# Patient Record
Sex: FEMALE | Race: WHITE | NOT HISPANIC OR LATINO | Employment: OTHER | URBAN - METROPOLITAN AREA
[De-identification: names, ages, dates, MRNs, and addresses within clinical notes are randomized per-mention and may not be internally consistent; named-entity substitution may affect disease eponyms.]

---

## 2017-01-16 DIAGNOSIS — I82.409 ACUTE EMBOLISM AND THROMBOSIS OF DEEP VEIN OF LOWER EXTREMITY (HCC): ICD-10-CM

## 2017-02-08 ENCOUNTER — GENERIC CONVERSION - ENCOUNTER (OUTPATIENT)
Dept: OTHER | Facility: OTHER | Age: 62
End: 2017-02-08

## 2017-02-27 DIAGNOSIS — I82.409 ACUTE EMBOLISM AND THROMBOSIS OF DEEP VEIN OF LOWER EXTREMITY (HCC): ICD-10-CM

## 2017-04-10 DIAGNOSIS — I82.409 ACUTE EMBOLISM AND THROMBOSIS OF DEEP VEIN OF LOWER EXTREMITY (HCC): ICD-10-CM

## 2017-06-09 ENCOUNTER — GENERIC CONVERSION - ENCOUNTER (OUTPATIENT)
Dept: OTHER | Facility: OTHER | Age: 62
End: 2017-06-09

## 2017-06-15 ENCOUNTER — ALLSCRIPTS OFFICE VISIT (OUTPATIENT)
Dept: OTHER | Facility: OTHER | Age: 62
End: 2017-06-15

## 2017-06-22 DIAGNOSIS — D68.59 OTHER PRIMARY THROMBOPHILIA (HCC): ICD-10-CM

## 2017-06-22 DIAGNOSIS — E78.5 HYPERLIPIDEMIA: ICD-10-CM

## 2017-06-22 DIAGNOSIS — E11.9 TYPE 2 DIABETES MELLITUS WITHOUT COMPLICATIONS (HCC): ICD-10-CM

## 2017-06-27 ENCOUNTER — ALLSCRIPTS OFFICE VISIT (OUTPATIENT)
Dept: OTHER | Facility: OTHER | Age: 62
End: 2017-06-27

## 2017-06-29 ENCOUNTER — APPOINTMENT (OUTPATIENT)
Dept: WOUND CARE | Facility: HOSPITAL | Age: 62
End: 2017-06-29
Payer: COMMERCIAL

## 2017-06-29 PROCEDURE — 99213 OFFICE O/P EST LOW 20 MIN: CPT

## 2017-07-07 ENCOUNTER — TRANSCRIBE ORDERS (OUTPATIENT)
Dept: WOUND CARE | Facility: HOSPITAL | Age: 62
End: 2017-07-07

## 2017-07-07 DIAGNOSIS — R60.0 LOCALIZED EDEMA: ICD-10-CM

## 2017-07-07 DIAGNOSIS — I87.2 UNSPECIFIED VENOUS (PERIPHERAL) INSUFFICIENCY: Primary | ICD-10-CM

## 2017-07-10 ENCOUNTER — HOSPITAL ENCOUNTER (OUTPATIENT)
Dept: ULTRASOUND IMAGING | Facility: HOSPITAL | Age: 62
Discharge: HOME | End: 2017-07-10
Attending: PODIATRIST
Payer: COMMERCIAL

## 2017-07-10 DIAGNOSIS — R60.0 LOCALIZED EDEMA: ICD-10-CM

## 2017-07-10 DIAGNOSIS — I87.2 UNSPECIFIED VENOUS (PERIPHERAL) INSUFFICIENCY: ICD-10-CM

## 2017-07-10 PROCEDURE — 93971 EXTREMITY STUDY: CPT

## 2017-07-13 ENCOUNTER — APPOINTMENT (OUTPATIENT)
Dept: WOUND CARE | Facility: HOSPITAL | Age: 62
End: 2017-07-13
Payer: COMMERCIAL

## 2017-07-13 PROCEDURE — 29580 STRAPPING UNNA BOOT: CPT

## 2017-07-20 ENCOUNTER — APPOINTMENT (OUTPATIENT)
Dept: WOUND CARE | Facility: HOSPITAL | Age: 62
End: 2017-07-20

## 2017-07-20 PROCEDURE — 99213 OFFICE O/P EST LOW 20 MIN: CPT

## 2017-07-20 PROCEDURE — 29580 STRAPPING UNNA BOOT: CPT

## 2017-07-25 ENCOUNTER — ALLSCRIPTS OFFICE VISIT (OUTPATIENT)
Dept: OTHER | Facility: OTHER | Age: 62
End: 2017-07-25

## 2017-07-25 ENCOUNTER — GENERIC CONVERSION - ENCOUNTER (OUTPATIENT)
Dept: OTHER | Facility: OTHER | Age: 62
End: 2017-07-25

## 2017-07-27 DIAGNOSIS — D68.59 OTHER PRIMARY THROMBOPHILIA (HCC): ICD-10-CM

## 2017-08-31 DIAGNOSIS — D68.59 OTHER PRIMARY THROMBOPHILIA (HCC): ICD-10-CM

## 2017-10-05 DIAGNOSIS — D68.59 OTHER PRIMARY THROMBOPHILIA (HCC): ICD-10-CM

## 2017-10-05 DIAGNOSIS — M79.604 PAIN OF RIGHT LEG: ICD-10-CM

## 2017-10-09 ENCOUNTER — GENERIC CONVERSION - ENCOUNTER (OUTPATIENT)
Dept: OTHER | Facility: OTHER | Age: 62
End: 2017-10-09

## 2017-10-27 ENCOUNTER — ALLSCRIPTS OFFICE VISIT (OUTPATIENT)
Dept: OTHER | Facility: OTHER | Age: 62
End: 2017-10-27

## 2017-10-28 NOTE — PROGRESS NOTES
Assessment  1  Hypercoagulable state (289 81) (D68 59)   2  Hyperlipidemia (272 4) (E78 5)   3  Hypertension (401 9) (I10)   4  Type 2 diabetes mellitus (250 00) (E11 9)   5  Leg pain, right (729 5) (M79 604)    Plan  Leg pain, right    · * XR TIBIA FIBULA 2 VIEW RIGHT; Status:Active; Requested DDS:27BTJ6067;   Need for influenza vaccination    · Fluzone Quadrivalent Intramuscular Suspension  Type 2 diabetes mellitus    · Begin a limited exercise program ; Status:Complete;   Done: 77FSB9607    Discussion/Summary  Possible side effects of new medications were reviewed with the patient/guardian today  The treatment plan was reviewed with the patient/guardian  The patient/guardian understands and agrees with the treatment plan      Chief Complaint  Patient is here today for follow up of chronic conditions described in HPI  History of Present Illness  She feel on her knees in September  Ever since, she has pain on the medical lower leg  It hurts to walk  The pain is getting worse  DM is well controlled on her current regimen  She has no hypoglycemia or side effects from her medications  ha a hypercoagulable state and is on lifelong anticoagulation  is using CPAP nightly  BP is well controlled on her current regimen  She has no HA or vision changes  She has no CP or SOB  lipids are at goal on her current regimen  She has no myalgia or muscle weakness  Review of Systems    Constitutional: No fever, no chills, feels well, no tiredness, no recent weight gain or weight loss  Eyes: No complaints of eye pain, no red eyes, no eyesight problems, no discharge, no dry eyes, no itching of eyes  ENT: no complaints of earache, no loss of hearing, no nose bleeds, no nasal discharge, no sore throat, no hoarseness  Cardiovascular: No complaints of slow heart rate, no fast heart rate, no chest pain, no palpitations, no leg claudication, no lower extremity edema     Respiratory: No complaints of shortness of breath, no wheezing, no cough, no SOB on exertion, no orthopnea, no PND  Gastrointestinal: No complaints of abdominal pain, no constipation, no nausea or vomiting, no diarrhea, no bloody stools  Genitourinary: No complaints of dysuria, no incontinence, no pelvic pain, no dysmenorrhea, no vaginal discharge or bleeding  Musculoskeletal: No complaints of arthralgias, no myalgias, no joint swelling or stiffness, no limb pain or swelling  Integumentary: No complaints of skin rash or lesions, no itching, no skin wounds, no breast pain or lump  Neurological: No complaints of headache, no confusion, no convulsions, no numbness, no dizziness or fainting, no tingling, no limb weakness, no difficulty walking  Psychiatric: Not suicidal, no sleep disturbance, no anxiety or depression, no change in personality, no emotional problems  Endocrine: No complaints of proptosis, no hot flashes, no muscle weakness, no deepening of the voice, no feelings of weakness  Hematologic/Lymphatic: No complaints of swollen glands, no swollen glands in the neck, does not bleed easily, does not bruise easily  Active Problems  1  Acute embolism and thrombosis of unspecified deep veins of unspecified lower   extremity (453 40) (I82 409)   2  Chronic venous hypertension w ulceration (459 31) (I87 319)   3  DVT (deep venous thrombosis) (453 40) (I82 409)   4  Edema (782 3) (R60 9)   5  Encounter for screening mammogram for breast cancer (V76 12) (Z12 31)   6  Esophageal reflux (530 81) (K21 9)   7  Denied: History of substance abuse   8  Homocystinemia (270 4) (E72 11)   9  Hypercoagulable state (289 81) (D68 59)   10  Hyperlipidemia (272 4) (E78 5)   11  Hypertension (401 9) (I10)   12  Denied: History of Mental health disorder   13  Morbid or severe obesity due to excess calories (278 01) (E66 01)   14  Muscle cramp, nocturnal (729 82) (R25 2)   15  Osteoporosis (733 00) (M81 0)   16   Polycystic ovarian syndrome (256 4) (E28 2) 17  Pulmonary hypertension, secondary (416 8)   18  Screen for colon cancer (V76 51) (Z12 11)   19  Skin rash (782 1) (R21)   20  Type 2 diabetes mellitus (250 00) (E11 9)    Past Medical History  1  Denied: History of substance abuse   2  Denied: History of Mental health disorder   3  History of Venous Thrombosis Of The Deep Vessels Of The Proximal Lower Extremity   (453 41)    Surgical History  1  History of Appendectomy   2  History of Biopsy Breast Open   3  History of Closed Treatment Of Fracture Of Metatarsal Bone(S) (Each)   4  History of Foot Surgery Right   5  History of Hemorrhoidectomy   6  History of Hysterectomy   7  History of Interruption Inferior Vena Cava Easton Filter Placement   8  History of Knee Replacement   9  History of Knee Replacement   10  History of Renal Lithotripsy    Family History  Mother    1  Family history of Primary Hypercoagulable State   2  Family history of Pulmonary Embolism  Maternal Grandmother    3  Family history of Primary Hypercoagulable State  Family History    4  Denied: Family history of mental disorder   5  Denied: Family history of substance abuse    Social History   · Denied: History of Alcohol   · Never A Smoker   · Unemployed (V62 0)    Current Meds   1  Alendronate Sodium 70 MG Oral Tablet; TAKE 1 TABLET ONCE WEEKLY Recorded   2  Amlodipine Besy-Benazepril HCl - 5-20 MG Oral Capsule; take 1 capsule daily; Therapy: 08USF5640 to (Evaluate:59Jty3034)  Requested for: 78Cvf8992; Last   Rx:71Rnc8484 Ordered   3  Breo Ellipta 100-25 MCG/INH Inhalation Aerosol Powder Breath Activated; USE 1   INHALATION ONCE DAILY; Therapy: 05Dmp7833 to (Evaluate:67Rvy0376); Last Rx:75Cqy9214 Ordered   4  Enoxaparin Sodium 40 MG/0 4ML Subcutaneous Solution; daily sq as directed; Therapy: 79Osl9814 to (Last Rx:10Bax9993) Ordered   5  Folbic 2 5-25-2 MG Oral Tablet; TAKE 1 TABLET Other Monday, wednesday and Friday;    Therapy: 35CAS7653 to (Chetna Catalan)  Requested for: 48ZBO9520; Last   Rx:09Mar2015 Ordered   6  Fosamax Plus D  MG-UNIT Oral Tablet; Therapy: (Recorded:33Lbp5014) to Recorded   7  Furosemide 20 MG Oral Tablet; Take 1 tablet daily; Therapy: 56AEP4721 to (Evaluate:89Yqv6805)  Requested for: 72Poo7579; Last   Rx:79Knb7293 Ordered   8  Lansoprazole 30 MG TBDP; Therapy: 39ROG3583 to (Last Rx:85Vsl8113)  Requested for: 92OST4130 Ordered   9  Loratadine 10 MG Oral Tablet; Therapy: (Recorded:15Eow3493) to Recorded   10  MetFORMIN HCl  MG Oral Tablet Extended Release 24 Hour; TAKE 4 TABLET    Bedtime; Therapy: 97RQA8866 to (Evaluate:74Cgh0120)  Requested for: 96Aqq8054; Last    Rx:60Noy6864 Ordered   11  Montelukast Sodium 10 MG Oral Tablet; Take 1 tablet daily  Requested for: 96Btt7092;    Last Rx:15Byj3257 Ordered   12  Multi-Day Vitamins TABS; Therapy: (Recorded:19Dlj0476) to Recorded   13  Nasonex 50 MCG/ACT Nasal Suspension; Therapy: (Recorded:20Nov2013) to Recorded   14  Nystatin 362047 UNIT/GM External Powder; APPLY 2-3 TIMES DAILY TO AFFECTED    AREA(S)  Requested for: 71UGP0199; Last MW:11QAD2563 Ordered   15  Pravastatin Sodium 40 MG Oral Tablet; Therapy: 22Quu7916 to (Last Rx:90Dqu7818)  Requested for: 38Gpc4929 Ordered   16  Vitamin D (Ergocalciferol) 84345 UNIT Oral Capsule; Therapy: 82EDT6054 to (Last Rx:30Mar2011)  Requested for: 30Mar2011 Ordered   17  Warfarin Sodium 5 MG Oral Tablet; TAKE 1 5 tabs daily or  as directed; Therapy: 41XOE3387 to (Evaluate:19Nov2017)  Requested for: 37JAY8630; Last    Rx:99Bod4260 Ordered   18  Zyrtec 10 MG TABS; take 1 tablet daily as needed Recorded    Allergies  1  Ciprofloxacin HCl TABS    Physical Exam    Constitutional   General appearance: No acute distress, well appearing and well nourished  Pulmonary   Respiratory effort: No increased work of breathing or signs of respiratory distress  Auscultation of lungs: Clear to auscultation      Cardiovascular   Auscultation of heart: Normal rate and rhythm, normal S1 and S2, without murmurs  Examination of extremities for edema and/or varicosities: Normal     Abdomen   Abdomen: Non-tender, no masses  Liver and spleen: No hepatomegaly or splenomegaly  Health Management  Encounter for screening mammogram for breast cancer   * MAMMO SCREENING BILATERAL W CAD; every 1 year; Last 57RFR8288; Next Due: 39WKG9024; Overdue  Screen for colon cancer   COLONOSCOPY; every 10 years; Last 10QMP0617; Next Due: 44IKC6416;  Overdue    Future Appointments    Date/Time Provider Specialty Site   12/14/2017 01:00 PM Sole Saldana MD [facility] [address] [place] [address]     Signatures   Electronically signed by : Yolanda Peña MD; Oct 27 2017  1:36PM EST                       (Author)

## 2017-11-09 DIAGNOSIS — D68.59 OTHER PRIMARY THROMBOPHILIA (HCC): ICD-10-CM

## 2017-12-11 ENCOUNTER — GENERIC CONVERSION - ENCOUNTER (OUTPATIENT)
Dept: OTHER | Facility: OTHER | Age: 62
End: 2017-12-11

## 2017-12-14 DIAGNOSIS — D68.59 OTHER PRIMARY THROMBOPHILIA (HCC): ICD-10-CM

## 2017-12-14 DIAGNOSIS — E72.11 HOMOCYSTINURIA (HCC): ICD-10-CM

## 2017-12-14 DIAGNOSIS — I82.409 ACUTE EMBOLISM AND THROMBOSIS OF DEEP VEIN OF LOWER EXTREMITY (HCC): ICD-10-CM

## 2017-12-26 ENCOUNTER — GENERIC CONVERSION - ENCOUNTER (OUTPATIENT)
Dept: OTHER | Facility: OTHER | Age: 62
End: 2017-12-26

## 2017-12-27 ENCOUNTER — GENERIC CONVERSION - ENCOUNTER (OUTPATIENT)
Dept: OTHER | Facility: OTHER | Age: 62
End: 2017-12-27

## 2017-12-28 ENCOUNTER — ALLSCRIPTS OFFICE VISIT (OUTPATIENT)
Dept: OTHER | Facility: OTHER | Age: 62
End: 2017-12-28

## 2018-01-02 LAB
LEFT EYE DIABETIC RETINOPATHY: NORMAL
RIGHT EYE DIABETIC RETINOPATHY: NORMAL

## 2018-01-09 NOTE — MISCELLANEOUS
Message  previously had left a message on [employer]'s machine for her to change her dosage than she called and stated she was taking Keflex so [employer] told her to remain on the same dosage she was previously taking and retest next week  Patient understood the instructions  Active Problems    1  Acute embolism and thrombosis of unspecified deep veins of unspecified lower   extremity (453 40) (I82 409)   2  Chronic venous hypertension w ulceration (459 31) (I87 319)   3  DVT (deep venous thrombosis) (453 40) (I82 409)   4  Edema (782 3) (R60 9)   5  Esophageal reflux (530 81) (K21 9)   6  Homocystinemia (270 4) (E72 11)   7  Hypercoagulable state (289 81) (D68 59)   8  Hyperlipidemia (272 4) (E78 5)   9  Hypertension (401 9) (I10)   10  Morbid or severe obesity due to excess calories (278 01) (E66 01)   11  Muscle cramp, nocturnal (729 82) (R25 2)   12  Osteoporosis (733 00) (M81 0)   13  Polycystic ovarian syndrome (256 4) (E28 2)   14  Pulmonary hypertension, secondary (416 8) (I27 2)   15  Skin rash (782 1) (R21)   16  Type 2 diabetes mellitus (250 00) (E11 9)    Current Meds   1  Alendronate Sodium 70 MG Oral Tablet; TAKE 1 TABLET ONCE WEEKLY Recorded   2  Amlodipine Besy-Benazepril HCl - 5-20 MG Oral Capsule; Therapy: 92EBP4198 to (Last Rx:36Gwm3258)  Requested for: 64MSC2525 Ordered   3  Breo Ellipta 100-25 MCG/INH Inhalation Aerosol Powder Breath Activated; USE 1   INHALATION ONCE DAILY; Therapy: 85Sgw0624 to (Evaluate:75Nzs4817); Last Rx:63Aqp7441 Ordered   4  Breo Ellipta 100-25 MCG/INH Inhalation Aerosol Powder Breath Activated; Therapy: (Recorded:05Oct2016) to Recorded   5  Enoxaparin Sodium 40 MG/0 4ML Subcutaneous Solution; daily sq as directed; Therapy: 31Rwn4724 to (Last Rx:15Nsj9833) Ordered   6  Enoxaparin Sodium 40 MG/0 4ML Subcutaneous Solution; daily sq as directed; Therapy: 56Xfo1279 to (Last Rx:36Fem1454) Ordered   7   Folbic 2 5-25-2 MG Oral Tablet; TAKE 1 TABLET Other Monday, wednesday and Friday; Therapy: 26GCE4264 to (Leanna Ferris)  Requested for: 34VYS6122; Last   Rx:09Mar2015 Ordered   8  Fosamax Plus D  MG-UNIT Oral Tablet; Therapy: (Recorded:79Zfd6202) to Recorded   9  Lansoprazole 30 MG TBDP; Therapy: 92UGB9750 to (Last Rx:72Wha9344)  Requested for: 86KPK8122 Ordered   10  Loratadine 10 MG Oral Tablet; Therapy: (Recorded:16Gtp0015) to Recorded   11  MetFORMIN HCl  MG Oral Tablet Extended Release 24 Hour; Therapy: 74JOJ9622 to (Last Rx:30Mar2011)  Requested for: 85YQX3100 Ordered   12  Multi-Day Vitamins TABS; Therapy: (Recorded:93Axp3095) to Recorded   13  Nasonex 50 MCG/ACT Nasal Suspension (Mometasone Furoate); Therapy: (Recorded:20Nov2013) to Recorded   14  Nystatin 952712 UNIT/GM External Powder; APPLY 2-3 TIMES DAILY TO AFFECTED    AREA(S)  Requested for: 61YQE0550; Last RN:95RLS6503 Ordered   15  Pravastatin Sodium 40 MG Oral Tablet; Therapy: 21Vdd4543 to (Last Rx:16Yjk7649)  Requested for: 10Thb2806 Ordered   16  Singulair 10 MG Oral Tablet (Montelukast Sodium); Take 1 tablet daily Recorded   17  Vitamin D (Ergocalciferol) 75311 UNIT Oral Capsule; Therapy: 03ZTL7802 to (Last Rx:30Mar2011)  Requested for: 41NXC7982 Ordered   18  Warfarin Sodium 5 MG Oral Tablet; TAKE 1 5 tabs daily or  as directed; Therapy: 70CRX5576 to (Evaluate:19Nov2017)  Requested for: 84WEF8651; Last    Rx:71Toc4063 Ordered   19  Zyrtec 10 MG TABS; take 1 tablet daily as needed Recorded    Allergies    1   Ciprofloxacin HCl TABS    Signatures   Electronically signed by : Margery Shone, MA; Jun 9 2017  2:04PM EST                       (Author)

## 2018-01-10 NOTE — MISCELLANEOUS
Message   Recorded as Task   Date: 06/08/2017 03:59 PM, Created By: Fercho Downing   Task Name: Call Back   Assigned To: Krissy Yarbrough   Regarding Patient: José Miguel De Souza, Status: In Progress   Natty Yun - 08 Jun 2017 3:59 PM     TASK CREATED  Caller: Self; Results Inquiry; [phone] (Home)  req that we call her back with the results of her coumadin that was taken yesterday 6 7 17 at [facility]  Call back # [phone]   [facility] - 08 Jun 2017 4:30 PM     TASK IN PROGRESS   [facility] - 09 Jun 2017 10:56 AM     TASK REPLIED TO: Previously Assigned To [facility]  called patient and left voicemail on new instructions  call noted      Active Problems    1  Acute embolism and thrombosis of unspecified deep veins of unspecified lower   extremity (453 40) (I82 409)   2  Chronic venous hypertension w ulceration (459 31) (I87 319)   3  DVT (deep venous thrombosis) (453 40) (I82 409)   4  Edema (782 3) (R60 9)   5  Esophageal reflux (530 81) (K21 9)   6  Homocystinemia (270 4) (E72 11)   7  Hypercoagulable state (289 81) (D68 59)   8  Hyperlipidemia (272 4) (E78 5)   9  Hypertension (401 9) (I10)   10  Morbid or severe obesity due to excess calories (278 01) (E66 01)   11  Muscle cramp, nocturnal (729 82) (R25 2)   12  Osteoporosis (733 00) (M81 0)   13  Polycystic ovarian syndrome (256 4) (E28 2)   14  Pulmonary hypertension, secondary (416 8) (I27 2)   15  Skin rash (782 1) (R21)   16  Type 2 diabetes mellitus (250 00) (E11 9)    Current Meds   1  Alendronate Sodium 70 MG Oral Tablet; TAKE 1 TABLET ONCE WEEKLY Recorded   2  Amlodipine Besy-Benazepril HCl - 5-20 MG Oral Capsule; Therapy: 11ELZ6013 to (Last Rx:57Cxl3781)  Requested for: 95QUQ9592 Ordered   3  Breo Ellipta 100-25 MCG/INH Inhalation Aerosol Powder Breath Activated; USE 1   INHALATION ONCE DAILY; Therapy: 87Nfl2705 to (Evaluate:59Bwm8087); Last Rx:69Aba5683 Ordered   4   Breo Ellipta 100-25 MCG/INH Inhalation Aerosol Powder Breath Activated; Therapy: (Recorded:71Amp7476) to Recorded   5  Enoxaparin Sodium 40 MG/0 4ML Subcutaneous Solution; daily sq as directed; Therapy: 82Cbf4572 to (Last Rx:25Apr2016) Ordered   6  Enoxaparin Sodium 40 MG/0 4ML Subcutaneous Solution; daily sq as directed; Therapy: 97Xhn8328 to (Last Rx:08Pte6055) Ordered   7  Folbic 2 5-25-2 MG Oral Tablet; TAKE 1 TABLET Other Monday, wednesday and Friday; Therapy: 50ORH2559 to (Andrés [place])  Requested for: 69HGC5561; Last   Rx:09Mar2015 Ordered   8  Fosamax Plus D  MG-UNIT Oral Tablet; Therapy: (Recorded:00Zxq8829) to Recorded   9  Lansoprazole 30 MG TBDP; Therapy: 05XFF1392 to (Last Rx:36Air1185)  Requested for: 65XLA3505 Ordered   10  Loratadine 10 MG Oral Tablet; Therapy: (Recorded:13Uko9389) to Recorded   11  MetFORMIN HCl  MG Oral Tablet Extended Release 24 Hour; Therapy: 67JCU0318 to (Last Rx:30Mar2011)  Requested for: 55EKX5453 Ordered   12  Multi-Day Vitamins TABS; Therapy: (Recorded:93Fhr0268) to Recorded   13  Nasonex 50 MCG/ACT Nasal Suspension (Mometasone Furoate); Therapy: (Recorded:20Nov2013) to Recorded   14  Nystatin 668003 UNIT/GM External Powder; APPLY 2-3 TIMES DAILY TO AFFECTED    AREA(S)  Requested for: 32UNH6871; Last OV:67THS5920 Ordered   15  Pravastatin Sodium 40 MG Oral Tablet; Therapy: 79Mxi0793 to (Last Rx:13Apr2011)  Requested for: 62Emt2133 Ordered   16  Singulair 10 MG Oral Tablet (Montelukast Sodium); Take 1 tablet daily Recorded   17  Vitamin D (Ergocalciferol) 53613 UNIT Oral Capsule; Therapy: 51VPJ6369 to (Last Rx:30Mar2011)  Requested for: 48XEH8669 Ordered   18  Warfarin Sodium 5 MG Oral Tablet; TAKE 1 5 tabs daily or  as directed; Therapy: 86GCE1676 to (Evaluate:19Nov2017)  Requested for: 28NLM1314; Last    Rx:13Ehk1164 Ordered   19  Zyrtec 10 MG TABS; take 1 tablet daily as needed Recorded    Allergies    1   Ciprofloxacin HCl TABS    Signatures   Electronically signed by : Jacinto Delacruz MA; Jun 9 2017 10:56AM EST                       (Author)

## 2018-01-11 ENCOUNTER — GENERIC CONVERSION - ENCOUNTER (OUTPATIENT)
Dept: OTHER | Facility: OTHER | Age: 63
End: 2018-01-11

## 2018-01-11 NOTE — MISCELLANEOUS
Message   Recorded as Task   Date: 06/28/2016 03:30 PM, Created By: Osvaldo Stein   Task Name: Call Back   Assigned To: Cherl Libman   Regarding Patient: Heidi Ramirez, Status: In Progress   Comment:    Jessica Parekh - 28 Jun 2016 3:30 PM     TASK CREATED  Caller: Self; Other; [phone] (Home)  RE HER DENTIST APPT FOR TEETH EXTRACTION; AFTER SHE STOPS HER CUMIDIN, DOES SHE HAVE TO GO FOR BLOODWORK BEFORE HER DENTIST APPT? ALSO, WHEN WILL SHE START HER LOVONOX SHOTS? PLEASE CALL WITH CLARIFICATION, TXClarissa Ramirez - 29 Jun 2016 9:42 AM     TASK IN PROGRESS   Spoke with dentist Dr Thien Greene yesterday afternoon about Coumadin and Lovenox bridge  Dental appt changed to 7/6/16  Spoke with pt today and instructed last dose of Coumadin Thurs 6/30/16  Start Lovenox Fri 7/1/16 through Mon 7/4/16  Nothing Tues 7/5/16  Have dentist instruct on restarting after procedure based on status post procedure  Called Dr Thien Greene at [phone] to provide update but no answer or VM  Active Problems    1  Chronic venous hypertension w ulceration (459 31) (I87 319)   2  DVT, lower extremity (453 40) (I82 409)   3  Edema (782 3) (R60 9)   4  Esophageal reflux (530 81) (K21 9)   5  Homocystinemia (270 4) (E72 11)   6  Hypercoagulable state (289 81) (D68 59)   7  Hyperlipidemia (272 4) (E78 5)   8  Hypertension (401 9) (I10)   9  Morbid or severe obesity due to excess calories (278 01) (E66 01)   10  Muscle cramp, nocturnal (729 82) (R25 2)   11  Osteoporosis (733 00) (M81 0)   12  Polycystic ovarian syndrome (256 4) (E28 2)   13  Skin rash (782 1) (R21)   14  Type 2 diabetes mellitus (250 00) (E11 9)    Current Meds   1  Alendronate Sodium 70 MG Oral Tablet; TAKE 1 TABLET ONCE WEEKLY Recorded   2  Amlodipine Besy-Benazepril HCl - 5-20 MG Oral Capsule; Therapy: 83XWS8712 to (Last Rx:19Nfi1061)  Requested for: 53BCC1299 Ordered   3  Enoxaparin Sodium 40 MG/0 4ML Subcutaneous Solution; daily sq as directed;    Therapy: 23Nzl5635 to (Last Rx:25Apr2016) Ordered   4  Folbic 2 5-25-2 MG Oral Tablet; TAKE 1 TABLET Other Monday, wednesday and Friday; Therapy: 50PLL3068 to (Judie Eisenmenger)  Requested for: 57JTS4336; Last   Rx:09Mar2015 Ordered   5  Fosamax Plus D  MG-UNIT Oral Tablet; Therapy: (Recorded:57Oyq4487) to Recorded   6  Lansoprazole 30 MG TBDP; Therapy: 86PFB6629 to (Last Rx:05Feb2011)  Requested for: 73WFV8585 Ordered   7  Loratadine 10 MG Oral Tablet; Therapy: (Recorded:11Jaf2322) to Recorded   8  MetFORMIN HCl  MG Oral Tablet Extended Release 24 Hour; Therapy: 04DCP9664 to (Last Rx:30Mar2011)  Requested for: 54PPS1713 Ordered   9  Multi-Day Vitamins Oral Tablet; Therapy: (Recorded:51Rer4935) to Recorded   10  Nasonex 50 MCG/ACT Nasal Suspension (Mometasone Furoate); Therapy: (Recorded:20Nov2013) to Recorded   11  Nystatin 753000 UNIT/GM External Powder; APPLY 2-3 TIMES DAILY TO AFFECTED    AREA(S)  Requested for: 62QMW5441; Last Rx:23Jun2015 Ordered   12  Pravastatin Sodium 40 MG Oral Tablet; Therapy: 13Apr2011 to (Last Rx:13Apr2011)  Requested for: 04Ppz5835 Ordered   13  Singulair 10 MG Oral Tablet (Montelukast Sodium); Take 1 tablet daily Recorded   14  Vitamin D (Ergocalciferol) 25714 UNIT Oral Capsule; Therapy: 17BYY9288 to (Last Rx:30Mar2011)  Requested for: 79LXK4695 Ordered   15  Warfarin Sodium 5 MG Oral Tablet; 1 5 tabs daily or as directed; Therapy: 01YLA0271 to (Last Rx:15Jhc0777)  Requested for: 56GIQ0632 Ordered   16  Zyrtec 10 MG TABS; take 1 tablet daily as needed Recorded    Allergies    1   Ciprofloxacin HCl TABS    Signatures   Electronically signed by : Nerissa Andino RN; Jun 29 2016  1:42PM EST                       (Author)

## 2018-01-12 VITALS
WEIGHT: 293 LBS | HEIGHT: 63 IN | DIASTOLIC BLOOD PRESSURE: 74 MMHG | SYSTOLIC BLOOD PRESSURE: 130 MMHG | HEART RATE: 75 BPM | BODY MASS INDEX: 51.91 KG/M2 | RESPIRATION RATE: 20 BRPM | OXYGEN SATURATION: 96 %

## 2018-01-13 VITALS
OXYGEN SATURATION: 98 % | HEART RATE: 81 BPM | HEIGHT: 63 IN | WEIGHT: 293 LBS | RESPIRATION RATE: 20 BRPM | SYSTOLIC BLOOD PRESSURE: 138 MMHG | BODY MASS INDEX: 51.91 KG/M2 | DIASTOLIC BLOOD PRESSURE: 82 MMHG

## 2018-01-13 NOTE — MISCELLANEOUS
Message  Called PT 3 times and left VM regarding her INR  I advised her to call the office at her [facility] with any questions or concerns  Active Problems    1  Chronic venous hypertension w ulceration (459 31) (I87 319)   2  DVT, lower extremity (453 40) (I82 409)   3  Edema (782 3) (R60 9)   4  Esophageal reflux (530 81) (K21 9)   5  Homocystinemia (270 4) (E72 11)   6  Hypercoagulable state (289 81) (D68 59)   7  Hyperlipidemia (272 4) (E78 5)   8  Hypertension (401 9) (I10)   9  Morbid or severe obesity due to excess calories (278 01) (E66 01)   10  Muscle cramp, nocturnal (729 82) (R25 2)   11  Osteoporosis (733 00) (M81 0)   12  Polycystic ovarian syndrome (256 4) (E28 2)   13  Skin rash (782 1) (R21)   14  Type 2 diabetes mellitus (250 00) (E11 9)    Current Meds   1  Alendronate Sodium 70 MG Oral Tablet; TAKE 1 TABLET ONCE WEEKLY Recorded   2  Amlodipine Besy-Benazepril HCl - 5-20 MG Oral Capsule; Therapy: 38DMV7345 to (Last Rx:26Pea0302)  Requested for: 26BKW4017 Ordered   3  Enoxaparin Sodium 40 MG/0 4ML Subcutaneous Solution; daily sq as directed; Therapy: 90Mnw0336 to (Last Rx:25Apr2016) Ordered   4  Folbic 2 5-25-2 MG Oral Tablet; TAKE 1 TABLET Other Monday, wednesday and Friday; Therapy: 21OQS6818 to (Anila Ceja)  Requested for: 03DTM5135; Last   Rx:09Mar2015 Ordered   5  Fosamax Plus D  MG-UNIT Oral Tablet; Therapy: (Recorded:23Lev2089) to Recorded   6  Lansoprazole 30 MG TBDP; Therapy: 29VRR9005 to (Last Rx:84Hzy4003)  Requested for: 33KNE9400 Ordered   7  Loratadine 10 MG Oral Tablet; Therapy: (Recorded:82Ney9881) to Recorded   8  MetFORMIN HCl  MG Oral Tablet Extended Release 24 Hour; Therapy: 82QTF4667 to (Last Rx:30Mar2011)  Requested for: 11MQY8409 Ordered   9  Multi-Day Vitamins Oral Tablet; Therapy: (Recorded:70Pai9973) to Recorded   10  Nasonex 50 MCG/ACT Nasal Suspension (Mometasone Furoate);     Therapy: (Recorded:20Nov2013) to Recorded 11  Nystatin 259027 UNIT/GM External Powder; APPLY 2-3 TIMES DAILY TO AFFECTED    AREA(S)  Requested for: 21WWK6533; Last Rx:23Jun2015 Ordered   12  Pravastatin Sodium 40 MG Oral Tablet; Therapy: 13Apr2011 to (Last Rx:13Apr2011)  Requested for: 13Apr2011 Ordered   13  Singulair 10 MG Oral Tablet (Montelukast Sodium); Take 1 tablet daily Recorded   14  Vitamin D (Ergocalciferol) 62336 UNIT Oral Capsule; Therapy: 85PUZ6486 to (Last Rx:30Mar2011)  Requested for: 25SWO8525 Ordered   15  Warfarin Sodium 5 MG Oral Tablet; 1 5 tabs daily or as directed; Therapy: 53VKX3593 to (Last Rx:99Wfh6118)  Requested for: 94LNO6575 Ordered   16  Zyrtec 10 MG TABS; take 1 tablet daily as needed Recorded    Allergies    1   Ciprofloxacin HCl TABS    Signatures   Electronically signed by : Shanita Huggins, ; May 12 2016  2:08PM EST                       (Author)

## 2018-01-14 VITALS
BODY MASS INDEX: 51.91 KG/M2 | RESPIRATION RATE: 20 BRPM | DIASTOLIC BLOOD PRESSURE: 76 MMHG | HEIGHT: 63 IN | TEMPERATURE: 97.6 F | WEIGHT: 293 LBS | HEART RATE: 86 BPM | OXYGEN SATURATION: 97 % | SYSTOLIC BLOOD PRESSURE: 130 MMHG

## 2018-01-14 NOTE — MISCELLANEOUS
Message   Recorded as Task   Date: 12/05/2016 12:08 PM, Created By: Edwin Pierre   Task Name: Call Back   Assigned To: Clarissa Parr   Regarding Patient: Mónica Duggan, Status: Active   Comment:    Jessica Parekh - 05 Dec 2016 12:08 PM     TASK CREATED  Caller: Self; Other; [phone] (Home)  FORGOT TO ASK DR RUSH:  PT WANTS TO KNOW IF SHE WAS EVER TESTED FOR HEP C? IF NOT, SHOULD SHE BE?  SHE HAS SEEN ALOT OF ADS ON TV ABOUT "BABY [employer]OMERS" BEING TESTED AND HER SISTER HAD PASSED WITH ONE OF HER COMPLICATIONS BEING HEP C  PLEASE CALL TO ADVISE  TXS   D/W Dr Chad Nelson called pt  Reached VM  Left message that Dr Chad Nelson has never ordered Hep C and does not know if another MD ordered  Can give you a lab slip but not sure if insurance will as can only provide dx that you are seen for here  Another option is to check with PCP  Active Problems    1  Acute embolism and thrombosis of unspecified deep veins of unspecified lower   extremity (453 40) (I82 409)   2  Chronic venous hypertension w ulceration (459 31) (I87 319)   3  DVT (deep venous thrombosis) (453 40) (I82 409)   4  Edema (782 3) (R60 9)   5  Esophageal reflux (530 81) (K21 9)   6  Homocystinemia (270 4) (E72 11)   7  Hypercoagulable state (289 81) (D68 59)   8  Hyperlipidemia (272 4) (E78 5)   9  Hypertension (401 9) (I10)   10  Morbid or severe obesity due to excess calories (278 01) (E66 01)   11  Muscle cramp, nocturnal (729 82) (R25 2)   12  Osteoporosis (733 00) (M81 0)   13  Polycystic ovarian syndrome (256 4) (E28 2)   14  Pulmonary hypertension, secondary (416 8) (I27 2)   15  Skin rash (782 1) (R21)   16  Type 2 diabetes mellitus (250 00) (E11 9)    Current Meds   1  Alendronate Sodium 70 MG Oral Tablet; TAKE 1 TABLET ONCE WEEKLY Recorded   2  Amlodipine Besy-Benazepril HCl - 5-20 MG Oral Capsule; Therapy: 21XAS4336 to (Last Rx:54Rov5584)  Requested for: 40YIU7989 Ordered   3   Breo Ellipta 100-25 MCG/INH Inhalation Aerosol Powder Breath Activated; USE 1   INHALATION ONCE DAILY; Therapy: 86Doy1014 to (Evaluate:44Off7002); Last Rx:56Imf0850 Ordered   4  Breo Ellipta 100-25 MCG/INH Inhalation Aerosol Powder Breath Activated; Therapy: (Recorded:05Oct2016) to Recorded   5  Enoxaparin Sodium 40 MG/0 4ML Subcutaneous Solution; daily sq as directed; Therapy: 93Qka5124 to (Last Rx:25Apr2016) Ordered   6  Enoxaparin Sodium 40 MG/0 4ML Subcutaneous Solution; daily sq as directed; Therapy: 96Sqa3645 to (Last Rx:51Wtv9765) Ordered   7  Folbic 2 5-25-2 MG Oral Tablet; TAKE 1 TABLET Other Monday, wednesday and Friday; Therapy: 96UGS3653 to (Fahad [place])  Requested for: 84XRZ3952; Last   Rx:09Mar2015 Ordered   8  Fosamax Plus D  MG-UNIT Oral Tablet; Therapy: (Recorded:73Man0502) to Recorded   9  Lansoprazole 30 MG TBDP; Therapy: 03PDR2060 to (Last Rx:17Mew8175)  Requested for: 66GQB2236 Ordered   10  Loratadine 10 MG Oral Tablet; Therapy: (Recorded:08Ehq7576) to Recorded   11  MetFORMIN HCl  MG Oral Tablet Extended Release 24 Hour; Therapy: 32BXY9980 to (Last Rx:30Mar2011)  Requested for: 51IDH4630 Ordered   12  Multi-Day Vitamins TABS; Therapy: (Recorded:59Opz0009) to Recorded   13  Nasonex 50 MCG/ACT Nasal Suspension (Mometasone Furoate); Therapy: (Recorded:20Nov2013) to Recorded   14  Nystatin 755358 UNIT/GM External Powder; APPLY 2-3 TIMES DAILY TO AFFECTED    AREA(S)  Requested for: 84VXQ1575; Last TR:12GXE9761 Ordered   15  Pravastatin Sodium 40 MG Oral Tablet; Therapy: 13Apr2011 to (Last Rx:13Apr2011)  Requested for: 13Apr2011 Ordered   16  Singulair 10 MG Oral Tablet (Montelukast Sodium); Take 1 tablet daily Recorded   17  Vitamin D (Ergocalciferol) 79455 UNIT Oral Capsule; Therapy: 31XTJ9940 to (Last Rx:30Mar2011)  Requested for: 34AHN4006 Ordered   18  Warfarin Sodium 5 MG Oral Tablet; TAKE 1 5 tabs daily or  as directed;     Therapy: 32QEG6396 to (Evaluate:22Ayz0917)  Requested for: 59Qyn5735; Last    Rx:38Cio1692 Ordered   19  Zyrtec 10 MG TABS; take 1 tablet daily as needed Recorded    Allergies    1   Ciprofloxacin HCl TABS    Signatures   Electronically signed by : Nerissa Andino RN; Dec  5 2016  1:04PM EST                       (Author)

## 2018-01-16 NOTE — MISCELLANEOUS
Message   Recorded as Task   Date: 01/11/2016 11:23 AM, Created By: Joanie Hamilton   Task Name: Call Back   Assigned To: Kaci Olivia   Regarding Patient: Whitney Mcconnell, Status: Active   CommentGillermina Pert - 11 Jan 2016 11:23 AM     TASK CREATED  please contact Dr Isabella Meckel @ [phone] about extracting teeth on pt  Dr Quick Never given message to contact Dr Isabella Meckel  Active Problems    1  Chronic venous hypertension w ulceration (459 31) (I87 319)   2  Edema (782 3) (R60 9)   3  Esophageal reflux (530 81) (K21 9)   4  Homocystinemia (270 4) (E72 11)   5  Hypercoagulable state (289 81) (D68 59)   6  Hyperlipidemia (272 4) (E78 5)   7  Hypertension (401 9) (I10)   8  Morbid or severe obesity due to excess calories (278 01) (E66 01)   9  Muscle cramp, nocturnal (729 82) (R25 2)   10  Osteoporosis (733 00) (M81 0)   11  Polycystic ovarian syndrome (256 4) (E28 2)   12  Skin rash (782 1) (R21)   13  Type 2 diabetes mellitus (250 00) (E11 9)    Current Meds   1  Alendronate Sodium 70 MG Oral Tablet; TAKE 1 TABLET ONCE WEEKLY Recorded   2  Amlodipine Besy-Benazepril HCl - 5-20 MG Oral Capsule; Therapy: 50VNS5661 to (Last Rx:51Jso8414)  Requested for: 83NAY7960 Ordered   3  Folbic 2 5-25-2 MG Oral Tablet; TAKE 1 TABLET Other Monday, wednesday and Friday; Therapy: 27XUE7830 to (Anila Ceja)  Requested for: 53NWN1975; Last   Rx:09Mar2015 Ordered   4  Fosamax Plus D  MG-UNIT Oral Tablet; Therapy: (Recorded:38Bte6283) to Recorded   5  Lansoprazole 30 MG TBDP; Therapy: 91BCR5571 to (Last Rx:59Rtf2442)  Requested for: 34QXJ9472 Ordered   6  Loratadine 10 MG Oral Tablet; Therapy: (Recorded:40Eak7632) to Recorded   7  MetFORMIN HCl  MG Oral Tablet Extended Release 24 Hour; Therapy: 07SIQ4435 to (Last Rx:30Mar2011)  Requested for: 06RGM1965 Ordered   8  Multi-Day Vitamins Oral Tablet; Therapy: (Recorded:70Yyj6127) to Recorded   9  Nasonex 50 MCG/ACT Nasal Suspension;    Therapy: (Recorded:20Nov2013) to Recorded   10  Nystatin 338319 UNIT/GM External Powder; APPLY 2-3 TIMES DAILY TO AFFECTED    AREA(S)  Requested for: 72OLK5840; Last Rx:23Jun2015 Ordered   11  Pravastatin Sodium 40 MG Oral Tablet; Therapy: 13Apr2011 to (Last Rx:13Apr2011)  Requested for: 13Apr2011 Ordered   12  Singulair 10 MG Oral Tablet (Montelukast Sodium); Take 1 tablet daily Recorded   13  Vitamin D (Ergocalciferol) 35994 UNIT Oral Capsule; Therapy: 94TQH9875 to (Last Rx:30Mar2011)  Requested for: 66VDU8974 Ordered   14  Warfarin Sodium 5 MG Oral Tablet; 1 5 tabs daily or as directed; Therapy: 70JRM2572 to (Last Rx:01Sep2015)  Requested for: 51Mkw5381 Ordered   15  Zyrtec 10 MG TABS; take 1 tablet daily as needed Recorded    Allergies    1   Ciprofloxacin HCl TABS    Signatures   Electronically signed by : Radha Yanez RN; Jan 11 2016  4:29PM EST                       (Author)

## 2018-01-16 NOTE — MISCELLANEOUS
Message  Called and left a voice message on patient's home answering machine regarding her 2/8/17 PT/INR results  Patient's INR was 3 46  Per Dr Giancarlo Elliott, patient was instructed to hold coumadin dose tonight and then to call the office back in the morning  Patient held coumadin dose on 2/8/17 & 2/9/17  Per Dr Giancarlo Elliott, patient is to start 5 mg coumadin Monday through Friday and 7 5 mg coumadin Sat and Sunday  Patient can start today  Patient is to have her PT/INR rechecked in 2 weeks time  1        1 Amended By: Yuko Dinero; Feb 10 2017 2:15 PM EST    Active Problems   1  Acute embolism and thrombosis of unspecified deep veins of unspecified lower   extremity (453 40) (I82 409)  2  Chronic venous hypertension w ulceration (459 31) (I87 319)  3  DVT (deep venous thrombosis) (453 40) (I82 409)  4  Edema (782 3) (R60 9)  5  Esophageal reflux (530 81) (K21 9)  6  Homocystinemia (270 4) (E72 11)  7  Hypercoagulable state (289 81) (D68 59)  8  Hyperlipidemia (272 4) (E78 5)  9  Hypertension (401 9) (I10)  10  Morbid or severe obesity due to excess calories (278 01) (E66 01)  11  Muscle cramp, nocturnal (729 82) (R25 2)  12  Osteoporosis (733 00) (M81 0)  13  Polycystic ovarian syndrome (256 4) (E28 2)  14  Pulmonary hypertension, secondary (416 8) (I27 2)  15  Skin rash (782 1) (R21)  16  Type 2 diabetes mellitus (250 00) (E11 9)    Current Meds  1  Alendronate Sodium 70 MG Oral Tablet; TAKE 1 TABLET ONCE WEEKLY Recorded  2  Amlodipine Besy-Benazepril HCl - 5-20 MG Oral Capsule; Therapy: 40QGK3123 to (Last Rx:83Deb2301)  Requested for: 23IOT9543 Ordered  3  Breo Ellipta 100-25 MCG/INH Inhalation Aerosol Powder Breath Activated; USE 1   INHALATION ONCE DAILY; Therapy: 65Yno9307 to (Evaluate:25Idr3662); Last Rx:73Sgt5166 Ordered  4  Breo Ellipta 100-25 MCG/INH Inhalation Aerosol Powder Breath Activated; Therapy: (Recorded:05Oct2016) to Recorded  5   Enoxaparin Sodium 40 MG/0 4ML Subcutaneous Solution; daily sq as directed; Therapy: 77Ddo9537 to (Last Rx:25Apr2016) Ordered  6  Enoxaparin Sodium 40 MG/0 4ML Subcutaneous Solution; daily sq as directed; Therapy: 97Wqf1666 to (Last Rx:59Nwv8953) Ordered  7  Folbic 2 5-25-2 MG Oral Tablet; TAKE 1 TABLET Other Monday, wednesday and Friday; Therapy: 28LFD8079 to (Fahad [place])  Requested for: 01FNB7639; Last   Rx:09Mar2015 Ordered  8  Fosamax Plus D  MG-UNIT Oral Tablet; Therapy: (Recorded:70Oqv7440) to Recorded  9  Lansoprazole 30 MG TBDP; Therapy: 51XOP9495 to (Last Rx:78Pkh4327)  Requested for: 54RSE0968 Ordered  10  Loratadine 10 MG Oral Tablet; Therapy: (Recorded:52Pub4922) to Recorded  11  MetFORMIN HCl  MG Oral Tablet Extended Release 24 Hour; Therapy: 10QTH2128 to (Last Rx:30Mar2011)  Requested for: 24VBA8701 Ordered  12  Multi-Day Vitamins TABS; Therapy: (Recorded:81Xnt2974) to Recorded  13  Nasonex 50 MCG/ACT Nasal Suspension (Mometasone Furoate); Therapy: (Recorded:20Nov2013) to Recorded  14  Nystatin 611288 UNIT/GM External Powder; APPLY 2-3 TIMES DAILY TO AFFECTED    AREA(S)  Requested for: 85PHC5504; Last QY:74SBC2310 Ordered  15  Pravastatin Sodium 40 MG Oral Tablet; Therapy: 91Pgm1791 to (Last Rx:13Apr2011)  Requested for: 60Wrq9141 Ordered  16  Singulair 10 MG Oral Tablet (Montelukast Sodium); Take 1 tablet daily Recorded  17  Vitamin D (Ergocalciferol) 62446 UNIT Oral Capsule; Therapy: 58ZKK9333 to (Last Rx:30Mar2011)  Requested for: 16HLR6740 Ordered  18  Warfarin Sodium 5 MG Oral Tablet; TAKE 1 5 tabs daily or  as directed; Therapy: 80JWQ2156 to (Evaluate:17Uej6763)  Requested for: 79Ipi5063; Last    Rx:09Tvl9252 Ordered  19  Zyrtec 10 MG TABS; take 1 tablet daily as needed Recorded    Allergies   1  Ciprofloxacin HCl TABS    Signatures   Electronically signed by :  Gabriela Bronson RN; Feb 10 2017  2:17PM EST                       (Author)

## 2018-01-16 NOTE — MISCELLANEOUS
Message  4/11/16: PT/INR 3 29  Contacted patient  She is taking 7 5 mg Coumadin daily  She will skip Coumadin tonight  New Coumadin dose 7 5 mg Monday through Thursday starting tomorrow  5 mg Fridays and Saturdays and Sundays  Recheck PT/INR on 4/18/16  Patient does not want to be switched over to new anticoagulation drugs  Discussed        Signatures   Electronically signed by : Paco Robb MD; Apr 11 2016  6:19PM EST                       (Author)

## 2018-01-18 NOTE — MISCELLANEOUS
Message   Recorded as Task   Date: 06/28/2016 11:33 AM, Created By: Bryon Costello   Task Name: Call Back   Assigned To: Ramiro Luther   Regarding Patient: Jessi Hall, Status: Active   CommentOriana Pillow - 28 Jun 2016 11:33 AM     TASK CREATED  [place] called in reguards to getting her teeth pulled friday 6/30 at 9:00am, wanting to know when to start her Lovenox    [phone]   Spoke with pt then d/w Dr Aaron Banda  Called pt back and informed Dr Aaron Banda would have preferred 5 days off Coumadin to Lovenox bridge  Informed could bridge now but would need STAT PT/INR prior to dental extraction  Since appt at 0900, pt will reschedule and provide office more notice to bridge  Active Problems    1  Chronic venous hypertension w ulceration (459 31) (I87 319)   2  DVT, lower extremity (453 40) (I82 409)   3  Edema (782 3) (R60 9)   4  Esophageal reflux (530 81) (K21 9)   5  Homocystinemia (270 4) (E72 11)   6  Hypercoagulable state (289 81) (D68 59)   7  Hyperlipidemia (272 4) (E78 5)   8  Hypertension (401 9) (I10)   9  Morbid or severe obesity due to excess calories (278 01) (E66 01)   10  Muscle cramp, nocturnal (729 82) (R25 2)   11  Osteoporosis (733 00) (M81 0)   12  Polycystic ovarian syndrome (256 4) (E28 2)   13  Skin rash (782 1) (R21)   14  Type 2 diabetes mellitus (250 00) (E11 9)    Current Meds   1  Alendronate Sodium 70 MG Oral Tablet; TAKE 1 TABLET ONCE WEEKLY Recorded   2  Amlodipine Besy-Benazepril HCl - 5-20 MG Oral Capsule; Therapy: 66PGS9717 to (Last Rx:45Mkc3969)  Requested for: 87BUD4928 Ordered   3  Enoxaparin Sodium 40 MG/0 4ML Subcutaneous Solution; daily sq as directed; Therapy: 05Sfy2615 to (Last Rx:73Gda4922) Ordered   4  Folbic 2 5-25-2 MG Oral Tablet; TAKE 1 TABLET Other Monday, wednesday and Friday; Therapy: 95XIG1970 to (Ashley Romero)  Requested for: 43FSO3133; Last   Rx:09Mar2015 Ordered   5  Fosamax Plus D  MG-UNIT Oral Tablet;    Therapy: (Recorded:52Ass9004) to Recorded   6  Lansoprazole 30 MG TBDP; Therapy: 00RYV8645 to (Last Rx:51Hjg9831)  Requested for: 12NPL1477 Ordered   7  Loratadine 10 MG Oral Tablet; Therapy: (Recorded:80Jso5300) to Recorded   8  MetFORMIN HCl  MG Oral Tablet Extended Release 24 Hour; Therapy: 79UPA0830 to (Last Rx:30Mar2011)  Requested for: 61NQG6282 Ordered   9  Multi-Day Vitamins Oral Tablet; Therapy: (Recorded:98Kst8443) to Recorded   10  Nasonex 50 MCG/ACT Nasal Suspension (Mometasone Furoate); Therapy: (Recorded:20Nov2013) to Recorded   11  Nystatin 616126 UNIT/GM External Powder; APPLY 2-3 TIMES DAILY TO AFFECTED    AREA(S)  Requested for: 10MNC5587; Last Rx:23Jun2015 Ordered   12  Pravastatin Sodium 40 MG Oral Tablet; Therapy: 35Gqi9703 to (Last Rx:13Apr2011)  Requested for: 35Snj6677 Ordered   13  Singulair 10 MG Oral Tablet (Montelukast Sodium); Take 1 tablet daily Recorded   14  Vitamin D (Ergocalciferol) 93304 UNIT Oral Capsule; Therapy: 49HUU1999 to (Last Rx:30Mar2011)  Requested for: 50ZSX2000 Ordered   15  Warfarin Sodium 5 MG Oral Tablet; 1 5 tabs daily or as directed; Therapy: 49SUO4498 to (Last Rx:49Rcz5218)  Requested for: 25TSI5568 Ordered   16  Zyrtec 10 MG TABS; take 1 tablet daily as needed Recorded    Allergies    1   Ciprofloxacin HCl TABS    Signatures   Electronically signed by : Mega Ramirez RN; Jun 28 2016  1:00PM EST                       (Author)

## 2018-01-23 VITALS
SYSTOLIC BLOOD PRESSURE: 160 MMHG | TEMPERATURE: 98.5 F | WEIGHT: 293 LBS | BODY MASS INDEX: 51.91 KG/M2 | OXYGEN SATURATION: 99 % | HEIGHT: 63 IN | DIASTOLIC BLOOD PRESSURE: 86 MMHG | HEART RATE: 87 BPM | RESPIRATION RATE: 20 BRPM

## 2018-01-23 NOTE — MISCELLANEOUS
Message   Recorded as Task   Date: 12/26/2017 11:05 AM, Created By: Emily Henley   Task Name: Call Back   Assigned To: Lisbet Sparrow   Regarding Patient: Fatuma Borrego, Status: Active   Comment:    Emily Henley - 26 Dec 2017 11:05 AM     TASK CREATED  Zaria Toure called wanting to know what her medication is going to be changing to due to her Coumadin levels taken on 12-22-17  She can be reached at [phone]  Most recent INR = 3 04     Discussed with Dr Carina Medrano - no changes to current dose - she will re check labs in 2 weeks      Active Problems    1  Acute embolism and thrombosis of unspecified deep veins of unspecified lower   extremity (453 40) (I82 409)   2  Chronic venous hypertension w ulceration (459 31) (I87 319)   3  DVT (deep venous thrombosis) (453 40) (I82 409)   4  Edema (782 3) (R60 9)   5  Encounter for screening mammogram for breast cancer (V76 12) (Z12 31)   6  Esophageal reflux (530 81) (K21 9)   7  Denied: History of substance abuse   8  Homocystinemia (270 4) (E72 11)   9  Hypercoagulable state (289 81) (D68 59)   10  Hyperlipidemia (272 4) (E78 5)   11  Hypertension (401 9) (I10)   12  Leg pain, right (729 5) (M79 604)   13  Denied: History of Mental health disorder   14  Morbid or severe obesity due to excess calories (278 01) (E66 01)   15  Muscle cramp, nocturnal (729 82) (R25 2)   16  Need for influenza vaccination (V04 81) (Z23)   17  Osteoporosis (733 00) (M81 0)   18  Polycystic ovarian syndrome (256 4) (E28 2)   19  Pulmonary hypertension, secondary (416 8)   20  Screen for colon cancer (V76 51) (Z12 11)   21  Skin rash (782 1) (R21)   22  Type 2 diabetes mellitus (250 00) (E11 9)    Current Meds   1  Alendronate Sodium 70 MG Oral Tablet; TAKE 1 TABLET ONCE WEEKLY Recorded   2  Amlodipine Besy-Benazepril HCl - 5-20 MG Oral Capsule; take 1 capsule daily; Therapy: 38YEX0076 to (Evaluate:54Azf9409)  Requested for: 68Jgu9781; Last   Rx:31Cin7429 Ordered   3   Joseph Aguiar 100-25 MCG/INH Inhalation Aerosol Powder Breath Activated; USE 1   INHALATION ONCE DAILY; Therapy: 75Sko0786 to (Evaluate:48Zcx6212); Last Rx:54Nmf5839 Ordered   4  Enoxaparin Sodium 40 MG/0 4ML Subcutaneous Solution; daily sq as directed; Therapy: 11Par9678 to (Last Rx:25Apr2016) Ordered   5  Folbic 2 5-25-2 MG Oral Tablet; TAKE 1 TABLET Other Monday, wednesday and Friday; Therapy: 66VYQ6501 to (Rebmerto Goodman)  Requested for: 54YDW0015; Last   Rx:09Mar2015 Ordered   6  Fosamax Plus D  MG-UNIT Oral Tablet; Therapy: (Recorded:46Ebf4005) to Recorded   7  Furosemide 20 MG Oral Tablet; Take 1 tablet daily; Therapy: 81NJQ3169 to (Evaluate:12Nov2018)  Requested for: 25DCZ9180; Last   Rx:17Nov2017 Ordered   8  Lansoprazole 30 MG TBDP; Therapy: 51JKX7077 to (Last Rx:44Lyy7968)  Requested for: 67SXC1402 Ordered   9  Loratadine 10 MG Oral Tablet; Therapy: (Recorded:48Hmq5178) to Recorded   10  MetFORMIN HCl  MG Oral Tablet Extended Release 24 Hour; TAKE 4 TABLET    Bedtime; Therapy: 23YKG6451 to (Evaluate:12Nov2018)  Requested for: 47VSI4960; Last    Rx:17Nov2017 Ordered   11  Montelukast Sodium 10 MG Oral Tablet (Singulair); Take 1 tablet daily  Requested for:    03Zcl4541; Last Rx:22Kka0504 Ordered   12  Multi-Day Vitamins TABS; Therapy: (Recorded:94Wti9209) to Recorded   13  Nasonex 50 MCG/ACT Nasal Suspension (Mometasone Furoate); Therapy: (Recorded:85Jja0204) to Recorded   14  Nystatin 503556 UNIT/GM External Powder; APPLY 2-3 TIMES DAILY TO AFFECTED    AREA(S)  Requested for: 84CGL4124; Last AH:34PLX1952 Ordered   15  Pravastatin Sodium 40 MG Oral Tablet; Therapy: 57Uce0585 to (Last Rx:13Apr2011)  Requested for: 49Xtm3637 Ordered   16  Vitamin D (Ergocalciferol) 42161 UNIT Oral Capsule; Therapy: 77HQS3096 to (Last Rx:30Mar2011)  Requested for: 30Mar2011 Ordered   17  Warfarin Sodium 5 MG Oral Tablet; TAKE 1 5 tabs daily or  as directed;     Therapy: 75EMM8829 to (ADAEOMXQ:99XRV4052)  Requested for: 24GPP9545; Last    Rx:55Ici5489 Ordered   18  Zyrtec 10 MG TABS; take 1 tablet daily as needed Recorded    Allergies    1   Ciprofloxacin HCl TABS    Signatures   Electronically signed by : Catie Boudreaux, ; Dec 26 2017 11:58AM EST                       (Author)

## 2018-01-23 NOTE — MISCELLANEOUS
Message  Called and left message for patient to have labs completed before appointment on 12-28-17      -SPOKE WITH PATIENT AT 2:43 PM ON 12-27-17 AND SHE INFORMED ME THAT SHE IS GOING TO GO TO [facility] TODAY TO HAVE HER LABS COMPLETED  I CALLED [facility] AND REQUESTED THAT THE LABS BE DONE STAT AND THEY STATED, NO PROBLEM IT WILL BE DONE  FAXED OVER REQ  FOR LAB WORK TO [facility]  1        1 Amended By: Emily Henley; Dec 27 2017 2:54 PM EST    Active Problems   1  Acute embolism and thrombosis of unspecified deep veins of unspecified lower   extremity (453 40) (I82 409)  2  Chronic venous hypertension w ulceration (459 31) (I87 319)  3  DVT (deep venous thrombosis) (453 40) (I82 409)  4  Edema (782 3) (R60 9)  5  Encounter for screening mammogram for breast cancer (V76 12) (Z12 31)  6  Esophageal reflux (530 81) (K21 9)  7  Denied: History of substance abuse  8  Homocystinemia (270 4) (E72 11)  9  Hypercoagulable state (289 81) (D68 59)  10  Hyperlipidemia (272 4) (E78 5)  11  Hypertension (401 9) (I10)  12  Leg pain, right (729 5) (M79 604)  13  Denied: History of Mental health disorder  14  Morbid or severe obesity due to excess calories (278 01) (E66 01)  15  Muscle cramp, nocturnal (729 82) (R25 2)  16  Need for influenza vaccination (V04 81) (Z23)  17  Osteoporosis (733 00) (M81 0)  18  Polycystic ovarian syndrome (256 4) (E28 2)  19  Pulmonary hypertension, secondary (416 8)  20  Screen for colon cancer (V76 51) (Z12 11)  21  Skin rash (782 1) (R21)  22  Type 2 diabetes mellitus (250 00) (E11 9)    Current Meds  1  Alendronate Sodium 70 MG Oral Tablet; TAKE 1 TABLET ONCE WEEKLY Recorded  2  Amlodipine Besy-Benazepril HCl - 5-20 MG Oral Capsule; take 1 capsule daily; Therapy: 85LZS0036 to (Evaluate:95Smr7131)  Requested for: 75Ooc9273; Last   Rx:64Fie3240 Ordered  3  Breo Ellipta 100-25 MCG/INH Inhalation Aerosol Powder Breath Activated; USE 1   INHALATION ONCE DAILY;    Therapy: 86Fqj8290 to (Evaluate:65Piu4435); Last Rx:51Yrx1020 Ordered  4  Enoxaparin Sodium 40 MG/0 4ML Subcutaneous Solution; daily sq as directed; Therapy: 76Uft9828 to (Last Rx:25Apr2016) Ordered  5  Folbic 2 5-25-2 MG Oral Tablet; TAKE 1 TABLET Other Monday, wednesday and Friday; Therapy: 21ZDA1315 to (Katerin Schulz)  Requested for: 36CST6768; Last   Rx:09Mar2015 Ordered  6  Fosamax Plus D  MG-UNIT Oral Tablet; Therapy: (Recorded:86Iuf6434) to Recorded  7  Furosemide 20 MG Oral Tablet; Take 1 tablet daily; Therapy: 66IKN8535 to (Evaluate:12Nov2018)  Requested for: 84NRO6271; Last   Rx:17Nov2017 Ordered  8  Lansoprazole 30 MG TBDP; Therapy: 67EZZ1066 to (Last Rx:60Nbo1506)  Requested for: 16QMU1708 Ordered  9  Loratadine 10 MG Oral Tablet; Therapy: (Recorded:57Ktl9683) to Recorded  10  MetFORMIN HCl  MG Oral Tablet Extended Release 24 Hour; TAKE 4 TABLET    Bedtime; Therapy: 99QUD0645 to (Evaluate:12Nov2018)  Requested for: 50CXS6602; Last    Rx:17Nov2017 Ordered  11  Montelukast Sodium 10 MG Oral Tablet (Singulair); Take 1 tablet daily  Requested for:    36Dgb3969; Last Rx:34Oqx8198 Ordered  12  Multi-Day Vitamins TABS; Therapy: (Recorded:79Uur2364) to Recorded  13  Nasonex 50 MCG/ACT Nasal Suspension (Mometasone Furoate); Therapy: (Recorded:20Nov2013) to Recorded  14  Nystatin 178335 UNIT/GM External Powder; APPLY 2-3 TIMES DAILY TO AFFECTED    AREA(S)  Requested for: 03WUU0467; Last NO:60OUC2432 Ordered  15  Pravastatin Sodium 40 MG Oral Tablet; Therapy: 13Apr2011 to (Last Rx:13Apr2011)  Requested for: 13Apr2011 Ordered  16  Vitamin D (Ergocalciferol) 79931 UNIT Oral Capsule; Therapy: 60PJM1746 to (Last Rx:45Hjh5501)  Requested for: 30Mar2011 Ordered  17  Warfarin Sodium 5 MG Oral Tablet; TAKE 1 5 tabs daily or  as directed; Therapy: 74JST2156 to (Evaluate:12Jun2018)  Requested for: 70LUP9977; Last    Rx:45Thj0353 Ordered  18   Zyrtec 10 MG TABS; take 1 tablet daily as needed Recorded    Allergies   1   Ciprofloxacin HCl TABS    Signatures   Electronically signed by : Deven Merino, ; Dec 27 2017  2:56PM EST                       (Author)

## 2018-01-25 DIAGNOSIS — I82.409 ACUTE EMBOLISM AND THROMBOSIS OF DEEP VEIN OF LOWER EXTREMITY (HCC): ICD-10-CM

## 2018-01-25 DIAGNOSIS — D68.59 OTHER PRIMARY THROMBOPHILIA (HCC): ICD-10-CM

## 2018-01-25 DIAGNOSIS — E72.11 HOMOCYSTINURIA (HCC): ICD-10-CM

## 2018-01-26 ENCOUNTER — ANTICOAG VISIT (OUTPATIENT)
Dept: HEMATOLOGY ONCOLOGY | Facility: CLINIC | Age: 63
End: 2018-01-26

## 2018-01-26 ENCOUNTER — TELEPHONE (OUTPATIENT)
Dept: HEMATOLOGY ONCOLOGY | Facility: CLINIC | Age: 63
End: 2018-01-26

## 2018-01-26 DIAGNOSIS — I82.4Y9 DEEP VEIN THROMBOSIS (DVT) OF PROXIMAL LOWER EXTREMITY, UNSPECIFIED CHRONICITY, UNSPECIFIED LATERALITY (HCC): Primary | ICD-10-CM

## 2018-01-26 NOTE — TELEPHONE ENCOUNTER
PT requesting lab results from Protime Coumadin levels  Labs done 1/25 at [facility] in [facility]

## 2018-02-13 LAB — INR PPP: 2.3 (ref 0.86–1.16)

## 2018-02-19 ENCOUNTER — TELEPHONE (OUTPATIENT)
Dept: HEMATOLOGY ONCOLOGY | Facility: CLINIC | Age: 63
End: 2018-02-19

## 2018-02-26 NOTE — MISCELLANEOUS
Message  Called and spoke with the patient in regards to her 1/10/18 PT/INR results  Patient's INR was 3 14  Per Dr Kate Rios, patient is decrease coumadin dose to 7 5 mg on Monday and Thurday, and 5 mg couamdin on the remaining days  Patient has a script to have her PT/INR rechecked on 1/25/18  Active Problems    1  Acute embolism and thrombosis of unspecified deep veins of unspecified lower   extremity (453 40) (I82 409)   2  Chronic venous hypertension w ulceration (459 31) (I87 319)   3  DVT (deep venous thrombosis) (453 40) (I82 409)   4  Edema (782 3) (R60 9)   5  Encounter for screening mammogram for breast cancer (V76 12) (Z12 31)   6  Esophageal reflux (530 81) (K21 9)   7  Denied: History of substance abuse   8  Homocystinemia (270 4) (E72 11)   9  Hypercoagulable state (289 81) (D68 59)   10  Hyperlipidemia (272 4) (E78 5)   11  Hypertension (401 9) (I10)   12  Leg pain, right (729 5) (M79 604)   13  Denied: History of Mental health disorder   14  Morbid or severe obesity due to excess calories (278 01) (E66 01)   15  Muscle cramp, nocturnal (729 82) (R25 2)   16  Need for influenza vaccination (V04 81) (Z23)   17  Osteoporosis (733 00) (M81 0)   18  Polycystic ovarian syndrome (256 4) (E28 2)   19  Pulmonary hypertension, secondary (416 8)   20  Screen for colon cancer (V76 51) (Z12 11)   21  Skin rash (782 1) (R21)   22  Type 2 diabetes mellitus (250 00) (E11 9)    Current Meds   1  Alendronate Sodium 70 MG Oral Tablet; TAKE 1 TABLET ONCE WEEKLY Recorded   2  Amlodipine Besy-Benazepril HCl - 5-20 MG Oral Capsule; take 1 capsule daily; Therapy: 96VLJ6453 to (Evaluate:72Rzx3621)  Requested for: 08Sep2017; Last   Rx:07Vgv0211 Ordered   3  Breo Ellipta 100-25 MCG/INH Inhalation Aerosol Powder Breath Activated; USE 1   INHALATION ONCE DAILY; Therapy: 92Bcw9794 to (Evaluate:23Wcq0753); Last Rx:62Vfm8592 Ordered   4   Enoxaparin Sodium 40 MG/0 4ML Subcutaneous Solution; daily sq as directed; Therapy: 25Apr2016 to (Last Rx:25Apr2016) Ordered   5  Folbic 2 5-25-2 MG Oral Tablet; TAKE 1 TABLET Other Monday, wednesday and Friday; Therapy: 01BFV7426 to (Casey Denise)  Requested for: 18Kwl9858; Last   Rx:09Mar2015 Ordered   6  Fosamax Plus D  MG-UNIT Oral Tablet; Therapy: (Recorded:29Vsh0615) to Recorded   7  Furosemide 20 MG Oral Tablet; Take 1 tablet daily; Therapy: 92UJX1939 to (Evaluate:12Nov2018)  Requested for: 50SDE5627; Last   Rx:17Nov2017 Ordered   8  Lansoprazole 30 MG TBDP; Therapy: 26VVH4714 to (Last Rx:83Bst3850)  Requested for: 66DKE1532 Ordered   9  Loratadine 10 MG Oral Tablet; Therapy: (Recorded:12Zym9498) to Recorded   10  MetFORMIN HCl  MG Oral Tablet Extended Release 24 Hour; TAKE 4 TABLET    Bedtime; Therapy: 50UHI4594 to (Evaluate:12Nov2018)  Requested for: 14GQS8936; Last    Rx:17Nov2017 Ordered   11  Montelukast Sodium 10 MG Oral Tablet (Singulair); Take 1 tablet daily  Requested for:    25Ayo6576; Last Rx:80Kjo6013 Ordered   12  Multi-Day Vitamins TABS; Therapy: (Recorded:04Bar3750) to Recorded   13  Nasonex 50 MCG/ACT Nasal Suspension (Mometasone Furoate); Therapy: (Recorded:20Nov2013) to Recorded   14  Nystatin 741104 UNIT/GM External Powder; APPLY 2-3 TIMES DAILY TO AFFECTED    AREA(S)  Requested for: 37Dqd8553; Last MF:18LKV7849 Ordered   15  Pravastatin Sodium 40 MG Oral Tablet; Therapy: 24Zyc2828 to (Last Rx:13Apr2011)  Requested for: 13Apr2011 Ordered   16  Vitamin D (Ergocalciferol) 30735 UNIT Oral Capsule; Therapy: 94DVP9831 to (Last Rx:30Mar2011)  Requested for: 30Mar2011 Ordered   17  Warfarin Sodium 5 MG Oral Tablet; TAKE 1 5 tabs daily or  as directed; Therapy: 26RXL2712 to (Evaluate:12Jun2018)  Requested for: 27UOI3775; Last    Rx:71Ekl2564 Ordered   18  Zyrtec 10 MG TABS; take 1 tablet daily as needed Recorded    Allergies    1   Ciprofloxacin HCl TABS    Plan  Acute embolism and thrombosis of unspecified deep veins of unspecified lower  extremity, DVT (deep venous thrombosis), Homocystinemia, Hypercoagulable state    · (1) PT WITH INR; Status:Active; Requested JAN:[id number];     Signatures   Electronically signed by :  Whitley Oliveros RN; Jan 11 2018  3:31PM EST                       (Author)

## 2018-02-27 ENCOUNTER — TELEPHONE (OUTPATIENT)
Dept: HEMATOLOGY ONCOLOGY | Facility: CLINIC | Age: 63
End: 2018-02-27

## 2018-02-27 NOTE — TELEPHONE ENCOUNTER
CHECO  Venkata Manifold Venkata Manifold patient has a staph infection and her PCP is changing her to another antibiotic, Bactrim

## 2018-02-27 NOTE — TELEPHONE ENCOUNTER
Pt called and indicated that she was on cephalexin 500 mg 3 times a day for 10 days and she has a staff injection and they are putting her on Batrim    Was suppose to pass infor along to doctor

## 2018-03-01 ENCOUNTER — APPOINTMENT (OUTPATIENT)
Dept: WOUND CARE | Facility: HOSPITAL | Age: 63
End: 2018-03-01
Payer: COMMERCIAL

## 2018-03-01 PROCEDURE — 99213 OFFICE O/P EST LOW 20 MIN: CPT

## 2018-03-01 PROCEDURE — 29580 STRAPPING UNNA BOOT: CPT

## 2018-03-08 ENCOUNTER — APPOINTMENT (OUTPATIENT)
Dept: WOUND CARE | Facility: HOSPITAL | Age: 63
End: 2018-03-08
Payer: COMMERCIAL

## 2018-03-08 PROCEDURE — 29580 STRAPPING UNNA BOOT: CPT

## 2018-03-12 ENCOUNTER — TELEPHONE (OUTPATIENT)
Dept: HEMATOLOGY ONCOLOGY | Facility: CLINIC | Age: 63
End: 2018-03-12

## 2018-03-12 LAB — INR PPP: 2.28 (ref 0.86–1.16)

## 2018-03-13 ENCOUNTER — TELEPHONE (OUTPATIENT)
Dept: HEMATOLOGY ONCOLOGY | Facility: CLINIC | Age: 63
End: 2018-03-13

## 2018-03-13 NOTE — TELEPHONE ENCOUNTER
Called and LVM for patient to call us back to provider with the PT and INR  Pt: 18 7  INR: 2 28    Per   Dr Gregory Garcia is to stay on same dose (7 5mg ALT 5mg every other day) Patient is to recheck in 2 weeks

## 2018-03-13 NOTE — TELEPHONE ENCOUNTER
Called and LVM for patient to call us back to provide PT and INR results and dosage information  Pt: 18 7  INR: 2 28    Per Dr Crowder Sensing- Patient should stay opn same dosage and recheck in 2 weeks

## 2018-03-15 ENCOUNTER — APPOINTMENT (OUTPATIENT)
Dept: WOUND CARE | Facility: HOSPITAL | Age: 63
End: 2018-03-15
Payer: COMMERCIAL

## 2018-03-15 PROCEDURE — 29580 STRAPPING UNNA BOOT: CPT

## 2018-03-19 ENCOUNTER — APPOINTMENT (OUTPATIENT)
Dept: WOUND CARE | Facility: HOSPITAL | Age: 63
End: 2018-03-19
Payer: COMMERCIAL

## 2018-03-19 PROCEDURE — 97597 DBRDMT OPN WND 1ST 20 CM/<: CPT

## 2018-03-19 PROCEDURE — 97598 DBRDMT OPN WND ADDL 20CM/<: CPT

## 2018-03-22 ENCOUNTER — APPOINTMENT (OUTPATIENT)
Dept: WOUND CARE | Facility: HOSPITAL | Age: 63
End: 2018-03-22
Payer: COMMERCIAL

## 2018-03-22 PROCEDURE — 29581 APPL MULTLAYER CMPRN SYS LEG: CPT

## 2018-03-29 ENCOUNTER — APPOINTMENT (OUTPATIENT)
Dept: WOUND CARE | Facility: HOSPITAL | Age: 63
End: 2018-03-29
Payer: COMMERCIAL

## 2018-03-29 PROCEDURE — 99213 OFFICE O/P EST LOW 20 MIN: CPT

## 2018-03-30 ENCOUNTER — HOSPITAL ENCOUNTER (INPATIENT)
Facility: HOSPITAL | Age: 63
LOS: 7 days | Discharge: HOME | DRG: 607 | End: 2018-04-06
Attending: EMERGENCY MEDICINE | Admitting: INTERNAL MEDICINE
Payer: COMMERCIAL

## 2018-03-30 ENCOUNTER — APPOINTMENT (EMERGENCY)
Dept: ULTRASOUND IMAGING | Facility: HOSPITAL | Age: 63
DRG: 607 | End: 2018-03-30
Payer: COMMERCIAL

## 2018-03-30 ENCOUNTER — APPOINTMENT (EMERGENCY)
Dept: RADIOLOGY | Facility: HOSPITAL | Age: 63
DRG: 607 | End: 2018-03-30
Payer: COMMERCIAL

## 2018-03-30 DIAGNOSIS — L03.115 CELLULITIS OF RIGHT LEG: Primary | ICD-10-CM

## 2018-03-30 DIAGNOSIS — L30.9 DERMATITIS: ICD-10-CM

## 2018-03-30 DIAGNOSIS — E66.01 MORBID OBESITY WITH BMI OF 60.0-69.9, ADULT (HCC): ICD-10-CM

## 2018-03-30 PROBLEM — L03.90 CELLULITIS: Status: ACTIVE | Noted: 2018-03-30

## 2018-03-30 PROBLEM — M79.606 LEG PAIN: Status: ACTIVE | Noted: 2018-03-30

## 2018-03-30 LAB
ABO GROUP BLD: NORMAL
ALBUMIN SERPL BCP-MCNC: 2.9 G/DL (ref 3.5–5)
ALP SERPL-CCNC: 73 U/L (ref 46–116)
ALT SERPL W P-5'-P-CCNC: 22 U/L (ref 12–78)
ANION GAP SERPL CALCULATED.3IONS-SCNC: 8 MMOL/L (ref 4–13)
APTT PPP: 39 SECONDS (ref 23–35)
AST SERPL W P-5'-P-CCNC: 31 U/L (ref 5–45)
BASOPHILS # BLD AUTO: 0.06 THOUSANDS/ΜL (ref 0–0.1)
BASOPHILS NFR BLD AUTO: 1 % (ref 0–1)
BILIRUB SERPL-MCNC: 0.5 MG/DL (ref 0.2–1)
BLD GP AB SCN SERPL QL: NEGATIVE
BUN SERPL-MCNC: 10 MG/DL (ref 5–25)
CALCIUM SERPL-MCNC: 8.9 MG/DL (ref 8.3–10.1)
CHLORIDE SERPL-SCNC: 103 MMOL/L (ref 100–108)
CO2 SERPL-SCNC: 27 MMOL/L (ref 21–32)
CREAT SERPL-MCNC: 0.9 MG/DL (ref 0.6–1.3)
EOSINOPHIL # BLD AUTO: 0.86 THOUSAND/ΜL (ref 0–0.61)
EOSINOPHIL NFR BLD AUTO: 9 % (ref 0–6)
ERYTHROCYTE [DISTWIDTH] IN BLOOD BY AUTOMATED COUNT: 15.6 % (ref 11.6–15.1)
GFR SERPL CREATININE-BSD FRML MDRD: 69 ML/MIN/1.73SQ M
GLUCOSE SERPL-MCNC: 95 MG/DL (ref 65–140)
HCT VFR BLD AUTO: 38.6 % (ref 34.8–46.1)
HGB BLD-MCNC: 12.6 G/DL (ref 11.5–15.4)
INR PPP: 1.65 (ref 0.86–1.16)
LACTATE SERPL-SCNC: 1.1 MMOL/L (ref 0.5–2)
LYMPHOCYTES # BLD AUTO: 2.54 THOUSANDS/ΜL (ref 0.6–4.47)
LYMPHOCYTES NFR BLD AUTO: 26 % (ref 14–44)
MAGNESIUM SERPL-MCNC: 2 MG/DL (ref 1.6–2.6)
MCH RBC QN AUTO: 28.1 PG (ref 26.8–34.3)
MCHC RBC AUTO-ENTMCNC: 32.6 G/DL (ref 31.4–37.4)
MCV RBC AUTO: 86 FL (ref 82–98)
MONOCYTES # BLD AUTO: 1.11 THOUSAND/ΜL (ref 0.17–1.22)
MONOCYTES NFR BLD AUTO: 11 % (ref 4–12)
NEUTROPHILS # BLD AUTO: 5.24 THOUSANDS/ΜL (ref 1.85–7.62)
NEUTS SEG NFR BLD AUTO: 53 % (ref 43–75)
NT-PROBNP SERPL-MCNC: 237 PG/ML
PH BLDV: 7.39 [PH] (ref 7.3–7.4)
PLATELET # BLD AUTO: 351 THOUSANDS/UL (ref 149–390)
PMV BLD AUTO: 10.5 FL (ref 8.9–12.7)
POTASSIUM SERPL-SCNC: 4.3 MMOL/L (ref 3.5–5.3)
PROT SERPL-MCNC: 7.8 G/DL (ref 6.4–8.2)
PROTHROMBIN TIME: 19.5 SECONDS (ref 12.1–14.4)
RBC # BLD AUTO: 4.48 MILLION/UL (ref 3.81–5.12)
RH BLD: POSITIVE
SODIUM SERPL-SCNC: 138 MMOL/L (ref 136–145)
SPECIMEN EXPIRATION DATE: NORMAL
TROPONIN I SERPL-MCNC: <0.02 NG/ML
WBC # BLD AUTO: 9.81 THOUSAND/UL (ref 4.31–10.16)

## 2018-03-30 PROCEDURE — 85610 PROTHROMBIN TIME: CPT | Performed by: EMERGENCY MEDICINE

## 2018-03-30 PROCEDURE — 87040 BLOOD CULTURE FOR BACTERIA: CPT | Performed by: EMERGENCY MEDICINE

## 2018-03-30 PROCEDURE — 83605 ASSAY OF LACTIC ACID: CPT | Performed by: EMERGENCY MEDICINE

## 2018-03-30 PROCEDURE — 86901 BLOOD TYPING SEROLOGIC RH(D): CPT | Performed by: EMERGENCY MEDICINE

## 2018-03-30 PROCEDURE — 93971 EXTREMITY STUDY: CPT

## 2018-03-30 PROCEDURE — 85730 THROMBOPLASTIN TIME PARTIAL: CPT | Performed by: EMERGENCY MEDICINE

## 2018-03-30 PROCEDURE — 86850 RBC ANTIBODY SCREEN: CPT | Performed by: EMERGENCY MEDICINE

## 2018-03-30 PROCEDURE — 99223 1ST HOSP IP/OBS HIGH 75: CPT | Performed by: INTERNAL MEDICINE

## 2018-03-30 PROCEDURE — 80053 COMPREHEN METABOLIC PANEL: CPT | Performed by: EMERGENCY MEDICINE

## 2018-03-30 PROCEDURE — 96365 THER/PROPH/DIAG IV INF INIT: CPT

## 2018-03-30 PROCEDURE — 36415 COLL VENOUS BLD VENIPUNCTURE: CPT | Performed by: EMERGENCY MEDICINE

## 2018-03-30 PROCEDURE — 86900 BLOOD TYPING SEROLOGIC ABO: CPT | Performed by: EMERGENCY MEDICINE

## 2018-03-30 PROCEDURE — 85025 COMPLETE CBC W/AUTO DIFF WBC: CPT | Performed by: EMERGENCY MEDICINE

## 2018-03-30 PROCEDURE — 96375 TX/PRO/DX INJ NEW DRUG ADDON: CPT

## 2018-03-30 PROCEDURE — 82800 BLOOD PH: CPT | Performed by: EMERGENCY MEDICINE

## 2018-03-30 PROCEDURE — 83735 ASSAY OF MAGNESIUM: CPT | Performed by: EMERGENCY MEDICINE

## 2018-03-30 PROCEDURE — 83880 ASSAY OF NATRIURETIC PEPTIDE: CPT | Performed by: EMERGENCY MEDICINE

## 2018-03-30 PROCEDURE — 73610 X-RAY EXAM OF ANKLE: CPT

## 2018-03-30 PROCEDURE — 84484 ASSAY OF TROPONIN QUANT: CPT | Performed by: EMERGENCY MEDICINE

## 2018-03-30 RX ORDER — NYSTATIN 100000 [USP'U]/G
POWDER TOPICAL 3 TIMES DAILY
COMMUNITY
End: 2018-04-17 | Stop reason: SDUPTHER

## 2018-03-30 RX ORDER — ALENDRONATE SODIUM 70 MG/1
70 TABLET ORAL
COMMUNITY
End: 2020-09-23 | Stop reason: SDUPTHER

## 2018-03-30 RX ORDER — MOMETASONE FUROATE 50 UG/1
2 SPRAY, METERED NASAL DAILY PRN
COMMUNITY
End: 2022-07-14 | Stop reason: ALTCHOICE

## 2018-03-30 RX ORDER — WARFARIN SODIUM 5 MG/1
TABLET ORAL
COMMUNITY
End: 2019-01-28 | Stop reason: SDUPTHER

## 2018-03-30 RX ORDER — FENTANYL CITRATE 50 UG/ML
100 INJECTION, SOLUTION INTRAMUSCULAR; INTRAVENOUS ONCE
Status: COMPLETED | OUTPATIENT
Start: 2018-03-30 | End: 2018-03-30

## 2018-03-30 RX ORDER — 0.9 % SODIUM CHLORIDE 0.9 %
3 VIAL (ML) INJECTION AS NEEDED
Status: DISCONTINUED | OUTPATIENT
Start: 2018-03-30 | End: 2018-04-06 | Stop reason: HOSPADM

## 2018-03-30 RX ADMIN — HYDROMORPHONE HYDROCHLORIDE 1 MG: 1 INJECTION, SOLUTION INTRAMUSCULAR; INTRAVENOUS; SUBCUTANEOUS at 21:35

## 2018-03-30 RX ADMIN — Medication 4.5 G: at 21:34

## 2018-03-30 RX ADMIN — FENTANYL CITRATE 100 MCG: 50 INJECTION INTRAMUSCULAR; INTRAVENOUS at 20:12

## 2018-03-31 PROBLEM — L03.115 CELLULITIS OF RIGHT LOWER EXTREMITY: Status: ACTIVE | Noted: 2018-03-30

## 2018-03-31 LAB
ALBUMIN SERPL BCP-MCNC: 2.5 G/DL (ref 3.5–5)
ALP SERPL-CCNC: 60 U/L (ref 46–116)
ALT SERPL W P-5'-P-CCNC: 20 U/L (ref 12–78)
ANION GAP SERPL CALCULATED.3IONS-SCNC: 9 MMOL/L (ref 4–13)
AST SERPL W P-5'-P-CCNC: 34 U/L (ref 5–45)
BACTERIA UR QL AUTO: ABNORMAL /HPF
BILIRUB SERPL-MCNC: 0.6 MG/DL (ref 0.2–1)
BILIRUB UR QL STRIP: NEGATIVE
BUN SERPL-MCNC: 11 MG/DL (ref 5–25)
CALCIUM SERPL-MCNC: 8.3 MG/DL (ref 8.3–10.1)
CHLORIDE SERPL-SCNC: 105 MMOL/L (ref 100–108)
CLARITY UR: ABNORMAL
CO2 SERPL-SCNC: 25 MMOL/L (ref 21–32)
COLOR UR: YELLOW
CREAT SERPL-MCNC: 0.96 MG/DL (ref 0.6–1.3)
ERYTHROCYTE [DISTWIDTH] IN BLOOD BY AUTOMATED COUNT: 15.9 % (ref 11.6–15.1)
GFR SERPL CREATININE-BSD FRML MDRD: 64 ML/MIN/1.73SQ M
GLUCOSE SERPL-MCNC: 107 MG/DL (ref 65–140)
GLUCOSE UR STRIP-MCNC: NEGATIVE MG/DL
HCT VFR BLD AUTO: 35.4 % (ref 34.8–46.1)
HGB BLD-MCNC: 11.5 G/DL (ref 11.5–15.4)
HGB UR QL STRIP.AUTO: NEGATIVE
KETONES UR STRIP-MCNC: NEGATIVE MG/DL
LEUKOCYTE ESTERASE UR QL STRIP: ABNORMAL
MAGNESIUM SERPL-MCNC: 2.1 MG/DL (ref 1.6–2.6)
MCH RBC QN AUTO: 28.5 PG (ref 26.8–34.3)
MCHC RBC AUTO-ENTMCNC: 32.5 G/DL (ref 31.4–37.4)
MCV RBC AUTO: 88 FL (ref 82–98)
NITRITE UR QL STRIP: NEGATIVE
NON-SQ EPI CELLS URNS QL MICRO: ABNORMAL /HPF
OTHER STN SPEC: ABNORMAL
PH UR STRIP.AUTO: 5 [PH] (ref 4.5–8)
PHOSPHATE SERPL-MCNC: 4.2 MG/DL (ref 2.3–4.1)
PLATELET # BLD AUTO: 319 THOUSANDS/UL (ref 149–390)
PMV BLD AUTO: 10.5 FL (ref 8.9–12.7)
POTASSIUM SERPL-SCNC: 3.9 MMOL/L (ref 3.5–5.3)
PROT SERPL-MCNC: 6.9 G/DL (ref 6.4–8.2)
PROT UR STRIP-MCNC: NEGATIVE MG/DL
RBC # BLD AUTO: 4.03 MILLION/UL (ref 3.81–5.12)
RBC #/AREA URNS AUTO: ABNORMAL /HPF
SODIUM SERPL-SCNC: 139 MMOL/L (ref 136–145)
SP GR UR STRIP.AUTO: 1.02 (ref 1–1.03)
UROBILINOGEN UR QL STRIP.AUTO: 0.2 E.U./DL
WBC # BLD AUTO: 8.81 THOUSAND/UL (ref 4.31–10.16)
WBC #/AREA URNS AUTO: ABNORMAL /HPF

## 2018-03-31 PROCEDURE — 93971 EXTREMITY STUDY: CPT | Performed by: SURGERY

## 2018-03-31 PROCEDURE — 85027 COMPLETE CBC AUTOMATED: CPT | Performed by: INTERNAL MEDICINE

## 2018-03-31 PROCEDURE — G8979 MOBILITY GOAL STATUS: HCPCS | Performed by: PHYSICAL THERAPIST

## 2018-03-31 PROCEDURE — 81001 URINALYSIS AUTO W/SCOPE: CPT | Performed by: EMERGENCY MEDICINE

## 2018-03-31 PROCEDURE — G8978 MOBILITY CURRENT STATUS: HCPCS | Performed by: PHYSICAL THERAPIST

## 2018-03-31 PROCEDURE — 80053 COMPREHEN METABOLIC PANEL: CPT | Performed by: INTERNAL MEDICINE

## 2018-03-31 PROCEDURE — 99232 SBSQ HOSP IP/OBS MODERATE 35: CPT | Performed by: INTERNAL MEDICINE

## 2018-03-31 PROCEDURE — 87081 CULTURE SCREEN ONLY: CPT | Performed by: INTERNAL MEDICINE

## 2018-03-31 PROCEDURE — 84100 ASSAY OF PHOSPHORUS: CPT | Performed by: INTERNAL MEDICINE

## 2018-03-31 PROCEDURE — 97116 GAIT TRAINING THERAPY: CPT | Performed by: PHYSICAL THERAPIST

## 2018-03-31 PROCEDURE — 99285 EMERGENCY DEPT VISIT HI MDM: CPT

## 2018-03-31 PROCEDURE — 83735 ASSAY OF MAGNESIUM: CPT | Performed by: INTERNAL MEDICINE

## 2018-03-31 PROCEDURE — 97163 PT EVAL HIGH COMPLEX 45 MIN: CPT | Performed by: PHYSICAL THERAPIST

## 2018-03-31 RX ORDER — MONTELUKAST SODIUM 10 MG/1
10 TABLET ORAL
Status: DISCONTINUED | OUTPATIENT
Start: 2018-03-31 | End: 2018-04-06 | Stop reason: HOSPADM

## 2018-03-31 RX ORDER — FLUTICASONE PROPIONATE 50 MCG
1 SPRAY, SUSPENSION (ML) NASAL DAILY
Status: DISCONTINUED | OUTPATIENT
Start: 2018-04-01 | End: 2018-04-06 | Stop reason: HOSPADM

## 2018-03-31 RX ORDER — BENAZEPRIL HYDROCHLORIDE 20 MG/1
20 TABLET ORAL DAILY
Status: DISCONTINUED | OUTPATIENT
Start: 2018-03-31 | End: 2018-04-06 | Stop reason: HOSPADM

## 2018-03-31 RX ORDER — FUROSEMIDE 20 MG/1
20 TABLET ORAL DAILY
Status: DISCONTINUED | OUTPATIENT
Start: 2018-03-31 | End: 2018-03-31

## 2018-03-31 RX ORDER — OXYCODONE HYDROCHLORIDE 5 MG/1
5 TABLET ORAL EVERY 4 HOURS PRN
Status: DISCONTINUED | OUTPATIENT
Start: 2018-03-31 | End: 2018-04-06 | Stop reason: HOSPADM

## 2018-03-31 RX ORDER — NYSTATIN 100000 [USP'U]/G
POWDER TOPICAL 3 TIMES DAILY
Status: DISCONTINUED | OUTPATIENT
Start: 2018-03-31 | End: 2018-04-06 | Stop reason: HOSPADM

## 2018-03-31 RX ORDER — MORPHINE SULFATE 2 MG/ML
2 INJECTION, SOLUTION INTRAMUSCULAR; INTRAVENOUS EVERY 4 HOURS PRN
Status: DISCONTINUED | OUTPATIENT
Start: 2018-03-31 | End: 2018-04-06 | Stop reason: HOSPADM

## 2018-03-31 RX ORDER — ACETAMINOPHEN 325 MG/1
650 TABLET ORAL EVERY 6 HOURS PRN
Status: DISCONTINUED | OUTPATIENT
Start: 2018-03-31 | End: 2018-04-06 | Stop reason: HOSPADM

## 2018-03-31 RX ORDER — WARFARIN SODIUM 7.5 MG/1
7.5 TABLET ORAL
Status: DISCONTINUED | OUTPATIENT
Start: 2018-03-31 | End: 2018-04-02

## 2018-03-31 RX ORDER — WARFARIN SODIUM 7.5 MG/1
7.5 TABLET ORAL
Status: COMPLETED | OUTPATIENT
Start: 2018-03-31 | End: 2018-03-31

## 2018-03-31 RX ORDER — PANTOPRAZOLE SODIUM 40 MG/1
40 TABLET, DELAYED RELEASE ORAL
Status: DISCONTINUED | OUTPATIENT
Start: 2018-03-31 | End: 2018-04-06 | Stop reason: HOSPADM

## 2018-03-31 RX ORDER — LORATADINE 10 MG/1
10 TABLET ORAL DAILY
Status: DISCONTINUED | OUTPATIENT
Start: 2018-03-31 | End: 2018-04-06 | Stop reason: HOSPADM

## 2018-03-31 RX ORDER — ALENDRONATE SODIUM 70 MG/1
70 TABLET ORAL
Status: DISCONTINUED | OUTPATIENT
Start: 2018-03-31 | End: 2018-03-31 | Stop reason: RX

## 2018-03-31 RX ORDER — AMLODIPINE BESYLATE 5 MG/1
5 TABLET ORAL DAILY
Status: DISCONTINUED | OUTPATIENT
Start: 2018-03-31 | End: 2018-04-06 | Stop reason: HOSPADM

## 2018-03-31 RX ORDER — PRAVASTATIN SODIUM 40 MG
40 TABLET ORAL
Status: DISCONTINUED | OUTPATIENT
Start: 2018-03-31 | End: 2018-04-06 | Stop reason: HOSPADM

## 2018-03-31 RX ORDER — ACETAMINOPHEN 325 MG/1
650 TABLET ORAL EVERY 6 HOURS PRN
Status: DISCONTINUED | OUTPATIENT
Start: 2018-03-31 | End: 2018-03-31

## 2018-03-31 RX ORDER — WARFARIN SODIUM 5 MG/1
5 TABLET ORAL
Status: DISCONTINUED | OUTPATIENT
Start: 2018-03-31 | End: 2018-03-31

## 2018-03-31 RX ORDER — ALBUTEROL SULFATE 90 UG/1
2 AEROSOL, METERED RESPIRATORY (INHALATION) EVERY 6 HOURS PRN
Status: DISCONTINUED | OUTPATIENT
Start: 2018-03-31 | End: 2018-04-06 | Stop reason: HOSPADM

## 2018-03-31 RX ORDER — FLUTICASONE PROPIONATE 50 MCG
2 SPRAY, SUSPENSION (ML) NASAL DAILY
Status: DISCONTINUED | OUTPATIENT
Start: 2018-03-31 | End: 2018-03-31

## 2018-03-31 RX ORDER — ERGOCALCIFEROL 1.25 MG/1
50000 CAPSULE ORAL
Status: DISCONTINUED | OUTPATIENT
Start: 2018-04-01 | End: 2018-04-06 | Stop reason: HOSPADM

## 2018-03-31 RX ADMIN — PANTOPRAZOLE SODIUM 40 MG: 40 TABLET, DELAYED RELEASE ORAL at 05:02

## 2018-03-31 RX ADMIN — VANCOMYCIN HYDROCHLORIDE 1500 MG: 1 INJECTION, POWDER, LYOPHILIZED, FOR SOLUTION INTRAVENOUS at 00:47

## 2018-03-31 RX ADMIN — NYSTATIN: 100000 POWDER TOPICAL at 09:01

## 2018-03-31 RX ADMIN — MONTELUKAST SODIUM 10 MG: 10 TABLET, FILM COATED ORAL at 23:17

## 2018-03-31 RX ADMIN — NYSTATIN: 100000 POWDER TOPICAL at 21:07

## 2018-03-31 RX ADMIN — ENOXAPARIN SODIUM 40 MG: 40 INJECTION SUBCUTANEOUS at 09:00

## 2018-03-31 RX ADMIN — MONTELUKAST SODIUM 10 MG: 10 TABLET, FILM COATED ORAL at 01:05

## 2018-03-31 RX ADMIN — OXYCODONE HYDROCHLORIDE 5 MG: 5 TABLET ORAL at 13:03

## 2018-03-31 RX ADMIN — MORPHINE SULFATE 2 MG: 2 INJECTION, SOLUTION INTRAMUSCULAR; INTRAVENOUS at 04:56

## 2018-03-31 RX ADMIN — LORATADINE 10 MG: 10 TABLET ORAL at 09:00

## 2018-03-31 RX ADMIN — NYSTATIN: 100000 POWDER TOPICAL at 17:05

## 2018-03-31 RX ADMIN — PRAVASTATIN SODIUM 40 MG: 40 TABLET ORAL at 17:05

## 2018-03-31 RX ADMIN — OXYCODONE HYDROCHLORIDE 5 MG: 5 TABLET ORAL at 21:07

## 2018-03-31 RX ADMIN — VANCOMYCIN HYDROCHLORIDE 2000 MG: 1 INJECTION, POWDER, LYOPHILIZED, FOR SOLUTION INTRAVENOUS at 12:54

## 2018-03-31 RX ADMIN — AMLODIPINE BESYLATE 5 MG: 5 TABLET ORAL at 10:12

## 2018-03-31 RX ADMIN — BENAZEPRIL HYDROCHLORIDE 20 MG: 20 TABLET, FILM COATED ORAL at 10:12

## 2018-03-31 RX ADMIN — WARFARIN SODIUM 7.5 MG: 7.5 TABLET ORAL at 01:06

## 2018-03-31 RX ADMIN — WARFARIN SODIUM 7.5 MG: 7.5 TABLET ORAL at 17:05

## 2018-03-31 RX ADMIN — FLUTICASONE PROPIONATE 2 SPRAY: 50 SPRAY, METERED NASAL at 09:01

## 2018-03-31 RX ADMIN — VANCOMYCIN HYDROCHLORIDE 2000 MG: 1 INJECTION, POWDER, LYOPHILIZED, FOR SOLUTION INTRAVENOUS at 23:17

## 2018-03-31 RX ADMIN — Medication 1 TABLET: at 09:00

## 2018-03-31 RX ADMIN — MORPHINE SULFATE 2 MG: 2 INJECTION, SOLUTION INTRAMUSCULAR; INTRAVENOUS at 00:58

## 2018-03-31 RX ADMIN — FUROSEMIDE 20 MG: 20 TABLET ORAL at 10:12

## 2018-03-31 NOTE — H&P
History and Physical - [facility] Internal Medicine    Patient Information: Kate Castañeda 65 y o  female MRN: [id number]  Unit/Bed#: RM11 Encounter: [id number]  Admitting Physician: Babita Martinez DO  PCP: Armani Judd MD  Date of Admission:  03/30/18    Assessment/Plan:    Hospital Problem List:     Principal Problem:    Cellulitis  Active Problems:    DVT (deep venous thrombosis) ([facility])    Morbid obesity ([place] [address] )    PCOS (polycystic ovarian syndrome)    Hypertension    Homocysteinemia ([place] [address] )    Hypercoagulable state ([place] [address] )    Sleep apnea    Leg pain      Plan for the Primary Problem(s):  1  Right lower extremity cellulitis and weeping   - cont wound care   - consult to wound care   - has been ongoing on and off for several months and getting worse   - will start vancomycin   - follow up cultures   - outline area of cellulitis   - cont lasix   - morphine prn for pain    2  DVT   - subtherapeutic inr   - lovenox prophylaxis until inr is therapeutic   - cont home warfarin dose, inr therapeutic last on 3/12   - no dvt currently noted on doppler right now    3  HTN   - cont home bp meds    4  Sleep apnea   - cpap hs, patient will find out her dose    5  GERD   - ppi    VTE Prophylaxis: Enoxaparin (Lovenox)  / sequential compression device   Code Status: full code  POLST: POLST form is not discussed and not completed at this time  Anticipated Length of Stay:  Patient will be admitted on an Inpatient basis with an anticipated length of stay of  Greater than 2 midnights  Justification for Hospital Stay: cellulitis    Total Time for Visit, including Counseling / Coordination of Care: 30 minutes  Greater than 50% of this total time spent on direct patient counseling and coordination of care  Chief Complaint:   Right lower extremity pain    History of Present Illness:    Kate Castañeda is a 65 y o  female who presents with swelling, redness, warmth and pain in her right lower extremity    She states that she has been seeing wound care for the past month and a half for weeping of her rle  The swelling and redness in the leg have been going on since about December 2017  Over the past month it gets better and worse  She states that today the redness and pain is much worse and she could no longer tolerate it  She denies any fevers, nausea, vomiting  There does continue to be clear fluid weeping from the rle  She has a history of homocysteinemia and appears to be in a hypercoagulable state  She is following by heme/onc and had a prior left lower extremity dvt for which she is on lifelong ac  In the ED, vascular studies of her legs were done which did not show any dvt  No elevated wbc either  Blood cultures were drawn  Review of Systems:    Review of Systems   Constitutional: Negative for chills, fatigue and fever  HENT: Negative for congestion, ear pain, hearing loss, postnasal drip, sneezing, sore throat and trouble swallowing  Eyes: Negative  Respiratory: Negative for cough, chest tightness, shortness of breath and wheezing  Cardiovascular: Negative for chest pain, palpitations and leg swelling  Gastrointestinal: Negative for abdominal distention, abdominal pain, blood in stool, diarrhea, nausea and vomiting  Endocrine: Negative for polydipsia, polyphagia and polyuria  Genitourinary: Negative for decreased urine volume, dysuria, flank pain and urgency  No CVA Tenderness   Musculoskeletal: Negative for myalgias  Skin: Negative  Right lower extremity redness, warmth, pain, weeping   Allergic/Immunologic: Negative  Neurological: Negative for dizziness, seizures, syncope, speech difficulty, weakness, light-headedness and headaches  Hematological: Negative  Psychiatric/Behavioral: Negative for agitation and confusion         Past Medical and Surgical History:     Past Medical History:   Diagnosis Date    Blood clotting disorder ([facility] )     Homocysteinemia ([facility] )     Hypercoagulable state ([facility] )     Hypertension     Lyme disease     Obesity     PCOS (polycystic ovarian syndrome)     Renal disorder     kidney stones    Sleep apnea        Past Surgical History:   Procedure Laterality Date    ANKLE SURGERY Right     APPENDECTOMY      BREAST BIOPSY      FOOT SURGERY      HEMORROIDECTOMY      HYSTERECTOMY      JOINT REPLACEMENT Bilateral     knee    LITHOTRIPSY         Meds/Allergies:    Prior to Admission medications    Medication Sig Start Date End Date Taking? Authorizing Provider   albuterol (PROVENTIL HFA,VENTOLIN HFA) 90 mcg/act inhaler Inhale 2 puffs every 6 (six) hours as needed for wheezing  Yes Historical Provider, MD   alendronate (FOSAMAX) 70 mg tablet Take 70 mg by mouth every 7 days   Yes Historical Provider, MD   amLODIPine-benazepril (LOTREL 5-20) 5-20 MG per capsule Take 1 capsule by mouth daily  Yes Historical Provider, MD   cetirizine (ZyrTEC) 10 mg tablet Take 10 mg by mouth daily  Yes Historical Provider, MD   Cholecalciferol (VITAMIN D3) 95268 units TABS Take 1 tablet by mouth every 14 (fourteen) days   Yes Historical Provider, MD   FA-Pyridoxine-Cyanocobalamin (FOLBIC PO) Take 1 tablet by mouth daily  Yes Historical Provider, MD   furosemide (LASIX) 20 mg tablet Take 20 mg by mouth daily  Yes Historical Provider, MD   lansoprazole (PREVACID) 30 mg capsule Take 30 mg by mouth daily  Yes Historical Provider, MD   METFORMIN HCL PO Take 2,000 mg by mouth daily with dinner  Yes Historical Provider, MD   mometasone (NASONEX) 50 mcg/act nasal spray 2 sprays into each nostril daily   Yes Historical Provider, MD   montelukast (SINGULAIR) 10 mg tablet Take 10 mg by mouth daily at bedtime  Yes Historical Provider, MD   multivitamin (THERAGRAN) TABS Take 1 tablet by mouth daily     Yes Historical Provider, MD   nystatin (MYCOSTATIN) powder Apply topically 3 (three) times a day   Yes Historical Provider, MD   pravastatin (PRAVACHOL) 40 mg tablet Take 40 mg by mouth daily  Yes Historical Provider, MD   warfarin (COUMADIN) 5 mg tablet Take by mouth daily   Yes Historical Provider, MD   Ergocalciferol (CALCIFEROL PO) Take 1 25 mg by mouth once a week  Monday  3/30/18  Historical Provider, MD   warfarin (COUMADIN) 7 5 mg tablet Take 7 5 mg by mouth daily  3/30/18  Historical Provider, MD     I have reviewed home medications with patient personally  Allergies: Allergies   Allergen Reactions    Azithromycin Diarrhea    Ciprofloxacin Confusion, Dizziness and Other (See Comments)     Other reaction(s): Other (Please comment)  Dizziness, weepy       Social History:     Marital Status: /Civil Union     Substance Use History:   History   Alcohol Use No     History   Smoking Status    Never Smoker   Smokeless Tobacco    Never Used     History   Drug Use No       Family History:    Family History   Problem Relation Age of Onset    Pulmonary embolism Mother        Physical Exam:     Vitals:   Blood Pressure: 127/58 (03/30/18 2200)  Pulse: 84 (03/30/18 2200)  Temperature: 98 4 °F (36 9 °C) (03/30/18 1842)  Temp Source: Temporal (03/30/18 1842)  Respirations: 18 (03/30/18 2200)  Height: 5' 3" (160 cm) (03/30/18 1842)  Weight - Scale: (!) 176 kg (387 lb 12 8 oz) (03/30/18 1842)  SpO2: 95 % (03/30/18 2200)    Physical Exam   Constitutional: She is oriented to person, place, and time  She appears well-developed and well-nourished  Morbidly obese   HENT:   Head: Normocephalic and atraumatic  Eyes: Conjunctivae and EOM are normal  Pupils are equal, round, and reactive to light  Neck: Normal range of motion  No JVD present  No thyromegaly present  Cardiovascular: Normal rate, regular rhythm, normal heart sounds and intact distal pulses  No murmur heard  Pulmonary/Chest: Effort normal and breath sounds normal  No stridor  No respiratory distress  She has no wheezes  She has no rales  She exhibits no tenderness  Abdominal: Soft   Bowel sounds are normal  She exhibits no distension  There is no tenderness  There is no rebound and no guarding  Genitourinary:   Genitourinary Comments: No CVA tenderness   Musculoskeletal: Normal range of motion  She exhibits edema and tenderness  Edema in b/l lower extremities, worse on right   Neurological: She is alert and oriented to person, place, and time  No cranial nerve deficit  Skin: Skin is warm  She is not diaphoretic  There is erythema  Right lower extremity area of redness, warmth, ttp, weeping that goes all the way around right lower leg  Psychiatric: She has a normal mood and affect  Additional Data:     Lab Results: I have personally reviewed pertinent reports  Results from last 7 days  Lab Units 03/30/18 1957   WBC Thousand/uL 9 81   HEMOGLOBIN g/dL 12 6   HEMATOCRIT % 38 6   PLATELETS Thousands/uL 351   NEUTROS PCT % 53   LYMPHS PCT % 26   MONOS PCT % 11   EOS PCT % 9*       Results from last 7 days  Lab Units 03/30/18 1957   SODIUM mmol/L 138   POTASSIUM mmol/L 4 3   CHLORIDE mmol/L 103   CO2 mmol/L 27   BUN mg/dL 10   CREATININE mg/dL 0 90   CALCIUM mg/dL 8 9   TOTAL PROTEIN g/dL 7 8   BILIRUBIN TOTAL mg/dL 0 50   ALK PHOS U/L 73   ALT U/L 22   AST U/L 31   GLUCOSE RANDOM mg/dL 95       Results from last 7 days  Lab Units 03/30/18 1957   INR  1 65*       Imaging: I have personally reviewed pertinent reports  Xr Ankle 3+ Vw Right    Result Date: 3/30/2018  Narrative: RIGHT ANKLE INDICATION:   Pain, erythema  COMPARISON:  11/21/2013  VIEWS:  XR ANKLE 3+ VW RIGHT FINDINGS: There is no acute fracture or dislocation  Screw within the 5th metatarsal is again noted  Calcaneal spur(s) noted  No erosions or periosteal reaction identified  No lytic or blastic lesions seen  Soft tissues are unremarkable  Impression: No acute osseous abnormality   Workstation performed: [id number]     Vas Lower Limb Venous Duplex Study, Unilateral/limited    Result Date: 3/30/2018  Narrative:  [facility] REPORT CLINICAL: Indications: Patient presents to the emergency department with redness, pain, and oozing from her right lower leg  She has a personal history of DVT and clotting disorder  She is on a coumadin regimen and has a [place] filter  Risk Factors: The patient has history of DVT  She has no history of limb trauma  The patient current BMI is 68 55, Weight is 387 lb and Height is 63 in  Clinical: Right Lower Limb There is complaint of pain, edema, tenderness and inflammation  FINDINGS:  Segment  Right            Left              Impression       Impression       CFV      Normal (Patent)  Normal (Patent)     CONCLUSION: RIGHT LOWER LIMB No evidence of acute or chronic deep vein thrombosis   No evidence of superficial thrombophlebitis noted  Doppler evaluation shows a normal response to augmentation maneuvers  Popliteal, posterior tibial and anterior tibial arterial Doppler waveforms are biphasic  LEFT LOWER LIMB LIMITED Evaluation shows no evidence of thrombus in the common femoral vein  Doppler evaluation shows a normal response to augmentation maneuvers  Technical findings were given to Dr Margy Singletary / [facility] Records Reviewed: No     ** Please Note: This note has been constructed using a voice recognition system   **

## 2018-03-31 NOTE — PROGRESS NOTES
Progress Note - Ryley Avila 1955, 65 y o  female MRN: [id number]    Unit/Bed#: 575-49 Encounter: [id number]    Primary Care Provider: Kindra Tapia MD   Date and time admitted to hospital: 3/30/2018  6:35 PM        * Cellulitis of right lower extremity   Assessment & Plan    -continue IV vancomycin  -check a MRSA nasal screen  -wound care team consult        Obstructive sleep apnea on CPAP   Assessment & Plan    -continue CPAP q h s  and anytime while sleeping        Hypercoagulable state ([facility] )   Assessment & Plan    -continue Coumadin for goal INR of 2-3        Essential hypertension   Assessment & Plan    -continue amlodipine and benazepril  -follow the patient's blood pressure trend        Morbid obesity with BMI of 60 0-69 9, adult ([facility] )   Assessment & Plan    -nutrition consult        History of DVT (deep vein thrombosis)   Assessment & Plan    -continue Coumadin for goal INR 2-3            VTE Pharmacologic Prophylaxis:   Pharmacologic: Warfarin (Coumadin) for a goal INR of 2-3   Mechanical VTE Prophylaxis in Place: Yes on the left lower extremity only  No SCD on the right lower extremity with the right lower extremity cellulitis  Patient Centered Rounds: I have performed bedside rounds with nursing staff today  Time Spent for Care: 30 minutes  More than 50% of total time spent on counseling and coordination of care as described above  Current Length of Stay: 1 day(s)    Current Patient Status: Inpatient   Certification Statement: The patient will continue to require additional inpatient hospital stay due to the need for IV antibiotics  Code Status: Level 1 - Full Code      Subjective: The patient was seen and examined  The patient complains of severe right lower extremity pain at the site of the cellulitis      Objective:     Vitals:   Temp (24hrs), Av 4 °F (36 9 °C), Min:97 8 °F (36 6 °C), Max:99 °F (37 2 °C)    HR:  [84-88] 85  Resp:  [17-18] 18  BP: (106-172)/(52-83) 114/53  SpO2:  [95 %-100 %] 95 %  Body mass index is 68 26 kg/m²  Input and Output Summary (last 24 hours): Intake/Output Summary (Last 24 hours) at 03/31/18 1304  Last data filed at 03/31/18 1247   Gross per 24 hour   Intake              420 ml   Output              600 ml   Net             -180 ml       Physical Exam:     Physical Exam  General:  NAD, awake, alert, follows commands  HEENT:  NC/AT, mucous membranes moist  Neck:  Supple, No JVP elevation  CV:  + S1, + S2, RRR  Pulm:  Lung fields are CTA bilaterally  Abd:  Soft, Non-tender, Non-distended  Ext:  No clubbing/cyanosis, + bilateral lower extremity edema  Skin:  + right anterior shin erythema      Additional Data:     Labs:      Results from last 7 days  Lab Units 03/31/18 0455 03/30/18 1957   WBC Thousand/uL 8 81 9 81   HEMOGLOBIN g/dL 11 5 12 6   HEMATOCRIT % 35 4 38 6   PLATELETS Thousands/uL 319 351   NEUTROS PCT %  --  53   LYMPHS PCT %  --  26   MONOS PCT %  --  11   EOS PCT %  --  9*       Results from last 7 days  Lab Units 03/31/18  0455   SODIUM mmol/L 139   POTASSIUM mmol/L 3 9   CHLORIDE mmol/L 105   CO2 mmol/L 25   BUN mg/dL 11   CREATININE mg/dL 0 96   CALCIUM mg/dL 8 3   TOTAL PROTEIN g/dL 6 9   BILIRUBIN TOTAL mg/dL 0 60   ALK PHOS U/L 60   ALT U/L 20   AST U/L 34   GLUCOSE RANDOM mg/dL 107       Results from last 7 days  Lab Units 03/30/18 1957   INR  1 65*       * I Have Reviewed All Lab Data Listed Above  * Additional Pertinent Lab Tests Reviewed:  [facility] Admission Reviewed      Recent Cultures (last 7 days):           Last 24 Hours Medication List:     Current Facility-Administered Medications:  acetaminophen 650 mg Oral Q6H PRN Vera Linda, DO    albuterol 2 puff Inhalation Q6H PRN Haleigh Nam, DO    amLODIPine 5 mg Oral Daily Amos Moretoya DO    And        benazepril 20 mg Oral Daily Amos Kan, DO    [START ON 4/1/2018] ergocalciferol 50,000 Units Oral Q14 Days Haleigh Nam, DO    Maki Be ON 4/1/2018] fluticasone 1 spray Each Nare Daily Kaitlynn Fields DO    folic acid-pyridoxine-cyanocobalamin 1 tablet Oral Daily Antul Mccauley, DO    loratadine 10 mg Oral Daily Nikul Mccauley, DO    montelukast 10 mg Oral HS Michael Mccauley, DO    morphine injection 2 mg Intravenous Q4H PRN Berry Reap, DO    nystatin  Topical TID Berry Reap, DO    oxyCODONE 5 mg Oral Q4H PRN Kaitlynn Fields DO    pantoprazole 40 mg Oral Early Morning Michael Mccauley, DO    pravastatin 40 mg Oral After [employer], DO    sodium chloride (PF) 3 mL Intravenous PRN Luis Denise MD    vancomycin 20 mg/kg (Adjusted) Intravenous Q12H Brenda Mcclain DO Last Rate: 2,000 mg (03/31/18 1254)   warfarin 7 5 mg Oral Daily (warfarin) Kaitlynn Fields DO         Today, Patient Was Seen By: Kaitlynn Fields DO    ** Please Note: Dictation voice to text software may have been used in the creation of this document   **

## 2018-03-31 NOTE — ED PROVIDER NOTES
History  Chief Complaint   Patient presents with    Cellulitis     Pt reports Rt lower leg and foot red and weeping since December  Painful area  Patient: Marquis Rosa  79 y o /female  YOB: 1955  MRN: [id number]  PCP: Johnie Amaya MD  Date of evaluation: 3/30/2018    (N B  Voice-recognition software may have been used in the preparation of this document )    At least since December she has been attending our wound care center for her right lower leg  She states that on Thursday she had a wrap put on it  On the way home she had a great deal of clear drainage from under the wrap  Now the wrap is often she is having extreme pain  She noticed is that there continues to be clear drainage from the wound  She complains that the skin is beefy red  She denies any other areas pain, redness, drainage  She denies fever  Prior to Admission Medications   Prescriptions Last Dose Informant Patient Reported? Taking? Cholecalciferol (VITAMIN D3) 30663 units TABS   Yes Yes   Sig: Take 1 tablet by mouth every 14 (fourteen) days   FA-Pyridoxine-Cyanocobalamin (FOLBIC PO)   Yes Yes   Sig: Take 1 tablet by mouth daily  METFORMIN HCL PO   Yes Yes   Sig: Take 2,000 mg by mouth daily with dinner  albuterol (PROVENTIL HFA,VENTOLIN HFA) 90 mcg/act inhaler   Yes Yes   Sig: Inhale 2 puffs every 6 (six) hours as needed for wheezing  alendronate (FOSAMAX) 70 mg tablet   Yes Yes   Sig: Take 70 mg by mouth every 7 days   amLODIPine-benazepril (LOTREL 5-20) 5-20 MG per capsule   Yes Yes   Sig: Take 1 capsule by mouth daily  cetirizine (ZyrTEC) 10 mg tablet   Yes Yes   Sig: Take 10 mg by mouth daily  furosemide (LASIX) 20 mg tablet   Yes Yes   Sig: Take 20 mg by mouth daily  mometasone (NASONEX) 50 mcg/act nasal spray   Yes Yes   Si sprays into each nostril daily   montelukast (SINGULAIR) 10 mg tablet   Yes Yes   Sig: Take 10 mg by mouth daily at bedtime     multivitamin (THERAGRAN) TABS Yes Yes   Sig: Take 1 tablet by mouth daily  nystatin (MYCOSTATIN) powder   Yes Yes   Sig: Apply topically 3 (three) times a day   pravastatin (PRAVACHOL) 40 mg tablet   Yes Yes   Sig: Take 40 mg by mouth daily  warfarin (COUMADIN) 5 mg tablet   Yes Yes   Sig: Take by mouth daily      Facility-Administered Medications: None       Past Medical History:   Diagnosis Date    Blood clotting disorder ([facility] )     Homocysteinemia (HCC)     Hypercoagulable state ([facility] )     Hypertension     Lyme disease     Obesity     PCOS (polycystic ovarian syndrome)     Renal disorder     kidney stones    Sleep apnea        Past Surgical History:   Procedure Laterality Date    ANKLE SURGERY Right     APPENDECTOMY      BREAST BIOPSY      BREAST BIOPSY      FOOT SURGERY      HEMORROIDECTOMY      HYSTERECTOMY      JOINT REPLACEMENT Bilateral     knee    LITHOTRIPSY         Family History   Problem Relation Age of Onset    Pulmonary embolism Mother      I have reviewed and agree with the history as documented  Social History   Substance Use Topics    Smoking status: Never Smoker    Smokeless tobacco: Never Used    Alcohol use No        Review of Systems   Constitutional: Negative for chills and fever  HENT: Negative for hearing loss, trouble swallowing and voice change  Eyes: Negative for pain, redness and visual disturbance  Respiratory: Negative for cough and shortness of breath  Cardiovascular: Negative for chest pain and palpitations  Gastrointestinal: Negative for abdominal pain, constipation, diarrhea, nausea and vomiting  Genitourinary: Negative for dysuria, hematuria, vaginal bleeding and vaginal discharge  Musculoskeletal: Negative for back pain, gait problem and neck pain  Skin: Positive for color change and wound  Negative for rash  see hpi   Neurological: Negative for weakness and light-headedness  Psychiatric/Behavioral: Negative for confusion and decreased concentration   The patient is not nervous/anxious  All other systems reviewed and are negative  Physical Exam  ED Triage Vitals [03/30/18 1842]   Temperature Pulse Respirations Blood Pressure SpO2   98 4 °F (36 9 °C) 88 17 (!) 172/83 100 %      Temp Source Heart Rate Source Patient Position - Orthostatic VS BP Location FiO2 (%)   Temporal Monitor Lying Right arm --      Pain Score       Worst Possible Pain           Orthostatic Vital Signs  Vitals:    04/03/18 0011 04/03/18 0710 04/03/18 0910 04/03/18 1512   BP: 103/51 107/59 129/91 132/64   Pulse: 81 77  82   Patient Position - Orthostatic VS: Lying - Orthostatic VS Lying Lying Lying       Physical Exam   Constitutional: She is oriented to person, place, and time  She appears well-developed and well-nourished  HENT:   Mouth/Throat: Oropharynx is clear and moist and mucous membranes are normal    Voice normal   Eyes: EOM are normal  Pupils are equal, round, and reactive to light  Cardiovascular: Normal rate and regular rhythm  Pulmonary/Chest: Effort normal and breath sounds normal    Abdominal: Soft  Bowel sounds are normal  There is no tenderness  Neurological: She is alert and oriented to person, place, and time  GCS eye subscore is 4  GCS verbal subscore is 5  GCS motor subscore is 6  Skin: Skin is warm and dry  Capillary refill takes less than 2 seconds  There is erythema (RLE circumferential above ankle associated with clear weeping)  Psychiatric: She has a normal mood and affect  Her speech is normal and behavior is normal    Nursing note and vitals reviewed        ED Medications  Medications   sodium chloride (PF) 0 9 % injection 3 mL (not administered)   loratadine (CLARITIN) tablet 10 mg (10 mg Oral Given 4/3/18 0910)   pantoprazole (PROTONIX) EC tablet 40 mg (40 mg Oral Given 4/3/18 6889)   pravastatin (PRAVACHOL) tablet 40 mg (40 mg Oral Given 9/0/32 1050)   folic acid-pyridoxine-cyanocobalamin 2 5-25-2 mg per tablet 1 tablet (1 tablet Oral Given 4/3/18 3504)   montelukast (SINGULAIR) tablet 10 mg (10 mg Oral Given 4/2/18 2235)   albuterol (PROVENTIL HFA,VENTOLIN HFA) inhaler 2 puff (not administered)   ergocalciferol (VITAMIN D2) capsule 50,000 Units (50,000 Units Oral Given 4/1/18 0933)   nystatin (MYCOSTATIN) powder ( Topical Given 4/3/18 1734)   morphine injection 2 mg (2 mg Intravenous Given 3/31/18 0456)   amLODIPine (NORVASC) tablet 5 mg (5 mg Oral Given 4/3/18 0910)     And   benazepril (LOTENSIN) tablet 20 mg (20 mg Oral Given 4/3/18 0911)   fluticasone (FLONASE) 50 mcg/act nasal spray 1 spray (1 spray Each Nare Given 4/3/18 0921)   oxyCODONE (ROXICODONE) IR tablet 5 mg (5 mg Oral Given 4/2/18 2323)   acetaminophen (TYLENOL) tablet 650 mg (not administered)   warfarin (COUMADIN) tablet 5 mg (5 mg Oral Given 4/3/18 1731)   ammonium lactate (LAC-HYDRIN) 12 % cream ( Topical Given 4/2/18 1853)   diphenhydrAMINE (BENADRYL) tablet 25 mg (25 mg Oral Given 4/3/18 1734)   docusate sodium (COLACE) capsule 100 mg (100 mg Oral Given 4/3/18 1731)   vancomycin (VANCOCIN) 1,500 mg in sodium chloride 0 9 % 500 mL IVPB (1,500 mg Intravenous New Bag 4/3/18 1734)   piperacillin-tazobactam (ZOSYN) 4 5 g in sodium chloride 0 9 % 100 mL IVPB (0 g Intravenous Stopped 3/30/18 2212)   fentanyl citrate (PF) 100 MCG/2ML 100 mcg (100 mcg Intravenous Given 3/30/18 2012)   HYDROmorphone (DILAUDID) injection 1 mg (1 mg Intravenous Given 3/30/18 2135)   warfarin (COUMADIN) tablet 7 5 mg (7 5 mg Oral Given 3/31/18 0106)       Diagnostic Studies  Results Reviewed     Procedure Component Value Units Date/Time    Blood culture [[id number]] Collected:  03/30/18 1957    Lab Status:  Preliminary result Specimen:  Blood from Arm, Left Updated:  04/03/18 1301     Blood Culture No Growth at 72 hrs      Vancomycin, trough [[id number]]  (Abnormal) Collected:  04/01/18 1012    Lab Status:  Final result Specimen:  Blood from Arm, Right Updated:  04/01/18 1119     Vancomycin Tr 23 9 (HH) ug/mL Narrative:         Verify that this specimen was collected immediately prior to drug administration  Reference range and result flagging reflect proper specimen collection protocol  Urinalysis with culture and sensitivity reflex [[id number]]  (Abnormal) Collected:  03/31/18 0209    Lab Status:  Final result Specimen:  Urine from Urine, Clean Catch Updated:  03/31/18 0215     Color, UA Yellow     Clarity, UA Cloudy     Specific Gravity, UA 1 025     pH, UA 5 0     Leukocytes, UA Small (A)     Nitrite, UA Negative     Protein, UA Negative mg/dl      Glucose, UA Negative mg/dl      Ketones, UA Negative mg/dl      Urobilinogen, UA 0 2 E U /dl      Bilirubin, UA Negative     Blood, UA Negative    B-type natriuretic peptide [[id number]]  (Abnormal) Collected:  03/30/18 1957    Lab Status:  Final result Specimen:  Blood from Arm, Left Updated:  03/30/18 2028     NT-proBNP 237 (H) pg/mL     Comprehensive metabolic panel [[id number]]  (Abnormal) Collected:  03/30/18 1957    Lab Status:  Final result Specimen:  Blood from Arm, Left Updated:  03/30/18 2028     Sodium 138 mmol/L      Potassium 4 3 mmol/L      Chloride 103 mmol/L      CO2 27 mmol/L      Anion Gap 8 mmol/L      BUN 10 mg/dL      Creatinine 0 90 mg/dL      Glucose 95 mg/dL      Calcium 8 9 mg/dL      AST 31 U/L      ALT 22 U/L      Alkaline Phosphatase 73 U/L      Total Protein 7 8 g/dL      Albumin 2 9 (L) g/dL      Total Bilirubin 0 50 mg/dL      eGFR 69 ml/min/1 73sq m     Narrative:         National Kidney Disease Education Program recommendations are as follows:  GFR calculation is accurate only with a steady state creatinine  Chronic Kidney disease less than 60 ml/min/1 73 sq  meters  Kidney failure less than 15 ml/min/1 73 sq  meters      Magnesium [[id number]]  (Normal) Collected:  03/30/18 1957    Lab Status:  Final result Specimen:  Blood from Arm, Left Updated:  03/30/18 2028     Magnesium 2 0 mg/dL     Lactate Blood [[id number]]  (Normal) Collected: 03/30/18 1957    Lab Status:  Final result Specimen:  Blood from Arm, Left Updated:  03/30/18 2023     LACTIC ACID 1 1 mmol/L     Narrative:         Result may be elevated if tourniquet was used during collection  Troponin I [[id number]]  (Normal) Collected:  03/30/18 1957    Lab Status:  Final result Specimen:  Blood from Arm, Left Updated:  03/30/18 2023     Troponin I <0 02 ng/mL     Narrative:         [employer] Chemistry analyzer 99% cutoff is > 0 04 ng/mL in network labs    o cTnI 99% cutoff is useful only when applied to patients in the clinical setting of myocardial ischemia  o cTnI 99% cutoff should be interpreted in the context of clinical history, ECG findings and possibly cardiac imaging to establish correct diagnosis  o cTnI 99% cutoff may be suggestive but clearly not indicative of a coronary event without the clinical setting of myocardial ischemia  Protime-INR [[id number]]  (Abnormal) Collected:  03/30/18 1957    Lab Status:  Final result Specimen:  Blood from Arm, Left Updated:  03/30/18 2015     Protime 19 5 (H) seconds      INR 1 65 (H)    APTT [[id number]]  (Abnormal) Collected:  03/30/18 1957    Lab Status:  Final result Specimen:  Blood from Arm, Left Updated:  03/30/18 2015     PTT 39 (H) seconds     Narrative:          Therapeutic Heparin Range = 60-90 seconds    pH, venous [[id number]]  (Normal) Collected:  03/30/18 1957    Lab Status:  Final result Specimen:  Blood from Arm, Left Updated:  03/30/18 2007     pH, Mike 7 386    CBC and differential [[id number]]  (Abnormal) Collected:  03/30/18 1957    Lab Status:  Final result Specimen:  Blood from Arm, Left Updated:  03/30/18 2005     WBC 9 81 Thousand/uL      RBC 4 48 Million/uL      Hemoglobin 12 6 g/dL      Hematocrit 38 6 %      MCV 86 fL      MCH 28 1 pg      MCHC 32 6 g/dL      RDW 15 6 (H) %      MPV 10 5 fL      Platelets 776 Thousands/uL      Neutrophils Relative 53 %      Lymphocytes Relative 26 %      Monocytes Relative 11 % Eosinophils Relative 9 (H) %      Basophils Relative 1 %      Neutrophils Absolute 5 24 Thousands/µL      Lymphocytes Absolute 2 54 Thousands/µL      Monocytes Absolute 1 11 Thousand/µL      Eosinophils Absolute 0 86 (H) Thousand/µL      Basophils Absolute 0 06 Thousands/µL                  VAS lower limb venous duplex study, unilateral/limited   ED Interpretation by Elizabeth Andersen MD (03/30 2120)   Preliminary interpretation provided by [facility] tech - neg for DVT  Final Result by Romaine Griffiths MD (03/31 7584)      XR ankle 3+ vw right   ED Interpretation by Elizabeth Andersen MD (03/30 2120)   No apparent bony changes or hardware changes  No gas in tissues  Final Result by Nadira Charles MD (03/30 2205)      No acute osseous abnormality  Workstation performed: [id number]                    Procedures  Procedures       Phone Contacts  ED Phone Contact    ED Course  ED Course as of Apr 03 1926   Jimmy Laura Mar 30, 2018   2051 preliminary report negative for DVT VAS lower limb venous duplex study, unilateral/limited   2119 Re-check - pain starting to come back    Medication ordered  2119 No new bony abnormality or hardware problem  No gas in tissues  XR ankle 3+ vw right    Dictation under the ED course          dictation this space below the ED course                  MDM  CritCare Time    Disposition  Final diagnoses:   Cellulitis of right leg     Time reflects when diagnosis was documented in both MDM as applicable and the Disposition within this note     Time User Action Codes Description Comment    3/30/2018 11:06 PM Santa Currie Add [L03 115] Cellulitis of right leg     3/31/2018 12:48 PM [place], [address] [E66 01,  Z68 44] Morbid obesity with BMI of 60 0-69 9, adult [facility])       ED Disposition     ED Disposition Maris Jeff Avers accepts for admission    Condition at discharge: Good        Follow-up Information    None       Current Discharge Medication List CONTINUE these medications which have NOT CHANGED    Details   albuterol (PROVENTIL HFA,VENTOLIN HFA) 90 mcg/act inhaler Inhale 2 puffs every 6 (six) hours as needed for wheezing  alendronate (FOSAMAX) 70 mg tablet Take 70 mg by mouth every 7 days      amLODIPine-benazepril (LOTREL 5-20) 5-20 MG per capsule Take 1 capsule by mouth daily  cetirizine (ZyrTEC) 10 mg tablet Take 10 mg by mouth daily  Cholecalciferol (VITAMIN D3) 81330 units TABS Take 1 tablet by mouth every 14 (fourteen) days      FA-Pyridoxine-Cyanocobalamin (FOLBIC PO) Take 1 tablet by mouth daily  furosemide (LASIX) 20 mg tablet Take 20 mg by mouth daily  METFORMIN HCL PO Take 2,000 mg by mouth daily with dinner  mometasone (NASONEX) 50 mcg/act nasal spray 2 sprays into each nostril daily      montelukast (SINGULAIR) 10 mg tablet Take 10 mg by mouth daily at bedtime  multivitamin (THERAGRAN) TABS Take 1 tablet by mouth daily  nystatin (MYCOSTATIN) powder Apply topically 3 (three) times a day      pravastatin (PRAVACHOL) 40 mg tablet Take 40 mg by mouth daily  warfarin (COUMADIN) 5 mg tablet Take by mouth daily         STOP taking these medications       lansoprazole (PREVACID) 30 mg capsule Comments:   Reason for Stopping:             No discharge procedures on file      ED Provider  Electronically Signed by           Khadar Oakley MD  03/31/18 [address] [place], [place] [address]Nikolai MD  04/03/18 2007

## 2018-03-31 NOTE — PHYSICAL THERAPY NOTE
PT Evaluation     03/31/18 1252   Note Type   Note type Eval/Treat   Pain Assessment   Pain Assessment 0-10   Pain Score 6   Pain Location Back;Hip;Leg   Pain Orientation Right   Home Living   Type of Home House   Home Layout Two level;Bed/bath upstairs   Bathroom Shower/Tub Tub/shower unit   Bathroom Toilet Raised   Bathroom Equipment Grab bars in shower;Grab bars around toilet   [address] Walker;Cane;Wheelchair-manual   Prior Function   Level of [place] Independent with ADLs and functional mobility   Lives With Alone   Receives Help From Family   ADL Assistance Independent   IADLs Independent   Falls in the last 6 months 0   Restrictions/Precautions   Weight Bearing Precautions Per Order No   Other Precautions Contact/isolation   General   Family/Caregiver Present No   Cognition   Overall Cognitive Status WFL   Arousal/Participation Alert   Orientation Level Oriented X4   Memory Within functional limits   Following Commands Follows all commands and directions without difficulty   RLE Assessment   RLE Assessment WFL  (RLE stregnth grossly 3+-4/5)   LLE Assessment   LLE Assessment WFL  (LLE strength grossly 4+-5/5)   Light Touch   RLE Light Touch Grossly intact   LLE Light Touch Grossly intact   Bed Mobility   Supine to Sit 7  Independent   Additional items Bedrails   Sit to Supine 7  Independent   Additional items Bedrails   Transfers   Sit to Stand 7  Independent   Stand to Sit 7  Independent   Ambulation/Elevation   Gait pattern WNL   Gait Assistance 7  Independent   Assistive Device None   Distance 20 ft   Balance   Static Sitting Good   Dynamic Sitting Good   Static Standing Good   Dynamic Standing Good   Ambulatory Good  (no AD used)   Activity Tolerance   Activity Tolerance Patient tolerated treatment well   Assessment   Prognosis Good   Problem List Decreased endurance;Decreased strength;Decreased range of motion   Assessment Pt is a 65year old female presenting with cellulitis of the RLE and pain in the RLE  Pt was (i) with ADLs and functional mobility prior to admission  PT notes the pt with decreased strength and ROM of the RLE secondary to pain and swelling, decreased endurance secondary to pain, and decreased functional mobility secondary to pain  Pt would benefit from continued PT to ensure independence and d/c with outpatient PT if needed when medically stable  Goals   Patient Goals Pt wishes to return home   LTG Expiration Date 04/14/18   Long Term Goal #1 Pt to be (I) with all bed mobility and transfers   Long Term Goal #2 Pt to ambulate 250' and 12 steps (I) with no AD to ensure safe return home   Plan   Treatment/Interventions LE strengthening/ROM; Elevations; Therapeutic exercise; Endurance training;Gait training   PT Frequency (3-5x/wk)   Recommendation   Recommendation Outpatient PT  (if needed)   PT - OK to Discharge Yes  (when medically stable)   Pt supine in bed at end of treatment, pillows under BLE with heels free, call bell in reach

## 2018-04-01 LAB
ALBUMIN SERPL BCP-MCNC: 2.3 G/DL (ref 3.5–5)
ALP SERPL-CCNC: 53 U/L (ref 46–116)
ALT SERPL W P-5'-P-CCNC: 18 U/L (ref 12–78)
ANION GAP SERPL CALCULATED.3IONS-SCNC: 6 MMOL/L (ref 4–13)
AST SERPL W P-5'-P-CCNC: 19 U/L (ref 5–45)
BASOPHILS # BLD AUTO: 0.03 THOUSANDS/ΜL (ref 0–0.1)
BASOPHILS NFR BLD AUTO: 0 % (ref 0–1)
BILIRUB SERPL-MCNC: 0.4 MG/DL (ref 0.2–1)
BUN SERPL-MCNC: 10 MG/DL (ref 5–25)
CALCIUM SERPL-MCNC: 8.2 MG/DL (ref 8.3–10.1)
CHLORIDE SERPL-SCNC: 106 MMOL/L (ref 100–108)
CK SERPL-CCNC: 60 U/L (ref 26–192)
CO2 SERPL-SCNC: 28 MMOL/L (ref 21–32)
CREAT SERPL-MCNC: 0.98 MG/DL (ref 0.6–1.3)
EOSINOPHIL # BLD AUTO: 0.87 THOUSAND/ΜL (ref 0–0.61)
EOSINOPHIL NFR BLD AUTO: 11 % (ref 0–6)
ERYTHROCYTE [DISTWIDTH] IN BLOOD BY AUTOMATED COUNT: 16.2 % (ref 11.6–15.1)
GFR SERPL CREATININE-BSD FRML MDRD: 62 ML/MIN/1.73SQ M
GLUCOSE SERPL-MCNC: 94 MG/DL (ref 65–140)
HCT VFR BLD AUTO: 36.7 % (ref 34.8–46.1)
HGB BLD-MCNC: 11.8 G/DL (ref 11.5–15.4)
INR PPP: 1.93 (ref 0.86–1.16)
LYMPHOCYTES # BLD AUTO: 2.81 THOUSANDS/ΜL (ref 0.6–4.47)
LYMPHOCYTES NFR BLD AUTO: 36 % (ref 14–44)
MAGNESIUM SERPL-MCNC: 2 MG/DL (ref 1.6–2.6)
MCH RBC QN AUTO: 28.3 PG (ref 26.8–34.3)
MCHC RBC AUTO-ENTMCNC: 32.2 G/DL (ref 31.4–37.4)
MCV RBC AUTO: 88 FL (ref 82–98)
MONOCYTES # BLD AUTO: 0.78 THOUSAND/ΜL (ref 0.17–1.22)
MONOCYTES NFR BLD AUTO: 10 % (ref 4–12)
NEUTROPHILS # BLD AUTO: 3.27 THOUSANDS/ΜL (ref 1.85–7.62)
NEUTS SEG NFR BLD AUTO: 43 % (ref 43–75)
PHOSPHATE SERPL-MCNC: 4.5 MG/DL (ref 2.3–4.1)
PLATELET # BLD AUTO: 328 THOUSANDS/UL (ref 149–390)
PMV BLD AUTO: 10.4 FL (ref 8.9–12.7)
POTASSIUM SERPL-SCNC: 4.1 MMOL/L (ref 3.5–5.3)
PREALB SERPL-MCNC: 11.8 MG/DL (ref 18–40)
PROT SERPL-MCNC: 6.4 G/DL (ref 6.4–8.2)
PROTHROMBIN TIME: 22.1 SECONDS (ref 12.1–14.4)
RBC # BLD AUTO: 4.17 MILLION/UL (ref 3.81–5.12)
SODIUM SERPL-SCNC: 140 MMOL/L (ref 136–145)
TROPONIN I SERPL-MCNC: <0.02 NG/ML
VANCOMYCIN TROUGH SERPL-MCNC: 23.9 UG/ML (ref 10–20)
WBC # BLD AUTO: 7.76 THOUSAND/UL (ref 4.31–10.16)

## 2018-04-01 PROCEDURE — 87086 URINE CULTURE/COLONY COUNT: CPT | Performed by: INTERNAL MEDICINE

## 2018-04-01 PROCEDURE — 80202 ASSAY OF VANCOMYCIN: CPT | Performed by: INTERNAL MEDICINE

## 2018-04-01 PROCEDURE — 84484 ASSAY OF TROPONIN QUANT: CPT | Performed by: INTERNAL MEDICINE

## 2018-04-01 PROCEDURE — 82550 ASSAY OF CK (CPK): CPT | Performed by: INTERNAL MEDICINE

## 2018-04-01 PROCEDURE — 83735 ASSAY OF MAGNESIUM: CPT | Performed by: INTERNAL MEDICINE

## 2018-04-01 PROCEDURE — 85610 PROTHROMBIN TIME: CPT | Performed by: INTERNAL MEDICINE

## 2018-04-01 PROCEDURE — 99232 SBSQ HOSP IP/OBS MODERATE 35: CPT | Performed by: PHYSICIAN ASSISTANT

## 2018-04-01 PROCEDURE — 80053 COMPREHEN METABOLIC PANEL: CPT | Performed by: INTERNAL MEDICINE

## 2018-04-01 PROCEDURE — 84134 ASSAY OF PREALBUMIN: CPT | Performed by: INTERNAL MEDICINE

## 2018-04-01 PROCEDURE — 84100 ASSAY OF PHOSPHORUS: CPT | Performed by: INTERNAL MEDICINE

## 2018-04-01 PROCEDURE — 85025 COMPLETE CBC W/AUTO DIFF WBC: CPT | Performed by: INTERNAL MEDICINE

## 2018-04-01 RX ADMIN — ERGOCALCIFEROL 50000 UNITS: 1.25 CAPSULE ORAL at 09:33

## 2018-04-01 RX ADMIN — OXYCODONE HYDROCHLORIDE 5 MG: 5 TABLET ORAL at 10:15

## 2018-04-01 RX ADMIN — WARFARIN SODIUM 7.5 MG: 7.5 TABLET ORAL at 18:10

## 2018-04-01 RX ADMIN — Medication 1 TABLET: at 09:31

## 2018-04-01 RX ADMIN — NYSTATIN: 100000 POWDER TOPICAL at 18:00

## 2018-04-01 RX ADMIN — OXYCODONE HYDROCHLORIDE 5 MG: 5 TABLET ORAL at 22:18

## 2018-04-01 RX ADMIN — NYSTATIN: 100000 POWDER TOPICAL at 09:32

## 2018-04-01 RX ADMIN — PANTOPRAZOLE SODIUM 40 MG: 40 TABLET, DELAYED RELEASE ORAL at 05:13

## 2018-04-01 RX ADMIN — VANCOMYCIN HYDROCHLORIDE 1500 MG: 1 INJECTION, POWDER, LYOPHILIZED, FOR SOLUTION INTRAVENOUS at 18:13

## 2018-04-01 RX ADMIN — MONTELUKAST SODIUM 10 MG: 10 TABLET, FILM COATED ORAL at 21:40

## 2018-04-01 RX ADMIN — FLUTICASONE PROPIONATE 1 SPRAY: 50 SPRAY, METERED NASAL at 09:32

## 2018-04-01 RX ADMIN — AMLODIPINE BESYLATE 5 MG: 5 TABLET ORAL at 09:32

## 2018-04-01 RX ADMIN — NYSTATIN: 100000 POWDER TOPICAL at 21:40

## 2018-04-01 RX ADMIN — LORATADINE 10 MG: 10 TABLET ORAL at 09:31

## 2018-04-01 RX ADMIN — PRAVASTATIN SODIUM 40 MG: 40 TABLET ORAL at 18:10

## 2018-04-01 RX ADMIN — BENAZEPRIL HYDROCHLORIDE 20 MG: 20 TABLET, FILM COATED ORAL at 09:32

## 2018-04-01 RX ADMIN — OXYCODONE HYDROCHLORIDE 5 MG: 5 TABLET ORAL at 18:10

## 2018-04-01 NOTE — PROGRESS NOTES
Progress Note - Karthik Helms 1955, 65 y o  female MRN: [id number]    Unit/Bed#: 231-12 Encounter: [id number]    Primary Care Provider: Last Dias MD   Date and time admitted to hospital: 3/30/2018  6:35 PM        * Cellulitis of right lower extremity   Assessment & Plan    -continue IV vancomycin  -MRSA nasal screen pending   -wound care team consult        Obstructive sleep apnea on CPAP   Assessment & Plan    -continue CPAP q h s  and anytime while sleeping        Essential hypertension   Assessment & Plan    -continue amlodipine and benazepril  -follow the patient's blood pressure trend        Hypercoagulable state ([facility] )   Assessment & Plan    -continue Coumadin for goal INR of 2-3        History of DVT (deep vein thrombosis)   Assessment & Plan    -continue Coumadin for goal INR 2-3        Morbid obesity with BMI of 60 0-69 9, adult ([facility] )   Assessment & Plan    -nutrition consult            VTE Pharmacologic Prophylaxis:   Pharmacologic: Warfarin (Coumadin)  Mechanical VTE Prophylaxis in Place: No    Discussions with Specialists or Other Care Team Provider: Nursing, CM, and attending    Education and Discussions with Family / Patient: n/a    Time Spent for Care: 30 minutes  More than 50% of total time spent on counseling and coordination of care as described above  Current Length of Stay: 2 day(s)    Current Patient Status: Inpatient   Certification Statement: The patient will continue to require additional inpatient hospital stay due to continued need for IV antibiotics  Code Status: Level 1 - Full Code      Subjective: The patient was seen and examined  The patient states she continues to have right leg pain but this has mildly improved  She feels as thought her redness has improved as well       Objective:     Vitals:   Temp (24hrs), Av 9 °F (37 2 °C), Min:98 4 °F (36 9 °C), Max:99 5 °F (37 5 °C)    HR:  [69-84] 84  Resp:  [18] 18  BP: (104-133)/(52-69) 133/65  SpO2:  [93 %-95 %] 94 %  Body mass index is 68 26 kg/m²  Input and Output Summary (last 24 hours): Intake/Output Summary (Last 24 hours) at 04/01/18 1257  Last data filed at 04/01/18 1200   Gross per 24 hour   Intake              920 ml   Output              350 ml   Net              570 ml       Physical Exam:     Physical Exam   Constitutional: She is oriented to person, place, and time  Vital signs are normal  She appears well-developed and well-nourished  She is active and cooperative  Morbidly obese  Cardiovascular: Normal rate, regular rhythm and normal heart sounds  Pulmonary/Chest: Effort normal and breath sounds normal  She has no wheezes  She has no rhonchi  She has no rales  Abdominal: Soft  Normal appearance and bowel sounds are normal  There is no tenderness  Neurological: She is alert and oriented to person, place, and time  No cranial nerve deficit  Skin:   Erythema noted right lower extremity from mid shin to ankle  Increased tenderness to palpation  Nursing note and vitals reviewed  Additional Data:     Labs:      Results from last 7 days  Lab Units 04/01/18  0426   WBC Thousand/uL 7 76   HEMOGLOBIN g/dL 11 8   HEMATOCRIT % 36 7   PLATELETS Thousands/uL 328   NEUTROS PCT % 43   LYMPHS PCT % 36   MONOS PCT % 10   EOS PCT % 11*       Results from last 7 days  Lab Units 04/01/18  0426   SODIUM mmol/L 140   POTASSIUM mmol/L 4 1   CHLORIDE mmol/L 106   CO2 mmol/L 28   BUN mg/dL 10   CREATININE mg/dL 0 98   CALCIUM mg/dL 8 2*   TOTAL PROTEIN g/dL 6 4   BILIRUBIN TOTAL mg/dL 0 40   ALK PHOS U/L 53   ALT U/L 18   AST U/L 19   GLUCOSE RANDOM mg/dL 94       Results from last 7 days  Lab Units 04/01/18  0426   INR  1 93*       * I Have Reviewed All Lab Data Listed Above  * Additional Pertinent Lab Tests Reviewed:  All Labs Within Last 24 Hours Reviewed    Imaging:    Imaging Reports Reviewed Today Include: none  Imaging Personally Reviewed by Myself Includes:  none    Recent Cultures (last 7 days): Last 24 Hours Medication List:     Current Facility-Administered Medications:  acetaminophen 650 mg Oral Q6H PRN [employer], DO   albuterol 2 puff Inhalation Q6H PRN Hunt Leticia, DO   amLODIPine 5 mg Oral Daily Micah Safe, DO   And       benazepril 20 mg Oral Daily Micah Safe, DO   ergocalciferol 50,000 Units Oral Q14 Days Hunt Leticia, DO   fluticasone 1 spray Each Nare Daily [employer], DO   folic acid-pyridoxine-cyanocobalamin 1 tablet Oral Daily Nikul Mccauley, DO   loratadine 10 mg Oral Daily Nikul Mccauley, DO   montelukast 10 mg Oral HS Nikul Mccauley, DO   morphine injection 2 mg Intravenous Q4H PRN Hunt Leticia, DO   nystatin  Topical TID Hunt Leticia, DO   oxyCODONE 5 mg Oral Q4H PRN [employer], DO   pantoprazole 40 mg Oral Early Morning Nikul Mccauley, DO   pravastatin 40 mg Oral After [employer], DO   sodium chloride (PF) 3 mL Intravenous PRN Jonathon Beck MD   vancomycin 15 mg/kg (Adjusted) Intravenous Q12H [employer], DO   warfarin 7 5 mg Oral Daily (warfarin) [employer], DO        Today, Patient Was Seen By: Samantha [place] PA-C    ** Please Note: Dictation voice to text software may have been used in the creation of this document   **

## 2018-04-01 NOTE — RESPIRATORY THERAPY NOTE
CPAP     I introduce myself to patient, asked her questions about her CPAP machine, she does not know her pressures, she states that she does use it every night  We have our CPAP machine in her room, she stated she doesn't want to use ours, and that she is okay not using it, she only has mild JES  I told her if she changed her mind that she could call anytime   Nurse notified, will mention it to the doctor also

## 2018-04-01 NOTE — CASE MANAGEMENT
Initial Clinical Review    Admission: Date/Time/Statement: 3/30/18 @ 2308     Orders Placed This Encounter   Procedures    Inpatient Admission (expected length of stay for this patient is greater than two midnights)     Standing Status:   Standing     Number of Occurrences:   1     Order Specific Question:   Admitting Physician     Answer:   Adalberto Wilson     Order Specific Question:   Level of Care     Answer:   Med Surg [16]     Order Specific Question:   Estimated length of stay     Answer:   More than 2 Midnights     Order Specific Question:   Certification     Answer:   I certify that inpatient services are medically necessary for this patient for a duration of greater than two midnights  See H&P and MD Progress Notes for additional information about the patient's course of treatment  ED: Date/Time/Mode of Arrival:   ED Arrival Information     Expected Arrival Acuity Means of Arrival Escorted By Service Admission Type    - 3/30/2018 18:25 Urgent Walk-In Family Member General Medicine Urgent    Arrival Complaint    rt foot/leg pain        Chief Complaint:   Chief Complaint   Patient presents with    Cellulitis     Pt reports Rt lower leg and foot red and weeping since December  Painful area  History of Illness: Tonja Magdaleno is a 65 y o  female who presents with swelling, redness, warmth and pain in her right lower extremity  She states that she has been seeing wound care for the past month and a half for weeping of her rle  The swelling and redness in the leg have been going on since about December 2017  Over the past month it gets better and worse  She states that today the redness and pain is much worse and she could no longer tolerate it  She denies any fevers, nausea, vomiting  There does continue to be clear fluid weeping from the rle  She has a history of homocysteinemia and appears to be in a hypercoagulable state    She is following by heme/onc and had a prior left lower extremity dvt for which she is on lifelong ac  In the ED, vascular studies of her legs were done which did not show any dvt  No elevated wbc either  Blood cultures were drawn  ED Vital Signs:   ED Triage Vitals [03/30/18 1842]   Temperature Pulse Respirations Blood Pressure SpO2   98 4 °F (36 9 °C) 88 17 (!) 172/83 100 %      Temp Source Heart Rate Source Patient Position - Orthostatic VS BP Location FiO2 (%)   Temporal Monitor Lying Right arm --      Pain Score       Worst Possible Pain        Wt Readings from Last 1 Encounters:   03/31/18 (!) 175 kg (385 lb 5 8 oz)     Vital Signs (abnormal):   03/30/18 1842  98 4 °F (36 9 °C)  88  17   172/83  100 %  None (Room air)  Lying     Abnormal Labs:    4/1 calcium 8 2  Albumin 2 9, 2 5, 2 3  Phosphorus 4 2, 4 5  Trop WNL     PT/INR 19 5/1 65, 22 1/1 93  Urine leukocytes small   Urine WBC 4-10  Blood cultures pending     Diagnostic Test Results:     3/30 Venous duplex BLE - WNL     ED Treatment:   Medication Administration from 03/30/2018 1825 to 03/31/2018 0017    Date/Time Order Dose Route Action   03/30/2018 2134 piperacillin-tazobactam (ZOSYN) 4 5 g in sodium chloride 0 9 % 100 mL IVPB 4 5 g Intravenous New Bag   03/30/2018 2012 fentanyl citrate (PF) 100 MCG/2ML 100 mcg 100 mcg Intravenous Given   03/30/2018 2135 HYDROmorphone (DILAUDID) injection 1 mg 1 mg Intravenous Given        Past Medical/Surgical History:    Active Ambulatory Problems     Diagnosis Date Noted    History of DVT (deep vein thrombosis) 07/01/2016    Morbid obesity with BMI of 60 0-69 9, adult ([facility] [address] ) 07/01/2016    Renal disorder     PCOS (polycystic ovarian syndrome)     Obesity     Lyme disease     Blood clotting disorder ([facility] [address] )     Homocysteinemia ([facility] [address] )     Leg pain 03/30/2018     Resolved Ambulatory Problems     Diagnosis Date Noted    Pneumonia due to infectious organism 07/01/2016    Encephalopathy acute 07/01/2016    CIRILO (acute kidney injury) ([facility] [address] ) 07/02/2016     Past Medical History:   Diagnosis Date    Blood clotting disorder ([place] [address] )     Homocysteinemia (HCC)     Hypercoagulable state ([place] [address] )     Hypertension     Lyme disease     Obesity     PCOS (polycystic ovarian syndrome)     Renal disorder     Sleep apnea      Admitting Diagnosis: Leg pain [M79 606]  Cellulitis of right leg [L03 115]    Age/Sex: 65 y o  female    Assessment/Plan:     Cellulitis  Active Problems:    DVT (deep venous thrombosis) (HCC)    Morbid obesity (HCC)    PCOS (polycystic ovarian syndrome)    Hypertension    Homocysteinemia ([place] [address] )    Hypercoagulable state ([place] [address] )    Sleep apnea    Leg pain      Plan for the Primary Problem(s):  1  Right lower extremity cellulitis and weeping              - cont wound care              - consult to wound care              - has been ongoing on and off for several months and getting worse              - will start vancomycin              - follow up cultures              - outline area of cellulitis              - cont lasix              - morphine prn for pain     2  DVT              - subtherapeutic inr              - lovenox prophylaxis until inr is therapeutic              - cont home warfarin dose, inr therapeutic last on 3/12              - no dvt currently noted on doppler right now     3  HTN              - cont home bp meds     4  Sleep apnea              - cpap hs, patient will find out her dose     5  GERD              - ppi     VTE Prophylaxis: Enoxaparin (Lovenox)  / sequential compression device   Code Status: full code  Anticipated Length of Stay:  Patient will be admitted on an Inpatient basis with an anticipated length of stay of  Greater than 2 midnights     Justification for Hospital Stay: cellulitis    Admission Orders:  Scheduled Meds:   Current Facility-Administered Medications:  acetaminophen 650 mg Oral Q6H PRN Taylor Gonzalez DO    albuterol 2 puff Inhalation Q6H PRN Delia Valente DO    amLODIPine 5 mg Oral Daily Ethan Francisco DO    And benazepril 20 mg Oral Daily Ryanneannmarie Melendez, DO    ergocalciferol 50,000 Units Oral Q14 Days Dotson [facility], DO    fluticasone 1 spray Each Nare Daily [employer], DO    folic acid-pyridoxine-cyanocobalamin 1 tablet Oral Daily Nikul Mccauley, DO    loratadine 10 mg Oral Daily Nikul Mccauley, DO    montelukast 10 mg Oral HS Nikul Mccauley, DO    morphine injection 2 mg Intravenous Q4H PRN Dotson Chi, DO    nystatin  Topical TID Dotson Chi, DO    oxyCODONE 5 mg Oral Q4H PRN [employer], DO    pantoprazole 40 mg Oral Early Morning Nikul Mccauley, DO    pravastatin 40 mg Oral After [employer], DO    sodium chloride (PF) 3 mL Intravenous PRN Annie Layton MD    vancomycin 20 mg/kg (Adjusted) Intravenous Q12H Ryanne Melendez, DO Last Rate: 2,000 mg (03/31/18 2317)   warfarin 7 5 mg Oral Daily (warfarin) [employer], DO      PRN Meds:   acetaminophen    albuterol    morphine injection IV x 2 3/31    oxyCODONE  X 3 in last 24 hrs     sodium chloride (PF)    Wound care daily   SCDs  Diet cardiac TLC, Na 2 3 Gm   Cons Wound Care   Cons Nutrition   CPAP at HS   PT eval/tx   _____________________________  3/31 Medicine Progress Note  * Cellulitis of right lower extremity   Assessment & Plan     -continue IV vancomycin  -check a MRSA nasal screen  -wound care team consult          Obstructive sleep apnea on CPAP   Assessment & Plan     -continue CPAP q h s  and anytime while sleeping        Hypercoagulable state ([facility] [address] )   Assessment & Plan     -continue Coumadin for goal INR of 2-3        Essential hypertension   Assessment & Plan     -continue amlodipine and benazepril  -follow the patient's blood pressure trend        Morbid obesity with BMI of 60 0-69 9, adult (HCC)   Assessment & Plan     -nutrition consult        History of DVT (deep vein thrombosis)   Assessment & Plan     -continue Coumadin for goal INR 2-3          VTE Pharmacologic Prophylaxis:   Pharmacologic: Warfarin (Coumadin) for a goal INR of 2-3 Mechanical VTE Prophylaxis in Place: Yes on the left lower extremity only  No SCD on the right lower extremity with the right lower extremity cellulitis  Certification Statement: The patient will continue to require additional inpatient hospital stay due to the need for IV antibiotics

## 2018-04-01 NOTE — SOCIAL WORK
Case management met with the patient to assess the prior level of function and form a safe d/c plan  The patient was given and I reviewed the case management  discharge checklist with the patient  The patient is aware that the d/c planning starts on the day of admission and that if she  has any questions or needs they  is to contact case management  The patient is at home and she has a cane a walker and a w/c also a shower chair  Her person of contact is her daughter Eleanor Verdugo  PCP Dr Henley Kinds   The patient is all on one floor at home and her family takes her to appointments

## 2018-04-02 LAB
ALBUMIN SERPL BCP-MCNC: 2.2 G/DL (ref 3.5–5)
ALP SERPL-CCNC: 51 U/L (ref 46–116)
ALT SERPL W P-5'-P-CCNC: 15 U/L (ref 12–78)
ANION GAP SERPL CALCULATED.3IONS-SCNC: 7 MMOL/L (ref 4–13)
AST SERPL W P-5'-P-CCNC: 18 U/L (ref 5–45)
BASOPHILS # BLD AUTO: 0.02 THOUSANDS/ΜL (ref 0–0.1)
BASOPHILS NFR BLD AUTO: 0 % (ref 0–1)
BILIRUB SERPL-MCNC: 0.4 MG/DL (ref 0.2–1)
BUN SERPL-MCNC: 9 MG/DL (ref 5–25)
CALCIUM SERPL-MCNC: 8 MG/DL (ref 8.3–10.1)
CHLORIDE SERPL-SCNC: 106 MMOL/L (ref 100–108)
CO2 SERPL-SCNC: 27 MMOL/L (ref 21–32)
CREAT SERPL-MCNC: 1.03 MG/DL (ref 0.6–1.3)
EOSINOPHIL # BLD AUTO: 0.86 THOUSAND/ΜL (ref 0–0.61)
EOSINOPHIL NFR BLD AUTO: 11 % (ref 0–6)
ERYTHROCYTE [DISTWIDTH] IN BLOOD BY AUTOMATED COUNT: 16.1 % (ref 11.6–15.1)
GFR SERPL CREATININE-BSD FRML MDRD: 59 ML/MIN/1.73SQ M
GLUCOSE SERPL-MCNC: 92 MG/DL (ref 65–140)
HCT VFR BLD AUTO: 38 % (ref 34.8–46.1)
HGB BLD-MCNC: 12.3 G/DL (ref 11.5–15.4)
INR PPP: 2.2 (ref 0.86–1.16)
LYMPHOCYTES # BLD AUTO: 2.15 THOUSANDS/ΜL (ref 0.6–4.47)
LYMPHOCYTES NFR BLD AUTO: 27 % (ref 14–44)
MCH RBC QN AUTO: 28.3 PG (ref 26.8–34.3)
MCHC RBC AUTO-ENTMCNC: 32.4 G/DL (ref 31.4–37.4)
MCV RBC AUTO: 88 FL (ref 82–98)
MONOCYTES # BLD AUTO: 0.83 THOUSAND/ΜL (ref 0.17–1.22)
MONOCYTES NFR BLD AUTO: 10 % (ref 4–12)
MRSA NOSE QL CULT: NORMAL
NEUTROPHILS # BLD AUTO: 4.09 THOUSANDS/ΜL (ref 1.85–7.62)
NEUTS SEG NFR BLD AUTO: 52 % (ref 43–75)
PLATELET # BLD AUTO: 335 THOUSANDS/UL (ref 149–390)
PMV BLD AUTO: 10.5 FL (ref 8.9–12.7)
POTASSIUM SERPL-SCNC: 3.8 MMOL/L (ref 3.5–5.3)
PROT SERPL-MCNC: 6.3 G/DL (ref 6.4–8.2)
PROTHROMBIN TIME: 24.5 SECONDS (ref 12.1–14.4)
RBC # BLD AUTO: 4.34 MILLION/UL (ref 3.81–5.12)
SODIUM SERPL-SCNC: 140 MMOL/L (ref 136–145)
WBC # BLD AUTO: 7.95 THOUSAND/UL (ref 4.31–10.16)

## 2018-04-02 PROCEDURE — 80053 COMPREHEN METABOLIC PANEL: CPT | Performed by: PHYSICIAN ASSISTANT

## 2018-04-02 PROCEDURE — 99232 SBSQ HOSP IP/OBS MODERATE 35: CPT | Performed by: PHYSICIAN ASSISTANT

## 2018-04-02 PROCEDURE — 97530 THERAPEUTIC ACTIVITIES: CPT

## 2018-04-02 PROCEDURE — 85610 PROTHROMBIN TIME: CPT | Performed by: INTERNAL MEDICINE

## 2018-04-02 PROCEDURE — G8989 SELF CARE D/C STATUS: HCPCS

## 2018-04-02 PROCEDURE — G8987 SELF CARE CURRENT STATUS: HCPCS

## 2018-04-02 PROCEDURE — 85025 COMPLETE CBC W/AUTO DIFF WBC: CPT | Performed by: PHYSICIAN ASSISTANT

## 2018-04-02 PROCEDURE — G8988 SELF CARE GOAL STATUS: HCPCS

## 2018-04-02 PROCEDURE — 97166 OT EVAL MOD COMPLEX 45 MIN: CPT

## 2018-04-02 PROCEDURE — 97116 GAIT TRAINING THERAPY: CPT

## 2018-04-02 RX ORDER — WARFARIN SODIUM 5 MG/1
5 TABLET ORAL
Status: DISCONTINUED | OUTPATIENT
Start: 2018-04-02 | End: 2018-04-06 | Stop reason: HOSPADM

## 2018-04-02 RX ORDER — DOCUSATE SODIUM 100 MG/1
100 CAPSULE, LIQUID FILLED ORAL 2 TIMES DAILY
Status: DISCONTINUED | OUTPATIENT
Start: 2018-04-02 | End: 2018-04-06 | Stop reason: HOSPADM

## 2018-04-02 RX ORDER — DIPHENHYDRAMINE HCL 25 MG
25 TABLET ORAL EVERY 6 HOURS PRN
Status: DISCONTINUED | OUTPATIENT
Start: 2018-04-02 | End: 2018-04-06 | Stop reason: HOSPADM

## 2018-04-02 RX ORDER — AMMONIUM LACTATE 12 G/100G
CREAM TOPICAL 2 TIMES DAILY PRN
Status: DISCONTINUED | OUTPATIENT
Start: 2018-04-02 | End: 2018-04-05

## 2018-04-02 RX ADMIN — MONTELUKAST SODIUM 10 MG: 10 TABLET, FILM COATED ORAL at 22:35

## 2018-04-02 RX ADMIN — VANCOMYCIN HYDROCHLORIDE 1500 MG: 1 INJECTION, POWDER, LYOPHILIZED, FOR SOLUTION INTRAVENOUS at 05:37

## 2018-04-02 RX ADMIN — NYSTATIN: 100000 POWDER TOPICAL at 22:38

## 2018-04-02 RX ADMIN — FLUTICASONE PROPIONATE 1 SPRAY: 50 SPRAY, METERED NASAL at 09:44

## 2018-04-02 RX ADMIN — NYSTATIN: 100000 POWDER TOPICAL at 17:00

## 2018-04-02 RX ADMIN — DIPHENHYDRAMINE HCL 25 MG: 25 TABLET ORAL at 16:56

## 2018-04-02 RX ADMIN — PANTOPRAZOLE SODIUM 40 MG: 40 TABLET, DELAYED RELEASE ORAL at 05:36

## 2018-04-02 RX ADMIN — NYSTATIN: 100000 POWDER TOPICAL at 09:44

## 2018-04-02 RX ADMIN — PRAVASTATIN SODIUM 40 MG: 40 TABLET ORAL at 18:48

## 2018-04-02 RX ADMIN — CEFAZOLIN SODIUM 2000 MG: 2 SOLUTION INTRAVENOUS at 16:49

## 2018-04-02 RX ADMIN — DOCUSATE SODIUM 100 MG: 100 CAPSULE, LIQUID FILLED ORAL at 18:48

## 2018-04-02 RX ADMIN — WARFARIN SODIUM 5 MG: 5 TABLET ORAL at 18:48

## 2018-04-02 RX ADMIN — Medication 1 TABLET: at 09:44

## 2018-04-02 RX ADMIN — OXYCODONE HYDROCHLORIDE 5 MG: 5 TABLET ORAL at 23:23

## 2018-04-02 RX ADMIN — CEFAZOLIN SODIUM 2000 MG: 2 SOLUTION INTRAVENOUS at 23:23

## 2018-04-02 RX ADMIN — LORATADINE 10 MG: 10 TABLET ORAL at 09:44

## 2018-04-02 RX ADMIN — Medication: at 18:53

## 2018-04-02 RX ADMIN — AMLODIPINE BESYLATE 5 MG: 5 TABLET ORAL at 09:44

## 2018-04-02 RX ADMIN — BENAZEPRIL HYDROCHLORIDE 20 MG: 20 TABLET, FILM COATED ORAL at 09:44

## 2018-04-02 RX ADMIN — OXYCODONE HYDROCHLORIDE 5 MG: 5 TABLET ORAL at 05:36

## 2018-04-02 NOTE — PLAN OF CARE
Problem: Potential for Falls  Goal: Patient will remain free of falls  INTERVENTIONS:  - Assess patient frequently for physical needs  -  Identify cognitive and physical deficits and behaviors that affect risk of falls    -  [place] fall precautions as indicated by assessment   - Educate patient/family on patient safety including physical limitations  - Instruct patient to call for assistance with activity based on assessment  - Modify environment to reduce risk of injury  - Consider OT/PT consult to assist with strengthening/mobility   Outcome: Progressing      Problem: Prexisting or High Potential for Compromised Skin Integrity  Goal: Skin integrity is maintained or improved  INTERVENTIONS:  - Identify patients at risk for skin breakdown  - Assess and monitor skin integrity  - Assess and monitor nutrition and hydration status  - Monitor labs (i e  albumin)  - Assess for incontinence   - Turn and reposition patient  - Assist with mobility/ambulation  - Relieve pressure over bony prominences  - Avoid friction and shearing  - Provide appropriate hygiene as needed including keeping skin clean and dry  - Evaluate need for skin moisturizer/barrier cream  - Collaborate with interdisciplinary team (i e  Nutrition, Rehabilitation, etc )   - Patient/family teaching   Outcome: Progressing      Problem: PAIN - ADULT  Goal: Verbalizes/displays adequate comfort level or baseline comfort level  Interventions:  - Encourage patient to monitor pain and request assistance  - Assess pain using appropriate pain scale  - Administer analgesics based on type and severity of pain and evaluate response  - Implement non-pharmacological measures as appropriate and evaluate response  - Consider cultural and social influences on pain and pain management  - Notify physician/advanced practitioner if interventions unsuccessful or patient reports new pain   Outcome: Progressing      Problem: INFECTION - ADULT  Goal: Absence or prevention of progression during hospitalization  INTERVENTIONS:  - Assess and monitor for signs and symptoms of infection  - Monitor lab/diagnostic results  - Monitor all insertion sites, i e  indwelling lines, tubes, and drains  - Monitor endotracheal (as able) and nasal secretions for changes in amount and color  - [place] appropriate cooling/warming therapies per order  - Administer medications as ordered  - Instruct and encourage patient and family to use good hand hygiene technique  - Identify and instruct in appropriate isolation precautions for identified infection/condition   Outcome: Progressing    Goal: Absence of fever/infection during neutropenic period  INTERVENTIONS:  - Monitor WBC  - Implement neutropenic guidelines   Outcome: Completed Date Met: 04/02/18      Problem: SAFETY ADULT  Goal: Maintain or return to baseline ADL function  INTERVENTIONS:  -  Assess patient's ability to carry out ADLs; assess patient's baseline for ADL function and identify physical deficits which impact ability to perform ADLs (bathing, care of mouth/teeth, toileting, grooming, dressing, etc )  - Assess/evaluate cause of self-care deficits   - Assess range of motion  - Assess patient's mobility; develop plan if impaired  - Assess patient's need for assistive devices and provide as appropriate  - Encourage maximum independence but intervene and supervise when necessary  ¯ Involve family in performance of ADLs  ¯ Assess for home care needs following discharge   ¯ Request OT consult to assist with ADL evaluation and planning for discharge  ¯ Provide patient education as appropriate   Outcome: Progressing    Goal: Maintain or return mobility status to optimal level  INTERVENTIONS:  - Assess patient's baseline mobility status (ambulation, transfers, stairs, etc )    - Identify cognitive and physical deficits and behaviors that affect mobility  - Identify mobility aids required to assist with transfers and/or ambulation (gait belt, sit-to-stand, lift, walker, cane, etc )  - [place] fall precautions as indicated by assessment  - Record patient progress and toleration of activity level on Mobility SBAR; progress patient to next Phase/Stage  - Instruct patient to call for assistance with activity based on assessment  - Request Rehabilitation consult to assist with strengthening/weightbearing, etc    Outcome: Progressing      Problem: DISCHARGE PLANNING  Goal: Discharge to home or other facility with appropriate resources  INTERVENTIONS:  - Identify barriers to discharge w/patient and caregiver  - Arrange for needed discharge resources and transportation as appropriate  - Identify discharge learning needs (meds, wound care, etc )  - Arrange for interpretive services to assist at discharge as needed  - Refer to Case Management Department for coordinating discharge planning if the patient needs post-hospital services based on physician/advanced practitioner order or complex needs related to functional status, cognitive ability, or social support system   Outcome: Progressing      Problem: Knowledge Deficit  Goal: Patient/family/caregiver demonstrates understanding of disease process, treatment plan, medications, and discharge instructions  Complete learning assessment and assess knowledge base    Interventions:  - Provide teaching at level of understanding  - Provide teaching via preferred learning methods   Outcome: Progressing      Problem: DISCHARGE PLANNING - CARE MANAGEMENT  Goal: Discharge to post-acute care or home with appropriate resources  INTERVENTIONS:  - Conduct assessment to determine patient/family and health care team treatment goals, and need for post-acute services based on payer coverage, community resources, and patient preferences, and barriers to discharge  - Address psychosocial, clinical, and financial barriers to discharge as identified in assessment in conjunction with the patient/family and health care team  - Arrange appropriate level of post-acute services according to patient's   needs and preference and payer coverage in collaboration with the physician and health care team  - Communicate with and update the patient/family, physician, and health care team regarding progress on the discharge plan  - Arrange appropriate transportation to post-acute venues   Outcome: Progressing

## 2018-04-02 NOTE — CONSULTS
Patient is known to the [facility] outpatient wound management center where she follows with Marissa Anderson DPM, for chronic venous ulcerations since 6/2017  She healed and reopened twice in the last year  Last visit was 3/29/2018  Outpatient documentation provided to inpatient team   Unna boot compression trialed, but wrap often "falls down"  RLE has scattered partial thickness open areas on distal medial and anterior aspects  Lower extremity edema with erythema and dry flakey periwound skin  Circumferences: increased since outpatient appointment  RLE calf- 53 cm  RLE ankle- 27 cm  RLE pedal 25 5 cm    Recommendations:  1  Leave lower extremities open to air and elevated on pillows to assist with edema control  2  Apply Remedy Nourishing skin cream to bilateral lower extremities 3 times per day  3   Follow up with outpatient wound care on discharge  Patient has a follow up appointment scheduled for 4/5/2018 at 1:45  Other Ulcers 04/02/18 Leg Right partial thickness (Active)   Wound Description Epithelialization;Fragile; Swelling; Other (dermal) 4/2/2018  9:00 AM   Leticia-wound Assessment Erythema;Fragile; Swelling;Yellow-brown; Other (dry, scaley) 4/2/2018  9:00 AM   Shape multiple irregular distal lower leg 4/2/2018  9:00 AM   Wound Length (cm) 4 5 cm 4/2/2018  9:00 AM   Wound Width (cm) 12 cm 4/2/2018  9:00 AM   Wound Depth (cm) 0 1 4/2/2018  9:00 AM   Calculated Wound Volume (cm^3) 5 4 cm^3 4/2/2018  9:00 AM   Drainage Amount Scant 4/2/2018  9:00 AM   Drainage Description Serous 4/2/2018  9:00 AM   Treatment Elevated with pillow(s) 4/2/2018  9:00 AM   Dressings Other (open to air  Moisturizer applied) 4/2/2018  9:00 AM   Patient Tolerance Tolerated well 4/2/2018  9:00 AM   Number of days: 0     Raleigh Stout   [id number]  4/2/2018 10:30

## 2018-04-02 NOTE — PHYSICAL THERAPY NOTE
PT Treatment Note      04/02/18 0905   Restrictions/Precautions   Other Precautions (Contact/isolation; Fall Risk;Pain)   Subjective   Subjective Agreeable to therapy  Declined OOB in recliner due to LE's  Wants to stay in bed to keep legs up instead of the recliner chair  States her R LE is painful  Bed Mobility   Supine to Sit 4  Minimal assistance   Additional items Assist x 1;Verbal cues;LE management   Sit to Supine 4  Minimal assistance   Additional items Assist x 1;Verbal cues;LE management   Transfers   Sit to Stand 7  Independent   Stand to Sit 7  Independent   Toilet transfer 5  Supervision   Ambulation/Elevation   Gait pattern WNL   Gait Assistance 5  Supervision   Assistive Device None   Distance 20' x 2   Balance   Static Sitting Good   Dynamic Sitting Good   Static Standing Good   Dynamic Standing Good   Ambulatory Good   Endurance Deficit   Endurance Deficit Yes   Activity Tolerance   Activity Tolerance Patient limited by pain   Exercises   Heelslides 20 reps   Ankle Pumps 20 reps   Assessment   Prognosis Good   Problem List Decreased range of motion;Decreased strength;Decreased endurance;Decreased mobility   Assessment Performing functional mobility at (Min-S) level of function  required assist with LE's in/out of bed  Tolerates short distances  Limited by RLE pain  Plan   Treatment/Interventions Functional transfer training;LE strengthening/ROM; Therapeutic exercise; Endurance training;Bed mobility;Gait training   Progress Progressing toward goals   Recommendation   Recommendation Home PT;Outpatient PT   Pt  In bed  with call bell within reach and LE's elevated on pillow  at end of PT session

## 2018-04-02 NOTE — OCCUPATIONAL THERAPY NOTE
[address] Evaluation     Patient Name: Jacob Clark  EBRBT'F Date: 4/2/2018  Problem List  Patient Active Problem List   Diagnosis    History of DVT (deep vein thrombosis)    Morbid obesity with BMI of 60 0-69 9, adult ([address] )    Renal disorder    PCOS (polycystic ovarian syndrome)    Obesity    Lyme disease    Blood clotting disorder ([facility] [address] )    Essential hypertension    Homocysteinemia ([facility] [address]    Hypercoagulable state ([facility] [address] )    Obstructive sleep apnea on CPAP    Leg pain    Cellulitis of right lower extremity     Past Medical History  Past Medical History:   Diagnosis Date    Blood clotting disorder ([facility] [address]     Homocysteinemia ([facility] [address]     Hypercoagulable state ([facility] [address] )     Hypertension     Lyme disease     Obesity     PCOS (polycystic ovarian syndrome)     Renal disorder     kidney stones    Sleep apnea      Past Surgical History  Past Surgical History:   Procedure Laterality Date    ANKLE SURGERY Right     APPENDECTOMY      BREAST BIOPSY      BREAST BIOPSY      FOOT SURGERY      HEMORROIDECTOMY      HYSTERECTOMY      JOINT REPLACEMENT Bilateral     knee    LITHOTRIPSY               04/02/18 1046   Note Type   Note type Eval only   Restrictions/Precautions   Weight Bearing Precautions Per Order No   Other Precautions Contact/isolation; Fall Risk;Pain   Pain Assessment   Pain Assessment 0-10   Pain Score 4   Pain Location Foot   Pain Orientation Right   Home Living   Type of Home House  [facility] style / See PT eval for home set-up)   Home Layout One level; Laundry in basement;Performs ADLs on one level  (One YAMILE)   [address]  (But does not use)   Prior Function   Level of [place] Independent with ADLs and functional mobility   Lives With Alone   Receives Help From Family   ADL Assistance Independent   IADLs Independent   Falls in the last 6 months 0   Psychosocial   Psychosocial (WDL) WDL   ADL   Where Assessed Edge of bed   LB Dressing Assistance 6  Modified independent   LB Dressing Deficit Increased time to complete; Don/doff L sock   Bed Mobility   Rolling R 6  Modified independent   Additional items Increased time required   Supine to Sit 4  Minimal assistance   Additional items LE management   Sit to Supine 4  Minimal assistance   Additional items LE management; Increased time required   Additional Comments (Pt reports utilizing towel for assistance w/ LE manag PTA)   Balance   Static Sitting Good   Dynamic Sitting Good   Activity Tolerance   Activity Tolerance Patient tolerated treatment well   RUE Assessment   RUE Assessment WNL   LUE Assessment   LUE Assessment WNL   Cognition   Overall Cognitive Status WFL   Arousal/Participation Alert; Responsive   Orientation Level Oriented X4   Assessment   Prognosis Good   Assessment Pt is a 64 y/o female admitted to hospital d/t cellulitis of RLE  Pt demonstrates good UE strength as well as static/dynamic balance  Pt required min A for management of RLE in/out of bed, however, pt reports this is normal prior to admission and pt would utilize towel for lifting RLE in/out of bed  Pt demonstrates appropriate adaptive techniques for increased independence w/ all ADL/IADL completion  Skilled OT services not indicated at this time  Recommendation   OT Discharge Recommendation Home with family support   OT - OK to Discharge Yes  (when medically stable)   Barthel Index   Feeding 10   Bathing 5   Grooming Score 5   Dressing Score 10   Bladder Score 10   Bowels Score 10   Toilet Use Score 10   Transfers (Bed/Chair) Score 10   Mobility (Level Surface) Score 15   Stairs Score 10   B[place]hel Index Score 95       Pt lying in bed at end of session w/ all needs within reach  Pt w/out SCDs on at start of session d/t open wound  Pt positioned w/ pillows under b/l LE

## 2018-04-02 NOTE — ASSESSMENT & PLAN NOTE
Slowly improving  MRSA nasal culture is negative  Will switch IV vancomycin to IV Ancef  Continue wound care

## 2018-04-02 NOTE — CASE MANAGEMENT
Notification of Inpatient Admission/Inpatient Authorization Request  This is a Notification of Inpatient Admission/Request for Inpatient Authorization to our facility [facility]  Please be advised that this patient is currently in our facility under Inpatient Status  Below you will find the Attending Physician and Facilitys information including NPI# and contact information for the Utilization  assigned to the [facility] where the patient is receiving services  Please feel free to contact the Utilization Review Department with any questions  Patient Information:  PATIENT NAME: Martha Rhodes  MRN: [id number]  YOB: 1955    PRESENTATION DATE: 3/30/2018  6:35 PM  IP ADMISSION DATE: 3/30/18 2308  DISCHARGE DATE: No discharge date for patient encounter  DISPOSITION: Home/Self Care    Attending Physician:  MAKSIM Vu  Specialty- Hospitalist,   [facility] ID- [id number]  [address]ela [address]   [address]  Phone 1: [phone]  Fax: [phone]    Facility:  [facility] [address] [place] [address] Road  [facility] [address], [address]  Phone: [phone]  Tax ID: [id number]  NPI: [id number]    [address] [place] [address] in the [facility] by Mike Herrmann for 2017  Network Utilization Review Department  Phone: [phone]; Fax [phone]  ATTENTION: The Network Utilization Review Department is now centralized for our 7 Facilities  Make a note that we have a new phone and fax numbers for our Department  Please call with any questions or concerns to [phone] and carefully follow the prompts so that you are directed to the right person  All voicemails are confidential  Fax any determinations, approvals, denials, and requests for initial or continue stay review clinical to [phone]   Due to HIGH CALL volume, it would be easier if you could please send faxed requests to expedite your requests and in part, help us provide discharge notifications faster

## 2018-04-02 NOTE — PROGRESS NOTES
Progress Note - Leila Andrews 1955, 65 y o  female MRN: [id number]    Unit/Bed#: 896-80 Encounter: [id number]    Primary Care Provider: Corine Solis MD   Date and time admitted to hospital: 3/30/2018  6:35 PM        * Cellulitis of right lower extremity   Assessment & Plan    Slowly improving  MRSA nasal culture is negative  Will switch IV vancomycin to IV Ancef  Continue wound care  Obstructive sleep apnea on CPAP   Assessment & Plan    -continue CPAP q h s  and anytime while sleeping        Essential hypertension   Assessment & Plan    -continue amlodipine and benazepril  -follow the patient's blood pressure trend        Hypercoagulable state ([address] )   Assessment & Plan    -INR is 2 20 today  Will decrease coumadin from 7 5 mg to home dose of 5 mg PO daily  Daily INR  History of DVT (deep vein thrombosis)   Assessment & Plan    Please see above plan  Morbid obesity with BMI of 60 0-69 9, adult [facility])   Assessment & Plan    -nutrition consult            VTE Pharmacologic Prophylaxis:   Pharmacologic: Warfarin (Coumadin)  Mechanical VTE Prophylaxis in Place: No      Discussions with Specialists or Other Care Team Provider: Nursing, CM, and attending    Education and Discussions with Family / Patient: n/a    Time Spent for Care: 30 minutes  More than 50% of total time spent on counseling and coordination of care as described above  Current Length of Stay: 3 day(s)    Current Patient Status: Inpatient   Certification Statement: The patient will continue to require additional inpatient hospital stay due to continued need for IV antibiotics      Code Status: Level 1 - Full Code      Subjective: The patient was seen and examined  The patient continues to have pain in her right leg       Objective:     Vitals:   Temp (24hrs), Av 6 °F (37 °C), Min:97 8 °F (36 6 °C), Max:99 1 °F (37 3 °C)    HR:  [75-80] 78  Resp:  [18] 18  BP: (102-115)/(51-59) 102/55  SpO2:  [94 %-96 %] 95 %  Body mass index is 68 34 kg/m²  Input and Output Summary (last 24 hours): Intake/Output Summary (Last 24 hours) at 04/02/18 1616  Last data filed at 04/02/18 1200   Gross per 24 hour   Intake             1280 ml   Output              250 ml   Net             1030 ml       Physical Exam:     Physical Exam   Constitutional: She is oriented to person, place, and time  Vital signs are normal  She appears well-developed and well-nourished  She is active and cooperative  Cardiovascular: Normal rate and regular rhythm  Pulmonary/Chest: Effort normal and breath sounds normal  She has no wheezes  She has no rhonchi  She has no rales  Abdominal: Soft  Normal appearance and bowel sounds are normal  There is no tenderness  Neurological: She is alert and oriented to person, place, and time  Skin:   Erythema noted from right shin just below knee to right foot  Peeling and cracking of skin noted  Nursing note and vitals reviewed  Additional Data:     Labs:      Results from last 7 days  Lab Units 04/02/18  0452   WBC Thousand/uL 7 95   HEMOGLOBIN g/dL 12 3   HEMATOCRIT % 38 0   PLATELETS Thousands/uL 335   NEUTROS PCT % 52   LYMPHS PCT % 27   MONOS PCT % 10   EOS PCT % 11*       Results from last 7 days  Lab Units 04/02/18  0452   SODIUM mmol/L 140   POTASSIUM mmol/L 3 8   CHLORIDE mmol/L 106   CO2 mmol/L 27   BUN mg/dL 9   CREATININE mg/dL 1 03   CALCIUM mg/dL 8 0*   TOTAL PROTEIN g/dL 6 3*   BILIRUBIN TOTAL mg/dL 0 40   ALK PHOS U/L 51   ALT U/L 15   AST U/L 18   GLUCOSE RANDOM mg/dL 92       Results from last 7 days  Lab Units 04/02/18  0452   INR  2 20*       * I Have Reviewed All Lab Data Listed Above  * Additional Pertinent Lab Tests Reviewed:  All Labs Within Last 24 Hours Reviewed    Imaging:    Imaging Reports Reviewed Today Include: none  Imaging Personally Reviewed by Myself Includes:  none    Recent Cultures (last 7 days):       Results from last 7 days  Lab Units 03/30/18 1957   BLOOD CULTURE No Growth at 48 hrs  Last 24 Hours Medication List:     Current Facility-Administered Medications:  acetaminophen 650 mg Oral Q6H PRN Hermelinda Calero, DO   albuterol 2 puff Inhalation Q6H PRN Juan Luis Tomas, DO   amLODIPine 5 mg Oral Daily Tamra Chaves, DO   And       benazepril 20 mg Oral Daily Kenneth Angeline, DO   ammonium lactate  Topical BID PRN Columba Jones PA-C   cefazolin 2,000 mg Intravenous Q8H Alexander Mejia, DO   diphenhydrAMINE 25 mg Oral Q6H PRN Columba Jones PA-C   ergocalciferol 50,000 Units Oral Q14 Days Juan Luis Tomas, DO   fluticasone 1 spray Each Nare Daily Hermelinda Calero, DO   folic acid-pyridoxine-cyanocobalamin 1 tablet Oral Daily Nikul Mccauley, DO   loratadine 10 mg Oral Daily Nikul Mccauley, DO   montelukast 10 mg Oral HS Nikcharles Mccauley, DO   morphine injection 2 mg Intravenous Q4H PRN Juan Luis Tomas, DO   nystatin  Topical TID Juan Luis Tomas, DO   oxyCODONE 5 mg Oral Q4H PRN Hermelinda Calero, DO   pantoprazole 40 mg Oral Early Morning Nikul Mccauley, DO   pravastatin 40 mg Oral After [facility], DO   sodium chloride (PF) 3 mL Intravenous PRN Sandra Sanon MD   warfarin 5 mg Oral Daily (warfarin) Columba Jones PA-C        Today, Patient Was Seen By: Columba Jones PA-C    ** Please Note: Dictation voice to text software may have been used in the creation of this document   **

## 2018-04-03 LAB
ANION GAP SERPL CALCULATED.3IONS-SCNC: 8 MMOL/L (ref 4–13)
BACTERIA UR CULT: NORMAL
BASOPHILS # BLD AUTO: 0.02 THOUSANDS/ΜL (ref 0–0.1)
BASOPHILS NFR BLD AUTO: 0 % (ref 0–1)
BUN SERPL-MCNC: 10 MG/DL (ref 5–25)
CALCIUM SERPL-MCNC: 8.1 MG/DL (ref 8.3–10.1)
CHLORIDE SERPL-SCNC: 106 MMOL/L (ref 100–108)
CO2 SERPL-SCNC: 26 MMOL/L (ref 21–32)
CREAT SERPL-MCNC: 0.95 MG/DL (ref 0.6–1.3)
EOSINOPHIL # BLD AUTO: 1.04 THOUSAND/ΜL (ref 0–0.61)
EOSINOPHIL NFR BLD AUTO: 12 % (ref 0–6)
ERYTHROCYTE [DISTWIDTH] IN BLOOD BY AUTOMATED COUNT: 16.1 % (ref 11.6–15.1)
GFR SERPL CREATININE-BSD FRML MDRD: 65 ML/MIN/1.73SQ M
GLUCOSE SERPL-MCNC: 87 MG/DL (ref 65–140)
HCT VFR BLD AUTO: 38.5 % (ref 34.8–46.1)
HGB BLD-MCNC: 12.5 G/DL (ref 11.5–15.4)
INR PPP: 2.36 (ref 0.86–1.16)
LYMPHOCYTES # BLD AUTO: 2.3 THOUSANDS/ΜL (ref 0.6–4.47)
LYMPHOCYTES NFR BLD AUTO: 26 % (ref 14–44)
MCH RBC QN AUTO: 28.3 PG (ref 26.8–34.3)
MCHC RBC AUTO-ENTMCNC: 32.5 G/DL (ref 31.4–37.4)
MCV RBC AUTO: 87 FL (ref 82–98)
MONOCYTES # BLD AUTO: 0.78 THOUSAND/ΜL (ref 0.17–1.22)
MONOCYTES NFR BLD AUTO: 9 % (ref 4–12)
NEUTROPHILS # BLD AUTO: 4.85 THOUSANDS/ΜL (ref 1.85–7.62)
NEUTS SEG NFR BLD AUTO: 53 % (ref 43–75)
PLATELET # BLD AUTO: 354 THOUSANDS/UL (ref 149–390)
PMV BLD AUTO: 10.6 FL (ref 8.9–12.7)
POTASSIUM SERPL-SCNC: 3.7 MMOL/L (ref 3.5–5.3)
PROTHROMBIN TIME: 25.9 SECONDS (ref 12.1–14.4)
RBC # BLD AUTO: 4.42 MILLION/UL (ref 3.81–5.12)
SODIUM SERPL-SCNC: 140 MMOL/L (ref 136–145)
WBC # BLD AUTO: 8.99 THOUSAND/UL (ref 4.31–10.16)

## 2018-04-03 PROCEDURE — 80048 BASIC METABOLIC PNL TOTAL CA: CPT | Performed by: PHYSICIAN ASSISTANT

## 2018-04-03 PROCEDURE — 99232 SBSQ HOSP IP/OBS MODERATE 35: CPT | Performed by: PHYSICIAN ASSISTANT

## 2018-04-03 PROCEDURE — 94760 N-INVAS EAR/PLS OXIMETRY 1: CPT

## 2018-04-03 PROCEDURE — 85610 PROTHROMBIN TIME: CPT | Performed by: INTERNAL MEDICINE

## 2018-04-03 PROCEDURE — 85025 COMPLETE CBC W/AUTO DIFF WBC: CPT | Performed by: PHYSICIAN ASSISTANT

## 2018-04-03 RX ADMIN — DOCUSATE SODIUM 100 MG: 100 CAPSULE, LIQUID FILLED ORAL at 17:31

## 2018-04-03 RX ADMIN — FLUTICASONE PROPIONATE 1 SPRAY: 50 SPRAY, METERED NASAL at 09:21

## 2018-04-03 RX ADMIN — MONTELUKAST SODIUM 10 MG: 10 TABLET, FILM COATED ORAL at 21:45

## 2018-04-03 RX ADMIN — DOCUSATE SODIUM 100 MG: 100 CAPSULE, LIQUID FILLED ORAL at 09:10

## 2018-04-03 RX ADMIN — DIPHENHYDRAMINE HCL 25 MG: 25 TABLET ORAL at 17:34

## 2018-04-03 RX ADMIN — WARFARIN SODIUM 5 MG: 5 TABLET ORAL at 17:31

## 2018-04-03 RX ADMIN — PANTOPRAZOLE SODIUM 40 MG: 40 TABLET, DELAYED RELEASE ORAL at 05:09

## 2018-04-03 RX ADMIN — LORATADINE 10 MG: 10 TABLET ORAL at 09:10

## 2018-04-03 RX ADMIN — PRAVASTATIN SODIUM 40 MG: 40 TABLET ORAL at 17:31

## 2018-04-03 RX ADMIN — CEFAZOLIN SODIUM 2000 MG: 2 SOLUTION INTRAVENOUS at 09:10

## 2018-04-03 RX ADMIN — Medication 1 TABLET: at 09:17

## 2018-04-03 RX ADMIN — AMLODIPINE BESYLATE 5 MG: 5 TABLET ORAL at 09:10

## 2018-04-03 RX ADMIN — BENAZEPRIL HYDROCHLORIDE 20 MG: 20 TABLET, FILM COATED ORAL at 09:11

## 2018-04-03 RX ADMIN — VANCOMYCIN HYDROCHLORIDE 1500 MG: 1 INJECTION, POWDER, LYOPHILIZED, FOR SOLUTION INTRAVENOUS at 17:34

## 2018-04-03 RX ADMIN — NYSTATIN: 100000 POWDER TOPICAL at 09:21

## 2018-04-03 RX ADMIN — NYSTATIN: 100000 POWDER TOPICAL at 17:34

## 2018-04-03 RX ADMIN — NYSTATIN: 100000 POWDER TOPICAL at 21:47

## 2018-04-03 NOTE — CASE MANAGEMENT
Continued Stay Review    Date: 4/3    Vital Signs: /91 (BP Location: Left arm)   Pulse 77   Temp 98 8 °F (37 1 °C) (Temporal)   Resp 18   Ht 5' 3" (1 6 m)   Wt (!) 175 kg (385 lb 12 9 oz)   SpO2 95%   BMI 68 34 kg/m²     Medications:   Scheduled Meds:   Current Facility-Administered Medications:  amLODIPine 5 mg Oral Daily   And      benazepril 20 mg Oral Daily   docusate sodium 100 mg Oral BID   ergocalciferol 50,000 Units Oral Q14 Days   fluticasone 1 spray Each Nare Daily   folic acid-pyridoxine-cyanocobalamin 1 tablet Oral Daily   loratadine 10 mg Oral Daily   montelukast 10 mg Oral HS   nystatin  Topical TID   pantoprazole 40 mg Oral Early Morning   pravastatin 40 mg Oral After Dinner   vancomycin 15 mg/kg Intravenous Q12H   warfarin 5 mg Oral Daily (warfarin)     Continuous Infusions:    PRN Meds:   acetaminophen    albuterol    ammonium lactate    diphenhydrAMINE    morphine injection    oxyCODONE    sodium chloride (PF)    Abnormal Labs/Diagnostic Results:    04/03/18 0510    Protime 12 1 - 14 4 seconds 25 9     INR 0 86 - 1 16 2 36       Calcium 8 3 - 10 1 mg/dL 8 1       Age/Sex: 65 y o  female     Assessment/Plan:   * Cellulitis of right lower extremity   Assessment & Plan     The patient's cellulitis is slightly worse today  Given it's been less than 24 hours since the switch to IV Ancef, will continue with IV Ancef and re-evaluate tomorrow  Continue wound care           Obstructive sleep apnea on CPAP   Assessment & Plan     -continue CPAP q h s  and anytime while sleeping          Essential hypertension   Assessment & Plan     -continue amlodipine and benazepril  -follow the patient's blood pressure trend          Hypercoagulable state (HCC)   Assessment & Plan     -INR remains therapeutic at 2 36 today  Continue coumadin 5 mg PO daily   Daily INR           History of DVT (deep vein thrombosis)   Assessment & Plan     Please see above plan           Morbid obesity with BMI of 60 0-69 9, adult [facility])   Assessment & Plan     -nutrition consult              VTE Pharmacologic Prophylaxis:   Pharmacologic: Warfarin (Coumadin)  Mechanical VTE Prophylaxis in Place: No     Current Length of Stay: 4 day(s)     Current Patient Status: Inpatient   Certification Statement: The patient will continue to require additional inpatient hospital stay due to continued need for IV antibiotics      Discharge Plan: Home when medically cleared

## 2018-04-03 NOTE — PROGRESS NOTES
Progress Note - Ben Cabezas 1955, 65 y o  female MRN: [id number]    Unit/Bed#: 562-30 Encounter: [id number]    Primary Care Provider: Caren Bonds MD   Date and time admitted to hospital: 3/30/2018  6:35 PM        * Cellulitis of right lower extremity   Assessment & Plan    The patient's cellulitis is slightly worse today  Given it's been less than 24 hours since the switch to IV Ancef, will continue with IV Ancef and re-evaluate tomorrow  Continue wound care  Obstructive sleep apnea on CPAP   Assessment & Plan    -continue CPAP q h s  and anytime while sleeping        Essential hypertension   Assessment & Plan    -continue amlodipine and benazepril  -follow the patient's blood pressure trend        Hypercoagulable state ([address] )   Assessment & Plan    -INR remains therapeutic at 2 36 today  Continue coumadin 5 mg PO daily  Daily INR  History of DVT (deep vein thrombosis)   Assessment & Plan    Please see above plan  Morbid obesity with BMI of 60 0-69 9, adult [facility])   Assessment & Plan    -nutrition consult            VTE Pharmacologic Prophylaxis:   Pharmacologic: Warfarin (Coumadin)  Mechanical VTE Prophylaxis in Place: No      Discussions with Specialists or Other Care Team Provider: Nursing, CM, and attending    Education and Discussions with Family / Patient: n/a    Time Spent for Care: 30 minutes  More than 50% of total time spent on counseling and coordination of care as described above  Current Length of Stay: 4 day(s)    Current Patient Status: Inpatient   Certification Statement: The patient will continue to require additional inpatient hospital stay due to continued need for IV antibiotics  Code Status: Level 1 - Full Code      Subjective: The patient was seen and examined  The patient states she continues to have pain/burning in her right leg which has been the same as yesterday      Objective:     Vitals:   Temp (24hrs), Av 8 °F (37 1 °C), Min:98 5 °F (36 9 °C), Max:99 1 °F (37 3 °C)    HR:  [77-81] 77  Resp:  [18] 18  BP: (102-129)/(51-91) 129/91  SpO2:  [94 %-97 %] 95 %  Body mass index is 68 34 kg/m²  Input and Output Summary (last 24 hours): Intake/Output Summary (Last 24 hours) at 04/03/18 1223  Last data filed at 04/03/18 0800   Gross per 24 hour   Intake              560 ml   Output              900 ml   Net             -340 ml       Physical Exam:     Physical Exam   Constitutional: She is oriented to person, place, and time  Vital signs are normal  She appears well-developed and well-nourished  She is active and cooperative  Morbidly obese   Cardiovascular: Normal rate, regular rhythm and normal heart sounds  Pulmonary/Chest: Effort normal and breath sounds normal  She has no wheezes  She has no rhonchi  She has no rales  Abdominal: Soft  Normal appearance and bowel sounds are normal  There is no tenderness  Neurological: She is alert and oriented to person, place, and time  No cranial nerve deficit  Skin:   Erythema noted from below right knee down onto right foot  Tenderness to palpation  Skin pealing and cracking noted  Nursing note and vitals reviewed  Additional Data:     Labs:      Results from last 7 days  Lab Units 04/03/18  0510   WBC Thousand/uL 8 99   HEMOGLOBIN g/dL 12 5   HEMATOCRIT % 38 5   PLATELETS Thousands/uL 354   NEUTROS PCT % 53   LYMPHS PCT % 26   MONOS PCT % 9   EOS PCT % 12*       Results from last 7 days  Lab Units 04/03/18  0510 04/02/18  0452   SODIUM mmol/L 140 140   POTASSIUM mmol/L 3 7 3 8   CHLORIDE mmol/L 106 106   CO2 mmol/L 26 27   BUN mg/dL 10 9   CREATININE mg/dL 0 95 1 03   CALCIUM mg/dL 8 1* 8 0*   TOTAL PROTEIN g/dL  --  6 3*   BILIRUBIN TOTAL mg/dL  --  0 40   ALK PHOS U/L  --  51   ALT U/L  --  15   AST U/L  --  18   GLUCOSE RANDOM mg/dL 87 92       Results from last 7 days  Lab Units 04/03/18  0510   INR  2 36*       * I Have Reviewed All Lab Data Listed Above    * Additional Pertinent Lab Tests Reviewed: All Labs Within Last 24 Hours Reviewed    Imaging:    Imaging Reports Reviewed Today Include: none  Imaging Personally Reviewed by Myself Includes:  none    Recent Cultures (last 7 days):       Results from last 7 days  Lab Units 04/01/18 2142 03/30/18 1957   BLOOD CULTURE   --  No Growth at 48 hrs  URINE CULTURE  <10,000 cfu/ml   --        Last 24 Hours Medication List:     Current Facility-Administered Medications:  acetaminophen 650 mg Oral Q6H PRN Hermelinda Calero, DO    albuterol 2 puff Inhalation Q6H PRN Juan Luis Tomas, DO    amLODIPine 5 mg Oral Daily Arelia Binet, DO    And        benazepril 20 mg Oral Daily Kenneth Marcus, DO    ammonium lactate  Topical BID PRN Columba Jones PA-C    cefazolin 2,000 mg Intravenous Q8H Hermelinda Calero, DO Last Rate: 2,000 mg (04/03/18 0910)   diphenhydrAMINE 25 mg Oral Q6H PRN Columba Jones PA-C    docusate sodium 100 mg Oral BID Columba Jones PA-C    ergocalciferol 50,000 Units Oral Q14 Days Juan Luis Tomas, DO    fluticasone 1 spray Each Nare Daily Hermelinda Calero, DO    folic acid-pyridoxine-cyanocobalamin 1 tablet Oral Daily Nikul Mccauley, DO    loratadine 10 mg Oral Daily Nikul Mccauley, DO    montelukast 10 mg Oral HS Nikul Mccauley, DO    morphine injection 2 mg Intravenous Q4H PRN Juan Luis Tomas, DO    nystatin  Topical TID Juan Luis Tomas, DO    oxyCODONE 5 mg Oral Q4H PRN Hermelinda Calero, DO    pantoprazole 40 mg Oral Early Morning Nikul Mccauley, DO    pravastatin 40 mg Oral After [employer], DO    sodium chloride (PF) 3 mL Intravenous PRN Sandra Sanon MD    warfarin 5 mg Oral Daily (warfarin) Columba Jones PA-C         Today, Patient Was Seen By: Columba Jones PA-C    ** Please Note: Dictation voice to text software may have been used in the creation of this document   **

## 2018-04-03 NOTE — ASSESSMENT & PLAN NOTE
The patient's cellulitis is slightly worse today  Given it's been less than 24 hours since the switch to IV Ancef, will continue with IV Ancef and re-evaluate tomorrow  Continue wound care

## 2018-04-03 NOTE — PROGRESS NOTES
I was called by Nursing to evaluate the patient due to a rash on her arms and thighs  The patient is unsure if she's ever had Ancef in the past  Will discontinue Ancef due to possible allergy  PRN benadryl  Will restart IV Vancomycin

## 2018-04-03 NOTE — PHYSICAL THERAPY NOTE
PT Note  Pt  Refused PT in AM and PM  States she is walking to the bathroom  C/o pain and burning RLE  States I want to keep my legs elevated and my RLE is fire engine red today so I'm not walking other than to the bathroom  Will continue to follow

## 2018-04-04 LAB
ALBUMIN SERPL BCP-MCNC: 2.2 G/DL (ref 3.5–5)
ALP SERPL-CCNC: 52 U/L (ref 46–116)
ALT SERPL W P-5'-P-CCNC: 14 U/L (ref 12–78)
ANION GAP SERPL CALCULATED.3IONS-SCNC: 7 MMOL/L (ref 4–13)
AST SERPL W P-5'-P-CCNC: 19 U/L (ref 5–45)
BASOPHILS # BLD AUTO: 0.02 THOUSANDS/ΜL (ref 0–0.1)
BASOPHILS NFR BLD AUTO: 0 % (ref 0–1)
BILIRUB SERPL-MCNC: 0.5 MG/DL (ref 0.2–1)
BUN SERPL-MCNC: 9 MG/DL (ref 5–25)
CALCIUM SERPL-MCNC: 8 MG/DL (ref 8.3–10.1)
CHLORIDE SERPL-SCNC: 107 MMOL/L (ref 100–108)
CO2 SERPL-SCNC: 28 MMOL/L (ref 21–32)
CREAT SERPL-MCNC: 0.91 MG/DL (ref 0.6–1.3)
EOSINOPHIL # BLD AUTO: 1.18 THOUSAND/ΜL (ref 0–0.61)
EOSINOPHIL NFR BLD AUTO: 13 % (ref 0–6)
ERYTHROCYTE [DISTWIDTH] IN BLOOD BY AUTOMATED COUNT: 15.9 % (ref 11.6–15.1)
GFR SERPL CREATININE-BSD FRML MDRD: 68 ML/MIN/1.73SQ M
GLUCOSE SERPL-MCNC: 90 MG/DL (ref 65–140)
HCT VFR BLD AUTO: 38 % (ref 34.8–46.1)
HGB BLD-MCNC: 12.4 G/DL (ref 11.5–15.4)
INR PPP: 2.3 (ref 0.86–1.16)
LYMPHOCYTES # BLD AUTO: 2 THOUSANDS/ΜL (ref 0.6–4.47)
LYMPHOCYTES NFR BLD AUTO: 22 % (ref 14–44)
MCH RBC QN AUTO: 28.4 PG (ref 26.8–34.3)
MCHC RBC AUTO-ENTMCNC: 32.6 G/DL (ref 31.4–37.4)
MCV RBC AUTO: 87 FL (ref 82–98)
MONOCYTES # BLD AUTO: 0.89 THOUSAND/ΜL (ref 0.17–1.22)
MONOCYTES NFR BLD AUTO: 10 % (ref 4–12)
NEUTROPHILS # BLD AUTO: 5.09 THOUSANDS/ΜL (ref 1.85–7.62)
NEUTS SEG NFR BLD AUTO: 55 % (ref 43–75)
PHOSPHATE SERPL-MCNC: 3.6 MG/DL (ref 2.3–4.1)
PLATELET # BLD AUTO: 337 THOUSANDS/UL (ref 149–390)
PMV BLD AUTO: 10.8 FL (ref 8.9–12.7)
POTASSIUM SERPL-SCNC: 3.9 MMOL/L (ref 3.5–5.3)
PROT SERPL-MCNC: 6.4 G/DL (ref 6.4–8.2)
PROTHROMBIN TIME: 25.4 SECONDS (ref 12.1–14.4)
RBC # BLD AUTO: 4.36 MILLION/UL (ref 3.81–5.12)
SODIUM SERPL-SCNC: 142 MMOL/L (ref 136–145)
WBC # BLD AUTO: 9.18 THOUSAND/UL (ref 4.31–10.16)

## 2018-04-04 PROCEDURE — 85610 PROTHROMBIN TIME: CPT | Performed by: PHYSICIAN ASSISTANT

## 2018-04-04 PROCEDURE — 80053 COMPREHEN METABOLIC PANEL: CPT | Performed by: PHYSICIAN ASSISTANT

## 2018-04-04 PROCEDURE — 99232 SBSQ HOSP IP/OBS MODERATE 35: CPT | Performed by: PHYSICIAN ASSISTANT

## 2018-04-04 PROCEDURE — 84100 ASSAY OF PHOSPHORUS: CPT | Performed by: PHYSICIAN ASSISTANT

## 2018-04-04 PROCEDURE — 97530 THERAPEUTIC ACTIVITIES: CPT

## 2018-04-04 PROCEDURE — 85025 COMPLETE CBC W/AUTO DIFF WBC: CPT | Performed by: PHYSICIAN ASSISTANT

## 2018-04-04 PROCEDURE — 97116 GAIT TRAINING THERAPY: CPT

## 2018-04-04 RX ADMIN — MONTELUKAST SODIUM 10 MG: 10 TABLET, FILM COATED ORAL at 21:11

## 2018-04-04 RX ADMIN — DOCUSATE SODIUM 100 MG: 100 CAPSULE, LIQUID FILLED ORAL at 17:18

## 2018-04-04 RX ADMIN — PANTOPRAZOLE SODIUM 40 MG: 40 TABLET, DELAYED RELEASE ORAL at 05:32

## 2018-04-04 RX ADMIN — VANCOMYCIN HYDROCHLORIDE 1500 MG: 1 INJECTION, POWDER, LYOPHILIZED, FOR SOLUTION INTRAVENOUS at 16:23

## 2018-04-04 RX ADMIN — WARFARIN SODIUM 5 MG: 5 TABLET ORAL at 17:18

## 2018-04-04 RX ADMIN — VANCOMYCIN HYDROCHLORIDE 1500 MG: 1 INJECTION, POWDER, LYOPHILIZED, FOR SOLUTION INTRAVENOUS at 04:03

## 2018-04-04 RX ADMIN — BENAZEPRIL HYDROCHLORIDE 20 MG: 20 TABLET, FILM COATED ORAL at 08:53

## 2018-04-04 RX ADMIN — DIPHENHYDRAMINE HCL 25 MG: 25 TABLET ORAL at 10:53

## 2018-04-04 RX ADMIN — PRAVASTATIN SODIUM 40 MG: 40 TABLET ORAL at 17:18

## 2018-04-04 RX ADMIN — NYSTATIN: 100000 POWDER TOPICAL at 08:54

## 2018-04-04 RX ADMIN — OXYCODONE HYDROCHLORIDE 5 MG: 5 TABLET ORAL at 21:10

## 2018-04-04 RX ADMIN — LORATADINE 10 MG: 10 TABLET ORAL at 08:53

## 2018-04-04 RX ADMIN — FLUTICASONE PROPIONATE 1 SPRAY: 50 SPRAY, METERED NASAL at 08:54

## 2018-04-04 RX ADMIN — NYSTATIN: 100000 POWDER TOPICAL at 16:24

## 2018-04-04 RX ADMIN — AMLODIPINE BESYLATE 5 MG: 5 TABLET ORAL at 08:53

## 2018-04-04 RX ADMIN — NYSTATIN: 100000 POWDER TOPICAL at 21:11

## 2018-04-04 RX ADMIN — DOCUSATE SODIUM 100 MG: 100 CAPSULE, LIQUID FILLED ORAL at 08:53

## 2018-04-04 RX ADMIN — Medication 1 TABLET: at 08:53

## 2018-04-04 RX ADMIN — OXYCODONE HYDROCHLORIDE 5 MG: 5 TABLET ORAL at 11:28

## 2018-04-04 NOTE — PROGRESS NOTES
Progress Note - Cristofer Drop 1955, 65 y o  female MRN: [id number]    Unit/Bed#: 091-14 Encounter: [id number]    Primary Care Provider: Beau Ramriez MD   Date and time admitted to hospital: 3/30/2018  6:35 PM        * Cellulitis of right lower extremity   Assessment & Plan    The patient's rash has improved since discontinuing IV Ancef  No improvement of her cellulitis  Continue IV Vancomycin  Will consult Podiatry  Continue wound care  Obstructive sleep apnea on CPAP   Assessment & Plan    -continue CPAP q h s  and anytime while sleeping        Essential hypertension   Assessment & Plan    -continue amlodipine and benazepril  -follow the patient's blood pressure trend        Hypercoagulable state ([facility] [address] )   Assessment & Plan    -INR remains therapeutic today  Continue coumadin 5 mg PO daily  Daily INR  History of DVT (deep vein thrombosis)   Assessment & Plan    Please see above plan  Morbid obesity with BMI of 60 0-69 9, adult [facility])   Assessment & Plan    -nutrition consult            VTE Pharmacologic Prophylaxis:   Pharmacologic: Warfarin (Coumadin)  Mechanical VTE Prophylaxis in Place: No      Discussions with Specialists or Other Care Team Provider: Nursing, CM, and attending    Education and Discussions with Family / Patient: n/a    Time Spent for Care: 30 minutes  More than 50% of total time spent on counseling and coordination of care as described above  Current Length of Stay: 5 day(s)    Current Patient Status: Inpatient   Certification Statement: The patient will continue to require additional inpatient hospital stay due to continued need for IV antibioitics  Code Status: Level 1 - Full Code      Subjective: The patient was seen and examined  The patient continues to have right leg pain/burning  She states this has not worsened but it has not improved either      Objective:     Vitals:   Temp (24hrs), Av 1 °F (36 7 °C), Min:97 4 °F (36 3 °C), Max:98 5 °F (36 9 °C)    HR:  [80-84] 84  Resp:  [18-20] 18  BP: (108-145)/(54-75) 145/75  SpO2:  [95 %] 95 %  Body mass index is 68 34 kg/m²  Input and Output Summary (last 24 hours): Intake/Output Summary (Last 24 hours) at 04/04/18 1455  Last data filed at 04/04/18 1250   Gross per 24 hour   Intake             1100 ml   Output             1050 ml   Net               50 ml       Physical Exam:     Physical Exam   Constitutional: She is oriented to person, place, and time  Vital signs are normal  She appears well-developed and well-nourished  She is active and cooperative  Morbidly obese   Cardiovascular: Normal rate, regular rhythm and normal heart sounds  Pulmonary/Chest: Effort normal and breath sounds normal  She has no wheezes  She has no rhonchi  She has no rales  Abdominal: Soft  Normal appearance and bowel sounds are normal    Neurological: She is alert and oriented to person, place, and time  No cranial nerve deficit  Skin:   Erythema noted right lower extremity from below knee to top of foot  Nursing note and vitals reviewed  Additional Data:     Labs:      Results from last 7 days  Lab Units 04/04/18  0442   WBC Thousand/uL 9 18   HEMOGLOBIN g/dL 12 4   HEMATOCRIT % 38 0   PLATELETS Thousands/uL 337   NEUTROS PCT % 55   LYMPHS PCT % 22   MONOS PCT % 10   EOS PCT % 13*       Results from last 7 days  Lab Units 04/04/18  0442   SODIUM mmol/L 142   POTASSIUM mmol/L 3 9   CHLORIDE mmol/L 107   CO2 mmol/L 28   BUN mg/dL 9   CREATININE mg/dL 0 91   CALCIUM mg/dL 8 0*   TOTAL PROTEIN g/dL 6 4   BILIRUBIN TOTAL mg/dL 0 50   ALK PHOS U/L 52   ALT U/L 14   AST U/L 19   GLUCOSE RANDOM mg/dL 90       Results from last 7 days  Lab Units 04/04/18  0442   INR  2 30*       * I Have Reviewed All Lab Data Listed Above  * Additional Pertinent Lab Tests Reviewed:  All Labs Within Last 24 Hours Reviewed    Imaging:    Imaging Reports Reviewed Today Include: none  Imaging Personally Reviewed by Myself Includes: none    Recent Cultures (last 7 days):       Results from last 7 days  Lab Units 04/01/18 2142 03/30/18 1957   BLOOD CULTURE   --  No Growth After 4 Days  URINE CULTURE  <10,000 cfu/ml   --        Last 24 Hours Medication List:     Current Facility-Administered Medications:  acetaminophen 650 mg Oral Q6H PRN Cyndee [place], DO    albuterol 2 puff Inhalation Q6H PRN Dotson Chi, DO    amLODIPine 5 mg Oral Daily Ryanneannmarie Melendez, DO    And        benazepril 20 mg Oral Daily Kenneth Marcus, DO    ammonium lactate  Topical BID PRN Peña Fair PA-C    diphenhydrAMINE 25 mg Oral Q6H PRN Peña Fair PA-C    docusate sodium 100 mg Oral BID Peña Fair PA-C    ergocalciferol 50,000 Units Oral Q14 Days Dotson Chi, DO    fluticasone 1 spray Each Nare Daily Cyndee [place], DO    folic acid-pyridoxine-cyanocobalamin 1 tablet Oral Daily Nikul Mccauley, DO    loratadine 10 mg Oral Daily Nikul Mccauley, DO    montelukast 10 mg Oral HS Nikul Mccauley, DO    morphine injection 2 mg Intravenous Q4H PRN Dotson Chi, DO    nystatin  Topical TID Dotson Chi, DO    oxyCODONE 5 mg Oral Q4H PRN Cyndee [place], DO    pantoprazole 40 mg Oral Early Morning Nikul Mccauley, DO    pravastatin 40 mg Oral After [employer], DO    sodium chloride (PF) 3 mL Intravenous PRN Annie Layton MD    vancomycin 1,500 mg Intravenous Q12H Peña Fair PA-C Last Rate: Stopped (04/04/18 0636)   warfarin 5 mg Oral Daily (warfarin) Peña Fair PA-C         Today, Patient Was Seen By: Peña Fair PA-C    ** Please Note: Dictation voice to text software may have been used in the creation of this document   **

## 2018-04-04 NOTE — PHYSICAL THERAPY NOTE
PT treatment Note      04/04/18 0959   Subjective   Subjective States she was able to take a shower  Agreeable to therapy  Bed Mobility   Supine to Sit 6  Modified independent   Additional items Bedrails;HOB elevated; Increased time required   Sit to Supine 6  Modified independent   Additional items Bedrails; Increased time required   Transfers   Sit to Stand 6  Modified independent   Stand to Sit 6  Modified independent   Ambulation/Elevation   Gait pattern Antalgic   Gait Assistance 5  Supervision   Assistive Device None   Distance 55'   Balance   Static Sitting Good   Dynamic Sitting Good   Static Standing Good   Dynamic Standing Good   Ambulatory Fair +   Endurance Deficit   Endurance Deficit Yes   Activity Tolerance   Activity Tolerance Patient limited by pain   Exercises   Heelslides 20 reps   Hip Abduction 20 reps   Hip Adduction 20 reps   Knee AROM Long Arc Quad 20 reps   Ankle Pumps 20 reps   Assessment   Prognosis Good   Problem List Decreased strength;Decreased endurance; Impaired balance;Decreased mobility; Decreased range of motion   Assessment Performing functional mobility at (mod I-S) level of function  Distance limited by pain in RLE  Pt would benefit from continued PT to ensure independence    Plan   Treatment/Interventions Functional transfer training;LE strengthening/ROM; Therapeutic exercise; Endurance training;Bed mobility;Gait training   Progress Progressing toward goals   Recommendation   Recommendation Home PT;Outpatient PT   Pt  OOB with call bell within reach, scd's connected and turned on and alarm on at end of PT session

## 2018-04-04 NOTE — ASSESSMENT & PLAN NOTE
The patient's rash has improved since discontinuing IV Ancef  No improvement of her cellulitis  Continue IV Vancomycin  Will consult Podiatry  Continue wound care

## 2018-04-05 LAB
ANION GAP SERPL CALCULATED.3IONS-SCNC: 7 MMOL/L (ref 4–13)
BACTERIA BLD CULT: NORMAL
BASOPHILS # BLD AUTO: 0.02 THOUSANDS/ΜL (ref 0–0.1)
BASOPHILS NFR BLD AUTO: 0 % (ref 0–1)
BUN SERPL-MCNC: 9 MG/DL (ref 5–25)
CALCIUM SERPL-MCNC: 7.9 MG/DL (ref 8.3–10.1)
CHLORIDE SERPL-SCNC: 107 MMOL/L (ref 100–108)
CO2 SERPL-SCNC: 27 MMOL/L (ref 21–32)
CREAT SERPL-MCNC: 0.89 MG/DL (ref 0.6–1.3)
EOSINOPHIL # BLD AUTO: 1.05 THOUSAND/ΜL (ref 0–0.61)
EOSINOPHIL NFR BLD AUTO: 13 % (ref 0–6)
ERYTHROCYTE [DISTWIDTH] IN BLOOD BY AUTOMATED COUNT: 15.9 % (ref 11.6–15.1)
GFR SERPL CREATININE-BSD FRML MDRD: 70 ML/MIN/1.73SQ M
GLUCOSE SERPL-MCNC: 84 MG/DL (ref 65–140)
HCT VFR BLD AUTO: 36.5 % (ref 34.8–46.1)
HGB BLD-MCNC: 11.6 G/DL (ref 11.5–15.4)
INR PPP: 2.14 (ref 0.86–1.16)
LYMPHOCYTES # BLD AUTO: 2.09 THOUSANDS/ΜL (ref 0.6–4.47)
LYMPHOCYTES NFR BLD AUTO: 26 % (ref 14–44)
MCH RBC QN AUTO: 28.1 PG (ref 26.8–34.3)
MCHC RBC AUTO-ENTMCNC: 31.8 G/DL (ref 31.4–37.4)
MCV RBC AUTO: 88 FL (ref 82–98)
MONOCYTES # BLD AUTO: 0.81 THOUSAND/ΜL (ref 0.17–1.22)
MONOCYTES NFR BLD AUTO: 10 % (ref 4–12)
NEUTROPHILS # BLD AUTO: 4.22 THOUSANDS/ΜL (ref 1.85–7.62)
NEUTS SEG NFR BLD AUTO: 51 % (ref 43–75)
PLATELET # BLD AUTO: 335 THOUSANDS/UL (ref 149–390)
PMV BLD AUTO: 11.1 FL (ref 8.9–12.7)
POTASSIUM SERPL-SCNC: 4 MMOL/L (ref 3.5–5.3)
PROTHROMBIN TIME: 24 SECONDS (ref 12.1–14.4)
RBC # BLD AUTO: 4.13 MILLION/UL (ref 3.81–5.12)
SODIUM SERPL-SCNC: 141 MMOL/L (ref 136–145)
VANCOMYCIN TROUGH SERPL-MCNC: 19 UG/ML (ref 10–20)
WBC # BLD AUTO: 8.19 THOUSAND/UL (ref 4.31–10.16)

## 2018-04-05 PROCEDURE — 97110 THERAPEUTIC EXERCISES: CPT

## 2018-04-05 PROCEDURE — 97116 GAIT TRAINING THERAPY: CPT

## 2018-04-05 PROCEDURE — 85610 PROTHROMBIN TIME: CPT | Performed by: PHYSICIAN ASSISTANT

## 2018-04-05 PROCEDURE — 99232 SBSQ HOSP IP/OBS MODERATE 35: CPT | Performed by: PHYSICIAN ASSISTANT

## 2018-04-05 PROCEDURE — 80202 ASSAY OF VANCOMYCIN: CPT | Performed by: PHYSICIAN ASSISTANT

## 2018-04-05 PROCEDURE — 85025 COMPLETE CBC W/AUTO DIFF WBC: CPT | Performed by: PHYSICIAN ASSISTANT

## 2018-04-05 PROCEDURE — 80048 BASIC METABOLIC PNL TOTAL CA: CPT | Performed by: PHYSICIAN ASSISTANT

## 2018-04-05 RX ORDER — TRIAMCINOLONE ACETONIDE 1 MG/G
CREAM TOPICAL 2 TIMES DAILY
Status: DISCONTINUED | OUTPATIENT
Start: 2018-04-05 | End: 2018-04-06 | Stop reason: HOSPADM

## 2018-04-05 RX ADMIN — VANCOMYCIN HYDROCHLORIDE 1500 MG: 1 INJECTION, POWDER, LYOPHILIZED, FOR SOLUTION INTRAVENOUS at 04:29

## 2018-04-05 RX ADMIN — NYSTATIN: 100000 POWDER TOPICAL at 10:04

## 2018-04-05 RX ADMIN — FLUTICASONE PROPIONATE 1 SPRAY: 50 SPRAY, METERED NASAL at 09:51

## 2018-04-05 RX ADMIN — MONTELUKAST SODIUM 10 MG: 10 TABLET, FILM COATED ORAL at 22:09

## 2018-04-05 RX ADMIN — OXYCODONE HYDROCHLORIDE 5 MG: 5 TABLET ORAL at 10:09

## 2018-04-05 RX ADMIN — Medication 1 TABLET: at 09:50

## 2018-04-05 RX ADMIN — DOCUSATE SODIUM 100 MG: 100 CAPSULE, LIQUID FILLED ORAL at 09:50

## 2018-04-05 RX ADMIN — LORATADINE 10 MG: 10 TABLET ORAL at 09:49

## 2018-04-05 RX ADMIN — TRIAMCINOLONE ACETONIDE: 1 CREAM TOPICAL at 09:51

## 2018-04-05 RX ADMIN — NYSTATIN: 100000 POWDER TOPICAL at 22:09

## 2018-04-05 RX ADMIN — PRAVASTATIN SODIUM 40 MG: 40 TABLET ORAL at 17:26

## 2018-04-05 RX ADMIN — NYSTATIN: 100000 POWDER TOPICAL at 15:45

## 2018-04-05 RX ADMIN — BENAZEPRIL HYDROCHLORIDE 20 MG: 20 TABLET, FILM COATED ORAL at 09:50

## 2018-04-05 RX ADMIN — PANTOPRAZOLE SODIUM 40 MG: 40 TABLET, DELAYED RELEASE ORAL at 05:59

## 2018-04-05 RX ADMIN — DIPHENHYDRAMINE HCL 25 MG: 25 TABLET ORAL at 10:09

## 2018-04-05 RX ADMIN — AMLODIPINE BESYLATE 5 MG: 5 TABLET ORAL at 09:49

## 2018-04-05 RX ADMIN — TRIAMCINOLONE ACETONIDE: 1 CREAM TOPICAL at 17:26

## 2018-04-05 RX ADMIN — DIPHENHYDRAMINE HCL 25 MG: 25 TABLET ORAL at 22:09

## 2018-04-05 RX ADMIN — VANCOMYCIN HYDROCHLORIDE 1500 MG: 1 INJECTION, POWDER, LYOPHILIZED, FOR SOLUTION INTRAVENOUS at 17:24

## 2018-04-05 RX ADMIN — DOCUSATE SODIUM 100 MG: 100 CAPSULE, LIQUID FILLED ORAL at 17:26

## 2018-04-05 RX ADMIN — WARFARIN SODIUM 5 MG: 5 TABLET ORAL at 17:26

## 2018-04-05 NOTE — CASE MANAGEMENT
Robert Boo  Supervisor Signed   Case Management Date of Service: 4/2/2018 11:16 AM         []Hide copied text  []Jerzyver for attribution information  Notification of Inpatient Admission/Inpatient Authorization Request  This is a Notification of Inpatient Admission/Request for Inpatient Authorization to our facility [address]  Please be advised that this patient is currently in our facility under Inpatient Status  Below you will find the Attending Physician and Facilitys information including NPI# and contact information for the Utilization  assigned to the [facility] where the patient is receiving services  Please feel free to contact the Utilization Review Department with any questions  Patient Information:  PATIENT NAME: Jaja Monsalve  MRN: [id number]  YOB: 1955     PRESENTATION DATE: 3/30/2018  6:35 PM  IP ADMISSION DATE: 3/30/18 2308  DISCHARGE DATE: No discharge date for patient encounter  DISPOSITION: Home/Self Care     Attending Physician:  MAKSIM Castanead  Specialty- Hospitalist,   [employer] ID- [id number]    Courtney Mc [address]   [address] [address]  Phone 1: [phone]  Fax: [phone]     Facility:  [facility] [address] [place] [address] O  [place] [address], [place] [address]  Phone: [phone]  Tax ID: [id number]  NPI: [id number]     [facility] in the [facility] by Mike Herrmann for 2017  Network Utilization Review Department  Phone: [phone]; Fax [phone]  ATTENTION: The Network Utilization Review Department is now centralized for our 7 Facilities  Make a note that we have a new phone and fax numbers for our Department  Please call with any questions or concerns to [phone] and carefully follow the prompts so that you are directed to the right person   All voicemails are confidential  Fax any determinations, approvals, denials, and requests for initial or continue stay review clinical to [phone]  Due to HIGH CALL volume, it would be easier if you could please send faxed requests to expedite your requests and in part, help us provide discharge notifications faster  Tank Almeida RN Registered Nurse Signed   Case Management Date of Service: 4/1/2018 11:18 AM         []Hide copied text  Initial Clinical Review     Admission: Date/Time/Statement: 3/30/18 @ 2308      Orders Placed This Encounter   Procedures    Inpatient Admission (expected length of stay for this patient is greater than two midnights)       Standing Status:   Standing       Number of Occurrences:   1       Order Specific Question:   Admitting Physician       Answer:   Sallie De La Garza       Order Specific Question:   Level of Care       Answer:   Med Surg [16]       Order Specific Question:   Estimated length of stay       Answer:   More than 2 Midnights       Order Specific Question:   Certification       Answer:   I certify that inpatient services are medically necessary for this patient for a duration of greater than two midnights  See H&P and MD Progress Notes for additional information about the patient's course of treatment       ED: Date/Time/Mode of Arrival:             ED Arrival Information      Expected Arrival Acuity Means of Arrival Escorted By Service Admission Type     - 3/30/2018 18:25 Urgent Walk-In Family Member General Medicine Urgent     Arrival Complaint     rt foot/leg pain          Chief Complaint:        Chief Complaint   Patient presents with    Cellulitis       Pt reports Rt lower leg and foot red and weeping since December   Painful area        History of Illness: Roxanna Garcia a 65 y o  female who presents with swelling, redness, warmth and pain in her right lower extremity   She states that she has been seeing wound care for the past month and a half for weeping of her rle   The swelling and redness in the leg have been going on since about December 2017   Over the past month it gets better and worse   She states that today the redness and pain is much worse and she could no longer tolerate it   She denies any fevers, nausea, vomiting  Philip Monday does continue to be clear fluid weeping from the rle  Alena Brar has a history of homocysteinemia and appears to be in a hypercoagulable state   She is following by heme/onc and had a prior left lower extremity dvt for which she is on lifelong ac   In the ED, vascular studies of her legs were done which did not show any dvt   No elevated wbc either   Blood cultures were drawn        ED Vital Signs:            ED Triage Vitals [03/30/18 1842]   Temperature Pulse Respirations Blood Pressure SpO2   98 4 °F (36 9 °C) 88 17 (!) 172/83 100 %       Temp Source Heart Rate Source Patient Position - Orthostatic VS BP Location FiO2 (%)   Temporal Monitor Lying Right arm --       Pain Score           Worst Possible Pain                Wt Readings from Last 1 Encounters:   03/31/18 (!) 175 kg (385 lb 5 8 oz)      Vital Signs (abnormal):   03/30/18 1842   98 4 °F (36 9 °C)   88   17    172/83   100 %   None (Room air)   Lying      Abnormal Labs:     4/1 calcium 8 2  Albumin 2 9, 2 5, 2 3  Phosphorus 4 2, 4 5  Trop WNL     PT/INR 19 5/1 65, 22 1/1 93  Urine leukocytes small   Urine WBC 4-10  Blood cultures pending      Diagnostic Test Results:      3/30 Venous duplex BLE - WNL      ED Treatment:           Medication Administration from 03/30/2018 1825 to 03/31/2018 0017    Date/Time Order Dose Route Action   03/30/2018 2134 piperacillin-tazobactam (ZOSYN) 4 5 g in sodium chloride 0 9 % 100 mL IVPB 4 5 g Intravenous New Bag   03/30/2018 2012 fentanyl citrate (PF) 100 MCG/2ML 100 mcg 100 mcg Intravenous Given   03/30/2018 2135 HYDROmorphone (DILAUDID) injection 1 mg 1 mg Intravenous Given          Past Medical/Surgical History:         Active Ambulatory Problems     Diagnosis Date Noted    History of DVT (deep vein thrombosis) 07/01/2016    Morbid obesity with BMI of 60 0-69 9, adult ([facility] ) 07/01/2016    Renal disorder      PCOS (polycystic ovarian syndrome)      Obesity      Lyme disease      Blood clotting disorder ([place] [address] )      Homocysteinemia ([place] [address] )      Leg pain 03/30/2018      Resolved Ambulatory Problems     Diagnosis Date Noted    Pneumonia due to infectious organism 07/01/2016    Encephalopathy acute 07/01/2016    CIRILO (acute kidney injury) ([place] [address] ) 07/02/2016           Past Medical History:   Diagnosis Date    Blood clotting disorder ([place] [address] )      Homocysteinemia ([place] [address] )      Hypercoagulable state ([place] [address] )      Hypertension      Lyme disease      Obesity      PCOS (polycystic ovarian syndrome)      Renal disorder      Sleep apnea        Admitting Diagnosis: Leg pain [M79 606]  Cellulitis of right leg [L03 115]     Age/Sex: 65 y o  female     Assessment/Plan:     Cellulitis  Active Problems:    DVT (deep venous thrombosis) (HCC)    Morbid obesity (HCC)    PCOS (polycystic ovarian syndrome)    Hypertension    Homocysteinemia (HCC)    Hypercoagulable state (HCC)    Sleep apnea    Leg pain      Plan for the Primary Problem(s):  1  Right lower extremity cellulitis and weeping              - cont wound care              - consult to wound care              - has been ongoing on and off for several months and getting worse              - will start vancomycin              - follow up cultures              - outline area of cellulitis              - cont lasix              - morphine prn for pain     2  DVT              - subtherapeutic inr              - lovenox prophylaxis until inr is therapeutic              - cont home warfarin dose, inr therapeutic last on 3/12              - no dvt currently noted on doppler right now     3  HTN              - cont home bp meds     4  Sleep apnea              - cpap hs, patient will find out her dose     5   GERD              - ppi     VTE Prophylaxis: Enoxaparin (Lovenox)  / sequential compression device   Code Status: full code  Anticipated Length of Stay: Ketan Turcios will be admitted on an Inpatient basis with an anticipated length of stay of  Greater than 2 midnights    Justification for Hospital Stay: cellulitis     Admission Orders:  Scheduled Meds:   Current Facility-Administered Medications:  acetaminophen 650 mg Oral Q6H PRN Sonia Abel, DO     albuterol 2 puff Inhalation Q6H PRN [facility], DO     amLODIPine 5 mg Oral Daily [facility], DO     And             benazepril 20 mg Oral Daily [facility], DO     ergocalciferol 50,000 Units Oral Q14 Days [facility], [facility]     fluticasone 1 spray Each Nare Daily [facility], DO     folic acid-pyridoxine-cyanocobalamin 1 tablet Oral Daily [facility], DO     loratadine 10 mg Oral Daily Nikul Mccauley, DO     montelukast 10 mg Oral HS [facility] Mccauley, DO     morphine injection 2 mg Intravenous Q4H PRN [facility], DO     nystatin   Topical TID [facility], DO     oxyCODONE 5 mg Oral Q4H PRN Sonia Alcova, DO     pantoprazole 40 mg Oral Early Morning [facility], DO     pravastatin 40 mg Oral After [employer], DO     sodium chloride (PF) 3 mL Intravenous PRN Quoc Ryan MD     vancomycin 20 mg/kg (Adjusted) Intravenous Q12H [facility], DO Last Rate: 2,000 mg (03/31/18 2317)   warfarin 7 5 mg Oral Daily (warfarin) Sonia Alcova, DO        PRN Meds:   acetaminophen    albuterol    morphine injection IV x 2 3/31    oxyCODONE  X 3 in last 24 hrs     sodium chloride (PF)     Wound care daily   SCDs  Diet cardiac TLC, Na 2 3 Gm   Cons Wound Care   Cons Nutrition   CPAP at HS   PT eval/tx   _____________________________  3/31 Medicine Progress Note      * Cellulitis of right lower extremity   Assessment & Plan     -continue IV vancomycin  -check a MRSA nasal screen  -wound care team consult          Obstructive sleep apnea on CPAP   Assessment & Plan     -continue CPAP q h s  and anytime while sleeping        Hypercoagulable state ([facility] )   Assessment & Plan     -continue Coumadin for goal INR of 2-3        Essential hypertension   Assessment & Plan     -continue amlodipine and benazepril  -follow the patient's blood pressure trend        Morbid obesity with BMI of 60 0-69 9, adult (HCC)   Assessment & Plan     -nutrition consult        History of DVT (deep vein thrombosis)   Assessment & Plan     -continue Coumadin for goal INR 2-3          VTE Pharmacologic Prophylaxis:   Pharmacologic: Warfarin (Coumadin) for a goal INR of 2-3          Mechanical VTE Prophylaxis in Place: Yes on the left lower extremity only   No SCD on the right lower extremity with the right lower extremity cellulitis  Certification Statement: The patient will continue to require additional inpatient hospital stay due to the need for IV antibiotics                      Elena Mendoza RN Registered Nurse Signed   Case Management Date of Service: 4/3/2018  2:02 PM         []Hide copied text  Continued Stay Review     Date: 4/3     Vital Signs: /91 (BP Location: Left arm)   Pulse 77   Temp 98 8 °F (37 1 °C) (Temporal)   Resp 18   Ht 5' 3" (1 6 m)   Wt (!) 175 kg (385 lb 12 9 oz)   SpO2 95%   BMI 68 34 kg/m²      Medications:   Scheduled Meds:   Current Facility-Administered Medications:  amLODIPine 5 mg Oral Daily   And         benazepril 20 mg Oral Daily   docusate sodium 100 mg Oral BID   ergocalciferol 50,000 Units Oral Q14 Days   fluticasone 1 spray Each Nare Daily   folic acid-pyridoxine-cyanocobalamin 1 tablet Oral Daily   loratadine 10 mg Oral Daily   montelukast 10 mg Oral HS   nystatin   Topical TID   pantoprazole 40 mg Oral Early Morning   pravastatin 40 mg Oral After Dinner   vancomycin 15 mg/kg Intravenous Q12H   warfarin 5 mg Oral Daily (warfarin)      Continuous Infusions:    PRN Meds:   acetaminophen    albuterol    ammonium lactate    diphenhydrAMINE    morphine injection    oxyCODONE    sodium chloride (PF)     Abnormal Labs/Diagnostic Results:     04/03/18 0510     Protime 12 1 - 14 4 seconds 25 9     INR 0 86 - 1 16 2 36        Calcium 8 3 - 10 1 mg/dL 8 1        Age/Sex: 65 y o  female      Assessment/Plan:       * Cellulitis of right lower extremity   Assessment & Plan     The patient's cellulitis is slightly worse today  Given it's been less than 24 hours since the switch to IV Ancef, will continue with IV Ancef and re-evaluate tomorrow  Continue wound care           Obstructive sleep apnea on CPAP   Assessment & Plan     -continue CPAP q h s  and anytime while sleeping          Essential hypertension   Assessment & Plan     -continue amlodipine and benazepril  -follow the patient's blood pressure trend          Hypercoagulable state (HCC)   Assessment & Plan     -INR remains therapeutic at 2 36 today  Continue coumadin 5 mg PO daily   Daily INR           History of DVT (deep vein thrombosis)   Assessment & Plan     Please see above plan           Morbid obesity with BMI of 60 0-69 9, adult [facility])   Assessment & Plan     -nutrition consult              VTE Pharmacologic Prophylaxis:   Pharmacologic: Warfarin (Coumadin)  Mechanical VTE Prophylaxis in Place: No     Current Length of Stay: 4 day(s)     Current Patient Status: Inpatient   Certification Statement: The patient will continue to require additional inpatient hospital stay due to continued need for IV antibiotics      Discharge Plan: Home when medically cleared

## 2018-04-05 NOTE — PROGRESS NOTES
Progress Note - Ankita Ferro 1955, 65 y o  female MRN: [id number]    Unit/Bed#: 821-15 Encounter: [id number]    Primary Care Provider: Prudence Mota MD   Date and time admitted to hospital: 3/30/2018  6:35 PM        * Cellulitis of right lower extremity   Assessment & Plan    Slight improvement of her cellulitis today  Continue IV Vancomycin  Podiatry consult pending  Continue wound care  MRSA nasal culture- negative  On 4/2/18 IV Vancomycin was switched to IV Ancef  However due to rash on 4/3/18, IV Ancef was discontinued and IV Vancomycin was restarted  Obstructive sleep apnea on CPAP   Assessment & Plan    -continue CPAP q h s  and anytime while sleeping        Essential hypertension   Assessment & Plan    -continue amlodipine and benazepril  -follow the patient's blood pressure trend        Hypercoagulable state ([facility] )   Assessment & Plan    -INR remains therapeutic today  Continue coumadin 5 mg PO daily  Daily INR  History of DVT (deep vein thrombosis)   Assessment & Plan    Please see above plan  Morbid obesity with BMI of 60 0-69 9, adult [facility])   Assessment & Plan    -nutrition consult            VTE Pharmacologic Prophylaxis:   Pharmacologic: Warfarin (Coumadin)  Mechanical VTE Prophylaxis in Place: Yes      Discussions with Specialists or Other Care Team Provider: Nursing, CM, and attending    Education and Discussions with Family / Patient: n/a    Time Spent for Care: 30 minutes  More than 50% of total time spent on counseling and coordination of care as described above  Current Length of Stay: 6 day(s)    Current Patient Status: Inpatient   Certification Statement: The patient will continue to require additional inpatient hospital stay due to continued need for IV antibiotics  Code Status: Level 1 - Full Code      Subjective: The patient was seen and examined  The patient states her right leg/foot still burns when placing weight on foot  Objective:     Vitals:   Temp (24hrs), Av 6 °F (37 °C), Min:97 6 °F (36 4 °C), Max:99 8 °F (37 7 °C)    HR:  [75-87] 80  Resp:  [18] 18  BP: (104-112)/(46-53) 112/53  SpO2:  [91 %-94 %] 94 %  Body mass index is 68 34 kg/m²  Input and Output Summary (last 24 hours): Intake/Output Summary (Last 24 hours) at 18 1217  Last data filed at 18 0705   Gross per 24 hour   Intake             1100 ml   Output                0 ml   Net             1100 ml       Physical Exam:     Physical Exam   Constitutional: She is oriented to person, place, and time  Vital signs are normal  She appears well-developed and well-nourished  She is active and cooperative  Morbidly obese   Cardiovascular: Normal rate, regular rhythm and normal heart sounds  Pulmonary/Chest: Effort normal and breath sounds normal  She has no wheezes  She has no rhonchi  She has no rales  Abdominal: Soft  Normal appearance and bowel sounds are normal  There is no tenderness  Neurological: She is alert and oriented to person, place, and time  No cranial nerve deficit  Skin:   Mild improvement of erythema of right leg extending from below knee to right foot  Tenderness to palpation  Nursing note and vitals reviewed        Additional Data:     Labs:      Results from last 7 days  Lab Units 18  0433   WBC Thousand/uL 8 19   HEMOGLOBIN g/dL 11 6   HEMATOCRIT % 36 5   PLATELETS Thousands/uL 335   NEUTROS PCT % 51   LYMPHS PCT % 26   MONOS PCT % 10   EOS PCT % 13*       Results from last 7 days  Lab Units 18  0433 18  0442   SODIUM mmol/L 141 142   POTASSIUM mmol/L 4 0 3 9   CHLORIDE mmol/L 107 107   CO2 mmol/L 27 28   BUN mg/dL 9 9   CREATININE mg/dL 0 89 0 91   CALCIUM mg/dL 7 9* 8 0*   TOTAL PROTEIN g/dL  --  6 4   BILIRUBIN TOTAL mg/dL  --  0 50   ALK PHOS U/L  --  52   ALT U/L  --  14   AST U/L  --  19   GLUCOSE RANDOM mg/dL 84 90       Results from last 7 days  Lab Units 18  0433   INR  2 14*       * I Have Reviewed All Lab Data Listed Above  * Additional Pertinent Lab Tests Reviewed: All Labs Within Last 24 Hours Reviewed    Imaging:    Imaging Reports Reviewed Today Include: none  Imaging Personally Reviewed by Myself Includes:  none    Recent Cultures (last 7 days):       Results from last 7 days  Lab Units 04/01/18 2142 03/30/18 1957   BLOOD CULTURE   --  No Growth After 4 Days  URINE CULTURE  <10,000 cfu/ml   --        Last 24 Hours Medication List:     Current Facility-Administered Medications:  acetaminophen 650 mg Oral Q6H PRN [employer], DO    albuterol 2 puff Inhalation Q6H PRN Mariah Prost, DO    amLODIPine 5 mg Oral Daily Josselyn Rojas, DO    And        benazepril 20 mg Oral Daily Kenneth Angeline, DO    diphenhydrAMINE 25 mg Oral Q6H PRN Uma Kim PA-C    docusate sodium 100 mg Oral BID Uma Kim PA-C    ergocalciferol 50,000 Units Oral Q14 Days Mariah Prost, DO    fluticasone 1 spray Each Nare Daily [employer], DO    folic acid-pyridoxine-cyanocobalamin 1 tablet Oral Daily Nikul Mccauley, DO    loratadine 10 mg Oral Daily Nikul Mccauley, DO    montelukast 10 mg Oral HS Nikul Mccauley, DO    morphine injection 2 mg Intravenous Q4H PRN Mariah Prost, DO    nystatin  Topical TID Mariha Prost, DO    oxyCODONE 5 mg Oral Q4H PRN [employer], DO    pantoprazole 40 mg Oral Early Morning Nikul Mccauley, DO    pravastatin 40 mg Oral After [employer], DO    sodium chloride (PF) 3 mL Intravenous PRN Cyndee Alex MD    triamcinolone  Topical BID Clifford Stokes DPM    vancomycin 1,500 mg Intravenous Q12H Uma Kim PA-C Last Rate: Stopped (04/05/18 5572)   warfarin 5 mg Oral Daily (warfarin) Uma Kim PA-C         Today, Patient Was Seen By: Uma Kim PA-C    ** Please Note: Dictation voice to text software may have been used in the creation of this document   **

## 2018-04-05 NOTE — PHYSICAL THERAPY NOTE
PT treatment note      04/05/18 1015   Subjective   Subjective My legs burn  Bed Mobility   Additional Comments seated on couch at start of PT session   Transfers   Sit to Stand 7  Independent   Stand to Sit 7  Independent   Ambulation/Elevation   Gait pattern Antalgic   Gait Assistance 5  Supervision   Assistive Device None   Distance 50'   Balance   Static Sitting Good   Dynamic Sitting Good   Static Standing Good   Dynamic Standing Good   Ambulatory Fair +   Endurance Deficit   Endurance Deficit Yes   Activity Tolerance   Activity Tolerance Patient limited by fatigue;Patient limited by pain   Exercises   Hip Flexion 20 reps   Hip Abduction 20 reps   Hip Adduction 20 reps   Knee AROM Long Arc Quad 20 reps   Ankle Pumps 20 reps   Assessment   Prognosis Good   Problem List Decreased strength;Decreased endurance; Impaired balance;Decreased mobility; Decreased range of motion   Assessment Performing functional mobility at (mod I-S) level of function  Distance limited by pain in RLE and decreased endurance  Pt would benefit from continued PT to ensure independence    Plan   Treatment/Interventions Functional transfer training;LE strengthening/ROM; Therapeutic exercise; Endurance training;Bed mobility;Gait training   Progress Progressing toward goals   Recommendation   Recommendation Home PT   Pt  OOB with call bell within reach at end of PT session

## 2018-04-05 NOTE — ASSESSMENT & PLAN NOTE
Slight improvement of her cellulitis today  Continue IV Vancomycin  Podiatry consult pending  Continue wound care  MRSA nasal culture- negative  On 4/2/18 IV Vancomycin was switched to IV Ancef  However due to rash on 4/3/18, IV Ancef was discontinued and IV Vancomycin was restarted

## 2018-04-06 VITALS
DIASTOLIC BLOOD PRESSURE: 59 MMHG | RESPIRATION RATE: 18 BRPM | HEART RATE: 76 BPM | BODY MASS INDEX: 51.91 KG/M2 | HEIGHT: 63 IN | SYSTOLIC BLOOD PRESSURE: 126 MMHG | WEIGHT: 293 LBS | TEMPERATURE: 97.6 F | OXYGEN SATURATION: 98 %

## 2018-04-06 LAB
ALBUMIN SERPL BCP-MCNC: 2.3 G/DL (ref 3.5–5)
ALP SERPL-CCNC: 50 U/L (ref 46–116)
ALT SERPL W P-5'-P-CCNC: 14 U/L (ref 12–78)
ANION GAP SERPL CALCULATED.3IONS-SCNC: 6 MMOL/L (ref 4–13)
AST SERPL W P-5'-P-CCNC: 21 U/L (ref 5–45)
BASOPHILS # BLD AUTO: 0.04 THOUSANDS/ΜL (ref 0–0.1)
BASOPHILS NFR BLD AUTO: 1 % (ref 0–1)
BILIRUB SERPL-MCNC: 0.4 MG/DL (ref 0.2–1)
BUN SERPL-MCNC: 8 MG/DL (ref 5–25)
CALCIUM SERPL-MCNC: 8 MG/DL (ref 8.3–10.1)
CHLORIDE SERPL-SCNC: 108 MMOL/L (ref 100–108)
CO2 SERPL-SCNC: 27 MMOL/L (ref 21–32)
CREAT SERPL-MCNC: 0.89 MG/DL (ref 0.6–1.3)
EOSINOPHIL # BLD AUTO: 0.98 THOUSAND/ΜL (ref 0–0.61)
EOSINOPHIL NFR BLD AUTO: 12 % (ref 0–6)
ERYTHROCYTE [DISTWIDTH] IN BLOOD BY AUTOMATED COUNT: 15.8 % (ref 11.6–15.1)
GFR SERPL CREATININE-BSD FRML MDRD: 70 ML/MIN/1.73SQ M
GLUCOSE SERPL-MCNC: 93 MG/DL (ref 65–140)
HCT VFR BLD AUTO: 35.8 % (ref 34.8–46.1)
HGB BLD-MCNC: 11.4 G/DL (ref 11.5–15.4)
INR PPP: 2 (ref 0.86–1.16)
LYMPHOCYTES # BLD AUTO: 1.65 THOUSANDS/ΜL (ref 0.6–4.47)
LYMPHOCYTES NFR BLD AUTO: 21 % (ref 14–44)
MCH RBC QN AUTO: 28 PG (ref 26.8–34.3)
MCHC RBC AUTO-ENTMCNC: 31.8 G/DL (ref 31.4–37.4)
MCV RBC AUTO: 88 FL (ref 82–98)
MONOCYTES # BLD AUTO: 0.78 THOUSAND/ΜL (ref 0.17–1.22)
MONOCYTES NFR BLD AUTO: 10 % (ref 4–12)
NEUTROPHILS # BLD AUTO: 4.56 THOUSANDS/ΜL (ref 1.85–7.62)
NEUTS SEG NFR BLD AUTO: 56 % (ref 43–75)
PLATELET # BLD AUTO: 308 THOUSANDS/UL (ref 149–390)
PMV BLD AUTO: 10.7 FL (ref 8.9–12.7)
POTASSIUM SERPL-SCNC: 4.2 MMOL/L (ref 3.5–5.3)
PROT SERPL-MCNC: 6.4 G/DL (ref 6.4–8.2)
PROTHROMBIN TIME: 22.7 SECONDS (ref 12.1–14.4)
RBC # BLD AUTO: 4.07 MILLION/UL (ref 3.81–5.12)
SODIUM SERPL-SCNC: 141 MMOL/L (ref 136–145)
WBC # BLD AUTO: 8.01 THOUSAND/UL (ref 4.31–10.16)

## 2018-04-06 PROCEDURE — 85610 PROTHROMBIN TIME: CPT | Performed by: PHYSICIAN ASSISTANT

## 2018-04-06 PROCEDURE — 80053 COMPREHEN METABOLIC PANEL: CPT | Performed by: PHYSICIAN ASSISTANT

## 2018-04-06 PROCEDURE — 85025 COMPLETE CBC W/AUTO DIFF WBC: CPT | Performed by: PHYSICIAN ASSISTANT

## 2018-04-06 PROCEDURE — 99239 HOSP IP/OBS DSCHRG MGMT >30: CPT | Performed by: PHYSICIAN ASSISTANT

## 2018-04-06 RX ORDER — TRIAMCINOLONE ACETONIDE 1 MG/G
CREAM TOPICAL 2 TIMES DAILY
Qty: 80 G | Refills: 0 | Status: SHIPPED | OUTPATIENT
Start: 2018-04-06 | End: 2018-09-04

## 2018-04-06 RX ADMIN — DOCUSATE SODIUM 100 MG: 100 CAPSULE, LIQUID FILLED ORAL at 17:18

## 2018-04-06 RX ADMIN — NYSTATIN: 100000 POWDER TOPICAL at 09:36

## 2018-04-06 RX ADMIN — LORATADINE 10 MG: 10 TABLET ORAL at 09:34

## 2018-04-06 RX ADMIN — TRIAMCINOLONE ACETONIDE: 1 CREAM TOPICAL at 17:18

## 2018-04-06 RX ADMIN — VANCOMYCIN HYDROCHLORIDE 1500 MG: 1 INJECTION, POWDER, LYOPHILIZED, FOR SOLUTION INTRAVENOUS at 03:53

## 2018-04-06 RX ADMIN — Medication 1 TABLET: at 09:35

## 2018-04-06 RX ADMIN — AMLODIPINE BESYLATE 5 MG: 5 TABLET ORAL at 09:34

## 2018-04-06 RX ADMIN — FLUTICASONE PROPIONATE 1 SPRAY: 50 SPRAY, METERED NASAL at 09:36

## 2018-04-06 RX ADMIN — BENAZEPRIL HYDROCHLORIDE 20 MG: 20 TABLET, FILM COATED ORAL at 09:34

## 2018-04-06 RX ADMIN — PANTOPRAZOLE SODIUM 40 MG: 40 TABLET, DELAYED RELEASE ORAL at 05:45

## 2018-04-06 RX ADMIN — DOCUSATE SODIUM 100 MG: 100 CAPSULE, LIQUID FILLED ORAL at 09:34

## 2018-04-06 RX ADMIN — PRAVASTATIN SODIUM 40 MG: 40 TABLET ORAL at 17:18

## 2018-04-06 RX ADMIN — NYSTATIN: 100000 POWDER TOPICAL at 17:18

## 2018-04-06 RX ADMIN — TRIAMCINOLONE ACETONIDE: 1 CREAM TOPICAL at 09:36

## 2018-04-06 RX ADMIN — WARFARIN SODIUM 5 MG: 5 TABLET ORAL at 17:18

## 2018-04-06 NOTE — PROGRESS NOTES
Progress Note - Wound   Sanjiv Olvera 65 y o  female MRN: [id number]  Unit/Bed#: 419-01 Encounter: [id number]      Assessment:  1  Lymphedema with dermatitis, possible mild cellulitis superimposed    Plan:  CBC completely normal again this AM   Erythema is improving with Triamcinalone 1 8% applications  At this time, it appears that erythema is more from dermatitis than true cellulitis  Would continue triamcinalone applications bid x 4-29 days, followed by thin layer of Vaseline to moisturize dry skin  F/U in wound center with Dr Anneliese Rajput next Thursday  Subjective:  Minimal pain  No f/cns  No other acute complaints    Objective:    Vitals: Blood pressure 112/55, pulse 73, temperature 98 5 °F (36 9 °C), temperature source Temporal, resp  rate 18, height 5' 3" (1 6 m), weight (!) 175 kg (385 lb 12 9 oz), SpO2 95 %  ,Body mass index is 68 34 kg/m²  Physical Exam:     Significant reduction in intensity of erythema to both legs  Lymphedema unchanged  No serous exudate noted today  No blistering of skin noted  Improved from yesterday  Lab, Imaging and other studies: I have personally reviewed pertinent reports        Other Ulcers 04/02/18 Leg Right partial thickness (Active)   Wound Description Swelling;Fragile 4/6/2018  7:56 AM   Leticia-wound Assessment Erythema;Fragile;Edema 4/6/2018  7:56 AM   Shape multiple irregular distal lower leg 4/2/2018  9:00 AM   Wound Length (cm) 4 5 cm 4/2/2018  9:00 AM   Wound Width (cm) 12 cm 4/2/2018  9:00 AM   Wound Depth (cm) 0 1 4/2/2018  9:00 AM   Calculated Wound Volume (cm^3) 5 4 cm^3 4/2/2018  9:00 AM   Drainage Amount Scant 4/6/2018  7:56 AM   Drainage Description Serous 4/6/2018  7:56 AM   Treatment Cleansed;Elevated with pillow(s) 4/4/2018  9:10 PM   Dressings Other (Comment) 4/4/2018  7:49 AM   Patient Tolerance Tolerated well 4/6/2018  7:56 AM   Dressing Status Open to air 4/6/2018  7:56 AM

## 2018-04-06 NOTE — CONSULTS
Consultation - [facility] 65 y o  female MRN: [id number]  Unit/Bed#: 274-92 Encounter: [id number]    **This is consult note for 4/5/18**    Assessment/Plan     Assessment:  1  Lymphedema with dermatitis, possible mild cellulitis superimposed  Plan:  CBC completely normal, I think majority of erythema is dermatitis  Discontinue Lac-Hydran  Start Triamcinalone 8 1% bid application  Re-eval tomorrow  Possible discharge tomorrow if improving  History of Present Illness   HPI:  Joe Diaz is a 65 y o  female who presents with erythema bilateral legs, worse to right  Hsistory of lymphedema bilaterally with occasional erythema especially to right leg  No f/c/ns  Some burning itching to both legs  No other acute complaints  Inpatient consult to Podiatry  Consult performed by: Best Mirza ordered by: Elisa Nail          Review of Systems  10  Point reviewed, non-contributory    Historical Information   Past Medical History:   Diagnosis Date    [facility] clotting disorder [facility]     Homocysteinemia [facility]     Hypercoagulable state ([facility] )     Hypertension     Lyme disease     Obesity     PCOS (polycystic ovarian syndrome)     Renal disorder     kidney stones    Sleep apnea      Past Surgical History:   Procedure Laterality Date    ANKLE SURGERY Right     APPENDECTOMY      BREAST BIOPSY      BREAST BIOPSY      FOOT SURGERY      HEMORROIDECTOMY      HYSTERECTOMY      JOINT REPLACEMENT Bilateral     knee    LITHOTRIPSY       Social History   History   Alcohol Use No     History   Drug Use No     History   Smoking Status    Never Smoker   Smokeless Tobacco    Never Used     Family History: non-contributory    Meds/Allergies   all current active meds have been reviewed  Allergies   Allergen Reactions    Azithromycin Diarrhea    Ciprofloxacin Confusion, Dizziness and Other (See Comments)     Other reaction(s):  Other (Please comment)  Dizziness, weepy    Ancef [Cefazolin] Rash       Objective   Vitals: Blood pressure 112/55, pulse 73, temperature 98 5 °F (36 9 °C), temperature source Temporal, resp  rate 18, height 5' 3" (1 6 m), weight (!) 175 kg (385 lb 12 9 oz), SpO2 95 %  Wounds:     Other Ulcers 04/02/18 Leg Right partial thickness (Active)   Wound Description Swelling;Fragile 4/6/2018  7:56 AM   Leticia-wound Assessment Erythema;Fragile;Edema 4/6/2018  7:56 AM   Shape multiple irregular distal lower leg 4/2/2018  9:00 AM   Wound Length (cm) 4 5 cm 4/2/2018  9:00 AM   Wound Width (cm) 12 cm 4/2/2018  9:00 AM   Wound Depth (cm) 0 1 4/2/2018  9:00 AM   Calculated Wound Volume (cm^3) 5 4 cm^3 4/2/2018  9:00 AM   Drainage Amount Scant 4/6/2018  7:56 AM   Drainage Description Serous 4/6/2018  7:56 AM   Treatment Cleansed;Elevated with pillow(s) 4/4/2018  9:10 PM   Dressings Other (Comment) 4/4/2018  7:49 AM   Patient Tolerance Tolerated well 4/6/2018  7:56 AM   Dressing Status Open to air 4/6/2018  7:56 AM         Physical Exam     Derm:  No open wounds, scattered superficial eschar and dry skin noted, worse to right lower leg  Erythema to both legs, worse to right leg  Neuro:  Protective sensation intact, no deficits  Ortho:  MMT normal, no deformities  Vascular:  Pedal pulses palpable, lymphedema bilateral lower legs    Lab Results: I have personally reviewed pertinent reports  Imaging: I have personally reviewed pertinent reports  EKG, Pathology, and Other Studies: I have personally reviewed pertinent reports  Code Status: Level 1 - Full Code  Advance Directive and Living Will:      Power of :    POLST:      Counseling / Coordination of Care  Total floor / unit time spent today 45 minutes  Greater than 50% of total time was spent with the patient and / or family counseling and / or coordination of care    A description of the counseling / coordination of care: Discussed morbid obesity as contributing factor, cont leg elevation, f/u at wound care on discharge

## 2018-04-06 NOTE — DISCHARGE INSTRUCTIONS
Apply vaseline overtop of triamcinolone cream for 7 days  Follow up in the wound care center and/or podiatry ASAP  May wish to pursue outpatient referral to Physical therapy for lymphedema treatment

## 2018-04-06 NOTE — NURSING NOTE
Pt discharged on 4-6-18, d/c instructions given and explained to pt with adequate understanding verbalized, prescription given to pt, no s/s of distress upon d/c

## 2018-04-10 ENCOUNTER — TELEPHONE (OUTPATIENT)
Dept: FAMILY MEDICINE CLINIC | Facility: CLINIC | Age: 63
End: 2018-04-10

## 2018-04-11 ENCOUNTER — TELEPHONE (OUTPATIENT)
Dept: FAMILY MEDICINE CLINIC | Facility: CLINIC | Age: 63
End: 2018-04-11

## 2018-04-11 NOTE — DISCHARGE SUMMARY
Discharge Summary - Jaja Monsalve 65 y o  female MRN: [id number]    Unit/Bed#: 081-38 Encounter: [id number]    Admission Date: 3/30/2018  Discharge date: 4/6/2018    Admitting Diagnosis: Leg pain [M79 606]  Cellulitis of right leg [L03 115]     Discharge diagnosis:   Patient Active Problem List   Diagnosis    History of DVT (deep vein thrombosis)    Morbid obesity with BMI of 60 0-69 9, adult ([place] [address] )    Renal disorder    PCOS (polycystic ovarian syndrome)    Obesity    Lyme disease    Blood clotting disorder ([place] [address] )    Essential hypertension    Homocysteinemia ([place] [address] )    Hypercoagulable state ([place] [address] )    Obstructive sleep apnea on CPAP    Leg pain    Cellulitis of right lower extremity       HPI: The patient presented to the ER with right leg pain and increased redness, which appeared to be cellulitis based on clinical exam  She was admitted for further workup and treatment  Procedures Performed: No orders of the defined types were placed in this encounter  Hospital Course: The patient was admitted to med/surg, started on IV Vancomycin initially  This was treated for a full 7 day course  Podiatry evaluated the patient and felt residual erythema was just dermatitis at this time  The patient was prescribed triamcinolone cream for a 10 day course  The patient was instructed to follow up with the wound care center, her podiatrist, and possibly a lymphedema clinic on discharge  She should also follow up with her PCP shortly after discharge  Significant Findings:  Xray right ankle: no acute osseus abnormality  Venous doppler of the lower extremities: No evidence of DVT  Complications: none    Condition at Discharge: good     Discharge instructions/Information to patient and family:   See after visit summary for information provided to patient and family  Provisions for Follow-Up Care:  See after visit summary for information related to follow-up care and any pertinent home health orders  Disposition: Home    Planned Readmission: No    Discharge Statement   I spent 45 minutes discharging the patient  This time was spent on the day of discharge, with patient education, examination, and preparation of discharge paperwork  Discharge Medications:  See after visit summary for reconciled discharge medications provided to patient and family

## 2018-04-12 RX ORDER — CEPHALEXIN 500 MG/1
CAPSULE ORAL
Refills: 0 | COMMUNITY
Start: 2018-03-29 | End: 2018-05-17

## 2018-04-16 DIAGNOSIS — E53.8 B12 DEFICIENCY: Primary | ICD-10-CM

## 2018-04-17 ENCOUNTER — OFFICE VISIT (OUTPATIENT)
Dept: FAMILY MEDICINE CLINIC | Facility: CLINIC | Age: 63
End: 2018-04-17
Payer: COMMERCIAL

## 2018-04-17 ENCOUNTER — TELEPHONE (OUTPATIENT)
Dept: HEMATOLOGY ONCOLOGY | Facility: CLINIC | Age: 63
End: 2018-04-17

## 2018-04-17 VITALS
RESPIRATION RATE: 18 BRPM | HEIGHT: 63 IN | WEIGHT: 293 LBS | BODY MASS INDEX: 51.91 KG/M2 | DIASTOLIC BLOOD PRESSURE: 76 MMHG | HEART RATE: 75 BPM | SYSTOLIC BLOOD PRESSURE: 128 MMHG | OXYGEN SATURATION: 98 %

## 2018-04-17 DIAGNOSIS — Z11.59 ENCOUNTER FOR HEPATITIS C SCREENING TEST FOR LOW RISK PATIENT: ICD-10-CM

## 2018-04-17 DIAGNOSIS — D68.9 BLOOD CLOTTING DISORDER (HCC): ICD-10-CM

## 2018-04-17 DIAGNOSIS — I10 ESSENTIAL HYPERTENSION: ICD-10-CM

## 2018-04-17 DIAGNOSIS — IMO0001 CHRONIC VENOUS HYPERTENSION WITH ULCER INVOLVING BOTH SIDES: ICD-10-CM

## 2018-04-17 DIAGNOSIS — B36.9 FUNGAL RASH OF TRUNK: Primary | ICD-10-CM

## 2018-04-17 DIAGNOSIS — E66.01 MORBID OBESITY WITH BMI OF 60.0-69.9, ADULT (HCC): ICD-10-CM

## 2018-04-17 DIAGNOSIS — E11.59 TYPE 2 DIABETES MELLITUS WITH OTHER CIRCULATORY COMPLICATION, WITHOUT LONG-TERM CURRENT USE OF INSULIN (HCC): Primary | ICD-10-CM

## 2018-04-17 DIAGNOSIS — L03.115 CELLULITIS OF RIGHT LOWER EXTREMITY: ICD-10-CM

## 2018-04-17 DIAGNOSIS — E53.8 B12 DEFICIENCY: ICD-10-CM

## 2018-04-17 PROCEDURE — 99495 TRANSJ CARE MGMT MOD F2F 14D: CPT | Performed by: FAMILY MEDICINE

## 2018-04-17 PROCEDURE — 1111F DSCHRG MED/CURRENT MED MERGE: CPT | Performed by: FAMILY MEDICINE

## 2018-04-17 RX ORDER — NYSTATIN 100000 [USP'U]/G
POWDER TOPICAL 3 TIMES DAILY
Qty: 60 G | Refills: 3 | Status: SHIPPED | OUTPATIENT
Start: 2018-04-17 | End: 2021-05-11

## 2018-04-17 RX ORDER — TRIAMCINOLONE ACETONIDE 5 MG/G
CREAM TOPICAL
Status: ON HOLD | COMMUNITY
Start: 2017-03-22 | End: 2020-09-09 | Stop reason: ALTCHOICE

## 2018-04-17 RX ORDER — METFORMIN HYDROCHLORIDE 500 MG/1
500 TABLET, EXTENDED RELEASE ORAL 4 TIMES DAILY
COMMUNITY
Start: 2018-04-08 | End: 2018-06-21 | Stop reason: SDUPTHER

## 2018-04-17 RX ORDER — PREDNISONE 10 MG/1
10 TABLET ORAL DAILY
COMMUNITY
End: 2018-09-04

## 2018-04-17 NOTE — PATIENT INSTRUCTIONS

## 2018-04-17 NOTE — TELEPHONE ENCOUNTER
Pt had INR yesterday at [facility] lab  Please call with Coumadin instructions   Can leave message on machine   [phone]

## 2018-04-30 ENCOUNTER — TELEPHONE (OUTPATIENT)
Dept: FAMILY MEDICINE CLINIC | Facility: CLINIC | Age: 63
End: 2018-04-30

## 2018-05-17 ENCOUNTER — OFFICE VISIT (OUTPATIENT)
Dept: FAMILY MEDICINE CLINIC | Facility: CLINIC | Age: 63
End: 2018-05-17
Payer: COMMERCIAL

## 2018-05-17 VITALS
RESPIRATION RATE: 16 BRPM | BODY MASS INDEX: 51.91 KG/M2 | SYSTOLIC BLOOD PRESSURE: 126 MMHG | HEART RATE: 88 BPM | WEIGHT: 293 LBS | DIASTOLIC BLOOD PRESSURE: 78 MMHG | OXYGEN SATURATION: 98 % | HEIGHT: 63 IN

## 2018-05-17 DIAGNOSIS — M79.89 PAIN AND SWELLING OF RIGHT LOWER EXTREMITY: Primary | ICD-10-CM

## 2018-05-17 DIAGNOSIS — M79.604 PAIN AND SWELLING OF RIGHT LOWER EXTREMITY: Primary | ICD-10-CM

## 2018-05-17 DIAGNOSIS — E78.5 HYPERLIPIDEMIA, UNSPECIFIED HYPERLIPIDEMIA TYPE: Primary | ICD-10-CM

## 2018-05-17 PROCEDURE — 99214 OFFICE O/P EST MOD 30 MIN: CPT | Performed by: FAMILY MEDICINE

## 2018-05-17 RX ORDER — IODOQUINOL, HYDROCORTISONE ACETATE AND ALOE VERA LEAF 10; 20; 10 MG/G; MG/G; MG/G
GEL TOPICAL 2 TIMES DAILY PRN
COMMUNITY
End: 2021-05-26 | Stop reason: SDUPTHER

## 2018-05-17 RX ORDER — PRAVASTATIN SODIUM 40 MG
40 TABLET ORAL DAILY
Qty: 90 TABLET | Refills: 3 | Status: SHIPPED | OUTPATIENT
Start: 2018-05-17 | End: 2019-04-24 | Stop reason: SDUPTHER

## 2018-05-17 NOTE — PROGRESS NOTES
Assessment/Plan:    No problem-specific Assessment & Plan notes found for this encounter  Diagnoses and all orders for this visit:    Pain and swelling of right lower extremity  -     XR tibia fibula 2 vw right; Future  -     VAS lower limb venous duplex study, unilateral/limited; Future  -     MRI tibia fibula right wo contrast; Future    Other orders  -     Iodoquinol-HC-Aloe Polysacch (ALCORTIN A) 1-2-1 % GEL; Apply topically          Subjective:      Patient ID: Zaid Domínguez is a 65 y o  female  Her right leg is giving out on a regular basis  She has pain and swelling of the right calf  She has ambulatory dysfunction  She has been having the pain for several weeks to months  She was admitted with cellulitis prior to the onset of this pain  She has been ruled out for DVT in the past   She has no trauma to that leg  She has no constitutional symptoms  The following portions of the patient's history were reviewed and updated as appropriate:   She  has a past medical history of Blood clotting disorder ([facility] ); Homocysteinemia ([facility] [address] ); Hypercoagulable state ([facility] [address] ); Hypertension; Lyme disease; Obesity; PCOS (polycystic ovarian syndrome); Renal disorder; Sleep apnea; and 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 ([facility] [address] )    She   Patient Active Problem List    Diagnosis Date Noted    Encounter for hepatitis C screening test for low risk patient 04/17/2018    Pain of right lower extremity 03/30/2018    Cellulitis of right lower extremity 03/30/2018    Renal disorder     PCOS (polycystic ovarian syndrome)     Obesity     Lyme disease     Blood clotting disorder ([facility] [address] )     Essential hypertension     Homocysteinemia ([facility] [address] )     Hypercoagulable state ([facility] [address] )     Obstructive sleep apnea on CPAP     DVT (deep venous thrombosis) ([facility] [address] ) 08/29/2016    History of DVT (deep vein thrombosis) 07/01/2016    Morbid obesity with BMI of 60 0-69 9, adult ([facility] [address] ) 07/01/2016  Homocystinemia ([place] [address] ) 03/09/2015    Skin rash 05/22/2014    Chronic venous hypertension w ulceration ([place] [address] ) 12/18/2013    Edema 11/20/2013    Esophageal reflux 11/20/2013    Hyperlipidemia 11/20/2013    Hypertension 11/20/2013    Polycystic ovarian syndrome 11/20/2013    Type 2 diabetes mellitus ([place] [address] ) 11/20/2013     She  has a past surgical history that includes Joint replacement (Bilateral); Appendectomy; Ankle surgery (Right); Hysterectomy; Hemorroidectomy; Breast biopsy; Foot surgery; LITHOTRIPSY; Breast biopsy; Closed reduction metatarsal fracture (Right); and Vena cava filter placement  Her family history includes Hypercoagulant Ability in her maternal grandmother and mother; Pulmonary embolism in her mother  She  reports that she has never smoked  She has never used smokeless tobacco  She reports that she does not drink alcohol or use drugs  Current Outpatient Prescriptions   Medication Sig Dispense Refill    albuterol (PROVENTIL HFA,VENTOLIN HFA) 90 mcg/act inhaler Inhale 2 puffs every 6 (six) hours as needed for wheezing   amLODIPine-benazepril (LOTREL 5-20) 5-20 MG per capsule Take 1 capsule by mouth daily   cetirizine (ZyrTEC) 10 mg tablet Take 10 mg by mouth daily   folic acid-pyridoxine-cyanocobalamin (FOLBIC) 2 5-25-2 mg Take 1 tablet by mouth daily 90 tablet 3    furosemide (LASIX) 20 mg tablet Take 20 mg by mouth daily   Iodoquinol-HC-Aloe Polysacch (ALCORTIN A) 1-2-1 % GEL Apply topically      metFORMIN (GLUCOPHAGE-XR) 500 mg 24 hr tablet 500 mg 4 (four) times a day        mometasone (NASONEX) 50 mcg/act nasal spray 2 sprays into each nostril daily      montelukast (SINGULAIR) 10 mg tablet Take 10 mg by mouth daily at bedtime   multivitamin (THERAGRAN) TABS Take 1 tablet by mouth daily   nystatin (MYCOSTATIN) powder Apply topically 3 (three) times a day 60 g 3    pravastatin (PRAVACHOL) 40 mg tablet Take 40 mg by mouth daily        triamcinolone (KENALOG) 0 5 % cream APPLY TOPICALLY TO RIGHT ANKLE EVERY 12 HOURS      warfarin (COUMADIN) 5 mg tablet Take by mouth daily      alendronate (FOSAMAX) 70 mg tablet Take 70 mg by mouth every 7 days      Cholecalciferol (VITAMIN D3) 12682 units TABS Take 1 tablet by mouth every 14 (fourteen) days      predniSONE 10 mg tablet Take 10 mg by mouth daily      triamcinolone (KENALOG) 0 1 % cream Apply topically 2 (two) times a day for 7 days 80 g 0     No current facility-administered medications for this visit  Current Outpatient Prescriptions on File Prior to Visit   Medication Sig    albuterol (PROVENTIL HFA,VENTOLIN HFA) 90 mcg/act inhaler Inhale 2 puffs every 6 (six) hours as needed for wheezing   amLODIPine-benazepril (LOTREL 5-20) 5-20 MG per capsule Take 1 capsule by mouth daily   cetirizine (ZyrTEC) 10 mg tablet Take 10 mg by mouth daily   folic acid-pyridoxine-cyanocobalamin (FOLBIC) 2 5-25-2 mg Take 1 tablet by mouth daily    furosemide (LASIX) 20 mg tablet Take 20 mg by mouth daily   metFORMIN (GLUCOPHAGE-XR) 500 mg 24 hr tablet 500 mg 4 (four) times a day      mometasone (NASONEX) 50 mcg/act nasal spray 2 sprays into each nostril daily    montelukast (SINGULAIR) 10 mg tablet Take 10 mg by mouth daily at bedtime   multivitamin (THERAGRAN) TABS Take 1 tablet by mouth daily   nystatin (MYCOSTATIN) powder Apply topically 3 (three) times a day    pravastatin (PRAVACHOL) 40 mg tablet Take 40 mg by mouth daily   triamcinolone (KENALOG) 0 5 % cream APPLY TOPICALLY TO RIGHT ANKLE EVERY 12 HOURS    warfarin (COUMADIN) 5 mg tablet Take by mouth daily    [DISCONTINUED] METFORMIN HCL PO Take 2,000 mg by mouth daily with dinner      alendronate (FOSAMAX) 70 mg tablet Take 70 mg by mouth every 7 days    Cholecalciferol (VITAMIN D3) 67089 units TABS Take 1 tablet by mouth every 14 (fourteen) days    predniSONE 10 mg tablet Take 10 mg by mouth daily    triamcinolone (KENALOG) 0 1 % cream Apply topically 2 (two) times a day for 7 days    [DISCONTINUED] cephalexin (KEFLEX) 500 mg capsule TAKE ONE CAPSULE BY MOUTH EVERY 6 HOURS FOR 7 DAYS    [DISCONTINUED] folic acid-pyridoxine-cyanocobalamin (FOLBIC) 2 5-25-2 mg Take 1 tablet by mouth daily     No current facility-administered medications on file prior to visit  She is allergic to azithromycin; ciprofloxacin; and ancef [cefazolin]       Review of Systems   All other systems reviewed and are negative  Objective:      /78 (BP Location: Right arm, Patient Position: Sitting, Cuff Size: Large)   Pulse 88   Resp 16   Ht 5' 3" (1 6 m)   Wt (!) 172 kg (379 lb 9 6 oz)   SpO2 98%   BMI 67 24 kg/m²          Physical Exam   Constitutional: She is oriented to person, place, and time  She appears well-developed and well-nourished  Neck: Normal range of motion  Neck supple  Cardiovascular: Normal rate, regular rhythm, normal heart sounds and intact distal pulses  Abdominal: Soft  Bowel sounds are normal    Musculoskeletal: Normal range of motion  Neurological: She is alert and oriented to person, place, and time  She has normal reflexes  Skin: Skin is warm and dry  Psychiatric: She has a normal mood and affect  Her behavior is normal  Judgment and thought content normal    Nursing note and vitals reviewed

## 2018-06-06 ENCOUNTER — TELEPHONE (OUTPATIENT)
Dept: HEMATOLOGY ONCOLOGY | Facility: CLINIC | Age: 63
End: 2018-06-06

## 2018-06-06 NOTE — TELEPHONE ENCOUNTER
Please call pt with Coumadin instructions  Had PT/INR on 6/4/18  Current dose 7 5 mg M-Th and 5 mg other days    [phone]

## 2018-06-20 RX ORDER — LORATADINE 10 MG/1
TABLET ORAL
COMMUNITY
End: 2018-09-04

## 2018-06-20 RX ORDER — TRIAMCINOLONE ACETONIDE 1 MG/G
CREAM TOPICAL 2 TIMES DAILY PRN
COMMUNITY
Start: 2018-05-05

## 2018-06-20 RX ORDER — FLUTICASONE PROPIONATE 50 MCG
SPRAY, SUSPENSION (ML) NASAL DAILY PRN
COMMUNITY
Start: 2014-08-12 | End: 2020-09-10 | Stop reason: HOSPADM

## 2018-06-20 RX ORDER — ERGOCALCIFEROL 1.25 MG/1
CAPSULE ORAL
COMMUNITY
Start: 2011-03-30 | End: 2020-09-10 | Stop reason: HOSPADM

## 2018-06-20 RX ORDER — FLUTICASONE FUROATE AND VILANTEROL 100; 25 UG/1; UG/1
POWDER RESPIRATORY (INHALATION) DAILY
Status: ON HOLD | COMMUNITY
Start: 2016-09-14 | End: 2020-09-09 | Stop reason: ALTCHOICE

## 2018-06-20 RX ORDER — CLOBETASOL PROPIONATE 0.5 MG/G
OINTMENT TOPICAL
Status: ON HOLD | COMMUNITY
End: 2020-09-09 | Stop reason: ALTCHOICE

## 2018-06-20 RX ORDER — CETIRIZINE HYDROCHLORIDE 10 MG/1
1 TABLET ORAL DAILY PRN
COMMUNITY

## 2018-06-21 ENCOUNTER — OFFICE VISIT (OUTPATIENT)
Dept: FAMILY MEDICINE CLINIC | Facility: CLINIC | Age: 63
End: 2018-06-21
Payer: COMMERCIAL

## 2018-06-21 VITALS
DIASTOLIC BLOOD PRESSURE: 82 MMHG | SYSTOLIC BLOOD PRESSURE: 136 MMHG | HEIGHT: 63 IN | BODY MASS INDEX: 51.91 KG/M2 | WEIGHT: 293 LBS

## 2018-06-21 DIAGNOSIS — M79.604 PAIN OF RIGHT LOWER EXTREMITY: ICD-10-CM

## 2018-06-21 DIAGNOSIS — E28.2 PCOS (POLYCYSTIC OVARIAN SYNDROME): ICD-10-CM

## 2018-06-21 DIAGNOSIS — I10 ESSENTIAL HYPERTENSION: ICD-10-CM

## 2018-06-21 DIAGNOSIS — E78.5 HYPERLIPIDEMIA, UNSPECIFIED HYPERLIPIDEMIA TYPE: ICD-10-CM

## 2018-06-21 DIAGNOSIS — E66.01 MORBID OBESITY WITH BMI OF 60.0-69.9, ADULT (HCC): ICD-10-CM

## 2018-06-21 DIAGNOSIS — J30.89 ENVIRONMENTAL AND SEASONAL ALLERGIES: ICD-10-CM

## 2018-06-21 DIAGNOSIS — R19.5 HEME POSITIVE STOOL: ICD-10-CM

## 2018-06-21 DIAGNOSIS — IMO0001 CHRONIC VENOUS HYPERTENSION WITH ULCER INVOLVING BOTH SIDES: Primary | ICD-10-CM

## 2018-06-21 PROCEDURE — 99214 OFFICE O/P EST MOD 30 MIN: CPT | Performed by: FAMILY MEDICINE

## 2018-06-21 RX ORDER — MONTELUKAST SODIUM 10 MG/1
10 TABLET ORAL
Qty: 90 TABLET | Refills: 3 | Status: SHIPPED | OUTPATIENT
Start: 2018-06-21 | End: 2019-06-19 | Stop reason: SDUPTHER

## 2018-06-21 RX ORDER — METFORMIN HYDROCHLORIDE 500 MG/1
500 TABLET, EXTENDED RELEASE ORAL 4 TIMES DAILY
Qty: 360 TABLET | Refills: 3 | Status: SHIPPED | OUTPATIENT
Start: 2018-06-21 | End: 2019-06-19 | Stop reason: SDUPTHER

## 2018-06-21 RX ORDER — AMLODIPINE BESYLATE AND BENAZEPRIL HYDROCHLORIDE 5; 20 MG/1; MG/1
1 CAPSULE ORAL DAILY
Qty: 90 CAPSULE | Refills: 3 | Status: SHIPPED | OUTPATIENT
Start: 2018-06-21 | End: 2019-06-19 | Stop reason: SDUPTHER

## 2018-06-21 NOTE — PROGRESS NOTES
Assessment/Plan:    No problem-specific Assessment & Plan notes found for this encounter  Diagnoses and all orders for this visit:    Chronic venous hypertension with ulcer involving both sides ([facility] [address] )    Morbid obesity with BMI of 60 0-69 9, adult ([facility] [address] )    Pain of right lower extremity    Hyperlipidemia, unspecified hyperlipidemia type    Heme positive stool  -     Ambulatory referral to Gastroenterology; Future    Essential hypertension  -     amLODIPine-benazepril (LOTREL 5-20) 5-20 MG per capsule; Take 1 capsule by mouth daily    PCOS (polycystic ovarian syndrome)  -     metFORMIN (GLUCOPHAGE-XR) 500 mg 24 hr tablet; Take 1 tablet (500 mg total) by mouth 4 (four) times a day    Environmental and seasonal allergies  -     montelukast (SINGULAIR) 10 mg tablet; Take 1 tablet (10 mg total) by mouth daily at bedtime    Other orders  -     fluticasone-vilanterol (BREO ELLIPTA) 100-25 mcg/inh inhaler; Inhale daily  -     clobetasol (TEMOVATE) 0 05 % ointment; Apply topically as directed two times daily  -     enoxaparin (LOVENOX) 150 mg/mL injection; Inject under the skin  -     fluticasone (FLONASE) 50 mcg/act nasal spray; into each nostril  -     alendronate-cholecalciferol (FOSAMAX PLUS D)  MG-UNIT per tablet; Take by mouth  -     lansoprazole (PREVACID SOLUTAB) 30 mg disintegrating tablet; Take by mouth  -     loratadine (CLARITIN) 10 mg tablet; Take by mouth  -     triamcinolone (KENALOG) 0 1 % cream;   -     ergocalciferol (VITAMIN D2) 50,000 units; Take by mouth  -     cetirizine (ZYRTEC ALLERGY) 10 mg tablet; Take 1 tablet by mouth daily as needed          Subjective:      Patient ID: Joe Diaz is a 65 y o  female  She has never been formally diagnosed with type 2 diabetes mellitus  She has had blood sugars consistent with "prediabetes  "  She is taking metformin for a combination of hyperglycemia and polycystic ovarian disease  She has significant peripheral edema    She was using lymphedema pumps with some degree of success  She was having right lower ext pain  We had MRI and doppler exam   She has no osteomyelitis and no DVT  She was recently treated for cellulitis  She was seen by OB-GYN  She had normal pap, but she had occult blood  She will need colonoscopy  The following portions of the patient's history were reviewed and updated as appropriate:   She  has a past medical history of Blood clotting disorder ([facility] [place] [facility] ); Homocysteinemia ([facility] [place] [facility] ); Hypercoagulable state ([facility] [place] [facility] ); Hypertension; Lyme disease; Obesity; PCOS (polycystic ovarian syndrome); Renal disorder; Sleep apnea; and 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 ([place] [address] )  She   Patient Active Problem List    Diagnosis Date Noted    Heme positive stool 06/21/2018    Encounter for hepatitis C screening test for low risk patient 04/17/2018    Pain of right lower extremity 03/30/2018    Cellulitis of right lower extremity 03/30/2018    Renal disorder     PCOS (polycystic ovarian syndrome)     Obesity     Lyme disease     Blood clotting disorder ([place] [address] )     Essential hypertension     Homocysteinemia ([facility] [place] [facility] )     Hypercoagulable state ([place] [address] )     Obstructive sleep apnea on CPAP     DVT (deep venous thrombosis) ([facility] [place] [facility] ) 08/29/2016    History of DVT (deep vein thrombosis) 07/01/2016    Morbid obesity with BMI of 60 0-69 9, adult ([place] [address] ) 07/01/2016    Homocystinemia ([facility] [place] [facility] ) 03/09/2015    Skin rash 05/22/2014    Chronic venous hypertension w ulceration ([facility] [place] [facility] ) 12/18/2013    Edema 11/20/2013    Esophageal reflux 11/20/2013    Hyperlipidemia 11/20/2013    Hypertension 11/20/2013    Polycystic ovarian syndrome 11/20/2013     She  has a past surgical history that includes Joint replacement (Bilateral); Appendectomy; Ankle surgery (Right); Hysterectomy; Hemorroidectomy; Breast biopsy; Foot surgery; LITHOTRIPSY;  Breast biopsy; Closed reduction metatarsal fracture (Right); and Vena cava filter placement  Her family history includes Hypercoagulant Ability in her maternal grandmother and mother; Pulmonary embolism in her mother  She  reports that she has never smoked  She has never used smokeless tobacco  She reports that she does not drink alcohol or use drugs  Current Outpatient Prescriptions   Medication Sig Dispense Refill    ergocalciferol (VITAMIN D2) 50,000 units Take by mouth      fluticasone (FLONASE) 50 mcg/act nasal spray into each nostril      fluticasone-vilanterol (BREO ELLIPTA) 100-25 mcg/inh inhaler Inhale daily      lansoprazole (PREVACID SOLUTAB) 30 mg disintegrating tablet Take by mouth      albuterol (PROVENTIL HFA,VENTOLIN HFA) 90 mcg/act inhaler Inhale 2 puffs every 6 (six) hours as needed for wheezing   alendronate (FOSAMAX) 70 mg tablet Take 70 mg by mouth every 7 days      alendronate-cholecalciferol (FOSAMAX PLUS D)  MG-UNIT per tablet Take by mouth      amLODIPine-benazepril (LOTREL 5-20) 5-20 MG per capsule Take 1 capsule by mouth daily 90 capsule 3    cetirizine (ZYRTEC ALLERGY) 10 mg tablet Take 1 tablet by mouth daily as needed      cetirizine (ZyrTEC) 10 mg tablet Take 10 mg by mouth daily   Cholecalciferol (VITAMIN D3) 39265 units TABS Take 1 tablet by mouth every 14 (fourteen) days      clobetasol (TEMOVATE) 0 05 % ointment Apply topically as directed two times daily      enoxaparin (LOVENOX) 150 mg/mL injection Inject under the skin      folic acid-pyridoxine-cyanocobalamin (FOLBIC) 2 5-25-2 mg Take 1 tablet by mouth daily 90 tablet 3    furosemide (LASIX) 20 mg tablet Take 20 mg by mouth daily        Iodoquinol-HC-Aloe Polysacch (ALCORTIN A) 1-2-1 % GEL Apply topically      loratadine (CLARITIN) 10 mg tablet Take by mouth      metFORMIN (GLUCOPHAGE-XR) 500 mg 24 hr tablet Take 1 tablet (500 mg total) by mouth 4 (four) times a day 360 tablet 3    mometasone (NASONEX) 50 mcg/act nasal spray 2 sprays into each nostril daily      montelukast (SINGULAIR) 10 mg tablet Take 1 tablet (10 mg total) by mouth daily at bedtime 90 tablet 3    multivitamin (THERAGRAN) TABS Take 1 tablet by mouth daily   nystatin (MYCOSTATIN) powder Apply topically 3 (three) times a day 60 g 3    pravastatin (PRAVACHOL) 40 mg tablet Take 1 tablet (40 mg total) by mouth daily 90 tablet 3    predniSONE 10 mg tablet Take 10 mg by mouth daily      triamcinolone (KENALOG) 0 1 % cream Apply topically 2 (two) times a day for 7 days 80 g 0    triamcinolone (KENALOG) 0 1 % cream       triamcinolone (KENALOG) 0 5 % cream APPLY TOPICALLY TO RIGHT ANKLE EVERY 12 HOURS      warfarin (COUMADIN) 5 mg tablet Take by mouth daily       No current facility-administered medications for this visit  Current Outpatient Prescriptions on File Prior to Visit   Medication Sig    albuterol (PROVENTIL HFA,VENTOLIN HFA) 90 mcg/act inhaler Inhale 2 puffs every 6 (six) hours as needed for wheezing   alendronate (FOSAMAX) 70 mg tablet Take 70 mg by mouth every 7 days    cetirizine (ZyrTEC) 10 mg tablet Take 10 mg by mouth daily   Cholecalciferol (VITAMIN D3) 44004 units TABS Take 1 tablet by mouth every 14 (fourteen) days    folic acid-pyridoxine-cyanocobalamin (FOLBIC) 2 5-25-2 mg Take 1 tablet by mouth daily    furosemide (LASIX) 20 mg tablet Take 20 mg by mouth daily   Iodoquinol-HC-Aloe Polysacch (ALCORTIN A) 1-2-1 % GEL Apply topically    mometasone (NASONEX) 50 mcg/act nasal spray 2 sprays into each nostril daily    multivitamin (THERAGRAN) TABS Take 1 tablet by mouth daily      nystatin (MYCOSTATIN) powder Apply topically 3 (three) times a day    pravastatin (PRAVACHOL) 40 mg tablet Take 1 tablet (40 mg total) by mouth daily    predniSONE 10 mg tablet Take 10 mg by mouth daily    triamcinolone (KENALOG) 0 1 % cream Apply topically 2 (two) times a day for 7 days    triamcinolone (KENALOG) 0 5 % cream APPLY TOPICALLY TO RIGHT ANKLE EVERY 12 HOURS    warfarin (COUMADIN) 5 mg tablet Take by mouth daily    [DISCONTINUED] amLODIPine-benazepril (LOTREL 5-20) 5-20 MG per capsule Take 1 capsule by mouth daily   [DISCONTINUED] metFORMIN (GLUCOPHAGE-XR) 500 mg 24 hr tablet 500 mg 4 (four) times a day      [DISCONTINUED] montelukast (SINGULAIR) 10 mg tablet Take 10 mg by mouth daily at bedtime  No current facility-administered medications on file prior to visit  She is allergic to azithromycin; ciprofloxacin; and ancef [cefazolin]       Review of Systems   All other systems reviewed and are negative  Objective:      /82 (BP Location: Right arm, Patient Position: Sitting, Cuff Size: Extra-Large)   Ht 5' 3" (1 6 m)   Wt (!) 171 kg (376 lb)   BMI 66 61 kg/m²          Physical Exam   Constitutional: She is oriented to person, place, and time  She appears well-developed and well-nourished  Neck: Normal range of motion  Neck supple  Cardiovascular: Normal rate, regular rhythm, normal heart sounds and intact distal pulses  Pulmonary/Chest: Effort normal and breath sounds normal    Abdominal: Soft  Bowel sounds are normal    Musculoskeletal: Normal range of motion  Neurological: She is alert and oriented to person, place, and time  She has normal reflexes  Skin: Skin is warm and dry  Psychiatric: She has a normal mood and affect  Her behavior is normal  Judgment and thought content normal    Nursing note and vitals reviewed

## 2018-06-29 ENCOUNTER — OFFICE VISIT (OUTPATIENT)
Dept: HEMATOLOGY ONCOLOGY | Facility: CLINIC | Age: 63
End: 2018-06-29
Payer: COMMERCIAL

## 2018-06-29 VITALS
BODY MASS INDEX: 51.91 KG/M2 | SYSTOLIC BLOOD PRESSURE: 126 MMHG | HEART RATE: 85 BPM | DIASTOLIC BLOOD PRESSURE: 68 MMHG | HEIGHT: 63 IN | OXYGEN SATURATION: 98 % | TEMPERATURE: 97.8 F | WEIGHT: 293 LBS | RESPIRATION RATE: 18 BRPM

## 2018-06-29 DIAGNOSIS — D68.59 HYPERCOAGULABLE STATE (HCC): ICD-10-CM

## 2018-06-29 DIAGNOSIS — R79.83 HOMOCYSTEINEMIA: ICD-10-CM

## 2018-06-29 DIAGNOSIS — I82.502 CHRONIC DEEP VEIN THROMBOSIS (DVT) OF LEFT LOWER EXTREMITY, UNSPECIFIED VEIN (HCC): Primary | ICD-10-CM

## 2018-06-29 PROCEDURE — 99214 OFFICE O/P EST MOD 30 MIN: CPT | Performed by: INTERNAL MEDICINE

## 2018-06-29 NOTE — LETTER
June 30, 2018     Ramon Kang MD  [address] [place] [id number]    Patient: Minh Jonas   YOB: 1955   Date of Visit: 6/29/2018       Dear Dr Zachary Yun: Thank you for referring Mariaelena Jewell to me for evaluation  Below are my notes for this consultation  If you have questions, please do not hesitate to call me  I look forward to following your patient along with you  Sincerely,        Bernard Agarwal MD        CC: No Recipients  Bernard Agarwal MD  6/30/2018  4:32 PM  Sign at close encounter  HPI:  Follow-up visit for recurrent DVTs, high homocystine level, hypercoagulation, and strong family history of blood clots (5 members) and patient has been on Coumadin for life  Also she takes Folbic  She had IVC filter  No bleeding or blood clot on Coumadin  History of lymphedema of both legs  She is regular with her PT INR blood test and Coumadin instructions  Patient could be going for procedure and she has asked to have Lovenox on hand to bridge from Coumadin to Lovenox and back to Coumadin  Last time she had 150 mg dose of Lovenox once daily for the procedure and that had worked for her  She will call with the procedure date and type  for instructions  She will call with the name of pharmacy  because she gets medications cheaper by using Good Rx coupon          Current Outpatient Prescriptions:     albuterol (PROVENTIL HFA,VENTOLIN HFA) 90 mcg/act inhaler, Inhale 2 puffs every 6 (six) hours as needed for wheezing , Disp: , Rfl:     alendronate (FOSAMAX) 70 mg tablet, Take 70 mg by mouth every 7 days, Disp: , Rfl:     alendronate-cholecalciferol (FOSAMAX PLUS D)  MG-UNIT per tablet, Take by mouth, Disp: , Rfl:     amLODIPine-benazepril (LOTREL 5-20) 5-20 MG per capsule, Take 1 capsule by mouth daily, Disp: 90 capsule, Rfl: 3    cetirizine (ZYRTEC ALLERGY) 10 mg tablet, Take 1 tablet by mouth daily as needed, Disp: , Rfl:     cetirizine (ZyrTEC) 10 mg tablet, Take 10 mg by mouth daily  , Disp: , Rfl:     Cholecalciferol (VITAMIN D3) 9[address]18 units TABS, Take 1 tablet by mouth every 14 (fourteen) days, Disp: , Rfl:     clobetasol (TEMOVATE) 0 05 % ointment, Apply topically as directed two times daily, Disp: , Rfl:     enoxaparin (LOVENOX) 150 mg/mL injection, Inject under the skin, Disp: , Rfl:     ergocalciferol (VITAMIN D2) 50,000 units, Take by mouth, Disp: , Rfl:     fluticasone (FLONASE) 50 mcg/act nasal spray, into each nostril, Disp: , Rfl:     fluticasone-vilanterol (BREO ELLIPTA) 100-25 mcg/inh inhaler, Inhale daily, Disp: , Rfl:     folic acid-pyridoxine-cyanocobalamin (FOLBIC) 2 5-25-2 mg, Take 1 tablet by mouth daily, Disp: 90 tablet, Rfl: 3    furosemide (LASIX) 20 mg tablet, Take 20 mg by mouth daily  , Disp: , Rfl:     Iodoquinol-HC-Aloe Polysacch (ALCORTIN A) 1-2-1 % GEL, Apply topically, Disp: , Rfl:     lansoprazole (PREVACID SOLUTAB) 30 mg disintegrating tablet, Take by mouth, Disp: , Rfl:     loratadine (CLARITIN) 10 mg tablet, Take by mouth, Disp: , Rfl:     metFORMIN (GLUCOPHAGE-XR) 500 mg 24 hr tablet, Take 1 tablet (500 mg total) by mouth 4 (four) times a day, Disp: 360 tablet, Rfl: 3    mometasone (NASONEX) 50 mcg/act nasal spray, 2 sprays into each nostril daily, Disp: , Rfl:     montelukast (SINGULAIR) 10 mg tablet, Take 1 tablet (10 mg total) by mouth daily at bedtime, Disp: 90 tablet, Rfl: 3    multivitamin (THERAGRAN) TABS, Take 1 tablet by mouth daily  , Disp: , Rfl:     nystatin (MYCOSTATIN) powder, Apply topically 3 (three) times a day, Disp: 60 g, Rfl: 3    pravastatin (PRAVACHOL) 40 mg tablet, Take 1 tablet (40 mg total) by mouth daily, Disp: 90 tablet, Rfl: 3    predniSONE 10 mg tablet, Take 10 mg by mouth daily, Disp: , Rfl:     triamcinolone (KENALOG) 0 1 % cream, , Disp: , Rfl:     triamcinolone (KENALOG) 0 5 % cream, APPLY TOPICALLY TO RIGHT ANKLE EVERY 12 HOURS, Disp: , Rfl:     warfarin (COUMADIN) 5 mg tablet, Take by mouth daily, Disp: , Rfl:     triamcinolone (KENALOG) 0 1 % cream, Apply topically 2 (two) times a day for 7 days, Disp: 80 g, Rfl: 0    Allergies   Allergen Reactions    Azithromycin Diarrhea    Ciprofloxacin Confusion, Dizziness and Other (See Comments)     Other reaction(s): Other (Please comment)  Dizziness, weepy    Ancef [Cefazolin] Rash         ROS:  06/29/18 Reviewed 13 systems:  Presently no headaches, seizures, dizziness, diplopia, dysphagia, hoarseness, chest pain, palpitations, shortness of breath, cough, hemoptysis, abdominal pain, nausea, vomiting, change in bowel habits, melena, hematuria, fever, chills, bleeding, bone pains, skin rash, weight loss, arthritic symptoms, tiredness ,  weakness, numbness,  claudication and gait problem  No frequent infections  Not unusually sensitive to heat or cold  No swollen glands  Patient is anxious  Other symptoms are in HPI        /68 (BP Location: Right arm, Cuff Size: Large)   Pulse 85   Temp 97 8 °F (36 6 °C) (Tympanic)   Resp 18   Ht 5' 3" (1 6 m)   Wt (!) 171 kg (376 lb 6 4 oz)   SpO2 98%   BMI 66 68 kg/m²      Physical Exam:  Alert, oriented, not in distress, she is overweight ,no icterus, no oral thrush, no palpable neck mass, clear lung fields, regular heart rate, abdomen  soft and non tender, no palpable abdominal mass, no ascites, has lymphedema of both legs, no calf tenderness, no focal neurological deficit, no skin rash, no palpable lymphadenopathy, good arterial pulses, no clubbing  Patient is anxious  Performance status 2  IMAGING:      LABS:  Results for orders placed or performed during the hospital encounter of 03/30/18   Blood culture   Result Value Ref Range    Blood Culture No Growth After 5 Days      MRSA culture   Result Value Ref Range    MRSA Culture Only       No Methicillin Resistant Staphlyococcus aureus (MRSA) isolated   Urine culture   Result Value Ref Range    Urine Culture <10,000 cfu/ml CBC and differential   Result Value Ref Range    WBC 9 81 4 31 - 10 16 Thousand/uL    RBC 4 48 3 81 - 5 12 Million/uL    Hemoglobin 12 6 11 5 - 15 4 g/dL    Hematocrit 38 6 34 8 - 46 1 %    MCV 86 82 - 98 fL    MCH 28 1 26 8 - 34 3 pg    MCHC 32 6 31 4 - 37 4 g/dL    RDW 15 6 (H) 11 6 - 15 1 %    MPV 10 5 8 9 - 12 7 fL    Platelets 312 672 - 000 Thousands/uL    Neutrophils Relative 53 43 - 75 %    Lymphocytes Relative 26 14 - 44 %    Monocytes Relative 11 4 - 12 %    Eosinophils Relative 9 (H) 0 - 6 %    Basophils Relative 1 0 - 1 %    Neutrophils Absolute 5 24 1 85 - 7 62 Thousands/µL    Lymphocytes Absolute 2 54 0 60 - 4 47 Thousands/µL    Monocytes Absolute 1 11 0 17 - 1 22 Thousand/µL    Eosinophils Absolute 0 86 (H) 0 00 - 0 61 Thousand/µL    Basophils Absolute 0 06 0 00 - 0 10 Thousands/µL   B-type natriuretic peptide   Result Value Ref Range    NT-proBNP 237 (H) <125 pg/mL   Lactate Blood   Result Value Ref Range    LACTIC ACID 1 1 0 5 - 2 0 mmol/L   Comprehensive metabolic panel   Result Value Ref Range    Sodium 138 136 - 145 mmol/L    Potassium 4 3 3 5 - 5 3 mmol/L    Chloride 103 100 - 108 mmol/L    CO2 27 21 - 32 mmol/L    Anion Gap 8 4 - 13 mmol/L    BUN 10 5 - 25 mg/dL    Creatinine 0 90 0 60 - 1 30 mg/dL    Glucose 95 65 - 140 mg/dL    Calcium 8 9 8 3 - 10 1 mg/dL    AST 31 5 - 45 U/L    ALT 22 12 - 78 U/L    Alkaline Phosphatase 73 46 - 116 U/L    Total Protein 7 8 6 4 - 8 2 g/dL    Albumin 2 9 (L) 3 5 - 5 0 g/dL    Total Bilirubin 0 50 0 20 - 1 00 mg/dL    eGFR 69 ml/min/1 73sq m   Magnesium   Result Value Ref Range    Magnesium 2 0 1 6 - 2 6 mg/dL   Protime-INR   Result Value Ref Range    Protime 19 5 (H) 12 1 - 14 4 seconds    INR 1 65 (H) 0 86 - 1 16   APTT   Result Value Ref Range    PTT 39 (H) 23 - 35 seconds   Urinalysis with culture and sensitivity reflex   Result Value Ref Range    Color, UA Yellow     Clarity, UA Cloudy     Specific Simpson, UA 1 025 1 003 - 1 030    pH, UA 5 0 4 5 - 8 0 Leukocytes, UA Small (A) Negative    Nitrite, UA Negative Negative    Protein, UA Negative Negative mg/dl    Glucose, UA Negative Negative mg/dl    Ketones, UA Negative Negative mg/dl    Urobilinogen, UA 0 2 0 2, 1 0 E U /dl E U /dl    Bilirubin, UA Negative Negative    Blood, UA Negative Negative, Trace-Intact   Troponin I   Result Value Ref Range    Troponin I <0 02 <=0 04 ng/mL   pH, venous   Result Value Ref Range    pH, Mike 7 386 7 300 - 7 400   Urine Microscopic   Result Value Ref Range    RBC, UA None Seen None Seen, 0-5 /hpf    WBC, UA 4-10 (A) None Seen, 0-5, 5-55, 5-65 /hpf    Epithelial Cells Moderate (A) None Seen, Occasional /hpf    Bacteria, UA Occasional None Seen, Occasional /hpf    OTHER OBSERVATIONS Yeast Cells Present    Comprehensive metabolic panel   Result Value Ref Range    Sodium 139 136 - 145 mmol/L    Potassium 3 9 3 5 - 5 3 mmol/L    Chloride 105 100 - 108 mmol/L    CO2 25 21 - 32 mmol/L    Anion Gap 9 4 - 13 mmol/L    BUN 11 5 - 25 mg/dL    Creatinine 0 96 0 60 - 1 30 mg/dL    Glucose 107 65 - 140 mg/dL    Calcium 8 3 8 3 - 10 1 mg/dL    AST 34 5 - 45 U/L    ALT 20 12 - 78 U/L    Alkaline Phosphatase 60 46 - 116 U/L    Total Protein 6 9 6 4 - 8 2 g/dL    Albumin 2 5 (L) 3 5 - 5 0 g/dL    Total Bilirubin 0 60 0 20 - 1 00 mg/dL    eGFR 64 ml/min/1 73sq m   Magnesium   Result Value Ref Range    Magnesium 2 1 1 6 - 2 6 mg/dL   Phosphorus   Result Value Ref Range    Phosphorus 4 2 (H) 2 3 - 4 1 mg/dL   CBC (With Platelets)   Result Value Ref Range    WBC 8 81 4 31 - 10 16 Thousand/uL    RBC 4 03 3 81 - 5 12 Million/uL    Hemoglobin 11 5 11 5 - 15 4 g/dL    Hematocrit 35 4 34 8 - 46 1 %    MCV 88 82 - 98 fL    MCH 28 5 26 8 - 34 3 pg    MCHC 32 5 31 4 - 37 4 g/dL    RDW 15 9 (H) 11 6 - 15 1 %    Platelets 628 259 - 348 Thousands/uL    MPV 10 5 8 9 - 12 7 fL   CBC and differential   Result Value Ref Range    WBC 7 76 4 31 - 10 16 Thousand/uL    RBC 4 17 3 81 - 5 12 Million/uL    Hemoglobin 11 8 11 5 - 15 4 g/dL    Hematocrit 36 7 34 8 - 46 1 %    MCV 88 82 - 98 fL    MCH 28 3 26 8 - 34 3 pg    MCHC 32 2 31 4 - 37 4 g/dL    RDW 16 2 (H) 11 6 - 15 1 %    MPV 10 4 8 9 - 12 7 fL    Platelets 262 845 - 056 Thousands/uL    Neutrophils Relative 43 43 - 75 %    Lymphocytes Relative 36 14 - 44 %    Monocytes Relative 10 4 - 12 %    Eosinophils Relative 11 (H) 0 - 6 %    Basophils Relative 0 0 - 1 %    Neutrophils Absolute 3 27 1 85 - 7 62 Thousands/µL    Lymphocytes Absolute 2 81 0 60 - 4 47 Thousands/µL    Monocytes Absolute 0 78 0 17 - 1 22 Thousand/µL    Eosinophils Absolute 0 87 (H) 0 00 - 0 61 Thousand/µL    Basophils Absolute 0 03 0 00 - 0 10 Thousands/µL   CK   Result Value Ref Range    Total CK 60 26 - 192 U/L   Comprehensive metabolic panel   Result Value Ref Range    Sodium 140 136 - 145 mmol/L    Potassium 4 1 3 5 - 5 3 mmol/L    Chloride 106 100 - 108 mmol/L    CO2 28 21 - 32 mmol/L    Anion Gap 6 4 - 13 mmol/L    BUN 10 5 - 25 mg/dL    Creatinine 0 98 0 60 - 1 30 mg/dL    Glucose 94 65 - 140 mg/dL    Calcium 8 2 (L) 8 3 - 10 1 mg/dL    AST 19 5 - 45 U/L    ALT 18 12 - 78 U/L    Alkaline Phosphatase 53 46 - 116 U/L    Total Protein 6 4 6 4 - 8 2 g/dL    Albumin 2 3 (L) 3 5 - 5 0 g/dL    Total Bilirubin 0 40 0 20 - 1 00 mg/dL    eGFR 62 ml/min/1 73sq m   Magnesium   Result Value Ref Range    Magnesium 2 0 1 6 - 2 6 mg/dL   Phosphorus   Result Value Ref Range    Phosphorus 4 5 (H) 2 3 - 4 1 mg/dL   Troponin I   Result Value Ref Range    Troponin I <0 02 <=0 04 ng/mL   Protime-INR   Result Value Ref Range    Protime 22 1 (H) 12 1 - 14 4 seconds    INR 1 93 (H) 0 86 - 1 16   Prealbumin   Result Value Ref Range    Prealbumin 11 8 (L) 18 0 - 40 0 mg/dL   Vancomycin, trough   Result Value Ref Range    Vancomycin Tr 23 9 (HH) 10 0 - 20 0 ug/mL   Protime-INR   Result Value Ref Range    Protime 24 5 (H) 12 1 - 14 4 seconds    INR 2 20 (H) 0 86 - 1 16   CBC and differential   Result Value Ref Range    WBC 7  95 4 31 - 10 16 Thousand/uL    RBC 4 34 3 81 - 5 12 Million/uL    Hemoglobin 12 3 11 5 - 15 4 g/dL    Hematocrit 38 0 34 8 - 46 1 %    MCV 88 82 - 98 fL    MCH 28 3 26 8 - 34 3 pg    MCHC 32 4 31 4 - 37 4 g/dL    RDW 16 1 (H) 11 6 - 15 1 %    MPV 10 5 8 9 - 12 7 fL    Platelets 413 374 - 656 Thousands/uL    Neutrophils Relative 52 43 - 75 %    Lymphocytes Relative 27 14 - 44 %    Monocytes Relative 10 4 - 12 %    Eosinophils Relative 11 (H) 0 - 6 %    Basophils Relative 0 0 - 1 %    Neutrophils Absolute 4 09 1 85 - 7 62 Thousands/µL    Lymphocytes Absolute 2 15 0 60 - 4 47 Thousands/µL    Monocytes Absolute 0 83 0 17 - 1 22 Thousand/µL    Eosinophils Absolute 0 86 (H) 0 00 - 0 61 Thousand/µL    Basophils Absolute 0 02 0 00 - 0 10 Thousands/µL   Comprehensive metabolic panel   Result Value Ref Range    Sodium 140 136 - 145 mmol/L    Potassium 3 8 3 5 - 5 3 mmol/L    Chloride 106 100 - 108 mmol/L    CO2 27 21 - 32 mmol/L    Anion Gap 7 4 - 13 mmol/L    BUN 9 5 - 25 mg/dL    Creatinine 1 03 0 60 - 1 30 mg/dL    Glucose 92 65 - 140 mg/dL    Calcium 8 0 (L) 8 3 - 10 1 mg/dL    AST 18 5 - 45 U/L    ALT 15 12 - 78 U/L    Alkaline Phosphatase 51 46 - 116 U/L    Total Protein 6 3 (L) 6 4 - 8 2 g/dL    Albumin 2 2 (L) 3 5 - 5 0 g/dL    Total Bilirubin 0 40 0 20 - 1 00 mg/dL    eGFR 59 ml/min/1 73sq m   Protime-INR   Result Value Ref Range    Protime 25 9 (H) 12 1 - 14 4 seconds    INR 2 36 (H) 0 86 - 1 16   CBC and differential   Result Value Ref Range    WBC 8 99 4 31 - 10 16 Thousand/uL    RBC 4 42 3 81 - 5 12 Million/uL    Hemoglobin 12 5 11 5 - 15 4 g/dL    Hematocrit 38 5 34 8 - 46 1 %    MCV 87 82 - 98 fL    MCH 28 3 26 8 - 34 3 pg    MCHC 32 5 31 4 - 37 4 g/dL    RDW 16 1 (H) 11 6 - 15 1 %    MPV 10 6 8 9 - 12 7 fL    Platelets 204 804 - 734 Thousands/uL    Neutrophils Relative 53 43 - 75 %    Lymphocytes Relative 26 14 - 44 %    Monocytes Relative 9 4 - 12 %    Eosinophils Relative 12 (H) 0 - 6 %    Basophils Relative 0 0 - 1 %    Neutrophils Absolute 4 85 1 85 - 7 62 Thousands/µL    Lymphocytes Absolute 2 30 0 60 - 4 47 Thousands/µL    Monocytes Absolute 0 78 0 17 - 1 22 Thousand/µL    Eosinophils Absolute 1 04 (H) 0 00 - 0 61 Thousand/µL    Basophils Absolute 0 02 0 00 - 0 10 Thousands/µL   Basic metabolic panel   Result Value Ref Range    Sodium 140 136 - 145 mmol/L    Potassium 3 7 3 5 - 5 3 mmol/L    Chloride 106 100 - 108 mmol/L    CO2 26 21 - 32 mmol/L    Anion Gap 8 4 - 13 mmol/L    BUN 10 5 - 25 mg/dL    Creatinine 0 95 0 60 - 1 30 mg/dL    Glucose 87 65 - 140 mg/dL    Calcium 8 1 (L) 8 3 - 10 1 mg/dL    eGFR 65 ml/min/1 73sq m   CBC and differential   Result Value Ref Range    WBC 9 18 4 31 - 10 16 Thousand/uL    RBC 4 36 3 81 - 5 12 Million/uL    Hemoglobin 12 4 11 5 - 15 4 g/dL    Hematocrit 38 0 34 8 - 46 1 %    MCV 87 82 - 98 fL    MCH 28 4 26 8 - 34 3 pg    MCHC 32 6 31 4 - 37 4 g/dL    RDW 15 9 (H) 11 6 - 15 1 %    MPV 10 8 8 9 - 12 7 fL    Platelets 716 276 - 129 Thousands/uL    Neutrophils Relative 55 43 - 75 %    Lymphocytes Relative 22 14 - 44 %    Monocytes Relative 10 4 - 12 %    Eosinophils Relative 13 (H) 0 - 6 %    Basophils Relative 0 0 - 1 %    Neutrophils Absolute 5 09 1 85 - 7 62 Thousands/µL    Lymphocytes Absolute 2 00 0 60 - 4 47 Thousands/µL    Monocytes Absolute 0 89 0 17 - 1 22 Thousand/µL    Eosinophils Absolute 1 18 (H) 0 00 - 0 61 Thousand/µL    Basophils Absolute 0 02 0 00 - 0 10 Thousands/µL   Comprehensive metabolic panel   Result Value Ref Range    Sodium 142 136 - 145 mmol/L    Potassium 3 9 3 5 - 5 3 mmol/L    Chloride 107 100 - 108 mmol/L    CO2 28 21 - 32 mmol/L    Anion Gap 7 4 - 13 mmol/L    BUN 9 5 - 25 mg/dL    Creatinine 0 91 0 60 - 1 30 mg/dL    Glucose 90 65 - 140 mg/dL    Calcium 8 0 (L) 8 3 - 10 1 mg/dL    AST 19 5 - 45 U/L    ALT 14 12 - 78 U/L    Alkaline Phosphatase 52 46 - 116 U/L    Total Protein 6 4 6 4 - 8 2 g/dL    Albumin 2 2 (L) 3 5 - 5 0 g/dL    Total Bilirubin 0 50 0 20 - 1 00 mg/dL    eGFR 68 ml/min/1 73sq m   Phosphorus   Result Value Ref Range    Phosphorus 3 6 2 3 - 4 1 mg/dL   Protime-INR   Result Value Ref Range    Protime 25 4 (H) 12 1 - 14 4 seconds    INR 2 30 (H) 0 86 - 1 16   Protime-INR   Result Value Ref Range    Protime 24 0 (H) 12 1 - 14 4 seconds    INR 2 14 (H) 0 86 - 1 16   CBC and differential   Result Value Ref Range    WBC 8 19 4 31 - 10 16 Thousand/uL    RBC 4 13 3 81 - 5 12 Million/uL    Hemoglobin 11 6 11 5 - 15 4 g/dL    Hematocrit 36 5 34 8 - 46 1 %    MCV 88 82 - 98 fL    MCH 28 1 26 8 - 34 3 pg    MCHC 31 8 31 4 - 37 4 g/dL    RDW 15 9 (H) 11 6 - 15 1 %    MPV 11 1 8 9 - 12 7 fL    Platelets 561 423 - 897 Thousands/uL    Neutrophils Relative 51 43 - 75 %    Lymphocytes Relative 26 14 - 44 %    Monocytes Relative 10 4 - 12 %    Eosinophils Relative 13 (H) 0 - 6 %    Basophils Relative 0 0 - 1 %    Neutrophils Absolute 4 22 1 85 - 7 62 Thousands/µL    Lymphocytes Absolute 2 09 0 60 - 4 47 Thousands/µL    Monocytes Absolute 0 81 0 17 - 1 22 Thousand/µL    Eosinophils Absolute 1 05 (H) 0 00 - 0 61 Thousand/µL    Basophils Absolute 0 02 0 00 - 0 10 Thousands/µL   Basic metabolic panel   Result Value Ref Range    Sodium 141 136 - 145 mmol/L    Potassium 4 0 3 5 - 5 3 mmol/L    Chloride 107 100 - 108 mmol/L    CO2 27 21 - 32 mmol/L    Anion Gap 7 4 - 13 mmol/L    BUN 9 5 - 25 mg/dL    Creatinine 0 89 0 60 - 1 30 mg/dL    Glucose 84 65 - 140 mg/dL    Calcium 7 9 (L) 8 3 - 10 1 mg/dL    eGFR 70 ml/min/1 73sq m   Vancomycin, trough   Result Value Ref Range    Vancomycin Tr 19 0 10 0 - 20 0 ug/mL   Protime-INR   Result Value Ref Range    Protime 22 7 (H) 12 1 - 14 4 seconds    INR 2 00 (H) 0 86 - 1 16   CBC and differential   Result Value Ref Range    WBC 8 01 4 31 - 10 16 Thousand/uL    RBC 4 07 3 81 - 5 12 Million/uL    Hemoglobin 11 4 (L) 11 5 - 15 4 g/dL    Hematocrit 35 8 34 8 - 46 1 %    MCV 88 82 - 98 fL    MCH 28 0 26 8 - 34 3 pg    MCHC 31 8 31 4 - 37 4 g/dL    RDW 15 8 (H) 11 6 - 15 1 %    MPV 10 7 8 9 - 12 7 fL    Platelets 767 230 - 063 Thousands/uL    Neutrophils Relative 56 43 - 75 %    Lymphocytes Relative 21 14 - 44 %    Monocytes Relative 10 4 - 12 %    Eosinophils Relative 12 (H) 0 - 6 %    Basophils Relative 1 0 - 1 %    Neutrophils Absolute 4 56 1 85 - 7 62 Thousands/µL    Lymphocytes Absolute 1 65 0 60 - 4 47 Thousands/µL    Monocytes Absolute 0 78 0 17 - 1 22 Thousand/µL    Eosinophils Absolute 0 98 (H) 0 00 - 0 61 Thousand/µL    Basophils Absolute 0 04 0 00 - 0 10 Thousands/µL   Comprehensive metabolic panel   Result Value Ref Range    Sodium 141 136 - 145 mmol/L    Potassium 4 2 3 5 - 5 3 mmol/L    Chloride 108 100 - 108 mmol/L    CO2 27 21 - 32 mmol/L    Anion Gap 6 4 - 13 mmol/L    BUN 8 5 - 25 mg/dL    Creatinine 0 89 0 60 - 1 30 mg/dL    Glucose 93 65 - 140 mg/dL    Calcium 8 0 (L) 8 3 - 10 1 mg/dL    AST 21 5 - 45 U/L    ALT 14 12 - 78 U/L    Alkaline Phosphatase 50 46 - 116 U/L    Total Protein 6 4 6 4 - 8 2 g/dL    Albumin 2 3 (L) 3 5 - 5 0 g/dL    Total Bilirubin 0 40 0 20 - 1 00 mg/dL    eGFR 70 ml/min/1 73sq m   Type and screen   Result Value Ref Range    ABO Grouping O     Rh Factor Positive     Antibody Screen Negative     Specimen Expiration Date [id number]      Labs, Imaging, & Other studies:   All pertinent labs and imaging studies were personally reviewed    Lab Results   Component Value Date     04/06/2018    K 4 2 04/06/2018     04/06/2018    CO2 27 04/06/2018    ANIONGAP 6 04/06/2018    BUN 8 04/06/2018    CREATININE 0 89 04/06/2018    GLUCOSE 93 04/06/2018    CALCIUM 8 0 (L) 04/06/2018    AST 21 04/06/2018    ALT 14 04/06/2018    ALKPHOS 50 04/06/2018    PROT 6 4 04/06/2018    BILITOT 0 40 04/06/2018    EGFR 70 04/06/2018     Lab Results   Component Value Date    WBC 8 01 04/06/2018    HGB 11 4 (L) 04/06/2018    HCT 35 8 04/06/2018    MCV 88 04/06/2018     04/06/2018   No results found for: SEDRATE    Reviewed and discussed with patient  Assessment and plan: Follow-up visit for recurrent DVTs, high homocystine level, hypercoagulation, and strong family history of blood clots (5 members) and patient has been on Coumadin for life  Also she takes Folbic  She had IVC filter  No bleeding or blood clot on Coumadin  History of lymphedema of both legs  She is regular with her PT INR blood test and Coumadin instructions  Patient could be going for procedure and she has asked to have Lovenox on hand to bridge from Coumadin to Lovenox and back to Coumadin  Last time she had 150 mg dose of Lovenox once daily for the procedure and that had worked for her  She will call with the procedure date and type  for instructions  She will call with the name of pharmacy  because she gets medications cheaper by using Good Rx coupon     Physical examination and test results are as recorded and discussed  Anticoagulation will be for life  Also [employer]  Procedure instructions as above  Condition and plan discussed  Questions answered  Patient states she is up-to-date with her medical checkups, GYN checkups and mammography  We discussed drug interaction with Coumadin and interaction with foods  Also discussed self-breast examination, eating healthy foods, staying active and health screening tests  1  Chronic deep vein thrombosis (DVT) of left lower extremity, unspecified vein (HCC)    - CBC and differential; Future  - Comprehensive metabolic panel; Future    2  Homocysteinemia ([facility] [address] )      3  Hypercoagulable state [facility])          Patient voiced understanding and agreement in the discussion  Counseling / Coordination of Care   Greater than 50% of total time was spent with the patient and / or family counseling and / or coordination of care

## 2018-06-29 NOTE — PATIENT INSTRUCTIONS
Blood tests prior to next visit in 6 months    PT INR  every 2 weeks or as advised on regular basis as before and call to find out Coumadin dose every time

## 2018-06-29 NOTE — PROGRESS NOTES
HPI:  Follow-up visit for recurrent DVTs, high homocystine level, hypercoagulation, and strong family history of blood clots (5 members) and patient has been on Coumadin for life  Also she takes Folbic  She had IVC filter  No bleeding or blood clot on Coumadin  History of lymphedema of both legs  She is regular with her PT INR blood test and Coumadin instructions  Patient could be going for procedure and she has asked to have Lovenox on hand to bridge from Coumadin to Lovenox and back to Coumadin  Last time she had 150 mg dose of Lovenox once daily for the procedure and that had worked for her  She will call with the procedure date and type  for instructions  She will call with the name of pharmacy  because she gets medications cheaper by using Good Rx coupon  Current Outpatient Prescriptions:     albuterol (PROVENTIL HFA,VENTOLIN HFA) 90 mcg/act inhaler, Inhale 2 puffs every 6 (six) hours as needed for wheezing , Disp: , Rfl:     alendronate (FOSAMAX) 70 mg tablet, Take 70 mg by mouth every 7 days, Disp: , Rfl:     alendronate-cholecalciferol (FOSAMAX PLUS D)  MG-UNIT per tablet, Take by mouth, Disp: , Rfl:     amLODIPine-benazepril (LOTREL 5-20) 5-20 MG per capsule, Take 1 capsule by mouth daily, Disp: 90 capsule, Rfl: 3    cetirizine (ZYRTEC ALLERGY) 10 mg tablet, Take 1 tablet by mouth daily as needed, Disp: , Rfl:     cetirizine (ZyrTEC) 10 mg tablet, Take 10 mg by mouth daily  , Disp: , Rfl:     Cholecalciferol (VITAMIN D3) 09890 units TABS, Take 1 tablet by mouth every 14 (fourteen) days, Disp: , Rfl:     clobetasol (TEMOVATE) 0 05 % ointment, Apply topically as directed two times daily, Disp: , Rfl:     enoxaparin (LOVENOX) 150 mg/mL injection, Inject under the skin, Disp: , Rfl:     ergocalciferol (VITAMIN D2) 50,000 units, Take by mouth, Disp: , Rfl:     fluticasone (FLONASE) 50 mcg/act nasal spray, into each nostril, Disp: , Rfl:     fluticasone-vilanterol (BREO ELLIPTA) 100-25 mcg/inh inhaler, Inhale daily, Disp: , Rfl:     folic acid-pyridoxine-cyanocobalamin (FOLBIC) 2 5-25-2 mg, Take 1 tablet by mouth daily, Disp: 90 tablet, Rfl: 3    furosemide (LASIX) 20 mg tablet, Take 20 mg by mouth daily  , Disp: , Rfl:     Iodoquinol-HC-Aloe Polysacch (ALCORTIN A) 1-2-1 % GEL, Apply topically, Disp: , Rfl:     lansoprazole (PREVACID SOLUTAB) 30 mg disintegrating tablet, Take by mouth, Disp: , Rfl:     loratadine (CLARITIN) 10 mg tablet, Take by mouth, Disp: , Rfl:     metFORMIN (GLUCOPHAGE-XR) 500 mg 24 hr tablet, Take 1 tablet (500 mg total) by mouth 4 (four) times a day, Disp: 360 tablet, Rfl: 3    mometasone (NASONEX) 50 mcg/act nasal spray, 2 sprays into each nostril daily, Disp: , Rfl:     montelukast (SINGULAIR) 10 mg tablet, Take 1 tablet (10 mg total) by mouth daily at bedtime, Disp: 90 tablet, Rfl: 3    multivitamin (THERAGRAN) TABS, Take 1 tablet by mouth daily  , Disp: , Rfl:     nystatin (MYCOSTATIN) powder, Apply topically 3 (three) times a day, Disp: 60 g, Rfl: 3    pravastatin (PRAVACHOL) 40 mg tablet, Take 1 tablet (40 mg total) by mouth daily, Disp: 90 tablet, Rfl: 3    predniSONE 10 mg tablet, Take 10 mg by mouth daily, Disp: , Rfl:     triamcinolone (KENALOG) 0 1 % cream, , Disp: , Rfl:     triamcinolone (KENALOG) 0 5 % cream, APPLY TOPICALLY TO RIGHT ANKLE EVERY 12 HOURS, Disp: , Rfl:     warfarin (COUMADIN) 5 mg tablet, Take by mouth daily, Disp: , Rfl:     triamcinolone (KENALOG) 0 1 % cream, Apply topically 2 (two) times a day for 7 days, Disp: 80 g, Rfl: 0    Allergies   Allergen Reactions    Azithromycin Diarrhea    Ciprofloxacin Confusion, Dizziness and Other (See Comments)     Other reaction(s):  Other (Please comment)  Dizziness, weepy    Ancef [Cefazolin] Rash         ROS:  06/29/18 Reviewed 13 systems:  Presently no headaches, seizures, dizziness, diplopia, dysphagia, hoarseness, chest pain, palpitations, shortness of breath, cough, hemoptysis, abdominal pain, nausea, vomiting, change in bowel habits, melena, hematuria, fever, chills, bleeding, bone pains, skin rash, weight loss, arthritic symptoms, tiredness ,  weakness, numbness,  claudication and gait problem  No frequent infections  Not unusually sensitive to heat or cold  No swollen glands  Patient is anxious  Other symptoms are in HPI        /68 (BP Location: Right arm, Cuff Size: Large)   Pulse 85   Temp 97 8 °F (36 6 °C) (Tympanic)   Resp 18   Ht 5' 3" (1 6 m)   Wt (!) 171 kg (376 lb 6 4 oz)   SpO2 98%   BMI 66 68 kg/m²     Physical Exam:  Alert, oriented, not in distress, she is overweight ,no icterus, no oral thrush, no palpable neck mass, clear lung fields, regular heart rate, abdomen  soft and non tender, no palpable abdominal mass, no ascites, has lymphedema of both legs, no calf tenderness, no focal neurological deficit, no skin rash, no palpable lymphadenopathy, good arterial pulses, no clubbing  Patient is anxious  Performance status 2  IMAGING:      LABS:  Results for orders placed or performed during the hospital encounter of 03/30/18   Blood culture   Result Value Ref Range    Blood Culture No Growth After 5 Days      MRSA culture   Result Value Ref Range    MRSA Culture Only       No Methicillin Resistant Staphlyococcus aureus (MRSA) isolated   Urine culture   Result Value Ref Range    Urine Culture <10,000 cfu/ml     CBC and differential   Result Value Ref Range    WBC 9 81 4 31 - 10 16 Thousand/uL    RBC 4 48 3 81 - 5 12 Million/uL    Hemoglobin 12 6 11 5 - 15 4 g/dL    Hematocrit 38 6 34 8 - 46 1 %    MCV 86 82 - 98 fL    MCH 28 1 26 8 - 34 3 pg    MCHC 32 6 31 4 - 37 4 g/dL    RDW 15 6 (H) 11 6 - 15 1 %    MPV 10 5 8 9 - 12 7 fL    Platelets 011 127 - 466 Thousands/uL    Neutrophils Relative 53 43 - 75 %    Lymphocytes Relative 26 14 - 44 %    Monocytes Relative 11 4 - 12 %    Eosinophils Relative 9 (H) 0 - 6 %    Basophils Relative 1 0 - 1 % Neutrophils Absolute 5 24 1 85 - 7 62 Thousands/µL    Lymphocytes Absolute 2 54 0 60 - 4 47 Thousands/µL    Monocytes Absolute 1 11 0 17 - 1 22 Thousand/µL    Eosinophils Absolute 0 86 (H) 0 00 - 0 61 Thousand/µL    Basophils Absolute 0 06 0 00 - 0 10 Thousands/µL   B-type natriuretic peptide   Result Value Ref Range    NT-proBNP 237 (H) <125 pg/mL   Lactate Blood   Result Value Ref Range    LACTIC ACID 1 1 0 5 - 2 0 mmol/L   Comprehensive metabolic panel   Result Value Ref Range    Sodium 138 136 - 145 mmol/L    Potassium 4 3 3 5 - 5 3 mmol/L    Chloride 103 100 - 108 mmol/L    CO2 27 21 - 32 mmol/L    Anion Gap 8 4 - 13 mmol/L    BUN 10 5 - 25 mg/dL    Creatinine 0 90 0 60 - 1 30 mg/dL    Glucose 95 65 - 140 mg/dL    Calcium 8 9 8 3 - 10 1 mg/dL    AST 31 5 - 45 U/L    ALT 22 12 - 78 U/L    Alkaline Phosphatase 73 46 - 116 U/L    Total Protein 7 8 6 4 - 8 2 g/dL    Albumin 2 9 (L) 3 5 - 5 0 g/dL    Total Bilirubin 0 50 0 20 - 1 00 mg/dL    eGFR 69 ml/min/1 73sq m   Magnesium   Result Value Ref Range    Magnesium 2 0 1 6 - 2 6 mg/dL   Protime-INR   Result Value Ref Range    Protime 19 5 (H) 12 1 - 14 4 seconds    INR 1 65 (H) 0 86 - 1 16   APTT   Result Value Ref Range    PTT 39 (H) 23 - 35 seconds   Urinalysis with culture and sensitivity reflex   Result Value Ref Range    Color, UA Yellow     Clarity, UA Cloudy     Specific [place], UA 1 025 1 003 - 1 030    pH, UA 5 0 4 5 - 8 0    Leukocytes, UA Small (A) Negative    Nitrite, UA Negative Negative    Protein, UA Negative Negative mg/dl    Glucose, UA Negative Negative mg/dl    Ketones, UA Negative Negative mg/dl    Urobilinogen, UA 0 2 0 2, 1 0 E U /dl E U /dl    Bilirubin, UA Negative Negative    Blood, UA Negative Negative, Trace-Intact   Troponin I   Result Value Ref Range    Troponin I <0 02 <=0 04 ng/mL   pH, venous   Result Value Ref Range    pH, Mike 7 386 7 300 - 7 400   Urine Microscopic   Result Value Ref Range    RBC, UA None Seen None Seen, 0-5 /hpf WBC, UA 4-10 (A) None Seen, 0-5, 5-55, 5-65 /hpf    Epithelial Cells Moderate (A) None Seen, Occasional /hpf    Bacteria, UA Occasional None Seen, Occasional /hpf    OTHER OBSERVATIONS Yeast Cells Present    Comprehensive metabolic panel   Result Value Ref Range    Sodium 139 136 - 145 mmol/L    Potassium 3 9 3 5 - 5 3 mmol/L    Chloride 105 100 - 108 mmol/L    CO2 25 21 - 32 mmol/L    Anion Gap 9 4 - 13 mmol/L    BUN 11 5 - 25 mg/dL    Creatinine 0 96 0 60 - 1 30 mg/dL    Glucose 107 65 - 140 mg/dL    Calcium 8 3 8 3 - 10 1 mg/dL    AST 34 5 - 45 U/L    ALT 20 12 - 78 U/L    Alkaline Phosphatase 60 46 - 116 U/L    Total Protein 6 9 6 4 - 8 2 g/dL    Albumin 2 5 (L) 3 5 - 5 0 g/dL    Total Bilirubin 0 60 0 20 - 1 00 mg/dL    eGFR 64 ml/min/1 73sq m   Magnesium   Result Value Ref Range    Magnesium 2 1 1 6 - 2 6 mg/dL   Phosphorus   Result Value Ref Range    Phosphorus 4 2 (H) 2 3 - 4 1 mg/dL   CBC (With Platelets)   Result Value Ref Range    WBC 8 81 4 31 - 10 16 Thousand/uL    RBC 4 03 3 81 - 5 12 Million/uL    Hemoglobin 11 5 11 5 - 15 4 g/dL    Hematocrit 35 4 34 8 - 46 1 %    MCV 88 82 - 98 fL    MCH 28 5 26 8 - 34 3 pg    MCHC 32 5 31 4 - 37 4 g/dL    RDW 15 9 (H) 11 6 - 15 1 %    Platelets 981 328 - 237 Thousands/uL    MPV 10 5 8 9 - 12 7 fL   CBC and differential   Result Value Ref Range    WBC 7 76 4 31 - 10 16 Thousand/uL    RBC 4 17 3 81 - 5 12 Million/uL    Hemoglobin 11 8 11 5 - 15 4 g/dL    Hematocrit 36 7 34 8 - 46 1 %    MCV 88 82 - 98 fL    MCH 28 3 26 8 - 34 3 pg    MCHC 32 2 31 4 - 37 4 g/dL    RDW 16 2 (H) 11 6 - 15 1 %    MPV 10 4 8 9 - 12 7 fL    Platelets 702 479 - 165 Thousands/uL    Neutrophils Relative 43 43 - 75 %    Lymphocytes Relative 36 14 - 44 %    Monocytes Relative 10 4 - 12 %    Eosinophils Relative 11 (H) 0 - 6 %    Basophils Relative 0 0 - 1 %    Neutrophils Absolute 3 27 1 85 - 7 62 Thousands/µL    Lymphocytes Absolute 2 81 0 60 - 4 47 Thousands/µL    Monocytes Absolute 0 78 0 17 - 1 22 Thousand/µL    Eosinophils Absolute 0 87 (H) 0 00 - 0 61 Thousand/µL    Basophils Absolute 0 03 0 00 - 0 10 Thousands/µL   CK   Result Value Ref Range    Total CK 60 26 - 192 U/L   Comprehensive metabolic panel   Result Value Ref Range    Sodium 140 136 - 145 mmol/L    Potassium 4 1 3 5 - 5 3 mmol/L    Chloride 106 100 - 108 mmol/L    CO2 28 21 - 32 mmol/L    Anion Gap 6 4 - 13 mmol/L    BUN 10 5 - 25 mg/dL    Creatinine 0 98 0 60 - 1 30 mg/dL    Glucose 94 65 - 140 mg/dL    Calcium 8 2 (L) 8 3 - 10 1 mg/dL    AST 19 5 - 45 U/L    ALT 18 12 - 78 U/L    Alkaline Phosphatase 53 46 - 116 U/L    Total Protein 6 4 6 4 - 8 2 g/dL    Albumin 2 3 (L) 3 5 - 5 0 g/dL    Total Bilirubin 0 40 0 20 - 1 00 mg/dL    eGFR 62 ml/min/1 73sq m   Magnesium   Result Value Ref Range    Magnesium 2 0 1 6 - 2 6 mg/dL   Phosphorus   Result Value Ref Range    Phosphorus 4 5 (H) 2 3 - 4 1 mg/dL   Troponin I   Result Value Ref Range    Troponin I <0 02 <=0 04 ng/mL   Protime-INR   Result Value Ref Range    Protime 22 1 (H) 12 1 - 14 4 seconds    INR 1 93 (H) 0 86 - 1 16   Prealbumin   Result Value Ref Range    Prealbumin 11 8 (L) 18 0 - 40 0 mg/dL   Vancomycin, trough   Result Value Ref Range    Vancomycin Tr 23 9 (HH) 10 0 - 20 0 ug/mL   Protime-INR   Result Value Ref Range    Protime 24 5 (H) 12 1 - 14 4 seconds    INR 2 20 (H) 0 86 - 1 16   CBC and differential   Result Value Ref Range    WBC 7 95 4 31 - 10 16 Thousand/uL    RBC 4 34 3 81 - 5 12 Million/uL    Hemoglobin 12 3 11 5 - 15 4 g/dL    Hematocrit 38 0 34 8 - 46 1 %    MCV 88 82 - 98 fL    MCH 28 3 26 8 - 34 3 pg    MCHC 32 4 31 4 - 37 4 g/dL    RDW 16 1 (H) 11 6 - 15 1 %    MPV 10 5 8 9 - 12 7 fL    Platelets 985 660 - 499 Thousands/uL    Neutrophils Relative 52 43 - 75 %    Lymphocytes Relative 27 14 - 44 %    Monocytes Relative 10 4 - 12 %    Eosinophils Relative 11 (H) 0 - 6 %    Basophils Relative 0 0 - 1 %    Neutrophils Absolute 4 09 1 85 - 7 62 Thousands/µL Lymphocytes Absolute 2 15 0 60 - 4 47 Thousands/µL    Monocytes Absolute 0 83 0 17 - 1 22 Thousand/µL    Eosinophils Absolute 0 86 (H) 0 00 - 0 61 Thousand/µL    Basophils Absolute 0 02 0 00 - 0 10 Thousands/µL   Comprehensive metabolic panel   Result Value Ref Range    Sodium 140 136 - 145 mmol/L    Potassium 3 8 3 5 - 5 3 mmol/L    Chloride 106 100 - 108 mmol/L    CO2 27 21 - 32 mmol/L    Anion Gap 7 4 - 13 mmol/L    BUN 9 5 - 25 mg/dL    Creatinine 1 03 0 60 - 1 30 mg/dL    Glucose 92 65 - 140 mg/dL    Calcium 8 0 (L) 8 3 - 10 1 mg/dL    AST 18 5 - 45 U/L    ALT 15 12 - 78 U/L    Alkaline Phosphatase 51 46 - 116 U/L    Total Protein 6 3 (L) 6 4 - 8 2 g/dL    Albumin 2 2 (L) 3 5 - 5 0 g/dL    Total Bilirubin 0 40 0 20 - 1 00 mg/dL    eGFR 59 ml/min/1 73sq m   Protime-INR   Result Value Ref Range    Protime 25 9 (H) 12 1 - 14 4 seconds    INR 2 36 (H) 0 86 - 1 16   CBC and differential   Result Value Ref Range    WBC 8 99 4 31 - 10 16 Thousand/uL    RBC 4 42 3 81 - 5 12 Million/uL    Hemoglobin 12 5 11 5 - 15 4 g/dL    Hematocrit 38 5 34 8 - 46 1 %    MCV 87 82 - 98 fL    MCH 28 3 26 8 - 34 3 pg    MCHC 32 5 31 4 - 37 4 g/dL    RDW 16 1 (H) 11 6 - 15 1 %    MPV 10 6 8 9 - 12 7 fL    Platelets 893 991 - 766 Thousands/uL    Neutrophils Relative 53 43 - 75 %    Lymphocytes Relative 26 14 - 44 %    Monocytes Relative 9 4 - 12 %    Eosinophils Relative 12 (H) 0 - 6 %    Basophils Relative 0 0 - 1 %    Neutrophils Absolute 4 85 1 85 - 7 62 Thousands/µL    Lymphocytes Absolute 2 30 0 60 - 4 47 Thousands/µL    Monocytes Absolute 0 78 0 17 - 1 22 Thousand/µL    Eosinophils Absolute 1 04 (H) 0 00 - 0 61 Thousand/µL    Basophils Absolute 0 02 0 00 - 0 10 Thousands/µL   Basic metabolic panel   Result Value Ref Range    Sodium 140 136 - 145 mmol/L    Potassium 3 7 3 5 - 5 3 mmol/L    Chloride 106 100 - 108 mmol/L    CO2 26 21 - 32 mmol/L    Anion Gap 8 4 - 13 mmol/L    BUN 10 5 - 25 mg/dL    Creatinine 0 95 0 60 - 1 30 mg/dL Glucose 87 65 - 140 mg/dL    Calcium 8 1 (L) 8 3 - 10 1 mg/dL    eGFR 65 ml/min/1 73sq m   CBC and differential   Result Value Ref Range    WBC 9 18 4 31 - 10 16 Thousand/uL    RBC 4 36 3 81 - 5 12 Million/uL    Hemoglobin 12 4 11 5 - 15 4 g/dL    Hematocrit 38 0 34 8 - 46 1 %    MCV 87 82 - 98 fL    MCH 28 4 26 8 - 34 3 pg    MCHC 32 6 31 4 - 37 4 g/dL    RDW 15 9 (H) 11 6 - 15 1 %    MPV 10 8 8 9 - 12 7 fL    Platelets 154 872 - 916 Thousands/uL    Neutrophils Relative 55 43 - 75 %    Lymphocytes Relative 22 14 - 44 %    Monocytes Relative 10 4 - 12 %    Eosinophils Relative 13 (H) 0 - 6 %    Basophils Relative 0 0 - 1 %    Neutrophils Absolute 5 09 1 85 - 7 62 Thousands/µL    Lymphocytes Absolute 2 00 0 60 - 4 47 Thousands/µL    Monocytes Absolute 0 89 0 17 - 1 22 Thousand/µL    Eosinophils Absolute 1 18 (H) 0 00 - 0 61 Thousand/µL    Basophils Absolute 0 02 0 00 - 0 10 Thousands/µL   Comprehensive metabolic panel   Result Value Ref Range    Sodium 142 136 - 145 mmol/L    Potassium 3 9 3 5 - 5 3 mmol/L    Chloride 107 100 - 108 mmol/L    CO2 28 21 - 32 mmol/L    Anion Gap 7 4 - 13 mmol/L    BUN 9 5 - 25 mg/dL    Creatinine 0 91 0 60 - 1 30 mg/dL    Glucose 90 65 - 140 mg/dL    Calcium 8 0 (L) 8 3 - 10 1 mg/dL    AST 19 5 - 45 U/L    ALT 14 12 - 78 U/L    Alkaline Phosphatase 52 46 - 116 U/L    Total Protein 6 4 6 4 - 8 2 g/dL    Albumin 2 2 (L) 3 5 - 5 0 g/dL    Total Bilirubin 0 50 0 20 - 1 00 mg/dL    eGFR 68 ml/min/1 73sq m   Phosphorus   Result Value Ref Range    Phosphorus 3 6 2 3 - 4 1 mg/dL   Protime-INR   Result Value Ref Range    Protime 25 4 (H) 12 1 - 14 4 seconds    INR 2 30 (H) 0 86 - 1 16   Protime-INR   Result Value Ref Range    Protime 24 0 (H) 12 1 - 14 4 seconds    INR 2 14 (H) 0 86 - 1 16   CBC and differential   Result Value Ref Range    WBC 8 19 4 31 - 10 16 Thousand/uL    RBC 4 13 3 81 - 5 12 Million/uL    Hemoglobin 11 6 11 5 - 15 4 g/dL    Hematocrit 36 5 34 8 - 46 1 %    MCV 88 82 - 98 fL MCH 28 1 26 8 - 34 3 pg    MCHC 31 8 31 4 - 37 4 g/dL    RDW 15 9 (H) 11 6 - 15 1 %    MPV 11 1 8 9 - 12 7 fL    Platelets 553 665 - 082 Thousands/uL    Neutrophils Relative 51 43 - 75 %    Lymphocytes Relative 26 14 - 44 %    Monocytes Relative 10 4 - 12 %    Eosinophils Relative 13 (H) 0 - 6 %    Basophils Relative 0 0 - 1 %    Neutrophils Absolute 4 22 1 85 - 7 62 Thousands/µL    Lymphocytes Absolute 2 09 0 60 - 4 47 Thousands/µL    Monocytes Absolute 0 81 0 17 - 1 22 Thousand/µL    Eosinophils Absolute 1 05 (H) 0 00 - 0 61 Thousand/µL    Basophils Absolute 0 02 0 00 - 0 10 Thousands/µL   Basic metabolic panel   Result Value Ref Range    Sodium 141 136 - 145 mmol/L    Potassium 4 0 3 5 - 5 3 mmol/L    Chloride 107 100 - 108 mmol/L    CO2 27 21 - 32 mmol/L    Anion Gap 7 4 - 13 mmol/L    BUN 9 5 - 25 mg/dL    Creatinine 0 89 0 60 - 1 30 mg/dL    Glucose 84 65 - 140 mg/dL    Calcium 7 9 (L) 8 3 - 10 1 mg/dL    eGFR 70 ml/min/1 73sq m   Vancomycin, trough   Result Value Ref Range    Vancomycin Tr 19 0 10 0 - 20 0 ug/mL   Protime-INR   Result Value Ref Range    Protime 22 7 (H) 12 1 - 14 4 seconds    INR 2 00 (H) 0 86 - 1 16   CBC and differential   Result Value Ref Range    WBC 8 01 4 31 - 10 16 Thousand/uL    RBC 4 07 3 81 - 5 12 Million/uL    Hemoglobin 11 4 (L) 11 5 - 15 4 g/dL    Hematocrit 35 8 34 8 - 46 1 %    MCV 88 82 - 98 fL    MCH 28 0 26 8 - 34 3 pg    MCHC 31 8 31 4 - 37 4 g/dL    RDW 15 8 (H) 11 6 - 15 1 %    MPV 10 7 8 9 - 12 7 fL    Platelets 762 180 - 293 Thousands/uL    Neutrophils Relative 56 43 - 75 %    Lymphocytes Relative 21 14 - 44 %    Monocytes Relative 10 4 - 12 %    Eosinophils Relative 12 (H) 0 - 6 %    Basophils Relative 1 0 - 1 %    Neutrophils Absolute 4 56 1 85 - 7 62 Thousands/µL    Lymphocytes Absolute 1 65 0 60 - 4 47 Thousands/µL    Monocytes Absolute 0 78 0 17 - 1 22 Thousand/µL    Eosinophils Absolute 0 98 (H) 0 00 - 0 61 Thousand/µL    Basophils Absolute 0 04 0 00 - 0 10 Thousands/µL   Comprehensive metabolic panel   Result Value Ref Range    Sodium 141 136 - 145 mmol/L    Potassium 4 2 3 5 - 5 3 mmol/L    Chloride 108 100 - 108 mmol/L    CO2 27 21 - 32 mmol/L    Anion Gap 6 4 - 13 mmol/L    BUN 8 5 - 25 mg/dL    Creatinine 0 89 0 60 - 1 30 mg/dL    Glucose 93 65 - 140 mg/dL    Calcium 8 0 (L) 8 3 - 10 1 mg/dL    AST 21 5 - 45 U/L    ALT 14 12 - 78 U/L    Alkaline Phosphatase 50 46 - 116 U/L    Total Protein 6 4 6 4 - 8 2 g/dL    Albumin 2 3 (L) 3 5 - 5 0 g/dL    Total Bilirubin 0 40 0 20 - 1 00 mg/dL    eGFR 70 ml/min/1 73sq m   Type and screen   Result Value Ref Range    ABO Grouping O     Rh Factor Positive     Antibody Screen Negative     Specimen Expiration Date [id number]      Labs, Imaging, & Other studies:   All pertinent labs and imaging studies were personally reviewed    Lab Results   Component Value Date     04/06/2018    K 4 2 04/06/2018     04/06/2018    CO2 27 04/06/2018    ANIONGAP 6 04/06/2018    BUN 8 04/06/2018    CREATININE 0 89 04/06/2018    GLUCOSE 93 04/06/2018    CALCIUM 8 0 (L) 04/06/2018    AST 21 04/06/2018    ALT 14 04/06/2018    ALKPHOS 50 04/06/2018    PROT 6 4 04/06/2018    BILITOT 0 40 04/06/2018    EGFR 70 04/06/2018     Lab Results   Component Value Date    WBC 8 01 04/06/2018    HGB 11 4 (L) 04/06/2018    HCT 35 8 04/06/2018    MCV 88 04/06/2018     04/06/2018   No results found for: SEDRATE    Reviewed and discussed with patient  Assessment and plan: Follow-up visit for recurrent DVTs, high homocystine level, hypercoagulation, and strong family history of blood clots (5 members) and patient has been on Coumadin for life  Also she takes Folbic  She had IVC filter  No bleeding or blood clot on Coumadin  History of lymphedema of both legs  She is regular with her PT INR blood test and Coumadin instructions       Patient could be going for procedure and she has asked to have Lovenox on hand to bridge from Coumadin to Lovenox and back to Coumadin  Last time she had 150 mg dose of Lovenox once daily for the procedure and that had worked for her  She will call with the procedure date and type  for instructions  She will call with the name of pharmacy  because she gets medications cheaper by using Good Rx coupon     Physical examination and test results are as recorded and discussed  Anticoagulation will be for life  Also [employer]  Procedure instructions as above  Condition and plan discussed  Questions answered  Patient states she is up-to-date with her medical checkups, GYN checkups and mammography  We discussed drug interaction with Coumadin and interaction with foods  Also discussed self-breast examination, eating healthy foods, staying active and health screening tests  1  Chronic deep vein thrombosis (DVT) of left lower extremity, unspecified vein (HCC)    - CBC and differential; Future  - Comprehensive metabolic panel; Future    2  Homocysteinemia ([facility] [address] )      3  Hypercoagulable state [facility])          Patient voiced understanding and agreement in the discussion  Counseling / Coordination of Care   Greater than 50% of total time was spent with the patient and / or family counseling and / or coordination of care

## 2018-07-10 ENCOUNTER — TELEPHONE (OUTPATIENT)
Dept: HEMATOLOGY ONCOLOGY | Facility: CLINIC | Age: 63
End: 2018-07-10

## 2018-07-10 NOTE — TELEPHONE ENCOUNTER
Calling for lab results  Done yesterday, but do not show up in Care Everywhere  Done at [facility]

## 2018-07-19 ENCOUNTER — OFFICE VISIT (OUTPATIENT)
Dept: FAMILY MEDICINE CLINIC | Facility: CLINIC | Age: 63
End: 2018-07-19
Payer: COMMERCIAL

## 2018-07-19 ENCOUNTER — TELEPHONE (OUTPATIENT)
Dept: FAMILY MEDICINE CLINIC | Facility: CLINIC | Age: 63
End: 2018-07-19

## 2018-07-19 VITALS
WEIGHT: 293 LBS | RESPIRATION RATE: 15 BRPM | OXYGEN SATURATION: 95 % | HEIGHT: 63 IN | BODY MASS INDEX: 51.91 KG/M2 | HEART RATE: 80 BPM | SYSTOLIC BLOOD PRESSURE: 138 MMHG | DIASTOLIC BLOOD PRESSURE: 82 MMHG

## 2018-07-19 DIAGNOSIS — E66.01 MORBID OBESITY WITH BMI OF 60.0-69.9, ADULT (HCC): ICD-10-CM

## 2018-07-19 DIAGNOSIS — L03.115 CELLULITIS OF RIGHT LOWER EXTREMITY: ICD-10-CM

## 2018-07-19 DIAGNOSIS — I82.502 CHRONIC DEEP VEIN THROMBOSIS (DVT) OF LEFT LOWER EXTREMITY, UNSPECIFIED VEIN (HCC): Primary | ICD-10-CM

## 2018-07-19 PROCEDURE — 99213 OFFICE O/P EST LOW 20 MIN: CPT | Performed by: FAMILY MEDICINE

## 2018-07-19 PROCEDURE — 3008F BODY MASS INDEX DOCD: CPT | Performed by: FAMILY MEDICINE

## 2018-07-19 RX ORDER — SULFAMETHOXAZOLE AND TRIMETHOPRIM 800; 160 MG/1; MG/1
1 TABLET ORAL EVERY 12 HOURS SCHEDULED
Qty: 14 TABLET | Refills: 0 | Status: SHIPPED | OUTPATIENT
Start: 2018-07-19 | End: 2018-07-26 | Stop reason: SDUPTHER

## 2018-07-19 NOTE — PROGRESS NOTES
Assessment/Plan:    No problem-specific Assessment & Plan notes found for this encounter  Diagnoses and all orders for this visit:    Chronic deep vein thrombosis (DVT) of left lower extremity, unspecified vein (HCC)    Morbid obesity with BMI of 60 0-69 9, adult (HCC)    Cellulitis of right lower extremity  -     sulfamethoxazole-trimethoprim (BACTRIM DS) 800-160 mg per tablet; Take 1 tablet by mouth every 12 (twelve) hours for 7 days          Subjective:      Patient ID: Marcellus Oh is a 65 y o  female  She has increasing redness and warmth of the right lower ext  She has h/o cellulitis  She has a hypercoagulable state and is on warfarin therapy (life-long)  She has no F/C  She has no swelling or pain  She has no trauma  The following portions of the patient's history were reviewed and updated as appropriate:   She  has a past medical history of Blood clotting disorder ([facility] ); Homocysteinemia ([facility] ); Hypercoagulable state ([facility] ); Hypertension; Lyme disease; Obesity; PCOS (polycystic ovarian syndrome); Renal disorder; Sleep apnea; and 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 ([facility] )    She   Patient Active Problem List    Diagnosis Date Noted    Heme positive stool 06/21/2018    Encounter for hepatitis C screening test for low risk patient 04/17/2018    Pain of right lower extremity 03/30/2018    Cellulitis of right lower extremity 03/30/2018    Renal disorder     PCOS (polycystic ovarian syndrome)     Obesity     Lyme disease     Blood clotting disorder ([facility] )     Essential hypertension     Homocysteinemia ([facility] )     Hypercoagulable state ([facility] )     Obstructive sleep apnea on CPAP     DVT (deep venous thrombosis) ([facility] ) 08/29/2016    History of DVT (deep vein thrombosis) 07/01/2016    Morbid obesity with BMI of 60 0-69 9, adult ([facility] ) 07/01/2016    Homocystinemia ([facility] ) 03/09/2015    Skin rash 05/22/2014    Chronic venous hypertension w ulceration ([facility] ) 12/18/2013    Edema 11/20/2013    Esophageal reflux 11/20/2013    Hyperlipidemia 11/20/2013    Hypertension 11/20/2013    Polycystic ovarian syndrome 11/20/2013     She  has a past surgical history that includes Joint replacement (Bilateral); Appendectomy; Ankle surgery (Right); Hysterectomy; Hemorroidectomy; Breast biopsy; Foot surgery; LITHOTRIPSY; Breast biopsy; Closed reduction metatarsal fracture (Right); and Vena cava filter placement  Her family history includes Hypercoagulant Ability in her maternal grandmother and mother; No Known Problems in her father; Pulmonary embolism in her mother  She  reports that she has never smoked  She has never used smokeless tobacco  She reports that she does not drink alcohol or use drugs  Current Outpatient Prescriptions   Medication Sig Dispense Refill    albuterol (PROVENTIL HFA,VENTOLIN HFA) 90 mcg/act inhaler Inhale 2 puffs every 6 (six) hours as needed for wheezing   alendronate (FOSAMAX) 70 mg tablet Take 70 mg by mouth every 7 days      amLODIPine-benazepril (LOTREL 5-20) 5-20 MG per capsule Take 1 capsule by mouth daily 90 capsule 3    cetirizine (ZYRTEC ALLERGY) 10 mg tablet Take 1 tablet by mouth daily as needed      cetirizine (ZyrTEC) 10 mg tablet Take 10 mg by mouth daily   Cholecalciferol (VITAMIN D3) 60221 units TABS Take 1 tablet by mouth every 14 (fourteen) days      clobetasol (TEMOVATE) 0 05 % ointment Apply topically as directed two times daily      enoxaparin (LOVENOX) 150 mg/mL injection Inject under the skin      ergocalciferol (VITAMIN D2) 50,000 units Take by mouth      fluticasone (FLONASE) 50 mcg/act nasal spray into each nostril      fluticasone-vilanterol (BREO ELLIPTA) 100-25 mcg/inh inhaler Inhale daily      folic acid-pyridoxine-cyanocobalamin (FOLBIC) 2 5-25-2 mg Take 1 tablet by mouth daily 90 tablet 3    furosemide (LASIX) 20 mg tablet Take 20 mg by mouth daily        Iodoquinol-HC-Aloe Polysacch (ALCORTIN A) 1-2-1 % GEL Apply topically      metFORMIN (GLUCOPHAGE-XR) 500 mg 24 hr tablet Take 1 tablet (500 mg total) by mouth 4 (four) times a day 360 tablet 3    mometasone (NASONEX) 50 mcg/act nasal spray 2 sprays into each nostril daily      montelukast (SINGULAIR) 10 mg tablet Take 1 tablet (10 mg total) by mouth daily at bedtime 90 tablet 3    multivitamin (THERAGRAN) TABS Take 1 tablet by mouth daily   nystatin (MYCOSTATIN) powder Apply topically 3 (three) times a day 60 g 3    pravastatin (PRAVACHOL) 40 mg tablet Take 1 tablet (40 mg total) by mouth daily 90 tablet 3    triamcinolone (KENALOG) 0 1 % cream       triamcinolone (KENALOG) 0 5 % cream APPLY TOPICALLY TO RIGHT ANKLE EVERY 12 HOURS      warfarin (COUMADIN) 5 mg tablet Take by mouth daily      alendronate-cholecalciferol (FOSAMAX PLUS D)  MG-UNIT per tablet Take by mouth      lansoprazole (PREVACID SOLUTAB) 30 mg disintegrating tablet Take by mouth      loratadine (CLARITIN) 10 mg tablet Take by mouth      predniSONE 10 mg tablet Take 10 mg by mouth daily      sulfamethoxazole-trimethoprim (BACTRIM DS) 800-160 mg per tablet Take 1 tablet by mouth every 12 (twelve) hours for 7 days 14 tablet 0    triamcinolone (KENALOG) 0 1 % cream Apply topically 2 (two) times a day for 7 days 80 g 0     No current facility-administered medications for this visit  Current Outpatient Prescriptions on File Prior to Visit   Medication Sig    albuterol (PROVENTIL HFA,VENTOLIN HFA) 90 mcg/act inhaler Inhale 2 puffs every 6 (six) hours as needed for wheezing   alendronate (FOSAMAX) 70 mg tablet Take 70 mg by mouth every 7 days    amLODIPine-benazepril (LOTREL 5-20) 5-20 MG per capsule Take 1 capsule by mouth daily    cetirizine (ZYRTEC ALLERGY) 10 mg tablet Take 1 tablet by mouth daily as needed    cetirizine (ZyrTEC) 10 mg tablet Take 10 mg by mouth daily      Cholecalciferol (VITAMIN D3) 34172 units TABS Take 1 tablet by mouth every 14 (fourteen) days    clobetasol (TEMOVATE) 0 05 % ointment Apply topically as directed two times daily    enoxaparin (LOVENOX) 150 mg/mL injection Inject under the skin    ergocalciferol (VITAMIN D2) 50,000 units Take by mouth    fluticasone (FLONASE) 50 mcg/act nasal spray into each nostril    fluticasone-vilanterol (BREO ELLIPTA) 100-25 mcg/inh inhaler Inhale daily    folic acid-pyridoxine-cyanocobalamin (FOLBIC) 2 5-25-2 mg Take 1 tablet by mouth daily    furosemide (LASIX) 20 mg tablet Take 20 mg by mouth daily   Iodoquinol-HC-Aloe Polysacch (ALCORTIN A) 1-2-1 % GEL Apply topically    metFORMIN (GLUCOPHAGE-XR) 500 mg 24 hr tablet Take 1 tablet (500 mg total) by mouth 4 (four) times a day    mometasone (NASONEX) 50 mcg/act nasal spray 2 sprays into each nostril daily    montelukast (SINGULAIR) 10 mg tablet Take 1 tablet (10 mg total) by mouth daily at bedtime    multivitamin (THERAGRAN) TABS Take 1 tablet by mouth daily   nystatin (MYCOSTATIN) powder Apply topically 3 (three) times a day    pravastatin (PRAVACHOL) 40 mg tablet Take 1 tablet (40 mg total) by mouth daily    triamcinolone (KENALOG) 0 1 % cream     triamcinolone (KENALOG) 0 5 % cream APPLY TOPICALLY TO RIGHT ANKLE EVERY 12 HOURS    warfarin (COUMADIN) 5 mg tablet Take by mouth daily    alendronate-cholecalciferol (FOSAMAX PLUS D)  MG-UNIT per tablet Take by mouth    lansoprazole (PREVACID SOLUTAB) 30 mg disintegrating tablet Take by mouth    loratadine (CLARITIN) 10 mg tablet Take by mouth    predniSONE 10 mg tablet Take 10 mg by mouth daily    triamcinolone (KENALOG) 0 1 % cream Apply topically 2 (two) times a day for 7 days     No current facility-administered medications on file prior to visit  She is allergic to azithromycin; ciprofloxacin; and ancef [cefazolin]       Review of Systems   All other systems reviewed and are negative          Objective:      /82 (BP Location: Left arm, Patient Position: Sitting, Cuff Size: Large)   Pulse 80   Resp 15   Ht 5' 3" (1 6 m)   Wt (!) 170 kg (375 lb)   SpO2 95%   BMI 66 43 kg/m²          Physical Exam   Constitutional: She appears well-developed and well-nourished  Neck: Normal range of motion  Neck supple  Cardiovascular: Normal rate, regular rhythm, normal heart sounds and intact distal pulses  Pulmonary/Chest: Effort normal and breath sounds normal    Abdominal: Soft  Bowel sounds are normal    Skin:   Erythema from ankle to mid shin, greater in distribution anterolaterally  Not much warmer than left leg  Nursing note and vitals reviewed

## 2018-07-23 LAB — HBA1C MFR BLD HPLC: 5.6 %

## 2018-07-26 ENCOUNTER — TELEPHONE (OUTPATIENT)
Dept: FAMILY MEDICINE CLINIC | Facility: CLINIC | Age: 63
End: 2018-07-26

## 2018-07-26 DIAGNOSIS — L03.115 CELLULITIS OF RIGHT LOWER EXTREMITY: ICD-10-CM

## 2018-07-26 RX ORDER — SULFAMETHOXAZOLE AND TRIMETHOPRIM 800; 160 MG/1; MG/1
1 TABLET ORAL EVERY 12 HOURS SCHEDULED
Qty: 14 TABLET | Refills: 0 | Status: SHIPPED | OUTPATIENT
Start: 2018-07-26 | End: 2018-08-02

## 2018-08-07 ENCOUNTER — ANTICOAG VISIT (OUTPATIENT)
Dept: HEMATOLOGY ONCOLOGY | Facility: CLINIC | Age: 63
End: 2018-08-07

## 2018-08-08 ENCOUNTER — TELEPHONE (OUTPATIENT)
Dept: HEMATOLOGY ONCOLOGY | Facility: CLINIC | Age: 63
End: 2018-08-08

## 2018-08-29 ENCOUNTER — TELEPHONE (OUTPATIENT)
Dept: FAMILY MEDICINE CLINIC | Facility: CLINIC | Age: 63
End: 2018-08-29

## 2018-08-29 DIAGNOSIS — L03.90 CELLULITIS, UNSPECIFIED CELLULITIS SITE: Primary | ICD-10-CM

## 2018-08-29 RX ORDER — SULFAMETHOXAZOLE AND TRIMETHOPRIM 800; 160 MG/1; MG/1
1 TABLET ORAL EVERY 12 HOURS SCHEDULED
Qty: 14 TABLET | Refills: 0 | Status: SHIPPED | OUTPATIENT
Start: 2018-08-29 | End: 2018-09-05

## 2018-09-04 ENCOUNTER — OFFICE VISIT (OUTPATIENT)
Dept: FAMILY MEDICINE CLINIC | Facility: CLINIC | Age: 63
End: 2018-09-04
Payer: COMMERCIAL

## 2018-09-04 VITALS
WEIGHT: 293 LBS | HEART RATE: 74 BPM | OXYGEN SATURATION: 98 % | HEIGHT: 63 IN | BODY MASS INDEX: 51.91 KG/M2 | DIASTOLIC BLOOD PRESSURE: 68 MMHG | RESPIRATION RATE: 17 BRPM | SYSTOLIC BLOOD PRESSURE: 116 MMHG

## 2018-09-04 DIAGNOSIS — D68.9 BLOOD CLOTTING DISORDER (HCC): ICD-10-CM

## 2018-09-04 DIAGNOSIS — Z23 NEED FOR INFLUENZA VACCINATION: ICD-10-CM

## 2018-09-04 DIAGNOSIS — E66.01 MORBID OBESITY WITH BMI OF 60.0-69.9, ADULT (HCC): ICD-10-CM

## 2018-09-04 DIAGNOSIS — IMO0001 CHRONIC VENOUS HYPERTENSION WITH ULCER INVOLVING BOTH SIDES: Primary | ICD-10-CM

## 2018-09-04 DIAGNOSIS — L03.90 CELLULITIS, UNSPECIFIED CELLULITIS SITE: ICD-10-CM

## 2018-09-04 DIAGNOSIS — I10 ESSENTIAL HYPERTENSION: ICD-10-CM

## 2018-09-04 DIAGNOSIS — I82.502 CHRONIC DEEP VEIN THROMBOSIS (DVT) OF LEFT LOWER EXTREMITY, UNSPECIFIED VEIN (HCC): ICD-10-CM

## 2018-09-04 PROCEDURE — 3078F DIAST BP <80 MM HG: CPT | Performed by: FAMILY MEDICINE

## 2018-09-04 PROCEDURE — 3074F SYST BP LT 130 MM HG: CPT | Performed by: FAMILY MEDICINE

## 2018-09-04 PROCEDURE — 99213 OFFICE O/P EST LOW 20 MIN: CPT | Performed by: FAMILY MEDICINE

## 2018-09-04 PROCEDURE — 90471 IMMUNIZATION ADMIN: CPT

## 2018-09-04 PROCEDURE — 90656 IIV3 VACC NO PRSV 0.5 ML IM: CPT

## 2018-09-04 RX ORDER — CLINDAMYCIN HYDROCHLORIDE 300 MG/1
300 CAPSULE ORAL 3 TIMES DAILY
Qty: 21 CAPSULE | Refills: 0 | Status: SHIPPED | OUTPATIENT
Start: 2018-09-04 | End: 2018-09-11

## 2018-09-04 RX ORDER — SULFAMETHOXAZOLE AND TRIMETHOPRIM 800; 160 MG/1; MG/1
1 TABLET ORAL EVERY 12 HOURS SCHEDULED
Qty: 14 TABLET | Refills: 0 | Status: SHIPPED | OUTPATIENT
Start: 2018-09-04 | End: 2018-09-11

## 2018-09-04 NOTE — PROGRESS NOTES
Assessment/Plan:    No problem-specific Assessment & Plan notes found for this encounter  Diagnoses and all orders for this visit:    Chronic venous hypertension with ulcer involving both sides (HCC)  -     sulfamethoxazole-trimethoprim (BACTRIM DS) 800-160 mg per tablet; Take 1 tablet by mouth every 12 (twelve) hours for 7 days    Chronic deep vein thrombosis (DVT) of left lower extremity, unspecified vein ([facility] )  -     Protime-INR; Future    Essential hypertension    Blood clotting disorder (HCC)    Morbid obesity with BMI of 60 0-69 9, adult (HCC)    Cellulitis, unspecified cellulitis site  -     sulfamethoxazole-trimethoprim (BACTRIM DS) 800-160 mg per tablet; Take 1 tablet by mouth every 12 (twelve) hours for 7 days  -     clindamycin (CLEOCIN) 300 MG capsule; Take 1 capsule (300 mg total) by mouth 3 (three) times a day for 7 days          Subjective:      Patient ID: Dianne Massey is a 65 y o  female  Her BP is well controlled on her current regimen  She has no CP or SOB  She has no Ha or vision changes  She has chronic venous stasis and ulceration on the right lower leg  There is no drainage or discharge  She has no F/C  She denies trauma  The following portions of the patient's history were reviewed and updated as appropriate:   She  has a past medical history of Blood clotting disorder ([facility] ); Homocysteinemia ([address] ); Hypercoagulable state ([address] ); Hypertension; Lyme disease; Obesity; PCOS (polycystic ovarian syndrome); Renal disorder; Sleep apnea; and 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 ([facility] )    She   Patient Active Problem List    Diagnosis Date Noted    Heme positive stool 06/21/2018    Encounter for hepatitis C screening test for low risk patient 04/17/2018    Pain of right lower extremity 03/30/2018    Cellulitis of right lower extremity 03/30/2018    Renal disorder     PCOS (polycystic ovarian syndrome)     Obesity     Lyme disease     Blood clotting disorder ([place] [address] )     Essential hypertension     Homocysteinemia ([place] [address] )     Hypercoagulable state ([place] [address] )     Obstructive sleep apnea on CPAP     DVT (deep venous thrombosis) ([place] [address] ) 08/29/2016    History of DVT (deep vein thrombosis) 07/01/2016    Morbid obesity with BMI of 60 0-69 9, adult ([place] [address] ) 07/01/2016    Homocystinemia ([place] [address] ) 03/09/2015    Skin rash 05/22/2014    Chronic venous hypertension w ulceration ([place] [address] ) 12/18/2013    Edema 11/20/2013    Esophageal reflux 11/20/2013    Hyperlipidemia 11/20/2013    Hypertension 11/20/2013    Polycystic ovarian syndrome 11/20/2013     She  has a past surgical history that includes Joint replacement (Bilateral); Appendectomy; Ankle surgery (Right); Hysterectomy; Hemorroidectomy; Breast biopsy; Foot surgery; LITHOTRIPSY; Breast biopsy; Closed reduction metatarsal fracture (Right); and Vena cava filter placement  Her family history includes Hypercoagulant Ability in her maternal grandmother and mother; No Known Problems in her father; Pulmonary embolism in her mother  She  reports that she has never smoked  She has never used smokeless tobacco  She reports that she does not drink alcohol or use drugs  Current Outpatient Prescriptions   Medication Sig Dispense Refill    albuterol (PROVENTIL HFA,VENTOLIN HFA) 90 mcg/act inhaler Inhale 2 puffs every 6 (six) hours as needed for wheezing        alendronate (FOSAMAX) 70 mg tablet Take 70 mg by mouth every 7 days      amLODIPine-benazepril (LOTREL 5-20) 5-20 MG per capsule Take 1 capsule by mouth daily 90 capsule 3    cetirizine (ZYRTEC ALLERGY) 10 mg tablet Take 1 tablet by mouth daily as needed      Cholecalciferol (VITAMIN D3) 96092 units TABS Take 1 tablet by mouth every 14 (fourteen) days      clobetasol (TEMOVATE) 0 05 % ointment Apply topically as directed two times daily      fluticasone (FLONASE) 50 mcg/act nasal spray into each nostril      fluticasone-vilanterol (BREO ELLIPTA) 100-25 mcg/inh inhaler Inhale daily      folic acid-pyridoxine-cyanocobalamin (FOLBIC) 2 5-25-2 mg Take 1 tablet by mouth daily 90 tablet 3    furosemide (LASIX) 20 mg tablet Take 20 mg by mouth daily   Iodoquinol-HC-Aloe Polysacch (ALCORTIN A) 1-2-1 % GEL Apply topically      metFORMIN (GLUCOPHAGE-XR) 500 mg 24 hr tablet Take 1 tablet (500 mg total) by mouth 4 (four) times a day 360 tablet 3    mometasone (NASONEX) 50 mcg/act nasal spray 2 sprays into each nostril daily      montelukast (SINGULAIR) 10 mg tablet Take 1 tablet (10 mg total) by mouth daily at bedtime 90 tablet 3    multivitamin (THERAGRAN) TABS Take 1 tablet by mouth daily   nystatin (MYCOSTATIN) powder Apply topically 3 (three) times a day 60 g 3    pravastatin (PRAVACHOL) 40 mg tablet Take 1 tablet (40 mg total) by mouth daily 90 tablet 3    sulfamethoxazole-trimethoprim (BACTRIM DS) 800-160 mg per tablet Take 1 tablet by mouth every 12 (twelve) hours for 7 days 14 tablet 0    triamcinolone (KENALOG) 0 1 % cream       triamcinolone (KENALOG) 0 5 % cream APPLY TOPICALLY TO RIGHT ANKLE EVERY 12 HOURS      warfarin (COUMADIN) 5 mg tablet Take by mouth daily      clindamycin (CLEOCIN) 300 MG capsule Take 1 capsule (300 mg total) by mouth 3 (three) times a day for 7 days 21 capsule 0    enoxaparin (LOVENOX) 150 mg/mL injection Inject under the skin      ergocalciferol (VITAMIN D2) 50,000 units Take by mouth      sulfamethoxazole-trimethoprim (BACTRIM DS) 800-160 mg per tablet Take 1 tablet by mouth every 12 (twelve) hours for 7 days 14 tablet 0     No current facility-administered medications for this visit  Current Outpatient Prescriptions on File Prior to Visit   Medication Sig    albuterol (PROVENTIL HFA,VENTOLIN HFA) 90 mcg/act inhaler Inhale 2 puffs every 6 (six) hours as needed for wheezing      alendronate (FOSAMAX) 70 mg tablet Take 70 mg by mouth every 7 days    amLODIPine-benazepril (LOTREL 5-20) 5-20 MG per capsule Take 1 capsule by mouth daily    cetirizine (ZYRTEC ALLERGY) 10 mg tablet Take 1 tablet by mouth daily as needed    Cholecalciferol (VITAMIN D3) 59796 units TABS Take 1 tablet by mouth every 14 (fourteen) days    clobetasol (TEMOVATE) 0 05 % ointment Apply topically as directed two times daily    fluticasone (FLONASE) 50 mcg/act nasal spray into each nostril    fluticasone-vilanterol (BREO ELLIPTA) 100-25 mcg/inh inhaler Inhale daily    folic acid-pyridoxine-cyanocobalamin (FOLBIC) 2 5-25-2 mg Take 1 tablet by mouth daily    furosemide (LASIX) 20 mg tablet Take 20 mg by mouth daily   Iodoquinol-HC-Aloe Polysacch (ALCORTIN A) 1-2-1 % GEL Apply topically    metFORMIN (GLUCOPHAGE-XR) 500 mg 24 hr tablet Take 1 tablet (500 mg total) by mouth 4 (four) times a day    mometasone (NASONEX) 50 mcg/act nasal spray 2 sprays into each nostril daily    montelukast (SINGULAIR) 10 mg tablet Take 1 tablet (10 mg total) by mouth daily at bedtime    multivitamin (THERAGRAN) TABS Take 1 tablet by mouth daily   nystatin (MYCOSTATIN) powder Apply topically 3 (three) times a day    pravastatin (PRAVACHOL) 40 mg tablet Take 1 tablet (40 mg total) by mouth daily    sulfamethoxazole-trimethoprim (BACTRIM DS) 800-160 mg per tablet Take 1 tablet by mouth every 12 (twelve) hours for 7 days    triamcinolone (KENALOG) 0 1 % cream     triamcinolone (KENALOG) 0 5 % cream APPLY TOPICALLY TO RIGHT ANKLE EVERY 12 HOURS    warfarin (COUMADIN) 5 mg tablet Take by mouth daily    enoxaparin (LOVENOX) 150 mg/mL injection Inject under the skin    ergocalciferol (VITAMIN D2) 50,000 units Take by mouth    [DISCONTINUED] alendronate-cholecalciferol (FOSAMAX PLUS D)  MG-UNIT per tablet Take by mouth    [DISCONTINUED] cetirizine (ZyrTEC) 10 mg tablet Take 10 mg by mouth daily      [DISCONTINUED] lansoprazole (PREVACID SOLUTAB) 30 mg disintegrating tablet Take by mouth    [DISCONTINUED] loratadine (CLARITIN) 10 mg tablet Take by mouth    [DISCONTINUED] predniSONE 10 mg tablet Take 10 mg by mouth daily    [DISCONTINUED] triamcinolone (KENALOG) 0 1 % cream Apply topically 2 (two) times a day for 7 days     No current facility-administered medications on file prior to visit  She is allergic to azithromycin; ciprofloxacin; and ancef [cefazolin]       Review of Systems   Skin: Positive for color change and wound  All other systems reviewed and are negative  Objective:      /68 (BP Location: Right arm, Patient Position: Sitting, Cuff Size: Large)   Pulse 74   Resp 17   Ht 5' 3" (1 6 m)   Wt (!) 172 kg (378 lb 9 6 oz)   SpO2 98%   BMI 67 07 kg/m²          Physical Exam   Constitutional: She appears well-developed and well-nourished  Neck: Normal range of motion  Neck supple  Cardiovascular: Normal rate, regular rhythm, normal heart sounds and intact distal pulses  Pulmonary/Chest: Effort normal and breath sounds normal    Abdominal: Soft  Bowel sounds are normal    Skin:   B/l erythema to mid shins with small ulceration right medial calf  Nursing note and vitals reviewed

## 2018-09-05 ENCOUNTER — TELEPHONE (OUTPATIENT)
Dept: FAMILY MEDICINE CLINIC | Facility: CLINIC | Age: 63
End: 2018-09-05

## 2018-09-05 ENCOUNTER — TELEPHONE (OUTPATIENT)
Dept: HEMATOLOGY ONCOLOGY | Facility: CLINIC | Age: 63
End: 2018-09-05

## 2018-09-05 NOTE — TELEPHONE ENCOUNTER
Called and informed patient that per Dr Elba Cole no change in current regimen of 5 mg daily and recheck in 2 weeks

## 2018-09-19 LAB — INR PPP: 1.93 (ref 0.86–1.17)

## 2018-09-20 ENCOUNTER — TELEPHONE (OUTPATIENT)
Dept: HEMATOLOGY ONCOLOGY | Facility: CLINIC | Age: 63
End: 2018-09-20

## 2018-09-20 NOTE — TELEPHONE ENCOUNTER
She had her PT/INR done on Tuesday at [facility] in [facility]  Requesting results  Then she needs to know her Coumadin dose  Currently taking 5 mg daily

## 2018-09-21 NOTE — TELEPHONE ENCOUNTER
Called and spoke to Deonte Reese, Per Dr Giancarlo Elliott, 7 5 Tuesday and Friday and 5 mg all other days, recheck in 2 weeks

## 2018-10-02 LAB — INR PPP: 2.32 (ref 0.86–1.17)

## 2018-10-03 ENCOUNTER — TELEPHONE (OUTPATIENT)
Dept: HEMATOLOGY ONCOLOGY | Facility: CLINIC | Age: 63
End: 2018-10-03

## 2018-10-03 NOTE — TELEPHONE ENCOUNTER
Called and informed patient that per Dr Case Staff no change in current regimen of 5 mg daily and recheck in 2-3 weeks

## 2018-10-24 ENCOUNTER — TELEPHONE (OUTPATIENT)
Dept: HEMATOLOGY ONCOLOGY | Facility: CLINIC | Age: 63
End: 2018-10-24

## 2018-10-24 LAB — INR PPP: 2.63 (ref 0.86–1.17)

## 2018-10-24 NOTE — TELEPHONE ENCOUNTER
[facility] for labs results  They agreed to fax the results over

## 2018-10-29 ENCOUNTER — TELEPHONE (OUTPATIENT)
Dept: HEMATOLOGY ONCOLOGY | Facility: CLINIC | Age: 63
End: 2018-10-29

## 2018-10-29 NOTE — TELEPHONE ENCOUNTER
Lori Corrales called she would like someone to call her back with her results of her coumadin   Please call her at [phone]

## 2018-10-30 NOTE — TELEPHONE ENCOUNTER
Called [facility] and informed her Per Dr Jeffrey Perezly that he wants her to stay on her current dose and recheck in 2-3 weeks

## 2018-11-06 ENCOUNTER — TELEPHONE (OUTPATIENT)
Dept: HEMATOLOGY ONCOLOGY | Facility: CLINIC | Age: 63
End: 2018-11-06

## 2018-11-06 DIAGNOSIS — I82.4Z9 DEEP VEIN THROMBOSIS (DVT) OF DISTAL VEIN OF LOWER EXTREMITY, UNSPECIFIED CHRONICITY, UNSPECIFIED LATERALITY (HCC): Primary | ICD-10-CM

## 2018-11-06 NOTE — TELEPHONE ENCOUNTER
Left a message for the patient to call the office tomorrow for her anticoagulation instructions  Patient is to stop taking her coumadin on 11/10/18  She is to take the 150 mg Lovenox on the mornings of 11/12, 11/13, an 11/14  The prescription for the Lovenox was sent to the patient's pharmacy  Patient is to resume her coumadin at the discursion of the Dr performing the colonoscopy

## 2018-11-06 NOTE — TELEPHONE ENCOUNTER
Patient called because she needs a prescription for Lovenox  She stated she needs it because she is having a colonoscopy on 11/15/18  Patient stated she spoke with Dr Flakito Barnett regarding this       She uses [address] (2nd on her list in [place])     She can be reached at [phone]

## 2018-11-07 ENCOUNTER — TELEPHONE (OUTPATIENT)
Dept: HEMATOLOGY ONCOLOGY | Facility: CLINIC | Age: 63
End: 2018-11-07

## 2018-11-07 NOTE — TELEPHONE ENCOUNTER
Pt called about Lovenox  Informed script was sent to [address] [facility] and instructions are for 11/12/18, 11/13/18, 11/14/18  Asking when she should take last dose of Coumadin   Please call with directions

## 2018-11-08 ENCOUNTER — TELEPHONE (OUTPATIENT)
Dept: FAMILY MEDICINE CLINIC | Facility: CLINIC | Age: 63
End: 2018-11-08

## 2018-11-08 NOTE — TELEPHONE ENCOUNTER
Patient fell over the weekend going down he steps, asking for x-ray of her back and right hip  Asks to have it faxed to [phone]

## 2018-11-08 NOTE — TELEPHONE ENCOUNTER
Patient reports she fell over the weekend  Instruct patient to either make an appointment to be seen in the office tomorrow for evaluation or to go to urgent care/er for evaluation and imaging

## 2018-11-09 ENCOUNTER — OFFICE VISIT (OUTPATIENT)
Dept: FAMILY MEDICINE CLINIC | Facility: CLINIC | Age: 63
End: 2018-11-09
Payer: COMMERCIAL

## 2018-11-09 VITALS
HEIGHT: 63 IN | BODY MASS INDEX: 51.91 KG/M2 | DIASTOLIC BLOOD PRESSURE: 76 MMHG | SYSTOLIC BLOOD PRESSURE: 124 MMHG | TEMPERATURE: 99.6 F | HEART RATE: 80 BPM | OXYGEN SATURATION: 96 % | WEIGHT: 293 LBS

## 2018-11-09 DIAGNOSIS — M54.50 ACUTE BILATERAL LOW BACK PAIN WITHOUT SCIATICA: ICD-10-CM

## 2018-11-09 DIAGNOSIS — M25.551 PAIN OF RIGHT HIP JOINT: ICD-10-CM

## 2018-11-09 DIAGNOSIS — L03.119 CELLULITIS OF LOWER EXTREMITY, UNSPECIFIED LATERALITY: ICD-10-CM

## 2018-11-09 DIAGNOSIS — M79.604 PAIN IN BOTH LOWER EXTREMITIES: ICD-10-CM

## 2018-11-09 DIAGNOSIS — M79.605 PAIN IN BOTH LOWER EXTREMITIES: ICD-10-CM

## 2018-11-09 DIAGNOSIS — Z91.81 HISTORY OF RECENT FALL: Primary | ICD-10-CM

## 2018-11-09 DIAGNOSIS — I83.12 VARICOSE VEINS OF LEFT LOWER EXTREMITY WITH INFLAMMATION: ICD-10-CM

## 2018-11-09 DIAGNOSIS — L03.90 CHRONIC CELLULITIS: ICD-10-CM

## 2018-11-09 PROCEDURE — 99214 OFFICE O/P EST MOD 30 MIN: CPT | Performed by: NURSE PRACTITIONER

## 2018-11-09 PROCEDURE — 1036F TOBACCO NON-USER: CPT | Performed by: NURSE PRACTITIONER

## 2018-11-09 PROCEDURE — 3008F BODY MASS INDEX DOCD: CPT | Performed by: NURSE PRACTITIONER

## 2018-11-09 RX ORDER — AMOXICILLIN 500 MG/1
CAPSULE ORAL
Refills: 0 | Status: ON HOLD | COMMUNITY
Start: 2018-10-15 | End: 2020-09-09 | Stop reason: ALTCHOICE

## 2018-11-09 RX ORDER — LANSOPRAZOLE 15 MG/1
CAPSULE, DELAYED RELEASE ORAL
COMMUNITY
End: 2019-01-04

## 2018-11-09 NOTE — PROGRESS NOTES
Assessment/Plan:     Diagnoses and all orders for this visit:    History of recent fall  -     XR spine lumbar 2 or 3 views injury; Future  -     XR hip/pelv 2-3 vws right if performed; Future    Pain of right hip joint  -     XR spine lumbar 2 or 3 views injury; Future  -     XR hip/pelv 2-3 vws right if performed; Future    Acute bilateral low back pain without sciatica  -     XR spine lumbar 2 or 3 views injury; Future  -     XR hip/pelv 2-3 vws right if performed; Future    Chronic cellulitis  -     Ambulatory referral to Vascular Surgery; Future    Cellulitis of lower extremity, unspecified laterality  -     Ambulatory referral to Vascular Surgery; Future    Varicose veins of left lower extremity with inflammation  -     Ambulatory referral to Vascular Surgery; Future    Pain in both lower extremities  -     Ambulatory referral to Vascular Surgery; Future    Other orders  -     amoxicillin (AMOXIL) 500 mg capsule; TAKE 4 CAPSULES BY MOUTH 1HR BEFORE APPT  -     lansoprazole (PREVACID) 15 mg capsule; Take by mouth          Subjective:      Patient ID: Zonia Frye is a 59 y o  female  Patient presents to [address] [place] [address] as follow up after a recent fall  Allergies, medical history and current medications reviewed with patient; patient reports taking all medications as prescribed without issues  Patient states she fell one week ago on a concrete patio; states she didn't feel pain until the next day  Patient reports she was getting out of the car and that once she got to the patio her legs gave out; patient states she wasn't sure if she tripped over her cat or something else as it was dark  She reports she had some difficulty getting back up  She saw the chiropractor the next day which helped, but he suggested she get further exam  Patient C/O low back and right hip pain; patient denies any numbness or tingling  Patient denies hitting her head when she fell   Patient describes pain as "just sore "     Patient reports she has chronic cellulitis to the lower extremities and a history of lymphedema  Patient reports that her leg seems to be weakened and sometimes gives out  Review of Systems   Musculoskeletal: Positive for arthralgias and back pain  Neurological: Positive for weakness  All other systems reviewed and are negative  Objective:    /76 (BP Location: Right arm, Patient Position: Sitting, Cuff Size: Large)   Pulse 80   Temp 99 6 °F (37 6 °C) (Temporal)   Ht 5' 3" (1 6 m)   Wt (!) 174 kg (383 lb 12 8 oz)   SpO2 96%   BMI 67 99 kg/m²      Physical Exam   Constitutional: She is oriented to person, place, and time  She appears well-developed and well-nourished  No distress  HENT:   Head: Normocephalic and atraumatic  Right Ear: Tympanic membrane, external ear and ear canal normal    Left Ear: Tympanic membrane, external ear and ear canal normal    Nose: Nose normal  No mucosal edema  Right sinus exhibits no maxillary sinus tenderness and no frontal sinus tenderness  Left sinus exhibits no maxillary sinus tenderness and no frontal sinus tenderness  Mouth/Throat: Uvula is midline, oropharynx is clear and moist and mucous membranes are normal  No oropharyngeal exudate, posterior oropharyngeal edema or posterior oropharyngeal erythema  Nasal turbinates pink, moist and without exudate  Eyes: Pupils are equal, round, and reactive to light  Conjunctivae, EOM and lids are normal  Right eye exhibits no discharge  Left eye exhibits no discharge  Right conjunctiva is not injected  Left conjunctiva is not injected  Neck: Normal range of motion  Neck supple  No tracheal deviation, no edema and no erythema present  No thyromegaly present  Cardiovascular: Normal rate, regular rhythm, S1 normal and S2 normal     Murmur heard  Pulses:       Radial pulses are 2+ on the right side, and 2+ on the left side          Dorsalis pedis pulses are 2+ on the right side, and 2+ on the left side  Pulmonary/Chest: Effort normal and breath sounds normal  No respiratory distress  Abdominal: Soft  Bowel sounds are normal  She exhibits no distension and no mass  There is no hepatosplenomegaly  There is no tenderness  There is no guarding  Musculoskeletal: Normal range of motion  She exhibits no edema or deformity  Right hip: She exhibits tenderness  Lumbar back: She exhibits tenderness  Lymphadenopathy:     She has no cervical adenopathy  Neurological: She is alert and oriented to person, place, and time  She has normal strength and normal reflexes  She displays a negative Romberg sign  Skin: Skin is warm, dry and intact  Psychiatric: She has a normal mood and affect  Her speech is normal    Nursing note and vitals reviewed

## 2018-11-09 NOTE — PATIENT INSTRUCTIONS
Fall Prevention   WHAT YOU NEED TO KNOW:   What is fall prevention? Fall prevention includes ways to make your home and other areas safer  It also includes ways you can move more carefully to prevent a fall  What increases my risk for falls? · Lack of vitamin D    · Not getting enough sleep each night    · Trouble walking or keeping your balance, or foot problems    · Health conditions that cause changes in your blood pressure, vision, or muscle strength and coordination    · Medicines that make you dizzy, weak, or sleepy    · Problems seeing clearly    · Shoes that have high heels or are not supportive    · Tripping hazards, such as items left on the floor, no handrails on the stairs, or broken steps  How can I help protect myself from falls? · Stand or sit up slowly  This may help you keep your balance and prevent falls  If you need to get up during the night, sit up first  Be sure you are fully awake before you stand  Turn on the light before you start walking  Go slowly in case you are still sleepy  Make sure you will not trip over any pets sleeping in the bedroom  · Use assistive devices as directed  Your healthcare provider may suggest that you use a cane or walker to help you keep your balance  You may need to have grab bars put in your bathroom near the toilet or in the shower  · Wear shoes that fit well and have soles that   Wear shoes both inside and outside  Use slippers with good   Do not wear shoes with high heels  · Wear a personal alarm  This is a device that allows you to call 911 if you fall and need help  Ask your healthcare provider for more information  · Stay active  Exercise can help strengthen your muscles and improve your balance  Your healthcare provider may recommend water aerobics or walking  He or she may also recommend physical therapy to improve your coordination   Never start an exercise program without talking to your healthcare provider first  · Manage medical conditions  Keep all appointments with your healthcare providers  Visit your eye doctor as directed  How can I make my home safer? · Add items to prevent falls in the bathroom  Put nonslip strips on your bath or shower floor to prevent you from slipping  Use a bath mat if you do not have carpet in the bathroom  This will prevent you from falling when you step out of the bath or shower  Use a shower seat so you do not need to stand while you shower  Sit on the toilet or a chair in your bathroom to dry yourself and put on clothing  This will prevent you from losing your balance from drying or dressing yourself while you are standing  · Keep paths clear  Remove books, shoes, and other objects from walkways and stairs  Place cords for telephones and lamps out of the way so that you do not need to walk over them  Tape them down if you cannot move them  Remove small rugs  If you cannot remove a rug, secure it with double-sided tape  This will prevent you from tripping  · Install bright lights in your home  Use night lights to help light paths to the bathroom or kitchen  Always turn on the light before you start walking  · Keep items you use often on shelves within reach  Do not use a step stool to help you reach an item  · Paint or place reflective tape on the edges of your stairs  This will help you see the stairs better  Call 911 or have someone else call if:   · You have fallen and are unconscious  · You have fallen and cannot move part of your body  Contact your healthcare provider if:   · You have fallen and have pain or a headache  · You have questions or concerns about your condition or care  CARE AGREEMENT:   You have the right to help plan your care  Learn about your health condition and how it may be treated  Discuss treatment options with your caregivers to decide what care you want to receive  You always have the right to refuse treatment   The above information is an  only  It is not intended as medical advice for individual conditions or treatments  Talk to your doctor, nurse or pharmacist before following any medical regimen to see if it is safe and effective for you  © 2017 [employer] Information is for End User's use only and may not be sold, redistributed or otherwise used for commercial purposes  All illustrations and images included in CareNotes® are the copyrighted property of [employer]  or Mike Herrmann  Acute Low Back Pain   WHAT YOU NEED TO KNOW:   What is acute low back pain? Acute low back pain is sudden discomfort in your lower back area that lasts for up to 6 weeks  The discomfort makes it difficult to tolerate activity  What causes or increases my risk for acute low back pain? Conditions that affect the spine, joints, or muscles can cause back pain  These may include arthritis, spinal stenosis (narrowing of the spinal column), muscle tension, or breakdown of the spinal discs  The following increase your risk of back pain:  · Repeated bending, lifting, or twisting, or lifting heavy items    · Injury from a fall or accident    · Lack of regular physical activity     · Obesity, pregnancy     · Smoking    · Aging    · Driving, sitting, or standing for long periods    · Bad posture while sitting or standing  How is acute low back pain diagnosed? Your healthcare provider will ask about your medical history and examine you  He may ask when you last had low back pain and how it started  Show him where you feel the pain and what makes it feel better or worse  Tell him about the type of pain you have, how bad it is, and how long it lasts  Tell him if your pain worsens at night or when you lie down on your back  How is acute low back pain treated? The goal of treatment is to relieve your pain and help you tolerate activity  Most people with acute lower back pain get better within 4 to 6 weeks  You may need any of the following:  · Medicines:      ¨ Acetaminophen  decreases pain  It is available without a doctor's order  Ask how much to take and how often to take it  Follow directions  Acetaminophen can cause liver damage if not taken correctly  ¨ NSAIDs  help decrease swelling and pain  This medicine is available with or without a doctor's order  NSAIDs can cause stomach bleeding or kidney problems in certain people  If you take blood thinner medicine, always ask your healthcare provider if NSAIDs are safe for you  Always read the medicine label and follow directions  ¨ Prescription pain medicine  may be given  Ask your healthcare provider how to take this medicine safely  ¨ Muscle relaxers  decrease pain by relaxing the muscles in your lower spine  What can I do to prevent low back pain? · Use proper body mechanics  ¨ Bend at the hips and knees when you  objects  Do not bend from the waist  Use your leg muscles as you lift the load  Do not use your back  Keep the object close to your chest as you lift it  Try not to twist or lift anything above your waist     ¨ Change your position often when you stand for long periods of time  Rest one foot on a small box or footrest, and then switch to the other foot often  ¨ Try not to sit for long periods of time  When you do, sit in a straight-backed chair with your feet flat on the floor  Never reach, pull, or push while you are sitting  · Do exercises that strengthen your back muscles  Warm up before you exercise  Ask your healthcare provider the best exercises for you  · Maintain a healthy weight  Ask your healthcare provider how much you should weigh  Ask him to help you create a weight loss plan if you are overweight  How can I take care of myself if I have low back pain? · Stay active  as much as you can without causing more pain  Bed rest could make your back pain worse  Start with some light exercises such as walking  Avoid heavy lifting until your pain is gone  Ask for more information about the activities or exercises that are right for you  · Ice  helps decrease swelling, pain, and muscle spams  Put crushed ice in a plastic bag  Cover it with a towel  Place it on your lower back for 20 to 30 minutes every 2 hours  Do this for about 2 to 3 days after your pain starts, or as directed  · Heat  helps decrease pain and muscle spasms  Start to use heat after treatment with ice has stopped  Use a small towel dampened with warm water or a heating pad, or sit in a warm bath  Apply heat on the area for 20 to 30 minutes every 2 hours for as many days as directed  Alternate heat and ice  When should I contact my healthcare provider? · You have a fever  · You have pain at night or when you rest     · Your pain does not get better with treatment  · You have pain that worsens when you cough or sneeze  · You suddenly feel something pop or snap in your back  · You have questions or concerns about your condition or care  When should I seek immediate care or call 911? · You have severe pain  · You have sudden stiffness and heaviness on both buttocks down to both legs  · You have numbness or weakness in one leg, or pain in both legs  · You have numbness in your genital area or across your lower back  · You cannot control your urine or bowel movements  CARE AGREEMENT:   You have the right to help plan your care  Learn about your health condition and how it may be treated  Discuss treatment options with your caregivers to decide what care you want to receive  You always have the right to refuse treatment  The above information is an  only  It is not intended as medical advice for individual conditions or treatments  Talk to your doctor, nurse or pharmacist before following any medical regimen to see if it is safe and effective for you    © 2017 [employer] Information is for End User's use only and may not be sold, redistributed or otherwise used for commercial purposes  All illustrations and images included in CareNotes® are the copyrighted property of [employer]  or Mike Herrmann

## 2018-11-14 ENCOUNTER — TELEPHONE (OUTPATIENT)
Dept: HEMATOLOGY ONCOLOGY | Facility: CLINIC | Age: 63
End: 2018-11-14

## 2018-11-21 ENCOUNTER — TELEPHONE (OUTPATIENT)
Dept: HEMATOLOGY ONCOLOGY | Facility: CLINIC | Age: 63
End: 2018-11-21

## 2018-11-21 NOTE — TELEPHONE ENCOUNTER
PT HAD STOPPED HER CUMIDIN FOR A PROCEDURE, BUT IS NOW BACK ON IT   WANTS TO KNOW IF IT IS OK  HAD BLOOD DRAWN THIS MORNING AT [facility]      PLEASE CALL, TXS

## 2018-11-21 NOTE — TELEPHONE ENCOUNTER
Per Dr Gregory Garcia, Patient is to take 10 mg today and 7 5mg all other days and recheck in 2 weeks  Called and informed patient of this information

## 2018-11-29 ENCOUNTER — TELEPHONE (OUTPATIENT)
Dept: HEMATOLOGY ONCOLOGY | Facility: CLINIC | Age: 63
End: 2018-11-29

## 2018-11-29 NOTE — TELEPHONE ENCOUNTER
Patient called wanting to go over PT & INR results  Informed her per Dr Coughlin Freeze same dose of Coumadin and Recheck in 2 weeks

## 2018-12-07 ENCOUNTER — TELEPHONE (OUTPATIENT)
Dept: HEMATOLOGY ONCOLOGY | Facility: CLINIC | Age: 63
End: 2018-12-07

## 2018-12-07 NOTE — TELEPHONE ENCOUNTER
LVM for patient to call our office to provide us with the current dose of Coumadin that she is taking  Please get this information and sent it to me

## 2018-12-11 ENCOUNTER — TELEPHONE (OUTPATIENT)
Dept: HEMATOLOGY ONCOLOGY | Facility: CLINIC | Age: 63
End: 2018-12-11

## 2018-12-11 NOTE — TELEPHONE ENCOUNTER
LVM informing patient per Dr Stefano Cosme, She is to stay on her current dose of Coumadin, 10 mg Wednesday and 7 5 mg all other days she can recheck her labs in 2 weeks  Instructed patient to give our office a call if she had any additional questions

## 2018-12-18 ENCOUNTER — TELEPHONE (OUTPATIENT)
Dept: HEMATOLOGY ONCOLOGY | Facility: CLINIC | Age: 63
End: 2018-12-18

## 2018-12-18 NOTE — TELEPHONE ENCOUNTER
LVM informing patient that per Dr Kimberly Velásquez she is to stay on the same does of Coumadin and she can recheck her labs in 2-3 weeks

## 2018-12-22 ENCOUNTER — HOSPITAL ENCOUNTER (EMERGENCY)
Facility: HOSPITAL | Age: 63
Discharge: HOME | End: 2018-12-23
Attending: EMERGENCY MEDICINE | Admitting: EMERGENCY MEDICINE
Payer: COMMERCIAL

## 2018-12-22 DIAGNOSIS — M79.89 SWELLING OF LOWER LEG: ICD-10-CM

## 2018-12-22 DIAGNOSIS — L03.115 CELLULITIS OF RIGHT LOWER LEG: Primary | ICD-10-CM

## 2018-12-22 LAB
ALBUMIN SERPL BCP-MCNC: 2.9 G/DL (ref 3.5–5)
ALP SERPL-CCNC: 77 U/L (ref 46–116)
ALT SERPL W P-5'-P-CCNC: 22 U/L (ref 12–78)
ANION GAP SERPL CALCULATED.3IONS-SCNC: 8 MMOL/L (ref 4–13)
APTT PPP: 35 SECONDS (ref 26–38)
AST SERPL W P-5'-P-CCNC: 20 U/L (ref 5–45)
BASOPHILS # BLD AUTO: 0.09 THOUSANDS/ΜL (ref 0–0.1)
BASOPHILS NFR BLD AUTO: 1 % (ref 0–1)
BILIRUB SERPL-MCNC: 0.3 MG/DL (ref 0.2–1)
BUN SERPL-MCNC: 15 MG/DL (ref 5–25)
CALCIUM SERPL-MCNC: 7.9 MG/DL (ref 8.3–10.1)
CHLORIDE SERPL-SCNC: 103 MMOL/L (ref 100–108)
CO2 SERPL-SCNC: 29 MMOL/L (ref 21–32)
CREAT SERPL-MCNC: 0.99 MG/DL (ref 0.6–1.3)
EOSINOPHIL # BLD AUTO: 0.23 THOUSAND/ΜL (ref 0–0.61)
EOSINOPHIL NFR BLD AUTO: 2 % (ref 0–6)
ERYTHROCYTE [DISTWIDTH] IN BLOOD BY AUTOMATED COUNT: 14.5 % (ref 11.6–15.1)
GFR SERPL CREATININE-BSD FRML MDRD: 61 ML/MIN/1.73SQ M
GLUCOSE SERPL-MCNC: 101 MG/DL (ref 65–140)
HCT VFR BLD AUTO: 40.9 % (ref 34.8–46.1)
HGB BLD-MCNC: 12.8 G/DL (ref 11.5–15.4)
IMM GRANULOCYTES # BLD AUTO: 0.05 THOUSAND/UL (ref 0–0.2)
IMM GRANULOCYTES NFR BLD AUTO: 1 % (ref 0–2)
INR PPP: 1.81 (ref 0.86–1.17)
LYMPHOCYTES # BLD AUTO: 2.83 THOUSANDS/ΜL (ref 0.6–4.47)
LYMPHOCYTES NFR BLD AUTO: 26 % (ref 14–44)
MAGNESIUM SERPL-MCNC: 1.9 MG/DL (ref 1.6–2.6)
MCH RBC QN AUTO: 28.2 PG (ref 26.8–34.3)
MCHC RBC AUTO-ENTMCNC: 31.3 G/DL (ref 31.4–37.4)
MCV RBC AUTO: 90 FL (ref 82–98)
MONOCYTES # BLD AUTO: 1.01 THOUSAND/ΜL (ref 0.17–1.22)
MONOCYTES NFR BLD AUTO: 9 % (ref 4–12)
NEUTROPHILS # BLD AUTO: 6.5 THOUSANDS/ΜL (ref 1.85–7.62)
NEUTS SEG NFR BLD AUTO: 61 % (ref 43–75)
NRBC BLD AUTO-RTO: 0 /100 WBCS
NT-PROBNP SERPL-MCNC: 190 PG/ML
PLATELET # BLD AUTO: 341 THOUSANDS/UL (ref 149–390)
PMV BLD AUTO: 10.3 FL (ref 8.9–12.7)
POTASSIUM SERPL-SCNC: 4.2 MMOL/L (ref 3.5–5.3)
PROT SERPL-MCNC: 7.5 G/DL (ref 6.4–8.2)
PROTHROMBIN TIME: 20.1 SECONDS (ref 11.8–14.2)
RBC # BLD AUTO: 4.54 MILLION/UL (ref 3.81–5.12)
SODIUM SERPL-SCNC: 140 MMOL/L (ref 136–145)
WBC # BLD AUTO: 10.71 THOUSAND/UL (ref 4.31–10.16)

## 2018-12-22 PROCEDURE — 80053 COMPREHEN METABOLIC PANEL: CPT

## 2018-12-22 PROCEDURE — 96365 THER/PROPH/DIAG IV INF INIT: CPT

## 2018-12-22 PROCEDURE — 83880 ASSAY OF NATRIURETIC PEPTIDE: CPT

## 2018-12-22 PROCEDURE — 85730 THROMBOPLASTIN TIME PARTIAL: CPT

## 2018-12-22 PROCEDURE — 83735 ASSAY OF MAGNESIUM: CPT

## 2018-12-22 PROCEDURE — 85610 PROTHROMBIN TIME: CPT

## 2018-12-22 PROCEDURE — 36415 COLL VENOUS BLD VENIPUNCTURE: CPT

## 2018-12-22 PROCEDURE — 85025 COMPLETE CBC W/AUTO DIFF WBC: CPT

## 2018-12-22 PROCEDURE — 99284 EMERGENCY DEPT VISIT MOD MDM: CPT

## 2018-12-22 RX ORDER — CLINDAMYCIN PHOSPHATE 600 MG/50ML
600 INJECTION INTRAVENOUS ONCE
Status: COMPLETED | OUTPATIENT
Start: 2018-12-22 | End: 2018-12-23

## 2018-12-22 RX ADMIN — CLINDAMYCIN PHOSPHATE 600 MG: 600 INJECTION, SOLUTION INTRAVENOUS at 22:52

## 2018-12-23 VITALS
OXYGEN SATURATION: 95 % | TEMPERATURE: 98.2 F | SYSTOLIC BLOOD PRESSURE: 148 MMHG | WEIGHT: 293 LBS | HEART RATE: 82 BPM | DIASTOLIC BLOOD PRESSURE: 87 MMHG | BODY MASS INDEX: 68.42 KG/M2 | RESPIRATION RATE: 17 BRPM

## 2018-12-23 LAB — HOLD SPECIMEN: NORMAL

## 2018-12-23 RX ORDER — GINSENG 100 MG
1 CAPSULE ORAL ONCE
Status: COMPLETED | OUTPATIENT
Start: 2018-12-23 | End: 2018-12-23

## 2018-12-23 RX ORDER — CEPHALEXIN 500 MG/1
500 CAPSULE ORAL 4 TIMES DAILY
Qty: 32 CAPSULE | Refills: 0 | Status: SHIPPED | OUTPATIENT
Start: 2018-12-23 | End: 2018-12-31

## 2018-12-23 RX ADMIN — BACITRACIN ZINC 1 SMALL APPLICATION: 500 OINTMENT TOPICAL at 01:30

## 2018-12-23 NOTE — ED PROVIDER NOTES
History  Chief Complaint   Patient presents with    Leg Pain     Pt c/o increased redness and pain of RLE accompanied by swelling since "fishscales' fell off anterior aspect     Patient: Cole Moss  63 y o /female  YOB: 1955  MRN: [id number]  PCP: Lizzie Fields MD  Date of evaluation: 12/22/2018    (N B   [address] may have been used in the preparation of this document  Occasional wrong word or "sound-alike" substitutions may have occurred due to the inherent limitations of voice recognition software  Interpretation should be guided by context )    Chief complaint:  Cellulitis  This lady presents to the emergency department chief complaint of I think I have cellulitis again  Yesterday she noticed a slight reddening of skin of her right leg just above the ankle  Over the course of today she noticed an increase in the redness and swelling  She denies any fever, chills, nausea, vomiting, or diarrhea  She has no increase in her chronic shortness of breath  History provided by:  Patient and medical records  Leg Pain   Location:  Leg  Leg location:  R lower leg  Pain details:     Timing:  Constant    Progression:  Worsening  Chronicity:  Recurrent  Prior injury to area:  No  Worsened by:  Nothing  Ineffective treatments:  None tried  Associated symptoms: no back pain, no fever and no neck pain        Prior to Admission Medications   Prescriptions Last Dose Informant Patient Reported? Taking? Cholecalciferol (VITAMIN D3) 25214 units TABS  Self Yes No   Sig: Take 1 tablet by mouth every 14 (fourteen) days   Iodoquinol-HC-Aloe Polysacch (ALCORTIN A) 1-2-1 % GEL  Self Yes No   Sig: Apply topically   albuterol (PROVENTIL HFA,VENTOLIN HFA) 90 mcg/act inhaler  Self Yes No   Sig: Inhale 2 puffs every 6 (six) hours as needed for wheezing     alendronate (FOSAMAX) 70 mg tablet  Self Yes No   Sig: Take 70 mg by mouth every 7 days   amLODIPine-benazepril (LOTREL 5-20) 5-20 MG per capsule  Self No No   Sig: Take 1 capsule by mouth daily   amoxicillin (AMOXIL) 500 mg capsule   Yes No   Sig: TAKE 4 CAPSULES BY MOUTH 1HR BEFORE APPT   cetirizine (ZYRTEC ALLERGY) 10 mg tablet  Self Yes No   Sig: Take 1 tablet by mouth daily as needed   clobetasol (TEMOVATE) 0 05 % ointment  Self Yes No   Sig: Apply topically as directed two times daily   enoxaparin (LOVENOX) 150 mg/mL injection   No No   Sig: Inject 1 mL under the skin on the mornings of 18, 18, and 18  Patient not taking: Reported on 2018    ergocalciferol (VITAMIN D2) 50,000 units  Self Yes No   Sig: Take by mouth   fluticasone (FLONASE) 50 mcg/act nasal spray  Self Yes No   Sig: into each nostril   fluticasone-vilanterol (BREO ELLIPTA) 100-25 mcg/inh inhaler  Self Yes No   Sig: Inhale daily   folic acid-pyridoxine-cyanocobalamin (FOLBIC) 2 5-25-2 mg  Self No No   Sig: Take 1 tablet by mouth daily   furosemide (LASIX) 20 mg tablet  Self Yes No   Sig: Take 20 mg by mouth daily  lansoprazole (PREVACID) 15 mg capsule   Yes No   Sig: Take by mouth   metFORMIN (GLUCOPHAGE-XR) 500 mg 24 hr tablet  Self No No   Sig: Take 1 tablet (500 mg total) by mouth 4 (four) times a day   mometasone (NASONEX) 50 mcg/act nasal spray  Self Yes No   Si sprays into each nostril daily   montelukast (SINGULAIR) 10 mg tablet  Self No No   Sig: Take 1 tablet (10 mg total) by mouth daily at bedtime   multivitamin (THERAGRAN) TABS  Self Yes No   Sig: Take 1 tablet by mouth daily     nystatin (MYCOSTATIN) powder  Self No No   Sig: Apply topically 3 (three) times a day   pravastatin (PRAVACHOL) 40 mg tablet  Self No No   Sig: Take 1 tablet (40 mg total) by mouth daily   triamcinolone (KENALOG) 0 1 % cream  Self Yes No   triamcinolone (KENALOG) 0 5 % cream  Self Yes No   Sig: APPLY TOPICALLY TO RIGHT ANKLE EVERY 12 HOURS   warfarin (COUMADIN) 5 mg tablet  Self Yes No   Sig: Take by mouth daily GOAL 2 0 to 2 5 per hem/onc note Facility-Administered Medications: None       Past Medical History:   Diagnosis Date    [facility]     Homocysteinemia (HCC)     Hypercoagulable state ([facility] )     Hypertension     Lyme disease     Obesity     PCOS (polycystic ovarian syndrome)     Renal disorder     kidney stones    Sleep apnea     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)        Past Surgical History:   Procedure Laterality Date    ANKLE SURGERY Right     APPENDECTOMY      BREAST BIOPSY      BREAST BIOPSY      CLOSED REDUCTION METATARSAL FRACTURE Right     FOOT SURGERY      HEMORROIDECTOMY      HYSTERECTOMY      JOINT REPLACEMENT Bilateral     knee    LITHOTRIPSY      VENA CAVA FILTER PLACEMENT      onset: 1/10/10       Family History   Problem Relation Age of Onset    Pulmonary embolism Mother     Hypercoagulant Ability Mother         primary state   [place] Hypercoagulant Ability Maternal Grandmother         primary state    No Known Problems Father      I have reviewed and agree with the history as documented  Social History   Substance Use Topics    Smoking status: Never Smoker    Smokeless tobacco: Never Used    Alcohol use No        Review of Systems   Constitutional: Negative for chills and fever  HENT: Negative for hearing loss, trouble swallowing and voice change  Eyes: Negative for pain, redness and visual disturbance  Respiratory: Negative for cough and shortness of breath  Cardiovascular: Positive for leg swelling  Negative for chest pain and palpitations  Gastrointestinal: Negative for abdominal pain, constipation, diarrhea, nausea and vomiting  Genitourinary: Negative for dysuria, hematuria, vaginal bleeding and vaginal discharge  Musculoskeletal: Negative for back pain, gait problem and neck pain  Skin: Negative for color change and rash  Neurological: Negative for weakness and light-headedness     Psychiatric/Behavioral: Negative for confusion and decreased concentration  The patient is not nervous/anxious  All other systems reviewed and are negative  Physical Exam  Physical Exam   Constitutional: She is oriented to person, place, and time  She appears well-developed and well-nourished  HENT:   Mouth/Throat: Oropharynx is clear and moist and mucous membranes are normal    Voice normal   Eyes: Pupils are equal, round, and reactive to light  EOM are normal    Cardiovascular: Normal rate and regular rhythm  Pulmonary/Chest: Effort normal    Abdominal: Soft  Bowel sounds are normal    Neurological: She is alert and oriented to person, place, and time  GCS eye subscore is 4  GCS verbal subscore is 5  GCS motor subscore is 6  Skin: Skin is warm and dry  There is erythema  Images are attached here and are available in the Media tab  Psychiatric: She has a normal mood and affect  Her speech is normal and behavior is normal    Nursing note and vitals reviewed  Vital Signs  ED Triage Vitals [12/22/18 2144]   Temperature Pulse Respirations Blood Pressure SpO2   98 2 °F (36 8 °C) 98 20 (!) 185/85 98 %      Temp Source Heart Rate Source Patient Position - Orthostatic VS BP Location FiO2 (%)   Temporal Monitor Lying Left arm --      Pain Score       --           Vitals:    12/22/18 2144 12/23/18 0030   BP: (!) 185/85 148/87   Pulse: 98 82   Patient Position - Orthostatic VS: Lying        Visual Acuity      ED Medications  Medications   clindamycin (CLEOCIN) IVPB (premix) 600 mg (0 mg Intravenous Stopped 12/23/18 0000)   bacitracin topical ointment 1 small application (1 small application Topical Given 12/23/18 0130)       Diagnostic Studies  Results Reviewed     Procedure Component Value Units Date/Time    [place] draw [[id number]] Collected:  12/22/18 2222    Lab Status:  Final result Specimen:  Blood Updated:  12/23/18 0002    Narrative: The following orders were created for panel order [place] draw    Procedure Abnormality         Status                     ---------                               -----------         ------                     [place] top on TXXY[[id number]]                                 Final result               Green / Black tube on GSCJ[[id number]]                       Final result                 Please view results for these tests on the individual orders  Comprehensive metabolic panel [[id number]]  (Abnormal) Collected:  12/22/18 2222    Lab Status:  Final result Specimen:  Blood from Arm, Left Updated:  12/22/18 2252     Sodium 140 mmol/L      Potassium 4 2 mmol/L      Chloride 103 mmol/L      CO2 29 mmol/L      ANION GAP 8 mmol/L      BUN 15 mg/dL      Creatinine 0 99 mg/dL      Glucose 101 mg/dL      Calcium 7 9 (L) mg/dL      AST 20 U/L      ALT 22 U/L      Alkaline Phosphatase 77 U/L      Total Protein 7 5 g/dL      Albumin 2 9 (L) g/dL      Total Bilirubin 0 30 mg/dL      eGFR 61 ml/min/1 73sq m     Narrative:         National Kidney Disease Education Program recommendations are as follows:  GFR calculation is accurate only with a steady state creatinine  Chronic Kidney disease less than 60 ml/min/1 73 sq  meters  Kidney failure less than 15 ml/min/1 73 sq  meters      B-type natriuretic peptide [[id number]]  (Abnormal) Collected:  12/22/18 2222    Lab Status:  Final result Specimen:  Blood from Arm, Left Updated:  12/22/18 2252     NT-proBNP 190 (H) pg/mL     Magnesium [[id number]]  (Normal) Collected:  12/22/18 2222    Lab Status:  Final result Specimen:  Blood from Arm, Left Updated:  12/22/18 2252     Magnesium 1 9 mg/dL     APTT [[id number]]  (Normal) Collected:  12/22/18 2222    Lab Status:  Final result Specimen:  Blood from Arm, Left Updated:  12/22/18 2240     PTT 35 seconds     Protime-INR [[id number]]  (Abnormal) Collected:  12/22/18 2222    Lab Status:  Final result Specimen:  Blood from Arm, Left Updated:  12/22/18 2240     Protime 20 1 (H) seconds      INR 1 81 (H)    CBC and differential [[id number]]  (Abnormal) Collected:  12/22/18 2222    Lab Status:  Final result Specimen:  Blood from Arm, Left Updated:  12/22/18 2232     WBC 10 71 (H) Thousand/uL      RBC 4 54 Million/uL      Hemoglobin 12 8 g/dL      Hematocrit 40 9 %      MCV 90 fL      MCH 28 2 pg      MCHC 31 3 (L) g/dL      RDW 14 5 %      MPV 10 3 fL      Platelets 568 Thousands/uL      nRBC 0 /100 WBCs      Neutrophils Relative 61 %      Immat GRANS % 1 %      Lymphocytes Relative 26 %      Monocytes Relative 9 %      Eosinophils Relative 2 %      Basophils Relative 1 %      Neutrophils Absolute 6 50 Thousands/µL      Immature Grans Absolute 0 05 Thousand/uL      Lymphocytes Absolute 2 83 Thousands/µL      Monocytes Absolute 1 01 Thousand/µL      Eosinophils Absolute 0 23 Thousand/µL      Basophils Absolute 0 09 Thousands/µL                  VAS lower limb venous duplex study, unilateral/limited    (Results Pending)              Procedures  Procedures       Phone Contacts  ED Phone Contact    ED Course  ED Course as of Dec 23 0145   Sat Dec 22, 2018   2327 WBC: (!) 10 71                       patient's INR is out of range  The desired range is between 2 and 2 5  I informed her of the value  Although I doubt that this presentation is a result DVT, I have made a prescription for a Doppler study  If she and her hematologist in consultation tomorrow feel that this is warranted, she can get the study                        MDM  Number of Diagnoses or Management Options  Cellulitis of right lower leg: new and requires workup  Swelling of lower leg:      Amount and/or Complexity of Data Reviewed  Tests in the medicine section of CPT®: reviewed    Risk of Complications, Morbidity, and/or Mortality  Presenting problems: high    Patient Progress  Patient progress: stable    CritCare Time    Disposition  Final diagnoses:   Cellulitis of right lower leg   Swelling of lower leg     Time reflects when diagnosis was documented in both MDM as applicable and the Disposition within this note     Time User Action Codes Description Comment    12/23/2018 12:52 AM Steven Gurrola [[id number]] Cellulitis of right lower leg     12/23/2018  1:11 AM Steven ENCISO Add [[id number]] Swelling of lower leg       ED Disposition     ED Disposition Condition Comment    Discharge  Caryle Coffin discharge to home/self care  Condition at discharge: Good        Follow-up Information     Follow up With Specialties Details Why Contact Info    Demarcus Campos MD [facility]   [address] [place] [address] [address] [address])      Mary Arevalo MD Hematology and Oncology, Hematology, Oncology  Keep scheduled appointment   [address] [place] [address]  [phone]            Discharge Medication List as of 12/23/2018  1:17 AM      START taking these medications    Details   cephalexin (KEFLEX) 500 mg capsule Take 1 capsule (500 mg total) by mouth 4 (four) times a day for 8 days (antibiotic), Starting Sun 12/23/2018, Until Mon 12/31/2018, Print         CONTINUE these medications which have NOT CHANGED    Details   albuterol (PROVENTIL HFA,VENTOLIN HFA) 90 mcg/act inhaler Inhale 2 puffs every 6 (six) hours as needed for wheezing , Historical Med      alendronate (FOSAMAX) 70 mg tablet Take 70 mg by mouth every 7 days, Historical Med      amLODIPine-benazepril (LOTREL 5-20) 5-20 MG per capsule Take 1 capsule by mouth daily, Starting u 6/21/2018, Print      amoxicillin (AMOXIL) 500 mg capsule TAKE 4 CAPSULES BY MOUTH 1HR BEFORE APPT, Historical Med      cetirizine (ZYRTEC ALLERGY) 10 mg tablet Take 1 tablet by mouth daily as needed, Historical Med      Cholecalciferol (VITAMIN D3) 37035 units TABS Take 1 tablet by mouth every 14 (fourteen) days, Historical Med      clobetasol (TEMOVATE) 0 05 % ointment Apply topically as directed two times daily, Historical Med      enoxaparin (LOVENOX) 150 mg/mL injection Inject 1 mL under the skin on the mornings of 11/12/18, 11/13/18, and 11/14/18 , Normal      ergocalciferol (VITAMIN D2) 50,000 units Take by mouth, Starting Wed 3/30/2011, Historical Med      fluticasone (FLONASE) 50 mcg/act nasal spray into each nostril, Starting Tue 8/12/2014, Historical Med      fluticasone-vilanterol (BREO ELLIPTA) 100-25 mcg/inh inhaler Inhale daily, Starting Wed 9/14/2016, Historical Med      folic acid-pyridoxine-cyanocobalamin (FOLBIC) 2 5-25-2 mg Take 1 tablet by mouth daily, Starting Tue 4/17/2018, Normal      furosemide (LASIX) 20 mg tablet Take 20 mg by mouth daily  , Historical Med      Iodoquinol-HC-Aloe Polysacch (ALCORTIN A) 1-2-1 % GEL Apply topically, Historical Med      lansoprazole (PREVACID) 15 mg capsule Take by mouth, Historical Med      metFORMIN (GLUCOPHAGE-XR) 500 mg 24 hr tablet Take 1 tablet (500 mg total) by mouth 4 (four) times a day, Starting Thu 6/21/2018, Print      mometasone (NASONEX) 50 mcg/act nasal spray 2 sprays into each nostril daily, Historical Med      montelukast (SINGULAIR) 10 mg tablet Take 1 tablet (10 mg total) by mouth daily at bedtime, Starting Thu 6/21/2018, Print      multivitamin (THERAGRAN) TABS Take 1 tablet by mouth daily  , Historical Med      nystatin (MYCOSTATIN) powder Apply topically 3 (three) times a day, Starting Tue 4/17/2018, Normal      pravastatin (PRAVACHOL) 40 mg tablet Take 1 tablet (40 mg total) by mouth daily, Starting Thu 5/17/2018, Normal      triamcinolone (KENALOG) 0 1 % cream Starting Sat 5/5/2018, Historical Med      triamcinolone (KENALOG) 0 5 % cream APPLY TOPICALLY TO RIGHT ANKLE EVERY 12 HOURS, Historical Med      warfarin (COUMADIN) 5 mg tablet Take by mouth daily GOAL 2 0 to 2 5 per hem/onc note , Historical Med             Outpatient Discharge Orders  VAS lower limb venous duplex study, unilateral/limited   Standing Status: Future  Standing Exp   Date: 12/23/22         ED Provider  Electronically Signed by           Sudha Don Bharat Lopez MD  12/23/18 Keagan Ro MD  12/23/18 [id number]

## 2018-12-23 NOTE — DISCHARGE INSTRUCTIONS
Call today to see if the Venous Doppler study is available  Then call your hematologist's office today,  Tell the on-call doctor that you had your INR today 1 81  and discuss  1   your Coumadin dose  2   whether they even want the Doppler  +++    Cellulitis   WHAT YOU NEED TO KNOW:   Cellulitis is a skin infection caused by bacteria  Cellulitis may go away on its own or you may need treatment  Your healthcare provider may draw a [place] around the outside edges of your cellulitis  If your cellulitis spreads, your healthcare provider will see it outside of the [place]  DISCHARGE INSTRUCTIONS:   Call 911 if:   · You have sudden trouble breathing or chest pain  Return to the emergency department if:   · Your wound gets larger and more painful  · You feel a crackling under your skin when you touch it  · You have purple dots or bumps on your skin, or you see bleeding under your skin  · You have new swelling and pain in your legs  · The red, warm, swollen area gets larger  · You see red streaks coming from the infected area  Contact your healthcare provider if:   · You have a fever  · Your fever or pain does not go away or gets worse  · The area does not get smaller after 2 days of antibiotics  · Your skin is flaking or peeling off  · You have questions or concerns about your condition or care  Medicines:   · Antibiotics  help treat the bacterial infection  · NSAIDs , such as ibuprofen, help decrease swelling, pain, and fever  NSAIDs can cause stomach bleeding or kidney problems in certain people  If you take blood thinner medicine, always ask if NSAIDs are safe for you  Always read the medicine label and follow directions  Do not give these medicines to children under 10months of age without direction from your child's healthcare provider  · Acetaminophen  decreases pain and fever  It is available without a doctor's order  Ask how much to take and how often to take it  Follow directions  Read the labels of all other medicines you are using to see if they also contain acetaminophen, or ask your doctor or pharmacist  Acetaminophen can cause liver damage if not taken correctly  Do not use more than 4 grams (4,000 milligrams) total of acetaminophen in one day  · Take your medicine as directed  Contact your healthcare provider if you think your medicine is not helping or if you have side effects  Tell him or her if you are allergic to any medicine  Keep a list of the medicines, vitamins, and herbs you take  Include the amounts, and when and why you take them  Bring the list or the pill bottles to follow-up visits  Carry your medicine list with you in case of an emergency  Self-care:   · Elevate the area above the level of your heart  as often as you can  This will help decrease swelling and pain  Prop the area on pillows or blankets to keep it elevated comfortably  · Clean the area daily until the wound scabs over  Gently wash the area with soap and water  Pat dry  Use dressings as directed  · Place cool or warm, wet cloths on the area as directed  Use clean cloths and clean water  Leave it on the area until the cloth is room temperature  Pat the area dry with a clean, dry cloth  The cloths may help decrease pain  Prevent cellulitis:   · Do not scratch bug bites or areas of injury  You increase your risk for cellulitis by scratching these areas  · Do not share personal items, such as towels, clothing, and razors  · Clean exercise equipment  with germ-killing  before and after you use it  · Wash your hands often  Use soap and water  Wash your hands after you use the bathroom, change a child's diapers, or sneeze  Wash your hands before you prepare or eat food  Use lotion to prevent dry, cracked skin  · Wear pressure stockings as directed  You may be told to wear the stockings if you have peripheral edema   The stockings improve blood flow and decrease swelling  · Treat athlete's foot  This can help prevent the spread of a bacterial skin infection  Follow up with your healthcare provider within 3 days, or as directed: Your healthcare provider will check if your cellulitis is getting better  You may need different medicine  Write down your questions so you remember to ask them during your visits  © 2017 [employer] Information is for End User's use only and may not be sold, redistributed or otherwise used for commercial purposes  All illustrations and images included in CareNotes® are the copyrighted property of [employer]  or Mike Herrmann  The above information is an  only  It is not intended as medical advice for individual conditions or treatments  Talk to your doctor, nurse or pharmacist before following any medical regimen to see if it is safe and effective for you

## 2018-12-24 ENCOUNTER — TELEPHONE (OUTPATIENT)
Dept: HEMATOLOGY ONCOLOGY | Facility: CLINIC | Age: 63
End: 2018-12-24

## 2018-12-24 NOTE — TELEPHONE ENCOUNTER
I spoke with patient and she is going to go back to the emergency room because cellulitis has not improved  She had the instructions from the emergency room physician to come back for intravenous antibiotics if there is no improvement in 2 days    She might get admitted for intravenous antibiotics and if that happened I am sure her INR will be monitored in the hospital   If  did not get admitted she will decrease Coumadin dose to 5 mg from 7 5 mg because of drug interaction with Keflex and have PT INR checked on 12/26/18

## 2018-12-24 NOTE — TELEPHONE ENCOUNTER
Patient was in the ER over the weekend for leg swelling  She has cellulitis  They had taken a lot of blood work while she was there, would like Dr Ced Sinclair to look at it  They told her to ask if she needed a Venous Dopplar done? Had IV antibiotics while there and was sent home on Keflex 500mg 4 times a day  Patient also said she called the on call Dr over the weekend but no one called her back  Says she was told her WBC count was going up  Please call patient to discuss if something can be done or if she needs to go back to the ER  They told her if it wasn't better in 2 days to go back but she has not yet

## 2018-12-25 ENCOUNTER — APPOINTMENT (EMERGENCY)
Dept: RADIOLOGY | Facility: HOSPITAL | Age: 63
End: 2018-12-25
Payer: COMMERCIAL

## 2018-12-25 ENCOUNTER — HOSPITAL ENCOUNTER (EMERGENCY)
Facility: HOSPITAL | Age: 63
Discharge: HOME | End: 2018-12-25
Attending: EMERGENCY MEDICINE
Payer: COMMERCIAL

## 2018-12-25 VITALS
HEIGHT: 64 IN | RESPIRATION RATE: 18 BRPM | DIASTOLIC BLOOD PRESSURE: 63 MMHG | HEART RATE: 82 BPM | SYSTOLIC BLOOD PRESSURE: 130 MMHG | OXYGEN SATURATION: 98 % | BODY MASS INDEX: 50.02 KG/M2 | TEMPERATURE: 98.1 F | WEIGHT: 293 LBS

## 2018-12-25 DIAGNOSIS — L03.115 CELLULITIS OF RIGHT LOWER LEG: Primary | ICD-10-CM

## 2018-12-25 LAB
ANION GAP SERPL CALCULATED.3IONS-SCNC: 7 MMOL/L (ref 4–13)
APTT PPP: 33 SECONDS (ref 26–38)
BASOPHILS # BLD AUTO: 0.08 THOUSANDS/ΜL (ref 0–0.1)
BASOPHILS NFR BLD AUTO: 1 % (ref 0–1)
BUN SERPL-MCNC: 22 MG/DL (ref 5–25)
CALCIUM SERPL-MCNC: 8.4 MG/DL (ref 8.3–10.1)
CHLORIDE SERPL-SCNC: 104 MMOL/L (ref 100–108)
CO2 SERPL-SCNC: 29 MMOL/L (ref 21–32)
CREAT SERPL-MCNC: 1.15 MG/DL (ref 0.6–1.3)
EOSINOPHIL # BLD AUTO: 0.21 THOUSAND/ΜL (ref 0–0.61)
EOSINOPHIL NFR BLD AUTO: 2 % (ref 0–6)
ERYTHROCYTE [DISTWIDTH] IN BLOOD BY AUTOMATED COUNT: 14.6 % (ref 11.6–15.1)
GFR SERPL CREATININE-BSD FRML MDRD: 51 ML/MIN/1.73SQ M
GLUCOSE SERPL-MCNC: 107 MG/DL (ref 65–140)
HCT VFR BLD AUTO: 41.2 % (ref 34.8–46.1)
HGB BLD-MCNC: 12.9 G/DL (ref 11.5–15.4)
IMM GRANULOCYTES # BLD AUTO: 0.02 THOUSAND/UL (ref 0–0.2)
IMM GRANULOCYTES NFR BLD AUTO: 0 % (ref 0–2)
INR PPP: 1.77 (ref 0.86–1.17)
LYMPHOCYTES # BLD AUTO: 2.41 THOUSANDS/ΜL (ref 0.6–4.47)
LYMPHOCYTES NFR BLD AUTO: 27 % (ref 14–44)
MCH RBC QN AUTO: 28.4 PG (ref 26.8–34.3)
MCHC RBC AUTO-ENTMCNC: 31.3 G/DL (ref 31.4–37.4)
MCV RBC AUTO: 91 FL (ref 82–98)
MONOCYTES # BLD AUTO: 0.72 THOUSAND/ΜL (ref 0.17–1.22)
MONOCYTES NFR BLD AUTO: 8 % (ref 4–12)
NEUTROPHILS # BLD AUTO: 5.43 THOUSANDS/ΜL (ref 1.85–7.62)
NEUTS SEG NFR BLD AUTO: 62 % (ref 43–75)
NRBC BLD AUTO-RTO: 0 /100 WBCS
PLATELET # BLD AUTO: 339 THOUSANDS/UL (ref 149–390)
PMV BLD AUTO: 10.1 FL (ref 8.9–12.7)
POTASSIUM SERPL-SCNC: 4.4 MMOL/L (ref 3.5–5.3)
PROTHROMBIN TIME: 19.8 SECONDS (ref 11.8–14.2)
RBC # BLD AUTO: 4.55 MILLION/UL (ref 3.81–5.12)
SODIUM SERPL-SCNC: 140 MMOL/L (ref 136–145)
WBC # BLD AUTO: 8.87 THOUSAND/UL (ref 4.31–10.16)

## 2018-12-25 PROCEDURE — 85730 THROMBOPLASTIN TIME PARTIAL: CPT | Performed by: EMERGENCY MEDICINE

## 2018-12-25 PROCEDURE — 73590 X-RAY EXAM OF LOWER LEG: CPT

## 2018-12-25 PROCEDURE — 80048 BASIC METABOLIC PNL TOTAL CA: CPT | Performed by: EMERGENCY MEDICINE

## 2018-12-25 PROCEDURE — 96365 THER/PROPH/DIAG IV INF INIT: CPT

## 2018-12-25 PROCEDURE — 36415 COLL VENOUS BLD VENIPUNCTURE: CPT | Performed by: EMERGENCY MEDICINE

## 2018-12-25 PROCEDURE — 99283 EMERGENCY DEPT VISIT LOW MDM: CPT

## 2018-12-25 PROCEDURE — 85610 PROTHROMBIN TIME: CPT | Performed by: EMERGENCY MEDICINE

## 2018-12-25 PROCEDURE — 85025 COMPLETE CBC W/AUTO DIFF WBC: CPT | Performed by: EMERGENCY MEDICINE

## 2018-12-25 RX ORDER — CLINDAMYCIN HYDROCHLORIDE 150 MG/1
450 CAPSULE ORAL 4 TIMES DAILY
Qty: 84 CAPSULE | Refills: 0 | Status: SHIPPED | OUTPATIENT
Start: 2018-12-25 | End: 2018-12-25

## 2018-12-25 RX ORDER — CLINDAMYCIN HYDROCHLORIDE 150 MG/1
450 CAPSULE ORAL EVERY 8 HOURS SCHEDULED
Qty: 90 CAPSULE | Refills: 0 | Status: SHIPPED | OUTPATIENT
Start: 2018-12-25 | End: 2019-01-04 | Stop reason: SDUPTHER

## 2018-12-25 RX ORDER — CLINDAMYCIN HYDROCHLORIDE 150 MG/1
450 CAPSULE ORAL ONCE
Status: DISCONTINUED | OUTPATIENT
Start: 2018-12-25 | End: 2018-12-25

## 2018-12-25 RX ORDER — CLINDAMYCIN PHOSPHATE 600 MG/50ML
600 INJECTION INTRAVENOUS ONCE
Status: COMPLETED | OUTPATIENT
Start: 2018-12-25 | End: 2018-12-25

## 2018-12-25 RX ADMIN — CLINDAMYCIN PHOSPHATE 600 MG: 600 INJECTION, SOLUTION INTRAVENOUS at 18:27

## 2018-12-25 NOTE — ED PROVIDER NOTES
History  Chief Complaint   Patient presents with    Follow-Up Cellulitis     Pt reports she was here 2 days ago with cellulitis on her right lower leg  Pt reports getting worse  Patient: Eulogio Goodwin  32 y o /female  YOB: 1955  MRN: [id number]  PCP: Judy Baldwin MD  Date of evaluation: 12/25/2018    (N B   [address] may have been used in the preparation of this document  Occasional wrong word or "sound-alike" substitutions may have occurred due to the inherent limitations of voice recognition software  Interpretation should be guided by context )    Wound Check    She was treated in the ED 2 to 3 days ago  Previous treatment in the ED includes IV/IM antibiotics  Treatments since wound repair include oral antibiotics  There has been no drainage from the wound  The redness has worsened  There is no swelling present  She has no difficulty moving the affected extremity or digit  Prior to Admission Medications   Prescriptions Last Dose Informant Patient Reported? Taking? Cholecalciferol (VITAMIN D3) 93321 units TABS  Self Yes Yes   Sig: Take 1 tablet by mouth every 14 (fourteen) days   Iodoquinol-HC-Aloe Polysacch (ALCORTIN A) 1-2-1 % GEL  Self Yes Yes   Sig: Apply topically   albuterol (PROVENTIL HFA,VENTOLIN HFA) 90 mcg/act inhaler  Self Yes Yes   Sig: Inhale 2 puffs every 6 (six) hours as needed for wheezing     alendronate (FOSAMAX) 70 mg tablet  Self Yes Yes   Sig: Take 70 mg by mouth every 7 days   amLODIPine-benazepril (LOTREL 5-20) 5-20 MG per capsule  Self No Yes   Sig: Take 1 capsule by mouth daily   amoxicillin (AMOXIL) 500 mg capsule   Yes No   Sig: TAKE 4 CAPSULES BY MOUTH 1HR BEFORE APPT   cephalexin (KEFLEX) 500 mg capsule 12/25/2018 at Unknown time  No Yes   Sig: Take 1 capsule (500 mg total) by mouth 4 (four) times a day for 8 days (antibiotic)   cetirizine (ZYRTEC ALLERGY) 10 mg tablet  Self Yes Yes   Sig: Take 1 tablet by mouth daily as needed   clobetasol (TEMOVATE) 0 05 % ointment  Self Yes Yes   Sig: Apply topically as directed two times daily   enoxaparin (LOVENOX) 150 mg/mL injection   No No   Sig: Inject 1 mL under the skin on the mornings of 18, 18, and 18  ergocalciferol (VITAMIN D2) 50,000 units  Self Yes Yes   Sig: Take by mouth   fluticasone (FLONASE) 50 mcg/act nasal spray  Self Yes Yes   Sig: into each nostril   fluticasone-vilanterol (BREO ELLIPTA) 100-25 mcg/inh inhaler  Self Yes Yes   Sig: Inhale daily   folic acid-pyridoxine-cyanocobalamin (FOLBIC) 2 5-25-2 mg  Self No Yes   Sig: Take 1 tablet by mouth daily   furosemide (LASIX) 20 mg tablet  Self Yes Yes   Sig: Take 20 mg by mouth daily  lansoprazole (PREVACID) 15 mg capsule   Yes Yes   Sig: Take by mouth   metFORMIN (GLUCOPHAGE-XR) 500 mg 24 hr tablet  Self No Yes   Sig: Take 1 tablet (500 mg total) by mouth 4 (four) times a day   mometasone (NASONEX) 50 mcg/act nasal spray  Self Yes Yes   Si sprays into each nostril daily   montelukast (SINGULAIR) 10 mg tablet  Self No Yes   Sig: Take 1 tablet (10 mg total) by mouth daily at bedtime   multivitamin (THERAGRAN) TABS  Self Yes Yes   Sig: Take 1 tablet by mouth daily     nystatin (MYCOSTATIN) powder  Self No Yes   Sig: Apply topically 3 (three) times a day   pravastatin (PRAVACHOL) 40 mg tablet  Self No Yes   Sig: Take 1 tablet (40 mg total) by mouth daily   triamcinolone (KENALOG) 0 1 % cream  Self Yes Yes   triamcinolone (KENALOG) 0 5 % cream  Self Yes Yes   Sig: APPLY TOPICALLY TO RIGHT ANKLE EVERY 12 HOURS   warfarin (COUMADIN) 5 mg tablet  Self Yes Yes   Sig: Take by mouth daily GOAL 2 0 to 2 5 per hem/onc note       Facility-Administered Medications: None       Past Medical History:   Diagnosis Date    Blood clotting disorder (HCC)     Homocysteinemia (HCC)     Hypercoagulable state ([facility] [address] )     Hypertension     Lyme disease     Obesity     PCOS (polycystic ovarian syndrome)     Renal disorder kidney stones    Sleep apnea     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)        Past Surgical History:   Procedure Laterality Date    ANKLE SURGERY Right     APPENDECTOMY      BREAST BIOPSY      BREAST BIOPSY      CLOSED REDUCTION METATARSAL FRACTURE Right     FOOT SURGERY      HEMORROIDECTOMY      HYSTERECTOMY      JOINT REPLACEMENT Bilateral     knee    LITHOTRIPSY      VENA CAVA FILTER PLACEMENT      onset: 1/10/10       Family History   Problem Relation Age of Onset    Pulmonary embolism Mother     Hypercoagulant Ability Mother         primary state    Hypercoagulant Ability Maternal Grandmother         primary state    No Known Problems Father      I have reviewed and agree with the history as documented  Social History   Substance Use Topics    Smoking status: Never Smoker    Smokeless tobacco: Never Used    Alcohol use No        Review of Systems   Constitutional: Negative for chills and fever  HENT: Negative for hearing loss, trouble swallowing and voice change  Eyes: Negative for pain, redness and visual disturbance  Respiratory: Negative for cough and shortness of breath  Cardiovascular: Negative for chest pain and palpitations  Gastrointestinal: Negative for abdominal pain, constipation, diarrhea, nausea and vomiting  Genitourinary: Negative for dysuria, hematuria, vaginal bleeding and vaginal discharge  Musculoskeletal: Negative for back pain, gait problem and neck pain  Skin: Negative for color change and rash  Neurological: Negative for weakness and light-headedness  Psychiatric/Behavioral: Negative for confusion and decreased concentration  The patient is not nervous/anxious  All other systems reviewed and are negative  Physical Exam  Physical Exam   Constitutional: She appears well-developed and well-nourished  HENT:   Head: Normocephalic and atraumatic     Cardiovascular: Normal rate and regular rhythm  Pulmonary/Chest: Effort normal and breath sounds normal    Abdominal: Soft  There is no tenderness  Neurological: She is alert  GCS eye subscore is 4  GCS verbal subscore is 5  GCS motor subscore is 6  Skin: Skin is warm and dry  See images under media tab   Psychiatric: She has a normal mood and affect  Her speech is normal and behavior is normal    Nursing note and vitals reviewed        Vital Signs  ED Triage Vitals [12/25/18 1616]   Temperature Pulse Respirations Blood Pressure SpO2   98 1 °F (36 7 °C) 99 17 (!) 184/87 96 %      Temp Source Heart Rate Source Patient Position - Orthostatic VS BP Location FiO2 (%)   Temporal Monitor Lying Left arm --      Pain Score       3           Vitals:    12/25/18 1700 12/25/18 1800 12/25/18 1830 12/25/18 1856   BP: 147/77 124/56 126/58 130/63   Pulse: 79 79 84 82   Patient Position - Orthostatic VS: Lying Lying Lying Lying       Visual Acuity      ED Medications  Medications   clindamycin (CLEOCIN) IVPB (premix) 600 mg (0 mg Intravenous Stopped 12/25/18 1852)       Diagnostic Studies  Results Reviewed     Procedure Component Value Units Date/Time    Protime-INR [[id number]]  (Abnormal) Collected:  12/25/18 1642    Lab Status:  Final result Specimen:  Blood from Arm, Left Updated:  12/25/18 1701     Protime 19 8 (H) seconds      INR 1 77 (H)    APTT [[id number]]  (Normal) Collected:  12/25/18 1642    Lab Status:  Final result Specimen:  Blood from Arm, Left Updated:  12/25/18 1701     PTT 33 seconds     Basic metabolic panel [[id number]] Collected:  12/25/18 1642    Lab Status:  Final result Specimen:  Blood from Arm, Left Updated:  12/25/18 1658     Sodium 140 mmol/L      Potassium 4 4 mmol/L      Chloride 104 mmol/L      CO2 29 mmol/L      ANION GAP 7 mmol/L      BUN 22 mg/dL      Creatinine 1 15 mg/dL      Glucose 107 mg/dL      Calcium 8 4 mg/dL      eGFR 51 ml/min/1 73sq m     Narrative:         National Kidney Disease Education Program recommendations are as follows:  GFR calculation is accurate only with a steady state creatinine  Chronic Kidney disease less than 60 ml/min/1 73 sq  meters  Kidney failure less than 15 ml/min/1 73 sq  meters  CBC and differential [[id number]]  (Abnormal) Collected:  12/25/18 1642    Lab Status:  Final result Specimen:  Blood from Arm, Left Updated:  12/25/18 1648     WBC 8 87 Thousand/uL      RBC 4 55 Million/uL      Hemoglobin 12 9 g/dL      Hematocrit 41 2 %      MCV 91 fL      MCH 28 4 pg      MCHC 31 3 (L) g/dL      RDW 14 6 %      MPV 10 1 fL      Platelets 210 Thousands/uL      nRBC 0 /100 WBCs      Neutrophils Relative 62 %      Immat GRANS % 0 %      Lymphocytes Relative 27 %      Monocytes Relative 8 %      Eosinophils Relative 2 %      Basophils Relative 1 %      Neutrophils Absolute 5 43 Thousands/µL      Immature Grans Absolute 0 02 Thousand/uL      Lymphocytes Absolute 2 41 Thousands/µL      Monocytes Absolute 0 72 Thousand/µL      Eosinophils Absolute 0 21 Thousand/µL      Basophils Absolute 0 08 Thousands/µL                  XR tibia fibula 2 vw right   Final Result by Hannah Abreu MD (12/25 1744)      Diffuse soft tissue swelling in keeping with the patient's history of synovitis  Partially imaged total right knee arthroplasty without mild lucency surrounding the tibial component possibly indicating loosening  Workstation performed: [id number]                    Procedures  Procedures       Phone Contacts  ED Phone Contact    ED Course  ED Course as of Dec 25 1904   Tue Dec 25, 2018   1722 WBC: 8 87                               MDM  Number of Diagnoses or Management Options  Cellulitis of right lower leg:      Amount and/or Complexity of Data Reviewed  Tests in the medicine section of CPT®: ordered and reviewed  Decide to obtain previous medical records or to obtain history from someone other than the patient: yes    Risk of Complications, Morbidity, and/or Mortality  General comments:      Dana Gutierrez has not improved on the initial outpatient antibiotic, she does not appear systemically ill and her white blood cell count is normal   She is a very compliant patient  I think it is appropriate to change her antibiotic and discharge her home with instructions to return to the emergency department immediately if she is not improving on this antibiotic, if she has fevers or chills, or she develops any other concerning symptoms  Patient Progress  Patient progress: stable    CritCare Time    Disposition  Final diagnoses:   Cellulitis of right lower leg - second encounter     Time reflects when diagnosis was documented in both MDM as applicable and the Disposition within this note     Time User Action Codes Description Comment    12/25/2018  6:51 PM Isauro Scales Add [Y21 396] Cellulitis of right lower leg     12/25/2018  6:51 PM Rob Clifford Modify [L03 115] Cellulitis of right lower leg second encounter      ED Disposition     ED Disposition Condition Comment    Discharge  Steve Hurtado discharge to home/self care  Condition at discharge: Good        Follow-up Information     Follow up With Specialties Details Why Contact Info    Cata Hilliard MD Family Medicine Call For followup, WITHOUT FAIL, Say you are following up from an ER visit  [address] [place] [address]  [address] [phone]                    Patient's Medications   Discharge Prescriptions    CLINDAMYCIN (CLEOCIN) 150 MG CAPSULE    Take 3 capsules (450 mg total) by mouth every 8 (eight) hours for 10 days (antibiotic)       Start Date: 12/25/2018End Date: 1/4/2019       Order Dose: 450 mg       Quantity: 90 capsule    Refills: 0     No discharge procedures on file      ED Provider  Electronically Signed by           Elisabet Wang MD  12/25/18 8534

## 2018-12-25 NOTE — DISCHARGE INSTRUCTIONS
Cellulitis   WHAT YOU NEED TO KNOW:   Cellulitis is a skin infection caused by bacteria  Cellulitis may go away on its own or you may need treatment  Your healthcare provider may draw a [place] around the outside edges of your cellulitis  If your cellulitis spreads, your healthcare provider will see it outside of the [place]  DISCHARGE INSTRUCTIONS:   Call 911 if:   · You have sudden trouble breathing or chest pain  Return to the emergency department if:   · Your wound gets larger and more painful  · You feel a crackling under your skin when you touch it  · You have purple dots or bumps on your skin, or you see bleeding under your skin  · You have new swelling and pain in your legs  · The red, warm, swollen area gets larger  · You see red streaks coming from the infected area  Contact your healthcare provider if:   · You have a fever  · Your fever or pain does not go away or gets worse  · The area does not get smaller after 2 days of antibiotics  · Your skin is flaking or peeling off  · You have questions or concerns about your condition or care  Medicines:   · Antibiotics  help treat the bacterial infection  · NSAIDs , such as ibuprofen, help decrease swelling, pain, and fever  NSAIDs can cause stomach bleeding or kidney problems in certain people  If you take blood thinner medicine, always ask if NSAIDs are safe for you  Always read the medicine label and follow directions  Do not give these medicines to children under 10months of age without direction from your child's healthcare provider  · Acetaminophen  decreases pain and fever  It is available without a doctor's order  Ask how much to take and how often to take it  Follow directions  Read the labels of all other medicines you are using to see if they also contain acetaminophen, or ask your doctor or pharmacist  Acetaminophen can cause liver damage if not taken correctly   Do not use more than 4 grams (4,000 milligrams) total of acetaminophen in one day  · Take your medicine as directed  Contact your healthcare provider if you think your medicine is not helping or if you have side effects  Tell him or her if you are allergic to any medicine  Keep a list of the medicines, vitamins, and herbs you take  Include the amounts, and when and why you take them  Bring the list or the pill bottles to follow-up visits  Carry your medicine list with you in case of an emergency  Self-care:   · Elevate the area above the level of your heart  as often as you can  This will help decrease swelling and pain  Prop the area on pillows or blankets to keep it elevated comfortably  · Clean the area daily until the wound scabs over  Gently wash the area with soap and water  Pat dry  Use dressings as directed  · Place cool or warm, wet cloths on the area as directed  Use clean cloths and clean water  Leave it on the area until the cloth is room temperature  Pat the area dry with a clean, dry cloth  The cloths may help decrease pain  Prevent cellulitis:   · Do not scratch bug bites or areas of injury  You increase your risk for cellulitis by scratching these areas  · Do not share personal items, such as towels, clothing, and razors  · Clean exercise equipment  with germ-killing  before and after you use it  · Wash your hands often  Use soap and water  Wash your hands after you use the bathroom, change a child's diapers, or sneeze  Wash your hands before you prepare or eat food  Use lotion to prevent dry, cracked skin  · Wear pressure stockings as directed  You may be told to wear the stockings if you have peripheral edema  The stockings improve blood flow and decrease swelling  · Treat athlete's foot  This can help prevent the spread of a bacterial skin infection  Follow up with your healthcare provider within 3 days, or as directed:   Your healthcare provider will check if your cellulitis is getting better  You may need different medicine  Write down your questions so you remember to ask them during your visits  © 2017 [employer] Information is for End User's use only and may not be sold, redistributed or otherwise used for commercial purposes  All illustrations and images included in CareNotes® are the copyrighted property of [employer]  or Mike Herrmann  The above information is an  only  It is not intended as medical advice for individual conditions or treatments  Talk to your doctor, nurse or pharmacist before following any medical regimen to see if it is safe and effective for you

## 2018-12-28 ENCOUNTER — TELEPHONE (OUTPATIENT)
Dept: HEMATOLOGY ONCOLOGY | Facility: CLINIC | Age: 63
End: 2018-12-28

## 2018-12-28 NOTE — TELEPHONE ENCOUNTER
WAS IN THE ER ON XMAS DAY - STARTED ON A NEW ANTI-BIOTIC ON Wednesday  WOULD LIKE TO DISCUSS THIS CHANGE      PLEASE CALL, TXS

## 2018-12-30 ENCOUNTER — TELEPHONE (OUTPATIENT)
Dept: HEMATOLOGY ONCOLOGY | Facility: CLINIC | Age: 63
End: 2018-12-30

## 2018-12-30 DIAGNOSIS — L03.119 CELLULITIS OF LOWER EXTREMITY, UNSPECIFIED LATERALITY: Primary | ICD-10-CM

## 2018-12-30 RX ORDER — SULFAMETHOXAZOLE AND TRIMETHOPRIM 800; 160 MG/1; MG/1
1 TABLET ORAL EVERY 12 HOURS SCHEDULED
Qty: 14 TABLET | Refills: 0 | Status: SHIPPED | OUTPATIENT
Start: 2018-12-30 | End: 2019-01-04

## 2018-12-30 NOTE — TELEPHONE ENCOUNTER
I spoke with patient on 12/28/18  She is not on Keflex anymore  She is on clindamycin  She will go back to Coumadin 7 5 mg daily  She will go for INR on 12/31/18    Any bleeding or blood clot she will report to the emergency room

## 2018-12-31 ENCOUNTER — OFFICE VISIT (OUTPATIENT)
Dept: HEMATOLOGY ONCOLOGY | Facility: CLINIC | Age: 63
End: 2018-12-31
Payer: COMMERCIAL

## 2018-12-31 VITALS
DIASTOLIC BLOOD PRESSURE: 68 MMHG | BODY MASS INDEX: 50.02 KG/M2 | SYSTOLIC BLOOD PRESSURE: 122 MMHG | OXYGEN SATURATION: 91 % | HEIGHT: 64 IN | WEIGHT: 293 LBS | TEMPERATURE: 97.3 F | HEART RATE: 106 BPM | RESPIRATION RATE: 22 BRPM

## 2018-12-31 DIAGNOSIS — L03.115 CELLULITIS OF RIGHT LOWER EXTREMITY: ICD-10-CM

## 2018-12-31 DIAGNOSIS — E72.11 HOMOCYSTINEMIA (HCC): ICD-10-CM

## 2018-12-31 DIAGNOSIS — I82.502 CHRONIC DEEP VEIN THROMBOSIS (DVT) OF LEFT LOWER EXTREMITY, UNSPECIFIED VEIN (HCC): Primary | ICD-10-CM

## 2018-12-31 DIAGNOSIS — R60.9 PERIPHERAL EDEMA: Primary | ICD-10-CM

## 2018-12-31 DIAGNOSIS — D68.59 HYPERCOAGULABLE STATE (HCC): ICD-10-CM

## 2018-12-31 PROCEDURE — 99214 OFFICE O/P EST MOD 30 MIN: CPT | Performed by: INTERNAL MEDICINE

## 2018-12-31 RX ORDER — FUROSEMIDE 20 MG/1
TABLET ORAL
Qty: 90 TABLET | Refills: 3 | Status: SHIPPED | OUTPATIENT
Start: 2018-12-31 | End: 2019-12-04 | Stop reason: SDUPTHER

## 2018-12-31 NOTE — PROGRESS NOTES
HPI:   Follow-up visit for hypercoagulation with recurrent DVTs and strong family history of blood clots  She has history of high homocystine level  Patient has been on Coumadin and Folbic  She gets PT INR checked on regular basis  Presently she is being treated for cellulitis of right lower leg  She was on Keflex and then Clindamycin now she is on Bactrim  She will be getting INR checked today  There is  interaction between Coumadin and Keflex but most likely not with Bactrim  No recent bleeding or blood clot    She has IVC filter  She has lymphedema both legs and has compression machine  She uses nystatin powder off and on for redness in the groin areas in summer months   She will remain on anticoglation for life unless contraindicated  She is aware to contact us if she were to receive any new medication from other providers  Patient is overweight  She has tiredness    She has exertional dyspnea probably because of her weight    Current Outpatient Prescriptions:     albuterol (PROVENTIL HFA,VENTOLIN HFA) 90 mcg/act inhaler, Inhale 2 puffs every 6 (six) hours as needed for wheezing , Disp: , Rfl:     alendronate (FOSAMAX) 70 mg tablet, Take 70 mg by mouth every 7 days, Disp: , Rfl:     amLODIPine-benazepril (LOTREL 5-20) 5-20 MG per capsule, Take 1 capsule by mouth daily, Disp: 90 capsule, Rfl: 3    amoxicillin (AMOXIL) 500 mg capsule, TAKE 4 CAPSULES BY MOUTH 1HR BEFORE APPT, Disp: , Rfl: 0    cephalexin (KEFLEX) 500 mg capsule, Take 1 capsule (500 mg total) by mouth 4 (four) times a day for 8 days (antibiotic), Disp: 32 capsule, Rfl: 0    cetirizine (ZYRTEC ALLERGY) 10 mg tablet, Take 1 tablet by mouth daily as needed, Disp: , Rfl:     Cholecalciferol (VITAMIN D3) 73899 units TABS, Take 1 tablet by mouth every 14 (fourteen) days, Disp: , Rfl:     clobetasol (TEMOVATE) 0 05 % ointment, Apply topically as directed two times daily, Disp: , Rfl:     enoxaparin (LOVENOX) 150 mg/mL injection, Inject 1 mL under the skin on the mornings of 11/12/18, 11/13/18, and 11/14/18 , Disp: 10 mL, Rfl: 0    ergocalciferol (VITAMIN D2) 50,000 units, Take by mouth, Disp: , Rfl:     fluticasone (FLONASE) 50 mcg/act nasal spray, into each nostril, Disp: , Rfl:     fluticasone-vilanterol (BREO ELLIPTA) 100-25 mcg/inh inhaler, Inhale daily, Disp: , Rfl:     folic acid-pyridoxine-cyanocobalamin (FOLBIC) 2 5-25-2 mg, Take 1 tablet by mouth daily, Disp: 90 tablet, Rfl: 3    furosemide (LASIX) 20 mg tablet, TAKE 1 TABLET DAILY, Disp: 90 tablet, Rfl: 3    Iodoquinol-HC-Aloe Polysacch (ALCORTIN A) 1-2-1 % GEL, Apply topically, Disp: , Rfl:     lansoprazole (PREVACID) 15 mg capsule, Take by mouth, Disp: , Rfl:     metFORMIN (GLUCOPHAGE-XR) 500 mg 24 hr tablet, Take 1 tablet (500 mg total) by mouth 4 (four) times a day, Disp: 360 tablet, Rfl: 3    mometasone (NASONEX) 50 mcg/act nasal spray, 2 sprays into each nostril daily, Disp: , Rfl:     montelukast (SINGULAIR) 10 mg tablet, Take 1 tablet (10 mg total) by mouth daily at bedtime, Disp: 90 tablet, Rfl: 3    multivitamin (THERAGRAN) TABS, Take 1 tablet by mouth daily  , Disp: , Rfl:     nystatin (MYCOSTATIN) powder, Apply topically 3 (three) times a day, Disp: 60 g, Rfl: 3    pravastatin (PRAVACHOL) 40 mg tablet, Take 1 tablet (40 mg total) by mouth daily, Disp: 90 tablet, Rfl: 3    sulfamethoxazole-trimethoprim (BACTRIM DS) 800-160 mg per tablet, Take 1 tablet by mouth every 12 (twelve) hours for 7 days, Disp: 14 tablet, Rfl: 0    triamcinolone (KENALOG) 0 1 % cream, , Disp: , Rfl:     triamcinolone (KENALOG) 0 5 % cream, APPLY TOPICALLY TO RIGHT ANKLE EVERY 12 HOURS, Disp: , Rfl:     warfarin (COUMADIN) 5 mg tablet, Take by mouth daily GOAL 2 0 to 2 5 per hem/onc note , Disp: , Rfl:     clindamycin (CLEOCIN) 150 mg capsule, Take 3 capsules (450 mg total) by mouth every 8 (eight) hours for 10 days (antibiotic) (Patient not taking: Reported on 12/31/2018 ), Disp: 90 capsule, Rfl: 0    Allergies   Allergen Reactions    Azithromycin Diarrhea    Ciprofloxacin Confusion, Dizziness and Other (See Comments)     Other reaction(s): Other (Please comment)  Dizziness, weepy    Clindamycin Other (See Comments)     Acid Reflux    Ancef [Cefazolin] Rash     States tolerated Keflex        No history exists  ROS:  12/31/18 Reviewed 13 systems:  Presently no other neurological, cardiac, pulmonary, GI and  symptoms other than listed above  No symptoms like     fever, chills, bleeding, bone pains, skin rash,  tiredness ,  weakness, numbness,  claudication and gait problem  Not unusually sensitive to heat or cold  Has swelling of the ankles  No swollen glands  Patient is anxious  /68 (BP Location: Right arm, Patient Position: Sitting, Cuff Size: Adult)   Pulse (!) 106   Temp (!) 97 3 °F (36 3 °C)   Resp 22   Ht 5' 4 25" (1 632 m)   Wt (!) 172 kg (380 lb 0 3 oz)   LMP  (LMP Unknown)   SpO2 91%   BMI 64 72 kg/m²     Physical Exam:    Patient is alert and oriented  She is not in distress  No icterus  No oral thrush  There is no palpable neck mass  Decreased breath sounds at lung bases  Heart rate is regular  Abdomen is large  Nontender  Difficult to palpate any mass  She has lymphedema lower legs and redness of skin of right lower leg  No calf tenderness, no focal neurological deficit, no skin rash except for redness of skin of right lower leg, no palpable lymphadenopathy in the neck and axillary areas, no clubbing  Patient is anxious  Performance status 3 because of her weight and lymphedema      IMAGING:      LABS:  Results for orders placed or performed during the hospital encounter of 12/25/18   CBC and differential   Result Value Ref Range    WBC 8 87 4 31 - 10 16 Thousand/uL    RBC 4 55 3 81 - 5 12 Million/uL    Hemoglobin 12 9 11 5 - 15 4 g/dL    Hematocrit 41 2 34 8 - 46 1 %    MCV 91 82 - 98 fL    MCH 28 4 26 8 - 34 3 pg    MCHC 31 3 (L) 31 4 - 37 4 g/dL    RDW 14 6 11 6 - 15 1 %    MPV 10 1 8 9 - 12 7 fL    Platelets 650 982 - 370 Thousands/uL    nRBC 0 /100 WBCs    Neutrophils Relative 62 43 - 75 %    Immat GRANS % 0 0 - 2 %    Lymphocytes Relative 27 14 - 44 %    Monocytes Relative 8 4 - 12 %    Eosinophils Relative 2 0 - 6 %    Basophils Relative 1 0 - 1 %    Neutrophils Absolute 5 43 1 85 - 7 62 Thousands/µL    Immature Grans Absolute 0 02 0 00 - 0 20 Thousand/uL    Lymphocytes Absolute 2 41 0 60 - 4 47 Thousands/µL    Monocytes Absolute 0 72 0 17 - 1 22 Thousand/µL    Eosinophils Absolute 0 21 0 00 - 0 61 Thousand/µL    Basophils Absolute 0 08 0 00 - 0 10 Thousands/µL   Basic metabolic panel   Result Value Ref Range    Sodium 140 136 - 145 mmol/L    Potassium 4 4 3 5 - 5 3 mmol/L    Chloride 104 100 - 108 mmol/L    CO2 29 21 - 32 mmol/L    ANION GAP 7 4 - 13 mmol/L    BUN 22 5 - 25 mg/dL    Creatinine 1 15 0 60 - 1 30 mg/dL    Glucose 107 65 - 140 mg/dL    Calcium 8 4 8 3 - 10 1 mg/dL    eGFR 51 ml/min/1 73sq m   Protime-INR   Result Value Ref Range    Protime 19 8 (H) 11 8 - 14 2 seconds    INR 1 77 (H) 0 86 - 1 17   APTT   Result Value Ref Range    PTT 33 26 - 38 seconds     Labs, Imaging, & Other studies:   All pertinent labs and imaging studies were personally reviewed    Lab Results   Component Value Date    K 4 4 12/25/2018     12/25/2018    CO2 29 12/25/2018    BUN 22 12/25/2018    CREATININE 1 15 12/25/2018    CALCIUM 8 4 12/25/2018    AST 20 12/22/2018    ALT 22 12/22/2018    ALKPHOS 77 12/22/2018    EGFR 51 12/25/2018     Lab Results   Component Value Date    WBC 8 87 12/25/2018    HGB 12 9 12/25/2018    HCT 41 2 12/25/2018    MCV 91 12/25/2018     12/25/2018       Reviewed and discussed with patient  Assessment and plan: Follow-up visit for hypercoagulation with recurrent DVTs and strong family history of blood clots  She has history of high homocystine level  Patient has been on Coumadin and Folbic    She gets PT INR checked on regular basis  Presently she is being treated for cellulitis of right lower leg  She was on Keflex and then Clindamycin now she is on Bactrim  She will be getting INR checked today  There is  interaction between Coumadin and Keflex but most likely not with Bactrim  No recent bleeding or blood clot    She has IVC filter  She has lymphedema both legs and has compression machine  She uses nystatin powder off and on for redness in the groin areas in summer months  She will remain on anticoglation for life unless contraindicated  She is aware to contact us if she were to receive any new medication from other providers, ie antibiotics  Patient is overweight  She has tiredness  She has exertional dyspnea probably because of her weight     She is regular with her medical checkups and GYN checkups and mammography     Physical examination and test results are as recorded and discussed  Plan is to continue her on Coumadin and Folbic as before  Condition discussed and explained  Questions answered  Discussed the importance of self-breast examination, eating healthy foods, staying active and health screening test   She is capable of self-care  She has instructions to report to the emergency room in case of blood clot or bleeding and also call our office  1  Chronic deep vein thrombosis (DVT) of left lower extremity, unspecified vein ([facility] [address] )    - Protime-INR; Standing  - CBC and differential; Future  - Comprehensive metabolic panel; Future  - Protime-INR    2  Hypercoagulable state ([address] )    - CBC and differential; Future  - Comprehensive metabolic panel; Future    3  Homocystinemia ([facility] [address] )    - Homocysteine, serum; Future  - CBC and differential; Future  - Comprehensive metabolic panel; Future    4  Cellulitis of right lower extremity            Patient voiced understanding and agreement in the discussion         Counseling / Coordination of Care   Greater than 50% of total time was spent with the patient and / or family counseling and / or coordination of care

## 2018-12-31 NOTE — LETTER
January 1, 2019     Cata Hilliard MD  [address] [place] [phone]    Patient: Steve Hurtado   YOB: 1955   Date of Visit: 12/31/2018       Dear Dr Venu Ni: Thank you for referring Naeem Gross to me for evaluation  Below are my notes for this consultation  If you have questions, please do not hesitate to call me  I look forward to following your patient along with you  Sincerely,        Ramona Osullivan MD        CC: No Recipients  Ramona Osullivan MD  1/1/2019 12:50 PM  Sign at close encounter    HPI:   Follow-up visit for hypercoagulation with recurrent DVTs and strong family history of blood clots  She has history of high homocystine level  Patient has been on Coumadin and Folbic  She gets PT INR checked on regular basis  Presently she is being treated for cellulitis of right lower leg  She was on Keflex and then Clindamycin now she is on Bactrim  She will be getting INR checked today  There is  interaction between Coumadin and Keflex but most likely not with Bactrim  No recent bleeding or blood clot    She has IVC filter  She has lymphedema both legs and has compression machine  She uses nystatin powder off and on for redness in the groin areas in summer months   She will remain on anticoglation for life unless contraindicated  She is aware to contact us if she were to receive any new medication from other providers  Patient is overweight  She has tiredness    She has exertional dyspnea probably because of her weight    Current Outpatient Prescriptions:     albuterol (PROVENTIL HFA,VENTOLIN HFA) 90 mcg/act inhaler, Inhale 2 puffs every 6 (six) hours as needed for wheezing , Disp: , Rfl:     alendronate (FOSAMAX) 70 mg tablet, Take 70 mg by mouth every 7 days, Disp: , Rfl:     amLODIPine-benazepril (LOTREL 5-20) 5-20 MG per capsule, Take 1 capsule by mouth daily, Disp: 90 capsule, Rfl: 3    amoxicillin (AMOXIL) 500 mg capsule, TAKE 4 CAPSULES BY MOUTH 1HR BEFORE APPT, Disp: , Rfl: 0    cephalexin (KEFLEX) 500 mg capsule, Take 1 capsule (500 mg total) by mouth 4 (four) times a day for 8 days (antibiotic), Disp: 32 capsule, Rfl: 0    cetirizine (ZYRTEC ALLERGY) 10 mg tablet, Take 1 tablet by mouth daily as needed, Disp: , Rfl:     Cholecalciferol (VITAMIN D3) 67161 units TABS, Take 1 tablet by mouth every 14 (fourteen) days, Disp: , Rfl:     clobetasol (TEMOVATE) 0 05 % ointment, Apply topically as directed two times daily, Disp: , Rfl:     enoxaparin (LOVENOX) 150 mg/mL injection, Inject 1 mL under the skin on the mornings of 11/12/18, 11/13/18, and 11/14/18 , Disp: 10 mL, Rfl: 0    ergocalciferol (VITAMIN D2) 50,000 units, Take by mouth, Disp: , Rfl:     fluticasone (FLONASE) 50 mcg/act nasal spray, into each nostril, Disp: , Rfl:     fluticasone-vilanterol (BREO ELLIPTA) 100-25 mcg/inh inhaler, Inhale daily, Disp: , Rfl:     folic acid-pyridoxine-cyanocobalamin (FOLBIC) 2 5-25-2 mg, Take 1 tablet by mouth daily, Disp: 90 tablet, Rfl: 3    furosemide (LASIX) 20 mg tablet, TAKE 1 TABLET DAILY, Disp: 90 tablet, Rfl: 3    Iodoquinol-HC-Aloe Polysacch (ALCORTIN A) 1-2-1 % GEL, Apply topically, Disp: , Rfl:     lansoprazole (PREVACID) 15 mg capsule, Take by mouth, Disp: , Rfl:     metFORMIN (GLUCOPHAGE-XR) 500 mg 24 hr tablet, Take 1 tablet (500 mg total) by mouth 4 (four) times a day, Disp: 360 tablet, Rfl: 3    mometasone (NASONEX) 50 mcg/act nasal spray, 2 sprays into each nostril daily, Disp: , Rfl:     montelukast (SINGULAIR) 10 mg tablet, Take 1 tablet (10 mg total) by mouth daily at bedtime, Disp: 90 tablet, Rfl: 3    multivitamin (THERAGRAN) TABS, Take 1 tablet by mouth daily  , Disp: , Rfl:     nystatin (MYCOSTATIN) powder, Apply topically 3 (three) times a day, Disp: 60 g, Rfl: 3    pravastatin (PRAVACHOL) 40 mg tablet, Take 1 tablet (40 mg total) by mouth daily, Disp: 90 tablet, Rfl: 3    sulfamethoxazole-trimethoprim (BACTRIM DS) 800-160 mg per tablet, Take 1 tablet by mouth every 12 (twelve) hours for 7 days, Disp: 14 tablet, Rfl: 0    triamcinolone (KENALOG) 0 1 % cream, , Disp: , Rfl:     triamcinolone (KENALOG) 0 5 % cream, APPLY TOPICALLY TO RIGHT ANKLE EVERY 12 HOURS, Disp: , Rfl:     warfarin (COUMADIN) 5 mg tablet, Take by mouth daily GOAL 2 0 to 2 5 per hem/onc note , Disp: , Rfl:     clindamycin (CLEOCIN) 150 mg capsule, Take 3 capsules (450 mg total) by mouth every 8 (eight) hours for 10 days (antibiotic) (Patient not taking: Reported on 12/31/2018 ), Disp: 90 capsule, Rfl: 0    Allergies   Allergen Reactions    Azithromycin Diarrhea    Ciprofloxacin Confusion, Dizziness and Other (See Comments)     Other reaction(s): Other (Please comment)  Dizziness, weepy    Clindamycin Other (See Comments)     Acid Reflux    Ancef [Cefazolin] Rash     States tolerated Keflex        No history exists  ROS:  12/31/18 Reviewed 13 systems:  Presently no other neurological, cardiac, pulmonary, GI and  symptoms other than listed above  No symptoms like     fever, chills, bleeding, bone pains, skin rash,  tiredness ,  weakness, numbness,  claudication and gait problem  Not unusually sensitive to heat or cold  Has swelling of the ankles  No swollen glands  Patient is anxious  /68 (BP Location: Right arm, Patient Position: Sitting, Cuff Size: Adult)   Pulse (!) 106   Temp (!) 97 3 °F (36 3 °C)   Resp 22   Ht 5' 4 25" (1 632 m)   Wt (!) 172 kg (380 lb 0 3 oz)   LMP  (LMP Unknown)   SpO2 91%   BMI 64 72 kg/m²      Physical Exam:    Patient is alert and oriented  She is not in distress  No icterus  No oral thrush  There is no palpable neck mass  Decreased breath sounds at lung bases  Heart rate is regular  Abdomen is large  Nontender  Difficult to palpate any mass  She has lymphedema lower legs and redness of skin of right lower leg    No calf tenderness, no focal neurological deficit, no skin rash except for redness of skin of right lower leg, no palpable lymphadenopathy in the neck and axillary areas, no clubbing  Patient is anxious  Performance status 3 because of her weight and lymphedema      IMAGING:      LABS:  Results for orders placed or performed during the hospital encounter of 12/25/18   CBC and differential   Result Value Ref Range    WBC 8 87 4 31 - 10 16 Thousand/uL    RBC 4 55 3 81 - 5 12 Million/uL    Hemoglobin 12 9 11 5 - 15 4 g/dL    Hematocrit 41 2 34 8 - 46 1 %    MCV 91 82 - 98 fL    MCH 28 4 26 8 - 34 3 pg    MCHC 31 3 (L) 31 4 - 37 4 g/dL    RDW 14 6 11 6 - 15 1 %    MPV 10 1 8 9 - 12 7 fL    Platelets 243 193 - 180 Thousands/uL    nRBC 0 /100 WBCs    Neutrophils Relative 62 43 - 75 %    Immat GRANS % 0 0 - 2 %    Lymphocytes Relative 27 14 - 44 %    Monocytes Relative 8 4 - 12 %    Eosinophils Relative 2 0 - 6 %    Basophils Relative 1 0 - 1 %    Neutrophils Absolute 5 43 1 85 - 7 62 Thousands/µL    Immature Grans Absolute 0 02 0 00 - 0 20 Thousand/uL    Lymphocytes Absolute 2 41 0 60 - 4 47 Thousands/µL    Monocytes Absolute 0 72 0 17 - 1 22 Thousand/µL    Eosinophils Absolute 0 21 0 00 - 0 61 Thousand/µL    Basophils Absolute 0 08 0 00 - 0 10 Thousands/µL   Basic metabolic panel   Result Value Ref Range    Sodium 140 136 - 145 mmol/L    Potassium 4 4 3 5 - 5 3 mmol/L    Chloride 104 100 - 108 mmol/L    CO2 29 21 - 32 mmol/L    ANION GAP 7 4 - 13 mmol/L    BUN 22 5 - 25 mg/dL    Creatinine 1 15 0 60 - 1 30 mg/dL    Glucose 107 65 - 140 mg/dL    Calcium 8 4 8 3 - 10 1 mg/dL    eGFR 51 ml/min/1 73sq m   Protime-INR   Result Value Ref Range    Protime 19 8 (H) 11 8 - 14 2 seconds    INR 1 77 (H) 0 86 - 1 17   APTT   Result Value Ref Range    PTT 33 26 - 38 seconds     Labs, Imaging, & Other studies:   All pertinent labs and imaging studies were personally reviewed    Lab Results   Component Value Date    K 4 4 12/25/2018     12/25/2018    CO2 29 12/25/2018    BUN 22 12/25/2018    CREATININE 1 15 12/25/2018    CALCIUM 8 4 12/25/2018    AST 20 12/22/2018    ALT 22 12/22/2018    ALKPHOS 77 12/22/2018    EGFR 51 12/25/2018     Lab Results   Component Value Date    WBC 8 87 12/25/2018    HGB 12 9 12/25/2018    HCT 41 2 12/25/2018    MCV 91 12/25/2018     12/25/2018       Reviewed and discussed with patient  Assessment and plan: Follow-up visit for hypercoagulation with recurrent DVTs and strong family history of blood clots  She has history of high homocystine level  Patient has been on Coumadin and Folbic  She gets PT INR checked on regular basis  Presently she is being treated for cellulitis of right lower leg  She was on Keflex and then Clindamycin now she is on Bactrim  She will be getting INR checked today  There is  interaction between Coumadin and Keflex but most likely not with Bactrim  No recent bleeding or blood clot    She has IVC filter  She has lymphedema both legs and has compression machine  She uses nystatin powder off and on for redness in the groin areas in summer months  She will remain on anticoglation for life unless contraindicated  She is aware to contact us if she were to receive any new medication from other providers, ie antibiotics  Patient is overweight  She has tiredness  She has exertional dyspnea probably because of her weight     She is regular with her medical checkups and GYN checkups and mammography     Physical examination and test results are as recorded and discussed  Plan is to continue her on Coumadin and Folbic as before  Condition discussed and explained  Questions answered  Discussed the importance of self-breast examination, eating healthy foods, staying active and health screening test   She is capable of self-care  She has instructions to report to the emergency room in case of blood clot or bleeding and also call our office    1  Chronic deep vein thrombosis (DVT) of left lower extremity, unspecified vein ([address] )    - Protime-INR; Standing  - CBC and differential; Future  - Comprehensive metabolic panel; Future  - Protime-INR    2  Hypercoagulable state ([facility] )    - CBC and differential; Future  - Comprehensive metabolic panel; Future    3  Homocystinemia ([facility] )    - Homocysteine, serum; Future  - CBC and differential; Future  - Comprehensive metabolic panel; Future    4  Cellulitis of right lower extremity            Patient voiced understanding and agreement in the discussion  Counseling / Coordination of Care   Greater than 50% of total time was spent with the patient and / or family counseling and / or coordination of care

## 2019-01-04 ENCOUNTER — OFFICE VISIT (OUTPATIENT)
Dept: FAMILY MEDICINE CLINIC | Facility: CLINIC | Age: 64
End: 2019-01-04
Payer: COMMERCIAL

## 2019-01-04 VITALS
RESPIRATION RATE: 15 BRPM | SYSTOLIC BLOOD PRESSURE: 116 MMHG | HEIGHT: 64 IN | DIASTOLIC BLOOD PRESSURE: 70 MMHG | OXYGEN SATURATION: 97 % | WEIGHT: 293 LBS | HEART RATE: 78 BPM | BODY MASS INDEX: 50.02 KG/M2

## 2019-01-04 DIAGNOSIS — L03.115 CELLULITIS OF RIGHT LOWER LEG: ICD-10-CM

## 2019-01-04 DIAGNOSIS — IMO0001 CHRONIC VENOUS HYPERTENSION WITH ULCER INVOLVING BOTH SIDES: Primary | ICD-10-CM

## 2019-01-04 DIAGNOSIS — L03.115 CELLULITIS OF RIGHT LOWER EXTREMITY: ICD-10-CM

## 2019-01-04 PROCEDURE — 99214 OFFICE O/P EST MOD 30 MIN: CPT | Performed by: FAMILY MEDICINE

## 2019-01-04 PROCEDURE — 3008F BODY MASS INDEX DOCD: CPT | Performed by: FAMILY MEDICINE

## 2019-01-04 RX ORDER — PANTOPRAZOLE SODIUM 40 MG/1
40 TABLET, DELAYED RELEASE ORAL DAILY
Qty: 30 TABLET | Refills: 5 | Status: ON HOLD | OUTPATIENT
Start: 2019-01-04 | End: 2020-09-09 | Stop reason: ALTCHOICE

## 2019-01-04 RX ORDER — CLINDAMYCIN HYDROCHLORIDE 150 MG/1
450 CAPSULE ORAL EVERY 8 HOURS SCHEDULED
Qty: 90 CAPSULE | Refills: 0 | Status: SHIPPED | OUTPATIENT
Start: 2019-01-04 | End: 2019-01-14

## 2019-01-04 NOTE — PROGRESS NOTES
Assessment/Plan:    No problem-specific Assessment & Plan notes found for this encounter  Diagnoses and all orders for this visit:    Chronic venous hypertension with ulcer involving both sides (HCC)    Cellulitis of right lower extremity        She has bilateral cellulitis that is worsening on Bactrim  She did better on clindamycin although she had side effects  She has multiple drug intolerances/allergies  We have decided to discontinue the Bactrim and restart clindamycin with the addition of a proton pump inhibitor (Protonix)  She will call the answering service over the weekend if she has increased symptomatology from the clindamycin  Subjective:      Patient ID: Aruna Delong is a 59 y o  female  Please see previous notes  She has chronic venous insufficiency of the lower extremities  She has recurrent cellulitis secondary to this  She was seen in the emergency room twice over the holidays for worsening cellulitis  She did well from a clinical standpoint on clindamycin but had side effects from it, specifically heartburn  She called the answering service over the weekend and her medication was changed to Bactrim  She tolerates this medication better, but unfortunately it has not had much effect on her cellulitis  She appears to be worsening at this time  She has no fevers or chills  She has no pain  She has a number of drug allergies which limits our arsenal in dealing with this  She was previously on Prevacid  She states that she has not been on this medication some time, although it is on her active medication list   The only thing she is taking for reflux at this point time is in over-the-counter Tums  The following portions of the patient's history were reviewed and updated as appropriate:   She  has a past medical history of Blood clotting disorder ([facility] [address] ); Homocysteinemia ([facility] [address] ); Hypercoagulable state ([facility] [address] );  Hypertension; Lyme disease; Obesity; PCOS (polycystic ovarian syndrome); Renal disorder; Sleep apnea; and 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 ([place] [address] )  She   Patient Active Problem List    Diagnosis Date Noted    Heme positive stool 06/21/2018    Encounter for hepatitis C screening test for low risk patient 04/17/2018    Pain of right lower extremity 03/30/2018    Cellulitis of right lower extremity 03/30/2018    Renal disorder     PCOS (polycystic ovarian syndrome)     Morbid obesity ([place] [address] )     Lyme disease     Blood clotting disorder ([place] [address] )     Essential hypertension     Homocysteinemia ([place] [address] )     Hypercoagulable state ([place] [address] )     Obstructive sleep apnea on CPAP     DVT (deep venous thrombosis) ([place] [address] ) 08/29/2016    Secondary pulmonary hypertension 08/10/2016    History of DVT (deep vein thrombosis) 07/01/2016    Morbid obesity with BMI of 60 0-69 9, adult ([place] [address] ) 07/01/2016    Acute embolism and thrombosis of unspecified deep veins of unspecified lower extremity ([place] [address] ) 04/25/2016    Homocystinemia ([place] [address] ) 03/09/2015    Skin rash 05/22/2014    Chronic venous hypertension w ulceration ([place] [address] ) 12/18/2013    Osteoporosis 12/02/2013    Edema 11/20/2013    Esophageal reflux 11/20/2013    Hyperlipidemia 11/20/2013    Hypertension 11/20/2013    Polycystic ovarian syndrome 11/20/2013     She  has a past surgical history that includes Joint replacement (Bilateral); Appendectomy; Ankle surgery (Right); Hysterectomy; Hemorroidectomy; Breast biopsy; Foot surgery; LITHOTRIPSY; Breast biopsy; Closed reduction metatarsal fracture (Right); and Vena cava filter placement  Her family history includes Hypercoagulant Ability in her maternal grandmother and mother; No Known Problems in her father; Pulmonary embolism in her mother  She  reports that she has never smoked  She has never used smokeless tobacco  She reports that she does not drink alcohol or use drugs    Current Outpatient Prescriptions   Medication Sig Dispense Refill    albuterol (PROVENTIL HFA,VENTOLIN HFA) 90 mcg/act inhaler Inhale 2 puffs every 6 (six) hours as needed for wheezing   alendronate (FOSAMAX) 70 mg tablet Take 70 mg by mouth every 7 days      amLODIPine-benazepril (LOTREL 5-20) 5-20 MG per capsule Take 1 capsule by mouth daily 90 capsule 3    cetirizine (ZYRTEC ALLERGY) 10 mg tablet Take 1 tablet by mouth daily as needed      Cholecalciferol (VITAMIN D3) 18078 units TABS Take 1 tablet by mouth every 14 (fourteen) days      clobetasol (TEMOVATE) 0 05 % ointment Apply topically as directed two times daily      ergocalciferol (VITAMIN D2) 50,000 units Take by mouth      fluticasone (FLONASE) 50 mcg/act nasal spray into each nostril      fluticasone-vilanterol (BREO ELLIPTA) 100-25 mcg/inh inhaler Inhale daily      folic acid-pyridoxine-cyanocobalamin (FOLBIC) 2 5-25-2 mg Take 1 tablet by mouth daily 90 tablet 3    furosemide (LASIX) 20 mg tablet TAKE 1 TABLET DAILY 90 tablet 3    Iodoquinol-HC-Aloe Polysacch (ALCORTIN A) 1-2-1 % GEL Apply topically      metFORMIN (GLUCOPHAGE-XR) 500 mg 24 hr tablet Take 1 tablet (500 mg total) by mouth 4 (four) times a day 360 tablet 3    mometasone (NASONEX) 50 mcg/act nasal spray 2 sprays into each nostril daily      montelukast (SINGULAIR) 10 mg tablet Take 1 tablet (10 mg total) by mouth daily at bedtime 90 tablet 3    multivitamin (THERAGRAN) TABS Take 1 tablet by mouth daily        nystatin (MYCOSTATIN) powder Apply topically 3 (three) times a day 60 g 3    pravastatin (PRAVACHOL) 40 mg tablet Take 1 tablet (40 mg total) by mouth daily 90 tablet 3    triamcinolone (KENALOG) 0 1 % cream       triamcinolone (KENALOG) 0 5 % cream APPLY TOPICALLY TO RIGHT ANKLE EVERY 12 HOURS      warfarin (COUMADIN) 5 mg tablet Take by mouth daily GOAL 2 0 to 2 5 per hem/onc note       amoxicillin (AMOXIL) 500 mg capsule TAKE 4 CAPSULES BY MOUTH 1HR BEFORE APPT  0    clindamycin (CLEOCIN) 150 mg capsule Take 3 capsules (450 mg total) by mouth every 8 (eight) hours for 10 days (antibiotic) (Patient not taking: Reported on 12/31/2018 ) 90 capsule 0    enoxaparin (LOVENOX) 150 mg/mL injection Inject 1 mL under the skin on the mornings of 11/12/18, 11/13/18, and 11/14/18  (Patient not taking: Reported on 1/4/2019 ) 10 mL 0     No current facility-administered medications for this visit  Current Outpatient Prescriptions on File Prior to Visit   Medication Sig    albuterol (PROVENTIL HFA,VENTOLIN HFA) 90 mcg/act inhaler Inhale 2 puffs every 6 (six) hours as needed for wheezing   alendronate (FOSAMAX) 70 mg tablet Take 70 mg by mouth every 7 days    amLODIPine-benazepril (LOTREL 5-20) 5-20 MG per capsule Take 1 capsule by mouth daily    cetirizine (ZYRTEC ALLERGY) 10 mg tablet Take 1 tablet by mouth daily as needed    Cholecalciferol (VITAMIN D3) 75925 units TABS Take 1 tablet by mouth every 14 (fourteen) days    clobetasol (TEMOVATE) 0 05 % ointment Apply topically as directed two times daily    ergocalciferol (VITAMIN D2) 50,000 units Take by mouth    fluticasone (FLONASE) 50 mcg/act nasal spray into each nostril    fluticasone-vilanterol (BREO ELLIPTA) 100-25 mcg/inh inhaler Inhale daily    folic acid-pyridoxine-cyanocobalamin (FOLBIC) 2 5-25-2 mg Take 1 tablet by mouth daily    furosemide (LASIX) 20 mg tablet TAKE 1 TABLET DAILY    Iodoquinol-HC-Aloe Polysacch (ALCORTIN A) 1-2-1 % GEL Apply topically    metFORMIN (GLUCOPHAGE-XR) 500 mg 24 hr tablet Take 1 tablet (500 mg total) by mouth 4 (four) times a day    mometasone (NASONEX) 50 mcg/act nasal spray 2 sprays into each nostril daily    montelukast (SINGULAIR) 10 mg tablet Take 1 tablet (10 mg total) by mouth daily at bedtime    multivitamin (THERAGRAN) TABS Take 1 tablet by mouth daily      nystatin (MYCOSTATIN) powder Apply topically 3 (three) times a day    pravastatin (PRAVACHOL) 40 mg tablet Take 1 tablet (40 mg total) by mouth daily    triamcinolone (KENALOG) 0 1 % cream     triamcinolone (KENALOG) 0 5 % cream APPLY TOPICALLY TO RIGHT ANKLE EVERY 12 HOURS    warfarin (COUMADIN) 5 mg tablet Take by mouth daily GOAL 2 0 to 2 5 per hem/onc note     [DISCONTINUED] lansoprazole (PREVACID) 15 mg capsule Take by mouth    [DISCONTINUED] sulfamethoxazole-trimethoprim (BACTRIM DS) 800-160 mg per tablet Take 1 tablet by mouth every 12 (twelve) hours for 7 days    amoxicillin (AMOXIL) 500 mg capsule TAKE 4 CAPSULES BY MOUTH 1HR BEFORE APPT    clindamycin (CLEOCIN) 150 mg capsule Take 3 capsules (450 mg total) by mouth every 8 (eight) hours for 10 days (antibiotic) (Patient not taking: Reported on 12/31/2018 )    enoxaparin (LOVENOX) 150 mg/mL injection Inject 1 mL under the skin on the mornings of 11/12/18, 11/13/18, and 11/14/18  (Patient not taking: Reported on 1/4/2019 )     No current facility-administered medications on file prior to visit  She is allergic to azithromycin; ciprofloxacin; clindamycin; and ancef [cefazolin]       Review of Systems   Skin: Positive for color change  All other systems reviewed and are negative  Objective:      /70 (BP Location: Right arm, Patient Position: Sitting, Cuff Size: Large)   Pulse 78   Resp 15   Ht 5' 4 25" (1 632 m)   Wt (!) 174 kg (384 lb 9 6 oz)   LMP  (LMP Unknown)   SpO2 97%   BMI 65 50 kg/m²          Physical Exam   Constitutional: She is oriented to person, place, and time  She appears well-developed and well-nourished  Neck: Normal range of motion  Neck supple  Cardiovascular: Normal rate, regular rhythm, normal heart sounds and intact distal pulses  Pulmonary/Chest: Effort normal and breath sounds normal    Abdominal: Soft  Bowel sounds are normal    Musculoskeletal: Normal range of motion  Neurological: She is alert and oriented to person, place, and time  She has normal reflexes  Skin: Skin is warm and dry     Erythema and warmth to mid shins b/l, worsening from last trip to ER    Psychiatric: She has a normal mood and affect  Her behavior is normal  Judgment and thought content normal    Nursing note and vitals reviewed

## 2019-01-09 ENCOUNTER — TELEPHONE (OUTPATIENT)
Dept: HEMATOLOGY ONCOLOGY | Facility: CLINIC | Age: 64
End: 2019-01-09

## 2019-01-09 NOTE — TELEPHONE ENCOUNTER
Stated that she was put on Clindamycin so she went back to 5mg of coumadin  She also wants to know when the labs are to be done

## 2019-01-09 NOTE — TELEPHONE ENCOUNTER
Called patient and informed her, per Dr Kimberly Velásquez she is to have the labs completed now for PT & INR  Once we have the results of that test we will contact her with and dosage changes  She stated she will have them done

## 2019-01-14 ENCOUNTER — TELEPHONE (OUTPATIENT)
Dept: HEMATOLOGY ONCOLOGY | Facility: CLINIC | Age: 64
End: 2019-01-14

## 2019-01-14 NOTE — TELEPHONE ENCOUNTER
Per Dr Rudy Vega, No change in current dose and recheck in 2-3 weeks  Called patient and gave her the information

## 2019-01-14 NOTE — TELEPHONE ENCOUNTER
Patient states she had PT/INR done on Thursday but hasn't heard anything back  She asked if these results are in, can someone just let her know what her results are      Patient can be reached at [phone]

## 2019-01-28 DIAGNOSIS — Z86.718 HISTORY OF DVT (DEEP VEIN THROMBOSIS): Primary | ICD-10-CM

## 2019-01-28 RX ORDER — WARFARIN SODIUM 5 MG/1
TABLET ORAL
Qty: 150 TABLET | Refills: 1 | Status: SHIPPED | OUTPATIENT
Start: 2019-01-28 | End: 2019-09-05 | Stop reason: SDUPTHER

## 2019-02-07 ENCOUNTER — TELEPHONE (OUTPATIENT)
Dept: HEMATOLOGY ONCOLOGY | Facility: CLINIC | Age: 64
End: 2019-02-07

## 2019-02-07 NOTE — TELEPHONE ENCOUNTER
Stated that she went for bw 2 weeks ago & needs to know if her coumadin needs to be adjusted    She went to [facility] in [facility]

## 2019-02-08 NOTE — TELEPHONE ENCOUNTER
Called and LVM informing patient that her labs from 1/28/19 were sent to our office  Per Dr Kasie Henderson she is to stay on her current dose of Coumadin and recheck her labs in 2-3 week  Instructed her to call our office if she has any additional questions

## 2019-02-20 ENCOUNTER — TELEPHONE (OUTPATIENT)
Dept: HEMATOLOGY ONCOLOGY | Facility: CLINIC | Age: 64
End: 2019-02-20

## 2019-02-20 NOTE — TELEPHONE ENCOUNTER
INR 3 98  I left message for the patient that she should not take Coumadin today and call the office for instructions     I will be on vacations  I have  given instructions to my medical assistant to change her Coumadin to 5 mg alternating with 7 5 mg and have PT INR checked next week and patient should call the office for instructions  No Coumadin today

## 2019-02-21 ENCOUNTER — TELEPHONE (OUTPATIENT)
Dept: HEMATOLOGY ONCOLOGY | Facility: CLINIC | Age: 64
End: 2019-02-21

## 2019-02-21 NOTE — TELEPHONE ENCOUNTER
Called patient to make sure that she received the ,essage from Dr Duncan Rivera about what dosage she should currently be taking  LVM instructing patient to call our office to inform us that she received the instructions and she skipped her dose yesterday  Per Dr Duncan Rivera, INR 3 98  I left message for the patient that she should not take Coumadin today and call the office for instructions     I will be on vacations  I have  given instructions to my medical assistant to change her Coumadin to 5 mg alternating with 7 5 mg and have PT INR checked next week and patient should call the office for instructions    No Coumadin today (2/20/19)

## 2019-02-27 ENCOUNTER — TELEPHONE (OUTPATIENT)
Dept: HEMATOLOGY ONCOLOGY | Facility: CLINIC | Age: 64
End: 2019-02-27

## 2019-02-27 NOTE — TELEPHONE ENCOUNTER
Please have WINDY Daniels review patients last labs they are normal done at [facility]  You may need top call to have them fax over the results

## 2019-02-27 NOTE — TELEPHONE ENCOUNTER
Pt called looking for results from INR  Last results are from 2/18 and she just had them drawn the other day  Would like a call back with results

## 2019-03-14 ENCOUNTER — ANTICOAG VISIT (OUTPATIENT)
Dept: HEMATOLOGY ONCOLOGY | Facility: CLINIC | Age: 64
End: 2019-03-14

## 2019-03-14 ENCOUNTER — TELEPHONE (OUTPATIENT)
Dept: HEMATOLOGY ONCOLOGY | Facility: CLINIC | Age: 64
End: 2019-03-14

## 2019-03-14 NOTE — TELEPHONE ENCOUNTER
Patient states she had her coumadin labs done on Tuesday, and has not heard anything back  She asked if someone could give her a call today to let her know the results        Patient can be reached at [phone]

## 2019-04-24 DIAGNOSIS — E78.5 HYPERLIPIDEMIA, UNSPECIFIED HYPERLIPIDEMIA TYPE: ICD-10-CM

## 2019-04-25 RX ORDER — PRAVASTATIN SODIUM 40 MG
40 TABLET ORAL DAILY
Qty: 90 TABLET | Refills: 3 | Status: SHIPPED | OUTPATIENT
Start: 2019-04-25 | End: 2020-06-22 | Stop reason: SDUPTHER

## 2019-04-30 LAB — INR PPP: 2.56 (ref 0.86–1.17)

## 2019-05-01 ENCOUNTER — TELEPHONE (OUTPATIENT)
Dept: HEMATOLOGY ONCOLOGY | Facility: CLINIC | Age: 64
End: 2019-05-01

## 2019-05-23 DIAGNOSIS — D68.9 BLOOD CLOTTING DISORDER (HCC): ICD-10-CM

## 2019-05-23 RX ORDER — FOLIC ACID-PYRIDOXINE-CYANOCOBALAMIN TAB 2.5-25-2 MG 2.5-25-2 MG
TAB ORAL
Qty: 90 TABLET | Refills: 3 | Status: SHIPPED | OUTPATIENT
Start: 2019-05-23 | End: 2019-05-23 | Stop reason: SDUPTHER

## 2019-05-24 ENCOUNTER — TELEPHONE (OUTPATIENT)
Dept: HEMATOLOGY ONCOLOGY | Facility: CLINIC | Age: 64
End: 2019-05-24

## 2019-06-13 ENCOUNTER — TELEPHONE (OUTPATIENT)
Dept: HEMATOLOGY ONCOLOGY | Facility: CLINIC | Age: 64
End: 2019-06-13

## 2019-06-19 DIAGNOSIS — E28.2 PCOS (POLYCYSTIC OVARIAN SYNDROME): ICD-10-CM

## 2019-06-19 DIAGNOSIS — J30.89 ENVIRONMENTAL AND SEASONAL ALLERGIES: ICD-10-CM

## 2019-06-19 DIAGNOSIS — I10 ESSENTIAL HYPERTENSION: ICD-10-CM

## 2019-06-19 RX ORDER — METFORMIN HYDROCHLORIDE 500 MG/1
TABLET, EXTENDED RELEASE ORAL
Qty: 360 TABLET | Refills: 3 | Status: SHIPPED | OUTPATIENT
Start: 2019-06-19 | End: 2020-07-06 | Stop reason: SDUPTHER

## 2019-06-19 RX ORDER — AMLODIPINE BESYLATE AND BENAZEPRIL HYDROCHLORIDE 5; 20 MG/1; MG/1
CAPSULE ORAL
Qty: 90 CAPSULE | Refills: 3 | Status: SHIPPED | OUTPATIENT
Start: 2019-06-19 | End: 2020-07-06 | Stop reason: SDUPTHER

## 2019-06-19 RX ORDER — MONTELUKAST SODIUM 10 MG/1
TABLET ORAL
Qty: 90 TABLET | Refills: 3 | Status: SHIPPED | OUTPATIENT
Start: 2019-06-19 | End: 2020-07-06 | Stop reason: SDUPTHER

## 2019-06-23 ENCOUNTER — TELEPHONE (OUTPATIENT)
Dept: HEMATOLOGY ONCOLOGY | Facility: CLINIC | Age: 64
End: 2019-06-23

## 2019-06-25 ENCOUNTER — OFFICE VISIT (OUTPATIENT)
Dept: FAMILY MEDICINE CLINIC | Facility: CLINIC | Age: 64
End: 2019-06-25
Payer: COMMERCIAL

## 2019-06-25 VITALS
RESPIRATION RATE: 18 BRPM | BODY MASS INDEX: 50.02 KG/M2 | HEART RATE: 74 BPM | DIASTOLIC BLOOD PRESSURE: 62 MMHG | OXYGEN SATURATION: 92 % | SYSTOLIC BLOOD PRESSURE: 124 MMHG | WEIGHT: 293 LBS | HEIGHT: 64 IN | TEMPERATURE: 98.2 F

## 2019-06-25 DIAGNOSIS — E28.2 PCOS (POLYCYSTIC OVARIAN SYNDROME): ICD-10-CM

## 2019-06-25 DIAGNOSIS — K52.9 GASTROENTERITIS: Primary | ICD-10-CM

## 2019-06-25 DIAGNOSIS — R11.0 NAUSEA: ICD-10-CM

## 2019-06-25 PROBLEM — D68.9: Status: ACTIVE | Noted: 2019-06-12

## 2019-06-25 PROCEDURE — 1036F TOBACCO NON-USER: CPT | Performed by: NURSE PRACTITIONER

## 2019-06-25 PROCEDURE — 99213 OFFICE O/P EST LOW 20 MIN: CPT | Performed by: NURSE PRACTITIONER

## 2019-06-25 PROCEDURE — 3008F BODY MASS INDEX DOCD: CPT | Performed by: NURSE PRACTITIONER

## 2019-06-25 RX ORDER — ONDANSETRON HYDROCHLORIDE 8 MG/1
8 TABLET, FILM COATED ORAL EVERY 8 HOURS PRN
Qty: 30 TABLET | Refills: 0 | Status: ON HOLD | OUTPATIENT
Start: 2019-06-25 | End: 2020-09-09 | Stop reason: ALTCHOICE

## 2019-06-25 RX ORDER — ONDANSETRON HYDROCHLORIDE 8 MG/1
8 TABLET, FILM COATED ORAL EVERY 8 HOURS PRN
Qty: 30 TABLET | Refills: 0 | Status: SHIPPED | OUTPATIENT
Start: 2019-06-25 | End: 2019-06-25

## 2019-07-02 ENCOUNTER — TELEPHONE (OUTPATIENT)
Dept: HEMATOLOGY ONCOLOGY | Facility: CLINIC | Age: 64
End: 2019-07-02

## 2019-07-02 NOTE — TELEPHONE ENCOUNTER
Pt called regarding her INR results and needs to know if she needs to increase or decrease her Coumadin

## 2019-07-02 NOTE — TELEPHONE ENCOUNTER
PT INR 3 12  Spoke with patient  She is taking Coumadin 7 5 mg Mondays through Fridays and 5 mg on Saturdays and Sundays  She will skip Coumadin tonight  She will change scheduled to 7 5 mg Coumadin  Mondays through Thursdays  5 mg on Fridays Saturdays and Sundays  She will have INR checked in 2 weeks

## 2019-07-12 ENCOUNTER — OFFICE VISIT (OUTPATIENT)
Dept: HEMATOLOGY ONCOLOGY | Facility: CLINIC | Age: 64
End: 2019-07-12
Payer: COMMERCIAL

## 2019-07-12 VITALS
DIASTOLIC BLOOD PRESSURE: 78 MMHG | HEART RATE: 108 BPM | BODY MASS INDEX: 50.02 KG/M2 | OXYGEN SATURATION: 97 % | SYSTOLIC BLOOD PRESSURE: 126 MMHG | RESPIRATION RATE: 18 BRPM | HEIGHT: 64 IN | WEIGHT: 293 LBS | TEMPERATURE: 98.5 F

## 2019-07-12 DIAGNOSIS — D68.59 HYPERCOAGULABLE STATE (HCC): ICD-10-CM

## 2019-07-12 DIAGNOSIS — I82.502 CHRONIC DEEP VEIN THROMBOSIS (DVT) OF LEFT LOWER EXTREMITY, UNSPECIFIED VEIN (HCC): Primary | ICD-10-CM

## 2019-07-12 DIAGNOSIS — E72.11 HOMOCYSTINEMIA (HCC): ICD-10-CM

## 2019-07-12 PROCEDURE — 99214 OFFICE O/P EST MOD 30 MIN: CPT | Performed by: INTERNAL MEDICINE

## 2019-07-12 NOTE — PATIENT INSTRUCTIONS
Blood tests for Coumadin as instructed and please call every time after the test to get instructions about Coumadin dose and next blood test

## 2019-07-12 NOTE — LETTER
July 12, 2019     Nixon Tapia MD  [address] [place] [address] [id number]    Patient: Amparo Atkins   YOB: 1955   Date of Visit: 7/12/2019       Dear Dr Luisana Matthew: Thank you for referring Vaishali Chavarria to me for evaluation  Below are my notes for this consultation  If you have questions, please do not hesitate to call me  I look forward to following your patient along with you  Sincerely,        Jacey Massey MD        CC: No Recipients  Jacey Massey MD  7/12/2019  2:10 PM  Sign at close encounter    HPI:  Patient is on long-term anticoagulation and she is taking Coumadin  She has history of recurrent DVTs  She has family history of blood clots  No bleeding or blood clots on Coumadin  She gets blood checked for PT INR to instruct her about Coumadin dose  She is also taking Folbic because of high homocystine level  She was told that she had viral infection and that happened 3 weeks ago and she felt dizzy  She is feeling better now  She did not receive antibiotic or any other medication  She hydrated herself and took rest   She knows to tell the prescribing doctor that she has been on Coumadin any time she has a new prescription because of drug interaction  She has IVC filter  She has lymphedema both legs and has compression machine  She uses nystatin powder off and on for redness in the groin areas in summer months  She will remain on anticoglation for life unless contraindicated  She is aware to contact us also if she were to receive any new medication from other providers, ie antibiotics  Patient is overweight  She has tiredness  She has exertional dyspnea probably because of her weight     She is regular with her medical checkups and GYN checkups and mammography                     Current Outpatient Medications:     albuterol (PROVENTIL HFA,VENTOLIN HFA) 90 mcg/act inhaler, Inhale 2 puffs every 6 (six) hours as needed for wheezing , Disp: , Rfl:    alendronate (FOSAMAX) 70 mg tablet, Take 70 mg by mouth every 7 days, Disp: , Rfl:     amLODIPine-benazepril (LOTREL 5-20) 5-20 MG per capsule, TAKE ONE CAPSULE BY MOUTH EVERY DAY, Disp: 90 capsule, Rfl: 3    amoxicillin (AMOXIL) 500 mg capsule, TAKE 4 CAPSULES BY MOUTH 1HR BEFORE APPT, Disp: , Rfl: 0    cetirizine (ZYRTEC ALLERGY) 10 mg tablet, Take 1 tablet by mouth daily as needed, Disp: , Rfl:     Cholecalciferol (VITAMIN D3) 93754 units TABS, Take 1 tablet by mouth every 14 (fourteen) days, Disp: , Rfl:     clobetasol (TEMOVATE) 0 05 % ointment, Apply topically as directed two times daily, Disp: , Rfl:     enoxaparin (LOVENOX) 150 mg/mL injection, Inject 1 mL under the skin on the mornings of 11/12/18, 11/13/18, and 11/14/18 , Disp: 10 mL, Rfl: 0    ergocalciferol (VITAMIN D2) 50,000 units, Take by mouth, Disp: , Rfl:     fluticasone (FLONASE) 50 mcg/act nasal spray, into each nostril, Disp: , Rfl:     fluticasone-vilanterol (BREO ELLIPTA) 100-25 mcg/inh inhaler, Inhale daily, Disp: , Rfl:     folic acid-pyridoxine-cyanocobalamin (FOLBIC) 2 5-25-2 mg, Take 1 tablet by mouth daily, Disp: 90 tablet, Rfl: 3    folic acid-pyridoxine-cyanocobalamin (FOLBIC) 2 5-25-2 mg, Take 1 tablet by mouth daily, Disp: 90 tablet, Rfl: 3    furosemide (LASIX) 20 mg tablet, TAKE 1 TABLET DAILY, Disp: 90 tablet, Rfl: 3    Iodoquinol-HC-Aloe Polysacch (ALCORTIN A) 1-2-1 % GEL, Apply topically, Disp: , Rfl:     metFORMIN (GLUCOPHAGE-XR) 500 mg 24 hr tablet, TAKE ONE TABLET BY MOUTH FOUR TIMES A DAY, Disp: 360 tablet, Rfl: 3    mometasone (NASONEX) 50 mcg/act nasal spray, 2 sprays into each nostril daily, Disp: , Rfl:     montelukast (SINGULAIR) 10 mg tablet, TAKE ONE TABLET BY MOUTH EVERY DAY AT BEDTIME, Disp: 90 tablet, Rfl: 3    multivitamin (THERAGRAN) TABS, Take 1 tablet by mouth daily  , Disp: , Rfl:     nystatin (MYCOSTATIN) powder, Apply topically 3 (three) times a day, Disp: 60 g, Rfl: 3    ondansetron (ZOFRAN) 8 mg tablet, Take 1 tablet (8 mg total) by mouth every 8 (eight) hours as needed for nausea or vomiting, Disp: 30 tablet, Rfl: 0    pantoprazole (PROTONIX) 40 mg tablet, Take 1 tablet (40 mg total) by mouth daily, Disp: 30 tablet, Rfl: 5    pravastatin (PRAVACHOL) 40 mg tablet, Take 1 tablet (40 mg total) by mouth daily, Disp: 90 tablet, Rfl: 3    silver sulfadiazine (SILVADENE,SSD) 1 % cream, Apply topically daily, Disp: 1000 g, Rfl: 5    triamcinolone (KENALOG) 0 1 % cream, , Disp: , Rfl:     triamcinolone (KENALOG) 0 5 % cream, APPLY TOPICALLY TO RIGHT ANKLE EVERY 12 HOURS, Disp: , Rfl:     warfarin (COUMADIN) 5 mg tablet, TAKE 1 & 1/2 (ONE & ONE-HALF) TABLETS BY MOUTH ONCE DAILY OR  AS  DIRECTED, Disp: 150 tablet, Rfl: 1    Allergies   Allergen Reactions    Azithromycin Diarrhea    Ciprofloxacin Confusion, Dizziness and Other (See Comments)     Other reaction(s): Other (Please comment)  Dizziness, weepy    Clindamycin Other (See Comments)     Acid Reflux    Ancef [Cefazolin] Rash     States tolerated Keflex        No history exists  ROS:  07/12/19 Reviewed 13 systems:  Presently no headaches, seizures, no more dizziness, diplopia, dysphagia, hoarseness, chest pain, palpitations,  cough, hemoptysis, abdominal pain, nausea, vomiting, change in bowel habits, melena, hematuria, fever, chills, bleeding, bone pains, skin rash, weight loss, arthritic   weakness, numbness,  claudication and gait problem  No frequent infections  Not unusually sensitive to heat or cold  No swollen glands     No GYN symptoms  Patient is anxious   Other symptoms are in HPI        /78 (BP Location: Left arm, Patient Position: Sitting, Cuff Size: Adult)   Pulse (!) 108   Temp 98 5 °F (36 9 °C)   Resp 18   Ht 5' 4 25" (1 632 m)   Wt (!) 172 kg (380 lb)   LMP  (LMP Unknown)   SpO2 97%   BMI 64 72 kg/m²      Physical Exam:  Alert, oriented, not in distress, no icterus, no oral thrush, no palpable neck mass, clear lung fields, regular heart rate and 92, abdomen  soft , large and non tender, difficult to palpate any mass in the abdomen, has large nonpitting edema of both legs with stasis dermatitis, no calf tenderness, no focal neurological deficit, has generalized weakness, no skin rash other than stasis dermatitis, no palpable lymphadenopathy in the neck and axillary areas,  no clubbing  Patient is anxious  Performance status 2 because of weight and lymphedema  IMAGING:      LABS:  Results for orders placed or performed in visit on 05/24/19   Protime-INR   Result Value Ref Range    INR 2 15 (A) 0 86 - 1 17     Labs, Imaging, & Other studies:   All pertinent labs and imaging studies were personally reviewed    Lab Results   Component Value Date    K 4 4 12/25/2018     12/25/2018    CO2 29 12/25/2018    BUN 22 12/25/2018    CREATININE 1 15 12/25/2018    CALCIUM 8 4 12/25/2018    AST 20 12/22/2018    ALT 22 12/22/2018    ALKPHOS 77 12/22/2018    EGFR 51 12/25/2018     Lab Results   Component Value Date    WBC 8 87 12/25/2018    HGB 12 9 12/25/2018    HCT 41 2 12/25/2018    MCV 91 12/25/2018     12/25/2018       Reviewed and discussed with patient  Assessment and plan:   Patient is on long-term anticoagulation and she is taking Coumadin  She has history of recurrent DVTs  She has family history of blood clots  No bleeding or blood clots on Coumadin  She gets blood checked for PT INR to instruct her about Coumadin dose  She is also taking Folbic because of high homocystine level  She was told that she had viral infection and that happened 3 weeks ago and she felt dizzy  She is feeling better now  She did not receive antibiotic or any other medication  She hydrated herself and took rest   She knows to tell the prescribing doctor that she has been on Coumadin any time she has a new prescription because of drug interaction  She has IVC filter   She has lymphedema both legs and has compression machine  She uses nystatin powder off and on for redness in the groin areas in summer months  She will remain on anticoglation for life unless contraindicated  She is aware to contact us also if she were to receive any new medication from other providers, ie antibiotics  Patient is overweight  She has tiredness  She has exertional dyspnea probably because of her weight     She is regular with her medical checkups and GYN checkups and mammography     Physical examination and test results are as recorded and discussed  Plan is to continue her on Coumadin and Folbic as before  Condition discussed and explained  Questions answered  Discussed the importance of self-breast examination, eating healthy foods, staying active and health screening test   She is capable of self-care  She has instructions to report to the emergency room in case of blood clot or bleeding and also call our office  Jigna Goodman She would prefer to come in March and not in January because of the weather  Also she will have blood counts and chemistry and homocystine level prior to that visit  PT INR as advised        1  Chronic deep vein thrombosis (DVT) of left lower extremity, unspecified vein (HCC)    - CBC and differential; Future  - Comprehensive metabolic panel; Future  - Protime-INR; Future  - Homocysteine, serum; Future    2  Hypercoagulable state ([facility] [address] )      3  Homocystinemia ([facility] [address] )    - Homocysteine, serum; Future          Patient voiced understanding and agreement in the discussion  Counseling / Coordination of Care   Greater than 50% of total time was spent with the patient and / or family counseling and / or coordination of care

## 2019-07-12 NOTE — PROGRESS NOTES
HPI:  Patient is on long-term anticoagulation and she is taking Coumadin  She has history of recurrent DVTs  She has family history of blood clots  No bleeding or blood clots on Coumadin  She gets blood checked for PT INR to instruct her about Coumadin dose  She is also taking Folbic because of high homocystine level  She was told that she had viral infection and that happened 3 weeks ago and she felt dizzy  She is feeling better now  She did not receive antibiotic or any other medication  She hydrated herself and took rest   She knows to tell the prescribing doctor that she has been on Coumadin any time she has a new prescription because of drug interaction  She has IVC filter  She has lymphedema both legs and has compression machine  She uses nystatin powder off and on for redness in the groin areas in summer months  She will remain on anticoglation for life unless contraindicated  She is aware to contact us also if she were to receive any new medication from other providers, ie antibiotics  Patient is overweight  She has tiredness  She has exertional dyspnea probably because of her weight     She is regular with her medical checkups and GYN checkups and mammography                     Current Outpatient Medications:     albuterol (PROVENTIL HFA,VENTOLIN HFA) 90 mcg/act inhaler, Inhale 2 puffs every 6 (six) hours as needed for wheezing , Disp: , Rfl:     alendronate (FOSAMAX) 70 mg tablet, Take 70 mg by mouth every 7 days, Disp: , Rfl:     amLODIPine-benazepril (LOTREL 5-20) 5-20 MG per capsule, TAKE ONE CAPSULE BY MOUTH EVERY DAY, Disp: 90 capsule, Rfl: 3    amoxicillin (AMOXIL) 500 mg capsule, TAKE 4 CAPSULES BY MOUTH 1HR BEFORE APPT, Disp: , Rfl: 0    cetirizine (ZYRTEC ALLERGY) 10 mg tablet, Take 1 tablet by mouth daily as needed, Disp: , Rfl:     Cholecalciferol (VITAMIN D3) 55649 units TABS, Take 1 tablet by mouth every 14 (fourteen) days, Disp: , Rfl:     clobetasol (TEMOVATE) 0 05 % ointment, Apply topically as directed two times daily, Disp: , Rfl:     enoxaparin (LOVENOX) 150 mg/mL injection, Inject 1 mL under the skin on the mornings of 11/12/18, 11/13/18, and 11/14/18 , Disp: 10 mL, Rfl: 0    ergocalciferol (VITAMIN D2) 50,000 units, Take by mouth, Disp: , Rfl:     fluticasone (FLONASE) 50 mcg/act nasal spray, into each nostril, Disp: , Rfl:     fluticasone-vilanterol (BREO ELLIPTA) 100-25 mcg/inh inhaler, Inhale daily, Disp: , Rfl:     folic acid-pyridoxine-cyanocobalamin (FOLBIC) 2 5-25-2 mg, Take 1 tablet by mouth daily, Disp: 90 tablet, Rfl: 3    folic acid-pyridoxine-cyanocobalamin (FOLBIC) 2 5-25-2 mg, Take 1 tablet by mouth daily, Disp: 90 tablet, Rfl: 3    furosemide (LASIX) 20 mg tablet, TAKE 1 TABLET DAILY, Disp: 90 tablet, Rfl: 3    Iodoquinol-HC-Aloe Polysacch (ALCORTIN A) 1-2-1 % GEL, Apply topically, Disp: , Rfl:     metFORMIN (GLUCOPHAGE-XR) 500 mg 24 hr tablet, TAKE ONE TABLET BY MOUTH FOUR TIMES A DAY, Disp: 360 tablet, Rfl: 3    mometasone (NASONEX) 50 mcg/act nasal spray, 2 sprays into each nostril daily, Disp: , Rfl:     montelukast (SINGULAIR) 10 mg tablet, TAKE ONE TABLET BY MOUTH EVERY DAY AT BEDTIME, Disp: 90 tablet, Rfl: 3    multivitamin (THERAGRAN) TABS, Take 1 tablet by mouth daily  , Disp: , Rfl:     nystatin (MYCOSTATIN) powder, Apply topically 3 (three) times a day, Disp: 60 g, Rfl: 3    ondansetron (ZOFRAN) 8 mg tablet, Take 1 tablet (8 mg total) by mouth every 8 (eight) hours as needed for nausea or vomiting, Disp: 30 tablet, Rfl: 0    pantoprazole (PROTONIX) 40 mg tablet, Take 1 tablet (40 mg total) by mouth daily, Disp: 30 tablet, Rfl: 5    pravastatin (PRAVACHOL) 40 mg tablet, Take 1 tablet (40 mg total) by mouth daily, Disp: 90 tablet, Rfl: 3    silver sulfadiazine (SILVADENE,SSD) 1 % cream, Apply topically daily, Disp: 1000 g, Rfl: 5    triamcinolone (KENALOG) 0 1 % cream, , Disp: , Rfl:     triamcinolone (KENALOG) 0 5 % cream, APPLY TOPICALLY TO RIGHT ANKLE EVERY 12 HOURS, Disp: , Rfl:     warfarin (COUMADIN) 5 mg tablet, TAKE 1 & 1/2 (ONE & ONE-HALF) TABLETS BY MOUTH ONCE DAILY OR  AS  DIRECTED, Disp: 150 tablet, Rfl: 1    Allergies   Allergen Reactions    Azithromycin Diarrhea    Ciprofloxacin Confusion, Dizziness and Other (See Comments)     Other reaction(s): Other (Please comment)  Dizziness, weepy    Clindamycin Other (See Comments)     Acid Reflux    Ancef [Cefazolin] Rash     States tolerated Keflex        No history exists  ROS:  07/12/19 Reviewed 13 systems:  Presently no headaches, seizures, no more dizziness, diplopia, dysphagia, hoarseness, chest pain, palpitations,  cough, hemoptysis, abdominal pain, nausea, vomiting, change in bowel habits, melena, hematuria, fever, chills, bleeding, bone pains, skin rash, weight loss, arthritic   weakness, numbness,  claudication and gait problem  No frequent infections  Not unusually sensitive to heat or cold  No swollen glands     No GYN symptoms  Patient is anxious  Other symptoms are in HPI        /78 (BP Location: Left arm, Patient Position: Sitting, Cuff Size: Adult)   Pulse (!) 108   Temp 98 5 °F (36 9 °C)   Resp 18   Ht 5' 4 25" (1 632 m)   Wt (!) 172 kg (380 lb)   LMP  (LMP Unknown)   SpO2 97%   BMI 64 72 kg/m²     Physical Exam:  Alert, oriented, not in distress, no icterus, no oral thrush, no palpable neck mass, clear lung fields, regular heart rate and 92, abdomen  soft , large and non tender, difficult to palpate any mass in the abdomen, has large nonpitting edema of both legs with stasis dermatitis, no calf tenderness, no focal neurological deficit, has generalized weakness, no skin rash other than stasis dermatitis, no palpable lymphadenopathy in the neck and axillary areas,  no clubbing  Patient is anxious  Performance status 2 because of weight and lymphedema      IMAGING:      LABS:  Results for orders placed or performed in visit on 05/24/19 Protime-INR   Result Value Ref Range    INR 2 15 (A) 0 86 - 1 17     Labs, Imaging, & Other studies:   All pertinent labs and imaging studies were personally reviewed    Lab Results   Component Value Date    K 4 4 12/25/2018     12/25/2018    CO2 29 12/25/2018    BUN 22 12/25/2018    CREATININE 1 15 12/25/2018    CALCIUM 8 4 12/25/2018    AST 20 12/22/2018    ALT 22 12/22/2018    ALKPHOS 77 12/22/2018    EGFR 51 12/25/2018     Lab Results   Component Value Date    WBC 8 87 12/25/2018    HGB 12 9 12/25/2018    HCT 41 2 12/25/2018    MCV 91 12/25/2018     12/25/2018       Reviewed and discussed with patient  Assessment and plan:   Patient is on long-term anticoagulation and she is taking Coumadin  She has history of recurrent DVTs  She has family history of blood clots  No bleeding or blood clots on Coumadin  She gets blood checked for PT INR to instruct her about Coumadin dose  She is also taking Folbic because of high homocystine level  She was told that she had viral infection and that happened 3 weeks ago and she felt dizzy  She is feeling better now  She did not receive antibiotic or any other medication  She hydrated herself and took rest   She knows to tell the prescribing doctor that she has been on Coumadin any time she has a new prescription because of drug interaction  She has IVC filter  She has lymphedema both legs and has compression machine  She uses nystatin powder off and on for redness in the groin areas in summer months  She will remain on anticoglation for life unless contraindicated  She is aware to contact us also if she were to receive any new medication from other providers, ie antibiotics  Patient is overweight  She has tiredness  She has exertional dyspnea probably because of her weight     She is regular with her medical checkups and GYN checkups and mammography     Physical examination and test results are as recorded and discussed    Plan is to continue her on Coumadin and Folbic as before  Condition discussed and explained  Questions answered  Discussed the importance of self-breast examination, eating healthy foods, staying active and health screening test   She is capable of self-care  She has instructions to report to the emergency room in case of blood clot or bleeding and also call our office  [place] She would prefer to come in March and not in January because of the weather  Also she will have blood counts and chemistry and homocystine level prior to that visit  PT INR as advised        1  Chronic deep vein thrombosis (DVT) of left lower extremity, unspecified vein (HCC)    - CBC and differential; Future  - Comprehensive metabolic panel; Future  - Protime-INR; Future  - Homocysteine, serum; Future    2  Hypercoagulable state ([facility] [address] )      3  Homocystinemia ([facility] [address] )    - Homocysteine, serum; Future          Patient voiced understanding and agreement in the discussion  Counseling / Coordination of Care   Greater than 50% of total time was spent with the patient and / or family counseling and / or coordination of care

## 2019-07-18 ENCOUNTER — TELEPHONE (OUTPATIENT)
Dept: HEMATOLOGY ONCOLOGY | Facility: CLINIC | Age: 64
End: 2019-07-18

## 2019-07-18 NOTE — TELEPHONE ENCOUNTER
Patient had her lab work done yesterday evening and called in to get her Coumadin level  At the time there were no results in Epic  She said that this has happened before and the results should be in right now  She asked if you could call the lab [facility]) to get the her results and then call her   [phone]

## 2019-07-19 NOTE — TELEPHONE ENCOUNTER
Called patient and informed her that she should continue on the same dose Coumadin of 7 5 Monday- Thursday and 5 mg Fri-Sat-Sun, recheck her labs in 2-3 weeks  Patient voiced understanding

## 2019-08-20 ENCOUNTER — TELEPHONE (OUTPATIENT)
Dept: HEMATOLOGY ONCOLOGY | Facility: CLINIC | Age: 64
End: 2019-08-20

## 2019-08-20 NOTE — TELEPHONE ENCOUNTER
Called and informed patient of her PT & INR results  PT: 18 4   INR: 2 15    Per Dr Jeevna Gardner she is to take Coumadin of 7 5 Monday- Thursday and 5 mg Fri-Sat-Sun, which is her current dose  LVM with this information and instructed patient to call the office if she has any additional questions

## 2019-09-05 DIAGNOSIS — Z86.718 HISTORY OF DVT (DEEP VEIN THROMBOSIS): Primary | ICD-10-CM

## 2019-09-05 RX ORDER — WARFARIN SODIUM 5 MG/1
TABLET ORAL
Qty: 150 TABLET | Refills: 1 | Status: SHIPPED | OUTPATIENT
Start: 2019-09-05 | End: 2020-05-29

## 2019-09-05 NOTE — TELEPHONE ENCOUNTER
Call transferred from [address] [place] [address]  Patient is requesting a 90 day refill on her Coumadin to be [facility] to [facility]  Patient has 6 pills left  Call back # [phone]

## 2019-09-05 NOTE — TELEPHONE ENCOUNTER
Patient called in again and stated that she spoke with someone 3 hours ago and is still waiting on her refill  I advised her that it can take a day or two for refills, and that a message had already been sent to the RN about the coumadin  She stated that she lives far and cannot come back and forth  The pharmacy only had a partial refill when she spoke with them

## 2019-09-16 ENCOUNTER — TELEPHONE (OUTPATIENT)
Dept: HEMATOLOGY ONCOLOGY | Facility: CLINIC | Age: 64
End: 2019-09-16

## 2019-09-16 NOTE — TELEPHONE ENCOUNTER
Spoke with José Moeller stating her PT was at 20 7 and her INR was at 2 65   Pt voiced understanding

## 2019-09-16 NOTE — TELEPHONE ENCOUNTER
Patient is calling with her Protime INR results done last Wednesday at [facility]

## 2019-09-25 ENCOUNTER — TELEPHONE (OUTPATIENT)
Dept: FAMILY MEDICINE CLINIC | Facility: CLINIC | Age: 64
End: 2019-09-25

## 2019-09-25 DIAGNOSIS — G47.33 OSA ON CPAP: Primary | ICD-10-CM

## 2019-09-25 DIAGNOSIS — Z99.89 OSA ON CPAP: Primary | ICD-10-CM

## 2019-09-25 NOTE — TELEPHONE ENCOUNTER
Pt called said she needs a new CPAP machine due to its leaking  She tried to call her cpap company but they said her script is "way to old" she would need a new diagnostic sleep study done she will go to [facility]

## 2019-10-03 NOTE — ASSESSMENT & PLAN NOTE
-continue amlodipine and benazepril  -follow the patient's blood pressure trend Wound Care: Petrol[place]

## 2019-10-15 ENCOUNTER — TELEPHONE (OUTPATIENT)
Dept: HEMATOLOGY ONCOLOGY | Facility: CLINIC | Age: 64
End: 2019-10-15

## 2019-10-15 NOTE — TELEPHONE ENCOUNTER
Received results from patient labs drawn on 10/11/19  Her results are:     PT: 20 4  INR: 2 79    Patient is to stay on her current dose of 5 mg Monday-Thursday and Fri-Sun 7 5mg and recheck in 2-3 weeks  Patient voiced understanding

## 2019-11-06 ENCOUNTER — TELEPHONE (OUTPATIENT)
Dept: SLEEP CENTER | Facility: CLINIC | Age: 64
End: 2019-11-06

## 2019-11-06 NOTE — TELEPHONE ENCOUNTER
----- Message from Nidhi Zelaya MD sent at 11/5/2019  5:06 PM EST -----  Approved  ----- Message -----  From: Cesar Mac MA  Sent: 11/4/2019  12:50 PM EST  To: Sleep Medicine [place] Provider    This sleep study needs approval      If approved please sign and return to clerical pool  If denied please include reasons why  Also provide alternative testing if warranted  Please sign and return to clerical pool

## 2019-11-08 ENCOUNTER — TELEPHONE (OUTPATIENT)
Dept: HEMATOLOGY ONCOLOGY | Facility: CLINIC | Age: 64
End: 2019-11-08

## 2019-11-08 NOTE — TELEPHONE ENCOUNTER
Reviewed with Dr Mady Haywood, Patient is to stay on her current dose of 5 mg Monday-Thursday and Fri-Sun 7 5mg and recheck in 2-3 weeks  Patient voiced understanding       PT 19 2  INR 2 48

## 2019-11-15 ENCOUNTER — TELEPHONE (OUTPATIENT)
Dept: FAMILY MEDICINE CLINIC | Facility: CLINIC | Age: 64
End: 2019-11-15

## 2019-11-15 DIAGNOSIS — Z99.89 OSA ON CPAP: ICD-10-CM

## 2019-11-15 DIAGNOSIS — G47.33 OSA ON CPAP: ICD-10-CM

## 2019-11-15 DIAGNOSIS — G47.33 OSA (OBSTRUCTIVE SLEEP APNEA): Primary | ICD-10-CM

## 2019-11-15 NOTE — TELEPHONE ENCOUNTER
Sleep lab called they need a new order put in for a diag sleep study   could you please put in a new order?  I need to work on a prior [address]

## 2019-11-21 ENCOUNTER — OFFICE VISIT (OUTPATIENT)
Dept: FAMILY MEDICINE CLINIC | Facility: CLINIC | Age: 64
End: 2019-11-21
Payer: COMMERCIAL

## 2019-11-21 VITALS
SYSTOLIC BLOOD PRESSURE: 134 MMHG | HEART RATE: 73 BPM | DIASTOLIC BLOOD PRESSURE: 80 MMHG | TEMPERATURE: 98.7 F | HEIGHT: 64 IN | RESPIRATION RATE: 18 BRPM | OXYGEN SATURATION: 97 % | WEIGHT: 293 LBS | BODY MASS INDEX: 50.02 KG/M2

## 2019-11-21 DIAGNOSIS — Z23 NEED FOR INFLUENZA VACCINATION: ICD-10-CM

## 2019-11-21 DIAGNOSIS — L71.9 ROSACEA: ICD-10-CM

## 2019-11-21 DIAGNOSIS — L30.9 DERMATITIS: Primary | ICD-10-CM

## 2019-11-21 DIAGNOSIS — B07.8 OTHER VIRAL WARTS: ICD-10-CM

## 2019-11-21 PROCEDURE — 99214 OFFICE O/P EST MOD 30 MIN: CPT | Performed by: NURSE PRACTITIONER

## 2019-11-21 PROCEDURE — 90682 RIV4 VACC RECOMBINANT DNA IM: CPT

## 2019-11-21 PROCEDURE — 90471 IMMUNIZATION ADMIN: CPT

## 2019-11-21 NOTE — PROGRESS NOTES
Assessment/Plan:     Diagnoses and all orders for this visit:    Dermatitis  Comments:  Use Triamcinolone cream as prescribed and follow up with Dermatology    Rosacea  Comments:  F/U with Dermatology     Other viral warts  Comments:  F/U with Dermatology     Need for influenza vaccination  -     influenza vaccine, [id number], quadrivalent, recombinant, PF, 0 5 mL, for patients 18 yr+ (FLUBLOK)        Subjective:      Patient ID: Queen Emilee is a 62 y o  female  Patient presents to [address] [place] [address] with complaints of a rash  Allergies, medical history and current medications reviewed with patient; patient reports taking all medications as prescribed without issues  Patient C/O a rash to the left lateral thigh; patient states she is unsure how long it has been there  Patient states rash is not itchy, but burns  Patient states she has been applying Silvadene cream, but has not noticed any improvement  Patient also reports chronic facial rash and chronic warts to the fingers  Patient states she currently follows with Dermatology in [facility]  Patient Care Team:  Kathy Loza MD as PCP - General (Family Medicine)  Ana Bowman MD (Hematology and Oncology)  Yamilet Boateng MD (Cardiology)  [facility] (Orthopedic Surgery)    Review of Systems   Skin: Positive for rash  All other systems reviewed and are negative  Objective:    /80 (BP Location: Left arm, Patient Position: Sitting, Cuff Size: Large)   Pulse 73   Temp 98 7 °F (37 1 °C) (Temporal)   Resp 18   Ht 5' 4 25" (1 632 m)   Wt (!) 176 kg (387 lb 11 2 oz)   LMP  (LMP Unknown)   SpO2 97%   BMI 66 03 kg/m²      Physical Exam   Constitutional: She is oriented to person, place, and time  She appears well-developed and well-nourished  No distress  HENT:   Head: Normocephalic and atraumatic  Eyes: Conjunctivae and lids are normal    Neck: Normal range of motion  No tracheal deviation present     Cardiovascular: Normal rate, regular rhythm, S1 normal, S2 normal and normal heart sounds  No murmur heard  Pulmonary/Chest: Effort normal and breath sounds normal  No respiratory distress  Abdominal: Normal appearance  She exhibits no distension  There is no guarding  Musculoskeletal: Normal range of motion  Neurological: She is alert and oriented to person, place, and time  Skin: Skin is warm, dry and intact  Lesion (2 warts to left hand) and rash (eczematous to left lateral thigh; rosacea to bilateral face) noted  Psychiatric: She has a normal mood and affect  Her speech is normal    Nursing note and vitals reviewed

## 2019-11-21 NOTE — PATIENT INSTRUCTIONS
Wellness Visit for Adults   WHAT YOU NEED TO KNOW:   What is a wellness visit? A wellness visit is when you see your healthcare provider to get screened for health problems  You can also get advice on how to stay healthy  Write down your questions so you remember to ask them  Ask your healthcare provider how often you should have a wellness visit  What happens at a wellness visit? Your healthcare provider will ask about your health, and your family history of health problems  This includes high blood pressure, heart disease, and cancer  He or she will ask if you have symptoms that concern you, if you smoke, and about your mood  You may also be asked about your intake of medicines, supplements, food, and alcohol  Any of the following may be done:  · Your weight  will be checked  Your height may also be checked so your body mass index (BMI) can be calculated  Your BMI shows if you are at a healthy weight  · Your blood pressure  and heart rate will be checked  Your temperature may also be checked  · Blood and urine tests  may be done  Blood tests may be done to check your cholesterol levels  Abnormal cholesterol levels increase your risk for heart disease and stroke  You may also need a blood or urine test to check for diabetes if you are at increased risk  Urine tests may be done to look for signs of an infection or kidney disease  · A physical exam  includes checking your heartbeat and lungs with a stethoscope  Your healthcare provider may also check your skin to look for sun damage  · Screening tests  may be recommended  A screening test is done to check for diseases that may not cause symptoms  The screening tests you may need depend on your age, gender, family history, and lifestyle habits  For example, colorectal screening may be recommended if you are 48years old or older  What screening tests do I need if I am a woman? · A Pap smear  is used to screen for cervical cancer   Pap smears are usually done every 3 to 5 years depending on your age  You may need them more often if you have had abnormal Pap smear test results in the past  Ask your healthcare provider how often you should have a Pap smear  · A mammogram  is an x-ray of your breasts to screen for breast cancer  Experts recommend mammograms every 2 years starting at age 48 years  You may need a mammogram at age 52 years or younger if you have an increased risk for breast cancer  Talk to your healthcare provider about when you should start having mammograms and how often you need them  What vaccines might I need? · Get an influenza vaccine  every year  The influenza vaccine protects you from the flu  Several types of viruses cause the flu  The viruses change over time, so new vaccines are made each year  · Get a tetanus-diphtheria (Td) booster vaccine  every 10 years  This vaccine protects you against tetanus and diphtheria  Tetanus is a severe infection that may cause painful muscle spasms and lockjaw  Diphtheria is a severe bacterial infection that causes a thick covering in the back of your mouth and throat  · Get a human papillomavirus (HPV) vaccine  if you are female and aged 23 to 32 or male 23 to 24 and never received it  This vaccine protects you from HPV infection  HPV is the most common infection spread by sexual contact  HPV may also cause vaginal, penile, and anal cancers  · Get a pneumococcal vaccine  if you are aged 72 years or older  The pneumococcal vaccine is an injection given to protect you from pneumococcal disease  Pneumococcal disease is an infection caused by pneumococcal bacteria  The infection may cause pneumonia, meningitis, or an ear infection  · Get a shingles vaccine  if you are aged 61 or older, even if you have had shingles before  The shingles vaccine is an injection to protect you from the varicella-zoster virus  This is the same virus that causes chickenpox   Shingles is a painful rash that develops in people who had chickenpox or have been exposed to the virus  How can I eat healthy? My Plate is a model for planning healthy meals  It shows the types and amounts of foods that should go on your plate  Fruits and vegetables make up about half of your plate, and grains and protein make up the other half  A serving of dairy is included on the side of your plate  The amount of calories and serving sizes you need depends on your age, gender, weight, and height  Examples of healthy foods are listed below:  · Eat a variety of vegetables  such as dark green, red, and orange vegetables  You can also include canned vegetables low in sodium (salt) and frozen vegetables without added butter or sauces  · Eat a variety of fresh fruits , canned fruit in 100% juice, frozen fruit, and dried fruit  · Include whole grains  At least half of the grains you eat should be whole grains  Examples include whole-wheat bread, wheat pasta, brown rice, and whole-grain cereals such as oatmeal     · Eat a variety of protein foods such as seafood (fish and shellfish), lean meat, and poultry without skin (turkey and chicken)  Examples of lean meats include pork leg, shoulder, or tenderloin, and beef round, sirloin, tenderloin, and extra lean ground beef  Other protein foods include eggs and egg substitutes, beans, peas, soy products, nuts, and seeds  · Choose low-fat dairy products such as skim or 1% milk or low-fat yogurt, cheese, and cottage cheese  · Limit unhealthy fats  such as butter, hard margarine, and shortening  How much exercise do I need? Exercise at least 30 minutes per day on most days of the week  Some examples of exercise include walking, biking, dancing, and swimming  You can also fit in more physical activity by taking the stairs instead of the elevator or parking farther away from stores  Include muscle strengthening activities 2 days each week  Regular exercise provides many health benefits  It helps you manage your weight, and decreases your risk for type 2 diabetes, heart disease, stroke, and high blood pressure  Exercise can also help improve your mood  Ask your healthcare provider about the best exercise plan for you  What are some general health and safety guidelines I should follow? · Do not smoke  Nicotine and other chemicals in cigarettes and cigars can cause lung damage  Ask your healthcare provider for information if you currently smoke and need help to quit  E-cigarettes or smokeless tobacco still contain nicotine  Talk to your healthcare provider before you use these products  · Limit alcohol  A drink of alcohol is 12 ounces of beer, 5 ounces of wine, or 1½ ounces of liquor  · Lose weight, if needed  Being overweight increases your risk of certain health conditions  These include heart disease, high blood pressure, type 2 diabetes, and certain types of cancer  · Protect your skin  Do not sunbathe or use tanning beds  Use sunscreen with a SPF 15 or higher  Apply sunscreen at least 15 minutes before you go outside  Reapply sunscreen every 2 hours  Wear protective clothing, hats, and sunglasses when you are outside  · Drive safely  Always wear your seatbelt  Make sure everyone in your car wears a seatbelt  A seatbelt can save your life if you are in an accident  Do not use your cell phone when you are driving  This could distract you and cause an accident  Pull over if you need to make a call or send a text message  · Practice safe sex  Use latex condoms if are sexually active and have more than one partner  Your healthcare provider may recommend screening tests for sexually transmitted infections (STIs)  · Wear helmets, lifejackets, and protective gear  Always wear a helmet when you ride a bike or motorcycle, go skiing, or play sports that could cause a head injury  Wear protective equipment when you play sports   Wear a lifejacket when you are on a boat or doing water sports  CARE AGREEMENT:   You have the right to help plan your care  Learn about your health condition and how it may be treated  Discuss treatment options with your caregivers to decide what care you want to receive  You always have the right to refuse treatment  The above information is an  only  It is not intended as medical advice for individual conditions or treatments  Talk to your doctor, nurse or pharmacist before following any medical regimen to see if it is safe and effective for you  © 2017 [employer] Information is for End User's use only and may not be sold, redistributed or otherwise used for commercial purposes  All illustrations and images included in CareNotes® are the copyrighted property of A D [employer] M [employer]  or Mike Herrmann

## 2019-11-22 PROBLEM — L71.9 ROSACEA: Status: ACTIVE | Noted: 2019-11-22

## 2019-11-25 NOTE — PROGRESS NOTES
Chart updated per office request  Discrete reportable data documented  Updated Mammogram DOS 11-5-19

## 2019-11-29 ENCOUNTER — TELEPHONE (OUTPATIENT)
Dept: HEMATOLOGY ONCOLOGY | Facility: CLINIC | Age: 64
End: 2019-11-29

## 2019-11-29 NOTE — TELEPHONE ENCOUNTER
Per Dr Alcaraz How- Results of the bw was good     Pt to check labs again in 2-3wks    Spoke with pt & made her aware

## 2019-12-04 DIAGNOSIS — R60.9 PERIPHERAL EDEMA: ICD-10-CM

## 2019-12-04 RX ORDER — FUROSEMIDE 20 MG/1
20 TABLET ORAL DAILY
Qty: 90 TABLET | Refills: 3 | Status: SHIPPED | OUTPATIENT
Start: 2019-12-04 | End: 2020-01-02 | Stop reason: SDUPTHER

## 2019-12-24 ENCOUNTER — TELEPHONE (OUTPATIENT)
Dept: HEMATOLOGY ONCOLOGY | Facility: CLINIC | Age: 64
End: 2019-12-24

## 2019-12-24 NOTE — TELEPHONE ENCOUNTER
Spoke to patient, she got her labs done at [facility], I called them and they stated that they were closed until Thursday, that there was no one to give me results at this present time, I spoke to patient and stated I would give her a call on Thursday with her results  Patient voiced understanding

## 2019-12-26 NOTE — TELEPHONE ENCOUNTER
PT 3 10 INR 21 6 Per Dr Olga Watters she should skip her coumadin tonight (12/26) and take 5 mg Monday-Friday, and 7 5 mg Sat and Sun  Recheck labs in 2 weeks  I LVM for patient to call us back since I could not get ahold of her  Please relay this information to patient when she does call back  Thank you

## 2019-12-30 ENCOUNTER — TELEPHONE (OUTPATIENT)
Dept: HEMATOLOGY ONCOLOGY | Facility: CLINIC | Age: 64
End: 2019-12-30

## 2020-01-02 DIAGNOSIS — R60.9 PERIPHERAL EDEMA: ICD-10-CM

## 2020-01-02 RX ORDER — FUROSEMIDE 20 MG/1
20 TABLET ORAL DAILY
Qty: 90 TABLET | Refills: 3 | Status: SHIPPED | OUTPATIENT
Start: 2020-01-02 | End: 2020-09-10 | Stop reason: HOSPADM

## 2020-01-10 DIAGNOSIS — L03.115 CELLULITIS OF RIGHT LOWER LEG: ICD-10-CM

## 2020-01-10 RX ORDER — SILVER SULFADIAZINE 1 %
CREAM (GRAM) TOPICAL
Qty: 800 G | Refills: 0 | Status: SHIPPED | OUTPATIENT
Start: 2020-01-10 | End: 2020-01-29

## 2020-01-13 ENCOUNTER — TELEPHONE (OUTPATIENT)
Dept: HEMATOLOGY ONCOLOGY | Facility: CLINIC | Age: 65
End: 2020-01-13

## 2020-01-13 NOTE — TELEPHONE ENCOUNTER
PT 17 3 INR 2 04    LVM per Dr Hazel Howell stay on current dose of coumadin 5 mg M-F 7 5 mg Sat & Mira    Recheck in 2-3 weeks

## 2020-01-13 NOTE — TELEPHONE ENCOUNTER
Please contact Remy Vogt with her coumadin dosage--had labs done at [facility]  Ok to leave her a message    [phone]

## 2020-01-24 ENCOUNTER — TELEPHONE (OUTPATIENT)
Dept: HEMATOLOGY ONCOLOGY | Facility: CLINIC | Age: 65
End: 2020-01-24

## 2020-01-24 NOTE — TELEPHONE ENCOUNTER
Patient called to follow up on the lab work she completed at [facility] lab  I called that lad and spoke with Efrain Leung and he advised that he will be faxing to our [facility] office  Patient would like to know INR level for her Coumadin dosage

## 2020-01-24 NOTE — TELEPHONE ENCOUNTER
PT 17 4 INR 2 06     Per Dr Cristal Panda stay on current dose of coumadin 5 mg M-F 7 5 mg Sat & Sun     Recheck in 2-3 weeks  As for Lovenox, Dr Cristal Panda will have answer for me come Monday and I will call patient to inform her  Patient voiced understanding of all above

## 2020-01-24 NOTE — TELEPHONE ENCOUNTER
Patient called and advised that she will be having knee surgery 2/7 at [facility], and would like to resume Lovenox medication  Please advise at [phone]

## 2020-01-28 NOTE — TELEPHONE ENCOUNTER
Pt is calling in inquiring if Dr Rizwana Lopez has an answer for her Lovenox   Please call her back at [phone]

## 2020-01-29 DIAGNOSIS — L03.115 CELLULITIS OF RIGHT LOWER LEG: ICD-10-CM

## 2020-01-29 RX ORDER — SILVER SULFADIAZINE 1 %
CREAM (GRAM) TOPICAL
Qty: 800 G | Refills: 0 | Status: SHIPPED | OUTPATIENT
Start: 2020-01-29 | End: 2021-05-11

## 2020-01-29 NOTE — TELEPHONE ENCOUNTER
Pt is calling in inquiring if Dr Raad Elise has an answer for her Lovenox   Please call her back at [phone]

## 2020-01-30 ENCOUNTER — TELEPHONE (OUTPATIENT)
Dept: HEMATOLOGY ONCOLOGY | Facility: MEDICAL CENTER | Age: 65
End: 2020-01-30

## 2020-01-30 DIAGNOSIS — I82.4Z9 DEEP VEIN THROMBOSIS (DVT) OF DISTAL VEIN OF LOWER EXTREMITY, UNSPECIFIED CHRONICITY, UNSPECIFIED LATERALITY (HCC): ICD-10-CM

## 2020-01-30 NOTE — TELEPHONE ENCOUNTER
Spoke to Mark and informed her that her appt on 3/20/2020 needs to be r/s  Nikki's new appt is set for 4/23/2020 at 3:30pm with Dr Priscilla Cho at the [place] location  Mark is satisfied with this new appt change

## 2020-01-30 NOTE — TELEPHONE ENCOUNTER
Per Dr Ham Distance, patient is to stop taking Coumadin on 2/3/20  She is to start Lovenox 150mg on 2/5/20 and 2/6/20 at 7:00 AM  Patient is follow the surgeons instructions for post op medication regimen  Patient was informed of this information and a script was sent to the patient pharmacy on file  Patient voiced understanding and was instructed to call the office if any other issues arise

## 2020-01-31 ENCOUNTER — TELEPHONE (OUTPATIENT)
Dept: HEMATOLOGY ONCOLOGY | Facility: CLINIC | Age: 65
End: 2020-01-31

## 2020-01-31 NOTE — TELEPHONE ENCOUNTER
Spoke to Joann Thacker her that she will take her last dose of Coumadin on 2/2/20 prior to her surgery that will be on 2/7/20  I informed patient surgeon will instruct her on when to restart coumadin afterwards  New Marvin to call our office if she has any questions

## 2020-02-11 ENCOUNTER — DOCUMENTATION (OUTPATIENT)
Dept: HEMATOLOGY ONCOLOGY | Facility: CLINIC | Age: 65
End: 2020-02-11

## 2020-02-11 ENCOUNTER — TELEPHONE (OUTPATIENT)
Dept: HEMATOLOGY ONCOLOGY | Facility: CLINIC | Age: 65
End: 2020-02-11

## 2020-02-11 NOTE — PROGRESS NOTES
I called patient and also her daughter Maverick Ray and left messages for both of them on the answering machines  I left my name and cell phone number and requested a phone call to find out what was going on with her Coumadin because we were not informed that her surgery got canceled on 02/07/2020 until today

## 2020-02-11 NOTE — TELEPHONE ENCOUNTER
Ruthie Bosworth from [facility]  called stating that the patient needs to be seen prior to a total right knee replacement which she is scheduled  2/28  I did advise that nothing is currently available between now and 2/28  Ruthie Bosworth stated that she will be faxing over their recent office visit note to [phone]  Please call patient and advise appropriate appt at  [phone]

## 2020-02-11 NOTE — TELEPHONE ENCOUNTER
LVM instructing patient to call the office to ask for me so we can discuss her surgery further  Please transfer patient to me when she calls back  Patient was scheduled for surgery for a right total knee replacement  She was provided instructions on when she is to stop taking her Coumadin prior to her surgery  Patient surgery was R/S to 2/28/20 with [facility]  We were unaware that the patient surgery was rescheduled due to not getting an update from the patient or the surgeon  We are unaware if the patient started taking her Coumadin again, how much, and how often  I called [facility] to get additional information ans was informed at that time that the surgery was moved to [facility] scheduled for 2/28/20  I called the patient an LVM for her to call our office because we need to know if she is taking Coumadin, how much, how often, and who gave her the instructions for the regimen and dose of Coumadin  Since we were not informed by either the patient or the surgeon clearing her for surgery may not be an option, per Dr Trish Mcdowell  Once the patient returns call we will get additional information to see what are next steps will be with her care

## 2020-02-12 ENCOUNTER — TELEPHONE (OUTPATIENT)
Dept: HEMATOLOGY ONCOLOGY | Facility: CLINIC | Age: 65
End: 2020-02-12

## 2020-02-12 NOTE — TELEPHONE ENCOUNTER
I spoke with patient last evening about Coumadin  She said when she found out that insurance had not cleared her for surgery she started taking Coumadin like before and did not take Lovenox  She is saving Lovenox for the next time  She states she will be going for blood tests next week

## 2020-02-17 ENCOUNTER — TELEPHONE (OUTPATIENT)
Dept: HEMATOLOGY ONCOLOGY | Facility: CLINIC | Age: 65
End: 2020-02-17

## 2020-02-17 NOTE — TELEPHONE ENCOUNTER
Vivi from Dr Cooper called stating she faxed Chest xray and labs to [phone] for patient's upcoming surgery

## 2020-02-18 ENCOUNTER — TELEPHONE (OUTPATIENT)
Dept: HEMATOLOGY ONCOLOGY | Facility: CLINIC | Age: 65
End: 2020-02-18

## 2020-02-18 ENCOUNTER — TELEPHONE (OUTPATIENT)
Dept: INFUSION CENTER | Facility: HOSPITAL | Age: 65
End: 2020-02-18

## 2020-02-18 ENCOUNTER — OFFICE VISIT (OUTPATIENT)
Dept: FAMILY MEDICINE CLINIC | Facility: CLINIC | Age: 65
End: 2020-02-18
Payer: COMMERCIAL

## 2020-02-18 VITALS
TEMPERATURE: 97.9 F | HEIGHT: 64 IN | BODY MASS INDEX: 50.02 KG/M2 | DIASTOLIC BLOOD PRESSURE: 68 MMHG | RESPIRATION RATE: 18 BRPM | OXYGEN SATURATION: 97 % | HEART RATE: 73 BPM | SYSTOLIC BLOOD PRESSURE: 108 MMHG | WEIGHT: 293 LBS

## 2020-02-18 DIAGNOSIS — Z95.828 GREENFIELD FILTER IN PLACE: ICD-10-CM

## 2020-02-18 DIAGNOSIS — D68.9 BLOOD CLOTTING DISORDER (HCC): ICD-10-CM

## 2020-02-18 DIAGNOSIS — Z01.818 PRE-OPERATIVE CLEARANCE: Primary | ICD-10-CM

## 2020-02-18 DIAGNOSIS — I10 ESSENTIAL HYPERTENSION: ICD-10-CM

## 2020-02-18 PROBLEM — Z86.711 HISTORY OF PULMONARY EMBOLISM: Status: ACTIVE | Noted: 2020-02-17

## 2020-02-18 PROCEDURE — 3008F BODY MASS INDEX DOCD: CPT | Performed by: NURSE PRACTITIONER

## 2020-02-18 PROCEDURE — 1036F TOBACCO NON-USER: CPT | Performed by: NURSE PRACTITIONER

## 2020-02-18 PROCEDURE — 99214 OFFICE O/P EST MOD 30 MIN: CPT | Performed by: NURSE PRACTITIONER

## 2020-02-18 PROCEDURE — 3078F DIAST BP <80 MM HG: CPT | Performed by: NURSE PRACTITIONER

## 2020-02-18 PROCEDURE — 3074F SYST BP LT 130 MM HG: CPT | Performed by: NURSE PRACTITIONER

## 2020-02-18 NOTE — TELEPHONE ENCOUNTER
Patient called requesting lab results      Labs draw date: 02/14  Labs drawn at: [facility]   Patient's call back #  [phone]

## 2020-02-18 NOTE — PROGRESS NOTES
Assessment/Plan:     Diagnoses and all orders for this visit:    Pre-operative clearance  Comments:  Cleared for surgery, as scheduled    [place] filter in place  Comments:  Previously cleared for surgery by Cardiology    Essential hypertension  Comments:  Stable    Blood clotting disorder ([address] )  Comments:  Awaiting clearance by Hematology        Subjective:      Patient ID: Lexi Taylor is a 62 y o  female  Patient presents to [address] [place] [address] for surgical clearance  Allergies, medical history and current medications reviewed with patient; patient reports taking all medications as prescribed without issues  Patient is scheduled for Revision of Right TKA with Orthopedic Dr Jeff Ackerman 2/28/20 with [facility]  Patient was seen by Cardiology Dr Zulema Monroe yesterday, who cleared patient for surgery from a Cardiac standpoint  Patient is also followed by Hematology Dr Trish Mcdowell, and is awaiting clearance from a Hematological standpoint due to known Clotting Disorder  Pre-operative labs, EKG and CXR were completed as previously ordered and were reviewed  Patient reports right knee pain, but denies any other problems or complaints at this time  Patient Care Team:  Iker Berman MD as PCP - General (Family Medicine)  Marisela Castanon MD (Hematology and Oncology)  Brianne Oconnell MD (Cardiology)  [facility] (Orthopedic Surgery)    Review of Systems   Respiratory: Negative for shortness of breath  Cardiovascular: Negative for chest pain  Gastrointestinal: Negative for abdominal pain  Musculoskeletal: Positive for arthralgias  All other systems reviewed and are negative      Objective:    /68 (BP Location: Right arm, Patient Position: Sitting, Cuff Size: Large)   Pulse 73   Temp 97 9 °F (36 6 °C) (Temporal)   Resp 18   Ht 5' 4 25" (1 632 m)   Wt (!) 180 kg (397 lb)   LMP  (LMP Unknown)   SpO2 97%   BMI 67 62 kg/m²      Physical Exam   Constitutional: She is oriented to person, place, and time  She appears well-developed and well-nourished  No distress  HENT:   Head: Normocephalic and atraumatic  Nasal turbinates pink, moist and without exudate  Eyes: Conjunctivae and lids are normal    Neck: Normal range of motion  No tracheal deviation present  Cardiovascular: Normal rate, regular rhythm, S1 normal, S2 normal and normal heart sounds  No murmur heard  Pulmonary/Chest: Effort normal and breath sounds normal  No respiratory distress  Abdominal: Normal appearance and bowel sounds are normal  She exhibits no distension  There is no guarding  Musculoskeletal: Normal range of motion  Neurological: She is alert and oriented to person, place, and time  Skin: Skin is warm, dry and intact  Psychiatric: She has a normal mood and affect  Her speech is normal    Nursing note and vitals reviewed  BMI Counseling: Body mass index is 67 62 kg/m²  The BMI is above normal  Nutrition recommendations include encouraging healthy choices of fruits and vegetables, consuming healthier snacks, reducing intake of saturated and trans fat and reducing intake of cholesterol  Exercise recommendations include exercising 3-5 times per week  The above recommendations were included in patient instructions

## 2020-02-18 NOTE — TELEPHONE ENCOUNTER
Per Dr Markie Schwartz is to take 7 5 mg coumadin daily and recheck her labs on Friday 2/21/20  I instructed patient to call our office once completed so we can request results to give her an answer prior to the end of the day on Friday  Patient voiced understanding

## 2020-02-18 NOTE — TELEPHONE ENCOUNTER
Patient calling for lab results and instructions for coumadin in preparation for upcoming surgery on 2/28/2020  Patient is on coumadin 5mg Mon-Friday and 7 5 mg on Sat and Sun   Will task Dr Alicea's MA

## 2020-02-19 NOTE — TELEPHONE ENCOUNTER
Per Inna Zurita, please review with Dr Raad Elise  [facility] called to get the instructions

## 2020-02-19 NOTE — TELEPHONE ENCOUNTER
Faxed this note to the fax number provided since there was no number provided to give a return call to the office  Paige Sanchez will be repeating her blood work this coming Friday and Dr Dara Johansen will give her instructions for her coumadin dosage then

## 2020-02-20 ENCOUNTER — TELEPHONE (OUTPATIENT)
Dept: HEMATOLOGY ONCOLOGY | Facility: CLINIC | Age: 65
End: 2020-02-20

## 2020-02-20 NOTE — TELEPHONE ENCOUNTER
LVM for Conception Eryn to call our office and ask for me  This is regarding her coumadin dosage  Per Dr Raad Elise she will get her labs checked tomorrow 2/21/20 and we will adjust her dosage according to that result  She will go for surgery on 2/28/20 and she will take her last coumadin on 2/23/20  She will have lovenox injections (150 mg) on the mornings of 2/26/20 and 2/27/20

## 2020-02-21 ENCOUNTER — TELEPHONE (OUTPATIENT)
Dept: HEMATOLOGY ONCOLOGY | Facility: CLINIC | Age: 65
End: 2020-02-21

## 2020-02-21 DIAGNOSIS — I82.4Z9 DEEP VEIN THROMBOSIS (DVT) OF DISTAL VEIN OF LOWER EXTREMITY, UNSPECIFIED CHRONICITY, UNSPECIFIED LATERALITY (HCC): Primary | ICD-10-CM

## 2020-02-21 NOTE — TELEPHONE ENCOUNTER
Spoke to Mark after reviewing her PT INR results with Dr Priscilla Cho  PT 20 7 INR 2 86  Per Dr Mijares Roup will stay on 7 5 mg daily and will take her last dose of coumadin on 2/23/20  And will have the lovenox injections on the early mornings of 2/26/20 and 2/27/20 (150 mg)  Patient will have surgery on 2/28/20 and surgeon will direct patient as far as her coumadin dosage following her surgery  I relayed all information to Mark and she voiced understanding

## 2020-02-21 NOTE — TELEPHONE ENCOUNTER
Spoke to Tae from [employer], he will be faxing patient's PT INR results to our office  Once received I will review with Dr Emilee James and will contact patient with coumadin instructions

## 2020-02-21 NOTE — TELEPHONE ENCOUNTER
Faxed PT INR lab script to [facility] per patient's request fax # is [phone]

## 2020-02-25 ENCOUNTER — TELEPHONE (OUTPATIENT)
Dept: OTHER | Facility: OTHER | Age: 65
End: 2020-02-25

## 2020-02-25 NOTE — TELEPHONE ENCOUNTER
We will need a clearance form due to patient did not have one with her   Fax to [phone] att: Bryn Nunes

## 2020-03-03 ENCOUNTER — TELEPHONE (OUTPATIENT)
Dept: INFUSION CENTER | Facility: HOSPITAL | Age: 65
End: 2020-03-03

## 2020-03-06 ENCOUNTER — TELEPHONE (OUTPATIENT)
Dept: HEMATOLOGY ONCOLOGY | Facility: CLINIC | Age: 65
End: 2020-03-06

## 2020-03-06 NOTE — TELEPHONE ENCOUNTER
LVM for Rk Nieves stating her appointment on 4/23/20 needed to be r/s  I r/s he appointment to 4/16/20 at 9:40 AM at the [facility]  Letter sent as well

## 2020-03-13 ENCOUNTER — TELEPHONE (OUTPATIENT)
Dept: HEMATOLOGY ONCOLOGY | Facility: CLINIC | Age: 65
End: 2020-03-13

## 2020-03-13 NOTE — TELEPHONE ENCOUNTER
Reviewed with Norman Barahona RN [facility]NA nurse the schedule for INR draws  Last INR was drawn yesterday in hospital , patient is on Coumadin 5 mg per that result , next INR due 3/19/2020  Norman Barahona will draw in home

## 2020-03-17 ENCOUNTER — TELEPHONE (OUTPATIENT)
Dept: FAMILY MEDICINE CLINIC | Facility: CLINIC | Age: 65
End: 2020-03-17

## 2020-03-17 DIAGNOSIS — N17.9 AKI (ACUTE KIDNEY INJURY) (HCC): Primary | ICD-10-CM

## 2020-03-17 NOTE — TELEPHONE ENCOUNTER
Called and left a vm for pt to continue taking just the amlodipine until we receive the lab results  If everything is normal she may resume normal med regimen  Also asked where her lab script needs to be sent for the nurse that is coming to her home to draw labs Thursday

## 2020-03-17 NOTE — TELEPHONE ENCOUNTER
Maria Ines Munoz had knee sx on 2/28  Maria Ines Munoz rx paperwork from the hospital stating her kidney function was off and they took her off her lasix, metformin, and lotrel and just put her on amlodipine  She needs to have her kidney function rechecked  The nurse is coming back Thursday to draw her labs  Can we get her a script to recheck it? Also, what would you like her to do with her meds?

## 2020-03-18 ENCOUNTER — TELEPHONE (OUTPATIENT)
Dept: FAMILY MEDICINE CLINIC | Facility: CLINIC | Age: 65
End: 2020-03-18

## 2020-03-18 ENCOUNTER — TELEPHONE (OUTPATIENT)
Dept: SURGICAL ONCOLOGY | Facility: CLINIC | Age: 65
End: 2020-03-18

## 2020-03-18 DIAGNOSIS — R19.7 INTRACTABLE DIARRHEA: Primary | ICD-10-CM

## 2020-03-18 RX ORDER — DIPHENOXYLATE HYDROCHLORIDE AND ATROPINE SULFATE 2.5; .025 MG/1; MG/1
1 TABLET ORAL 4 TIMES DAILY PRN
Qty: 30 TABLET | Refills: 0 | Status: ON HOLD | OUTPATIENT
Start: 2020-03-18 | End: 2020-09-09 | Stop reason: ALTCHOICE

## 2020-03-18 NOTE — TELEPHONE ENCOUNTER
Laura Guadarrama just had sx  She starts outpatient pt on Thursday 3/19/20  She has a c/o of diarrhea  Wants to know if she should get tested for C  Diff  Also, she needs something called in for it, she took imodium once and it did nothing  Please send to [facility] in [place]

## 2020-03-18 NOTE — TELEPHONE ENCOUNTER
Rehab facility call to request script for blood work from Dr Priscilla Cho  Facility wants pt to go tomorrow  Please call patient directly regarding this

## 2020-03-20 ENCOUNTER — TELEPHONE (OUTPATIENT)
Dept: HEMATOLOGY ONCOLOGY | Facility: MEDICAL CENTER | Age: 65
End: 2020-03-20

## 2020-03-20 NOTE — TELEPHONE ENCOUNTER
Task received and forwarded to Marylu Alonso  Please see below:    Patient called in stated that she wants to know what MG should she be on for warfarin (COUMADIN) 5 mg tablet, as labs were taken yesterday in out side facility [place] [facility]   A good call back number is [phone]

## 2020-03-20 NOTE — TELEPHONE ENCOUNTER
Received labs from [facility] PT 24 8, INR 4 04  Patient is currently taking 5 mg of coumadin daily  Per Dr Adele Johansen, patient is to not take any coumadin today 3/20/20 and to resume tomorrow with 2 5 mg, with alternating dosage of 5 mg every other day  Patient is to recheck labs on Thursday 3/26/20  Patient voiced understanding      2 5 mg-Sat 3/21/20  5 mg-Sun 3/22/20  2 5 mg-Mon 3/23/30  5 mg-Tues 3/24/20  2 5 mg- Wed 3/25/20  5 mg-Thurs 3/26/20 (Recheck labs)

## 2020-03-20 NOTE — TELEPHONE ENCOUNTER
Patient called in stated that she wants to know what MG should she be on for warfarin (COUMADIN) 5 mg tablet, as labs were taken yesterday in out side facility [place] [facility]   A good call back number is [phone]

## 2020-03-24 ENCOUNTER — TELEPHONE (OUTPATIENT)
Dept: FAMILY MEDICINE CLINIC | Facility: CLINIC | Age: 65
End: 2020-03-24

## 2020-03-27 ENCOUNTER — TELEPHONE (OUTPATIENT)
Dept: HEMATOLOGY ONCOLOGY | Facility: CLINIC | Age: 65
End: 2020-03-27

## 2020-03-27 NOTE — TELEPHONE ENCOUNTER
Patient called requesting lab results      Labs draw date: 03/26  Labs drawn at: [facility]   Patient's call back #  [phone]

## 2020-03-27 NOTE — TELEPHONE ENCOUNTER
Called and spoke to Joann Thacker her the results of her PT INR, per Dr Romana Herter she should keep alternating her coumadin dosage 2 5 mg, 5mg, every other day  She should recheck her labs in 2 weeks  Patient voiced understanding

## 2020-03-30 ENCOUNTER — TELEPHONE (OUTPATIENT)
Dept: FAMILY MEDICINE CLINIC | Facility: CLINIC | Age: 65
End: 2020-03-30

## 2020-03-31 ENCOUNTER — TELEPHONE (OUTPATIENT)
Dept: FAMILY MEDICINE CLINIC | Facility: CLINIC | Age: 65
End: 2020-03-31

## 2020-03-31 DIAGNOSIS — N17.9 AKI (ACUTE KIDNEY INJURY) (HCC): Primary | ICD-10-CM

## 2020-04-09 ENCOUNTER — TELEPHONE (OUTPATIENT)
Dept: HEMATOLOGY ONCOLOGY | Facility: CLINIC | Age: 65
End: 2020-04-09

## 2020-04-15 ENCOUNTER — TELEPHONE (OUTPATIENT)
Dept: HEMATOLOGY ONCOLOGY | Facility: CLINIC | Age: 65
End: 2020-04-15

## 2020-04-16 ENCOUNTER — TELEMEDICINE (OUTPATIENT)
Dept: HEMATOLOGY ONCOLOGY | Facility: CLINIC | Age: 65
End: 2020-04-16

## 2020-04-16 DIAGNOSIS — I82.4Z9 DEEP VEIN THROMBOSIS (DVT) OF DISTAL VEIN OF LOWER EXTREMITY, UNSPECIFIED CHRONICITY, UNSPECIFIED LATERALITY (HCC): Primary | ICD-10-CM

## 2020-04-16 PROCEDURE — 99213 OFFICE O/P EST LOW 20 MIN: CPT | Performed by: PHYSICIAN ASSISTANT

## 2020-04-28 ENCOUNTER — TELEPHONE (OUTPATIENT)
Dept: HEMATOLOGY ONCOLOGY | Facility: CLINIC | Age: 65
End: 2020-04-28

## 2020-05-27 ENCOUNTER — TELEMEDICINE (OUTPATIENT)
Dept: FAMILY MEDICINE CLINIC | Facility: CLINIC | Age: 65
End: 2020-05-27
Payer: COMMERCIAL

## 2020-05-27 DIAGNOSIS — Z96.651 STATUS POST TOTAL RIGHT KNEE REPLACEMENT: ICD-10-CM

## 2020-05-27 DIAGNOSIS — D68.9 BLOOD CLOTTING DISORDER (HCC): ICD-10-CM

## 2020-05-27 DIAGNOSIS — Z09 FOLLOW-UP EXAMINATION: Primary | ICD-10-CM

## 2020-05-27 PROBLEM — Z96.659 S/P TKR (TOTAL KNEE REPLACEMENT): Status: ACTIVE | Noted: 2020-03-04

## 2020-05-27 PROCEDURE — 99215 OFFICE O/P EST HI 40 MIN: CPT | Performed by: NURSE PRACTITIONER

## 2020-05-28 ENCOUNTER — TELEPHONE (OUTPATIENT)
Dept: HEMATOLOGY ONCOLOGY | Facility: CLINIC | Age: 65
End: 2020-05-28

## 2020-05-29 ENCOUNTER — TELEPHONE (OUTPATIENT)
Dept: SURGICAL ONCOLOGY | Facility: CLINIC | Age: 65
End: 2020-05-29

## 2020-05-29 DIAGNOSIS — Z86.718 HISTORY OF DVT (DEEP VEIN THROMBOSIS): ICD-10-CM

## 2020-05-29 RX ORDER — WARFARIN SODIUM 5 MG/1
TABLET ORAL
Qty: 150 TABLET | Refills: 0 | Status: ON HOLD | OUTPATIENT
Start: 2020-05-29 | End: 2020-09-10 | Stop reason: SDUPTHER

## 2020-06-02 ENCOUNTER — TELEPHONE (OUTPATIENT)
Dept: HEMATOLOGY ONCOLOGY | Facility: CLINIC | Age: 65
End: 2020-06-02

## 2020-06-04 ENCOUNTER — TELEPHONE (OUTPATIENT)
Dept: HEMATOLOGY ONCOLOGY | Facility: CLINIC | Age: 65
End: 2020-06-04

## 2020-06-17 ENCOUNTER — TELEPHONE (OUTPATIENT)
Dept: HEMATOLOGY ONCOLOGY | Facility: CLINIC | Age: 65
End: 2020-06-17

## 2020-06-22 DIAGNOSIS — E78.5 HYPERLIPIDEMIA, UNSPECIFIED HYPERLIPIDEMIA TYPE: ICD-10-CM

## 2020-06-23 DIAGNOSIS — E78.5 HYPERLIPIDEMIA, UNSPECIFIED HYPERLIPIDEMIA TYPE: ICD-10-CM

## 2020-06-23 RX ORDER — PRAVASTATIN SODIUM 40 MG
40 TABLET ORAL DAILY
Qty: 90 TABLET | Refills: 3 | Status: SHIPPED | OUTPATIENT
Start: 2020-06-23 | End: 2020-06-23

## 2020-06-23 RX ORDER — PRAVASTATIN SODIUM 40 MG
TABLET ORAL
Qty: 90 TABLET | Refills: 3 | Status: ON HOLD | OUTPATIENT
Start: 2020-06-23 | End: 2020-09-09 | Stop reason: ALTCHOICE

## 2020-06-30 ENCOUNTER — TELEPHONE (OUTPATIENT)
Dept: HEMATOLOGY ONCOLOGY | Facility: CLINIC | Age: 65
End: 2020-06-30

## 2020-07-06 DIAGNOSIS — E28.2 PCOS (POLYCYSTIC OVARIAN SYNDROME): ICD-10-CM

## 2020-07-06 DIAGNOSIS — J30.89 ENVIRONMENTAL AND SEASONAL ALLERGIES: ICD-10-CM

## 2020-07-06 DIAGNOSIS — I10 ESSENTIAL HYPERTENSION: ICD-10-CM

## 2020-07-06 RX ORDER — AMLODIPINE BESYLATE AND BENAZEPRIL HYDROCHLORIDE 5; 20 MG/1; MG/1
1 CAPSULE ORAL DAILY
Qty: 90 CAPSULE | Refills: 3 | Status: SHIPPED | OUTPATIENT
Start: 2020-07-06 | End: 2021-05-18 | Stop reason: SDUPTHER

## 2020-07-06 RX ORDER — MONTELUKAST SODIUM 10 MG/1
10 TABLET ORAL
Qty: 90 TABLET | Refills: 3 | Status: SHIPPED | OUTPATIENT
Start: 2020-07-06 | End: 2021-03-30 | Stop reason: SDUPTHER

## 2020-07-06 RX ORDER — METFORMIN HYDROCHLORIDE 500 MG/1
500 TABLET, EXTENDED RELEASE ORAL 4 TIMES DAILY
Qty: 360 TABLET | Refills: 3 | Status: SHIPPED | OUTPATIENT
Start: 2020-07-06 | End: 2021-08-12 | Stop reason: SDUPTHER

## 2020-07-23 ENCOUNTER — TELEPHONE (OUTPATIENT)
Dept: HEMATOLOGY ONCOLOGY | Facility: CLINIC | Age: 65
End: 2020-07-23

## 2020-07-23 NOTE — TELEPHONE ENCOUNTER
Patient had INR drawn on 7/21/20 at [facility] [phone]  Please call and get results  Patient is on Coumadin 7 5 mg on Tuesday and Friday  5 mg all other days  Please call patient with Coumadin instructions  Call back # [phone] (H) please leave a voice message with coumadin instructions on voicemail

## 2020-07-24 ENCOUNTER — TELEPHONE (OUTPATIENT)
Dept: HEMATOLOGY ONCOLOGY | Facility: CLINIC | Age: 65
End: 2020-07-24

## 2020-07-24 NOTE — TELEPHONE ENCOUNTER
Jc Roblero from central faxing called to give a curtesy call that patient's INR from 7/21/20 were faxed to us  Results are in Alex

## 2020-08-14 ENCOUNTER — TELEPHONE (OUTPATIENT)
Dept: HEMATOLOGY ONCOLOGY | Facility: CLINIC | Age: 65
End: 2020-08-14

## 2020-08-14 NOTE — TELEPHONE ENCOUNTER
Spoke to Marnie Fraga explaining per Dr Roge Gonzalez she is to stay on her current dosage of coumadin patient is on Coumadin 7 5 mg on Tuesday and Friday  5 mg all other days  Recheck labs in 2 weeks  Marnie Fraga voiced understanding

## 2020-08-27 ENCOUNTER — TELEPHONE (OUTPATIENT)
Dept: HEMATOLOGY ONCOLOGY | Facility: CLINIC | Age: 65
End: 2020-08-27

## 2020-08-27 NOTE — TELEPHONE ENCOUNTER
LVM for Makenzie Dalal explaining per Dr Crowder Sensing she is to stay on her current dosage of coumadin patient is on Coumadin 7 5 mg on Tuesday and Friday   5 mg all other days  Recheck labs in 2-3 weeks

## 2020-09-09 ENCOUNTER — APPOINTMENT (EMERGENCY)
Dept: RADIOLOGY | Facility: HOSPITAL | Age: 65
End: 2020-09-09
Payer: COMMERCIAL

## 2020-09-09 ENCOUNTER — HOSPITAL ENCOUNTER (OUTPATIENT)
Facility: HOSPITAL | Age: 65
Setting detail: OBSERVATION
Discharge: HOME | End: 2020-09-10
Attending: EMERGENCY MEDICINE | Admitting: INTERNAL MEDICINE
Payer: COMMERCIAL

## 2020-09-09 ENCOUNTER — TELEPHONE (OUTPATIENT)
Dept: FAMILY MEDICINE CLINIC | Facility: CLINIC | Age: 65
End: 2020-09-09

## 2020-09-09 ENCOUNTER — APPOINTMENT (EMERGENCY)
Dept: NON INVASIVE DIAGNOSTICS | Facility: HOSPITAL | Age: 65
End: 2020-09-09
Payer: COMMERCIAL

## 2020-09-09 DIAGNOSIS — Z95.828 GREENFIELD FILTER IN PLACE: ICD-10-CM

## 2020-09-09 DIAGNOSIS — E66.01 MORBID OBESITY (HCC): ICD-10-CM

## 2020-09-09 DIAGNOSIS — I10 ESSENTIAL HYPERTENSION: ICD-10-CM

## 2020-09-09 DIAGNOSIS — Z96.651 STATUS POST TOTAL RIGHT KNEE REPLACEMENT: ICD-10-CM

## 2020-09-09 DIAGNOSIS — K21.9 ESOPHAGEAL REFLUX: ICD-10-CM

## 2020-09-09 DIAGNOSIS — L03.90 CELLULITIS, UNSPECIFIED CELLULITIS SITE: ICD-10-CM

## 2020-09-09 DIAGNOSIS — Z86.718 HISTORY OF DVT (DEEP VEIN THROMBOSIS): ICD-10-CM

## 2020-09-09 DIAGNOSIS — L03.116 CELLULITIS OF LEFT LEG: Primary | ICD-10-CM

## 2020-09-09 DIAGNOSIS — E28.2 POLYCYSTIC OVARIAN SYNDROME: ICD-10-CM

## 2020-09-09 DIAGNOSIS — R60.9 EDEMA: ICD-10-CM

## 2020-09-09 DIAGNOSIS — E78.5 HYPERLIPIDEMIA, UNSPECIFIED HYPERLIPIDEMIA TYPE: ICD-10-CM

## 2020-09-09 PROBLEM — D72.829 LEUKOCYTOSIS: Status: ACTIVE | Noted: 2020-09-09

## 2020-09-09 LAB
ALBUMIN SERPL BCP-MCNC: 3.2 G/DL (ref 3.5–5)
ALP SERPL-CCNC: 98 U/L (ref 46–116)
ALT SERPL W P-5'-P-CCNC: 19 U/L (ref 12–78)
ANION GAP SERPL CALCULATED.3IONS-SCNC: 9 MMOL/L (ref 4–13)
APTT PPP: 34 SECONDS (ref 23–37)
AST SERPL W P-5'-P-CCNC: 20 U/L (ref 5–45)
BASOPHILS # BLD AUTO: 0.1 THOUSANDS/ΜL (ref 0–0.1)
BASOPHILS NFR BLD AUTO: 1 % (ref 0–1)
BILIRUB SERPL-MCNC: 0.3 MG/DL (ref 0.2–1)
BUN SERPL-MCNC: 21 MG/DL (ref 5–25)
CALCIUM SERPL-MCNC: 8.6 MG/DL (ref 8.3–10.1)
CHLORIDE SERPL-SCNC: 104 MMOL/L (ref 100–108)
CO2 SERPL-SCNC: 27 MMOL/L (ref 21–32)
CREAT SERPL-MCNC: 1.09 MG/DL (ref 0.6–1.3)
EOSINOPHIL # BLD AUTO: 0.26 THOUSAND/ΜL (ref 0–0.61)
EOSINOPHIL NFR BLD AUTO: 2 % (ref 0–6)
ERYTHROCYTE [DISTWIDTH] IN BLOOD BY AUTOMATED COUNT: 14.7 % (ref 11.6–15.1)
GFR SERPL CREATININE-BSD FRML MDRD: 54 ML/MIN/1.73SQ M
GLUCOSE SERPL-MCNC: 88 MG/DL (ref 65–140)
GLUCOSE SERPL-MCNC: 94 MG/DL (ref 65–140)
HCT VFR BLD AUTO: 39.7 % (ref 34.8–46.1)
HGB BLD-MCNC: 12.6 G/DL (ref 11.5–15.4)
IMM GRANULOCYTES # BLD AUTO: 0.12 THOUSAND/UL (ref 0–0.2)
IMM GRANULOCYTES NFR BLD AUTO: 1 % (ref 0–2)
INR PPP: 1.68 (ref 0.84–1.19)
LYMPHOCYTES # BLD AUTO: 3.13 THOUSANDS/ΜL (ref 0.6–4.47)
LYMPHOCYTES NFR BLD AUTO: 25 % (ref 14–44)
MCH RBC QN AUTO: 28.6 PG (ref 26.8–34.3)
MCHC RBC AUTO-ENTMCNC: 31.7 G/DL (ref 31.4–37.4)
MCV RBC AUTO: 90 FL (ref 82–98)
MONOCYTES # BLD AUTO: 0.91 THOUSAND/ΜL (ref 0.17–1.22)
MONOCYTES NFR BLD AUTO: 7 % (ref 4–12)
NEUTROPHILS # BLD AUTO: 7.78 THOUSANDS/ΜL (ref 1.85–7.62)
NEUTS SEG NFR BLD AUTO: 64 % (ref 43–75)
NRBC BLD AUTO-RTO: 0 /100 WBCS
PLATELET # BLD AUTO: 383 THOUSANDS/UL (ref 149–390)
PMV BLD AUTO: 9.6 FL (ref 8.9–12.7)
POTASSIUM SERPL-SCNC: 3.8 MMOL/L (ref 3.5–5.3)
PROT SERPL-MCNC: 8.4 G/DL (ref 6.4–8.2)
PROTHROMBIN TIME: 19.5 SECONDS (ref 11.6–14.5)
RBC # BLD AUTO: 4.41 MILLION/UL (ref 3.81–5.12)
SODIUM SERPL-SCNC: 140 MMOL/L (ref 136–145)
TROPONIN I SERPL-MCNC: <0.02 NG/ML
URATE SERPL-MCNC: 6.6 MG/DL (ref 2–6.8)
WBC # BLD AUTO: 12.3 THOUSAND/UL (ref 4.31–10.16)

## 2020-09-09 PROCEDURE — 99285 EMERGENCY DEPT VISIT HI MDM: CPT | Performed by: EMERGENCY MEDICINE

## 2020-09-09 PROCEDURE — 94664 DEMO&/EVAL PT USE INHALER: CPT

## 2020-09-09 PROCEDURE — 99220 PR INITIAL OBSERVATION CARE/DAY 70 MINUTES: CPT | Performed by: NURSE PRACTITIONER

## 2020-09-09 PROCEDURE — 94760 N-INVAS EAR/PLS OXIMETRY 1: CPT

## 2020-09-09 PROCEDURE — 80053 COMPREHEN METABOLIC PANEL: CPT | Performed by: EMERGENCY MEDICINE

## 2020-09-09 PROCEDURE — 84550 ASSAY OF BLOOD/URIC ACID: CPT | Performed by: EMERGENCY MEDICINE

## 2020-09-09 PROCEDURE — 99285 EMERGENCY DEPT VISIT HI MDM: CPT

## 2020-09-09 PROCEDURE — 87040 BLOOD CULTURE FOR BACTERIA: CPT | Performed by: EMERGENCY MEDICINE

## 2020-09-09 PROCEDURE — 85730 THROMBOPLASTIN TIME PARTIAL: CPT | Performed by: EMERGENCY MEDICINE

## 2020-09-09 PROCEDURE — 73590 X-RAY EXAM OF LOWER LEG: CPT

## 2020-09-09 PROCEDURE — 85025 COMPLETE CBC W/AUTO DIFF WBC: CPT | Performed by: EMERGENCY MEDICINE

## 2020-09-09 PROCEDURE — 83036 HEMOGLOBIN GLYCOSYLATED A1C: CPT | Performed by: NURSE PRACTITIONER

## 2020-09-09 PROCEDURE — 85610 PROTHROMBIN TIME: CPT | Performed by: EMERGENCY MEDICINE

## 2020-09-09 PROCEDURE — 93005 ELECTROCARDIOGRAM TRACING: CPT

## 2020-09-09 PROCEDURE — 73630 X-RAY EXAM OF FOOT: CPT

## 2020-09-09 PROCEDURE — 96365 THER/PROPH/DIAG IV INF INIT: CPT

## 2020-09-09 PROCEDURE — 84484 ASSAY OF TROPONIN QUANT: CPT | Performed by: EMERGENCY MEDICINE

## 2020-09-09 PROCEDURE — 93970 EXTREMITY STUDY: CPT

## 2020-09-09 PROCEDURE — 82948 REAGENT STRIP/BLOOD GLUCOSE: CPT

## 2020-09-09 PROCEDURE — 36415 COLL VENOUS BLD VENIPUNCTURE: CPT | Performed by: EMERGENCY MEDICINE

## 2020-09-09 RX ORDER — PRAVASTATIN SODIUM 40 MG
40 TABLET ORAL
Status: DISCONTINUED | OUTPATIENT
Start: 2020-09-10 | End: 2020-09-10 | Stop reason: HOSPADM

## 2020-09-09 RX ORDER — CLINDAMYCIN PHOSPHATE 600 MG/50ML
600 INJECTION INTRAVENOUS EVERY 8 HOURS
Status: DISCONTINUED | OUTPATIENT
Start: 2020-09-10 | End: 2020-09-10 | Stop reason: HOSPADM

## 2020-09-09 RX ORDER — MONTELUKAST SODIUM 10 MG/1
10 TABLET ORAL
Status: DISCONTINUED | OUTPATIENT
Start: 2020-09-09 | End: 2020-09-10 | Stop reason: HOSPADM

## 2020-09-09 RX ORDER — FLUTICASONE FUROATE AND VILANTEROL 100; 25 UG/1; UG/1
1 POWDER RESPIRATORY (INHALATION) DAILY
Status: DISCONTINUED | OUTPATIENT
Start: 2020-09-10 | End: 2020-09-10 | Stop reason: HOSPADM

## 2020-09-09 RX ORDER — WARFARIN SODIUM 7.5 MG/1
7.5 TABLET ORAL
Status: DISCONTINUED | OUTPATIENT
Start: 2020-09-10 | End: 2020-09-09

## 2020-09-09 RX ORDER — FUROSEMIDE 20 MG/1
20 TABLET ORAL DAILY
Status: DISCONTINUED | OUTPATIENT
Start: 2020-09-10 | End: 2020-09-10 | Stop reason: HOSPADM

## 2020-09-09 RX ORDER — WARFARIN SODIUM 7.5 MG/1
7.5 TABLET ORAL
Status: DISCONTINUED | OUTPATIENT
Start: 2020-09-09 | End: 2020-09-10 | Stop reason: HOSPADM

## 2020-09-09 RX ORDER — ALBUTEROL SULFATE 90 UG/1
2 AEROSOL, METERED RESPIRATORY (INHALATION) EVERY 6 HOURS PRN
Status: DISCONTINUED | OUTPATIENT
Start: 2020-09-09 | End: 2020-09-10 | Stop reason: HOSPADM

## 2020-09-09 RX ORDER — CLINDAMYCIN PHOSPHATE 600 MG/50ML
600 INJECTION INTRAVENOUS ONCE
Status: COMPLETED | OUTPATIENT
Start: 2020-09-09 | End: 2020-09-09

## 2020-09-09 RX ORDER — ACETAMINOPHEN 325 MG/1
650 TABLET ORAL ONCE
Status: COMPLETED | OUTPATIENT
Start: 2020-09-09 | End: 2020-09-09

## 2020-09-09 RX ADMIN — ACETAMINOPHEN 650 MG: 325 TABLET, FILM COATED ORAL at 19:09

## 2020-09-09 RX ADMIN — CLINDAMYCIN PHOSPHATE 600 MG: 600 INJECTION, SOLUTION INTRAVENOUS at 19:11

## 2020-09-09 RX ADMIN — MONTELUKAST 10 MG: 10 TABLET, FILM COATED ORAL at 23:06

## 2020-09-09 RX ADMIN — WARFARIN SODIUM 7.5 MG: 7.5 TABLET ORAL at 23:06

## 2020-09-09 NOTE — TELEPHONE ENCOUNTER
Pt is upset because shes trying to avoid an already large hospital bill, cant afford to go to ER, but with her new ins  she would at least not have as high a bill because its in network   would like to just get labs    I explained what you & dr both agreed on but told her I would let you know her concerns

## 2020-09-09 NOTE — ED PROVIDER NOTES
History  Chief Complaint   Patient presents with    Cellulitis     Pt c/o L ankle swelling "for a few days" but states it is always swollen  C/o pain but denies injury  Now stating she saw her PCP today who told her to come to ED for this issue  71-year-old female with a history of left ankle pain and swelling for at least 2 weeks presents to the emerged department for evaluation  She is concerned that she may be developing cellulitis  Patient does have a history of DVTs in the past   Patient has no calf tenderness or swelling  Patient is currently taking Coumadin  Patient called her PCP today and she was referred to the emergency department  History provided by:  Patient  Ankle Swelling   Location:  Ankle  Time since incident:  2 weeks  Ankle location:  L ankle  Pain details:     Quality:  Aching    Radiates to:  Does not radiate    Severity:  Moderate    Onset quality:  Gradual    Duration:  2 weeks    Timing:  Intermittent    Progression:  Waxing and waning  Chronicity:  Chronic  Foreign body present:  No foreign bodies  Tetanus status:  Up to date (Last tetanus 2015  )  Prior injury to area:  No  Relieved by:  Nothing  Worsened by:  Nothing  Ineffective treatments:  None tried  Associated symptoms: decreased ROM    Associated symptoms: no back pain    Risk factors: obesity    Risk factors: no concern for non-accidental trauma, no frequent fractures and no known bone disorder        Prior to Admission Medications   Prescriptions Last Dose Informant Patient Reported? Taking?    Cholecalciferol (VITAMIN D3) 19561 units TABS More than a month at Unknown time Self Yes No   Sig: Take 1 tablet by mouth every 14 (fourteen) days   Iodoquinol-HC-Aloe Polysacch (ALCORTIN A) 1-2-1 % GEL Past Month at Unknown time Self Yes Yes   Sig: Apply topically   SSD 1 % cream   No No   Sig: APPLY CREAM TOPICALLY DAILY   albuterol (PROVENTIL HFA,VENTOLIN HFA) 90 mcg/act inhaler More than a month at Unknown time Self Yes No Sig: Inhale 2 puffs every 6 (six) hours as needed for wheezing     alendronate (FOSAMAX) 70 mg tablet More than a month at Unknown time Self Yes No   Sig: Take 70 mg by mouth every 7 days   amLODIPine-benazepril (LOTREL 5-20) 5-20 MG per capsule 2020 at Unknown time  No Yes   Sig: Take 1 capsule by mouth daily   amoxicillin (AMOXIL) 500 mg capsule More than a month at Unknown time Self Yes No   Sig: TAKE 4 CAPSULES BY MOUTH 1HR BEFORE APPT   cetirizine (ZYRTEC ALLERGY) 10 mg tablet 2020 at Unknown time Self Yes Yes   Sig: Take 1 tablet by mouth daily as needed   clobetasol (TEMOVATE) 0 05 % ointment More than a month at Unknown time Self Yes No   Sig: Apply topically as directed two times daily   diphenoxylate-atropine (LOMOTIL) 2 5-0 025 mg per tablet More than a month at Unknown time  No No   Sig: Take 1 tablet by mouth 4 (four) times a day as needed for diarrhea   Patient not taking: Reported on 2020   enoxaparin (LOVENOX) 150 mg/mL injection 2020 at Unknown time  No Yes   Sig: Inject 1 mL under the skin on the mornings of 2020 and 2020   ergocalciferol (VITAMIN D2) 50,000 units More than a month at Unknown time Self Yes No   Sig: Take by mouth   fluticasone (FLONASE) 50 mcg/act nasal spray Past Month at Unknown time Self Yes Yes   Sig: into each nostril   fluticasone-vilanterol (BREO ELLIPTA) 100-25 mcg/inh inhaler More than a month at Unknown time Self Yes No   Sig: Inhale daily   folic acid-pyridoxine-cyanocobalamin (Folbic) 2 5-25-2 mg 2020 at Unknown time  No Yes   Sig: Take 1 tablet by mouth daily   furosemide (LASIX) 20 mg tablet 2020 at Unknown time  No Yes   Sig: Take 1 tablet (20 mg total) by mouth daily   metFORMIN (GLUCOPHAGE-XR) 500 mg 24 hr tablet 2020 at Unknown time  No Yes   Sig: Take 1 tablet (500 mg total) by mouth 4 (four) times a day   mometasone (NASONEX) 50 mcg/act nasal spray More than a month at Unknown time Self Yes No   Si sprays into each nostril daily   montelukast (SINGULAIR) 10 mg tablet 9/8/2020 at Unknown time  No Yes   Sig: Take 1 tablet (10 mg total) by mouth daily at bedtime   multivitamin (THERAGRAN) TABS 9/8/2020 at Unknown time Self Yes Yes   Sig: Take 1 tablet by mouth daily     nystatin (MYCOSTATIN) powder Past Month at Unknown time Self No Yes   Sig: Apply topically 3 (three) times a day   ondansetron (ZOFRAN) 8 mg tablet Not Taking at Unknown time Self No No   Sig: Take 1 tablet (8 mg total) by mouth every 8 (eight) hours as needed for nausea or vomiting   Patient not taking: Reported on 4/16/2020   pantoprazole (PROTONIX) 40 mg tablet Not Taking at Unknown time Self No No   Sig: Take 1 tablet (40 mg total) by mouth daily   Patient not taking: Reported on 4/16/2020   pravastatin (PRAVACHOL) 40 mg tablet 9/8/2020 at Unknown time  No Yes   Sig: TAKE 1 TABLET DAILY   triamcinolone (KENALOG) 0 1 % cream Past Week at Unknown time Self Yes Yes   triamcinolone (KENALOG) 0 5 % cream  Self Yes No   Sig: APPLY TOPICALLY TO RIGHT ANKLE EVERY 12 HOURS   warfarin (COUMADIN) 5 mg tablet 9/8/2020 at Unknown time  No Yes   Sig: TAKE 1 & 1/2 (ONE & ONE-HALF) TABLETS BY MOUTH ONCE DAILY or AS DIRECTED      Facility-Administered Medications: None       Past Medical History:   Diagnosis Date    Blood clotting disorder (HCC)     Homocysteinemia (HCC)     Hypercoagulable state ([facility] [place] [address] )     Hypertension     Lyme disease     Obesity     PCOS (polycystic ovarian syndrome)     Renal disorder     kidney stones    Sleep apnea     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)        Past Surgical History:   Procedure Laterality Date    ANKLE SURGERY Right     APPENDECTOMY      BREAST BIOPSY      BREAST BIOPSY      CLOSED REDUCTION METATARSAL FRACTURE Right     FOOT SURGERY      HEMORROIDECTOMY      HYSTERECTOMY      JOINT REPLACEMENT Bilateral     knee    LITHOTRIPSY     Jluian onset: 1/10/10       Family History   Problem Relation Age of Onset    Pulmonary embolism Mother     Hypercoagulant Ability Mother         primary state   [employer] Hypercoagulant Ability Maternal Grandmother         primary state    No Known Problems Father      I have reviewed and agree with the history as documented  E-Cigarette/Vaping     E-Cigarette/Vaping Substances     Social History     Tobacco Use    Smoking status: Never Smoker    Smokeless tobacco: Never Used   Substance Use Topics    Alcohol use: No    Drug use: No       Review of Systems   Constitutional: Negative  Negative for activity change, appetite change and chills  HENT: Negative  Eyes: Negative  Negative for discharge and itching  Respiratory: Negative  Cardiovascular: Negative  Gastrointestinal: Negative  Endocrine: Negative  Genitourinary: Negative  Musculoskeletal: Positive for gait problem  Negative for arthralgias and back pain  Bilateral malleolar swelling to the left ankle  Skin: Negative  Redness swelling warmth with edema to the left dorsum of the foot and lateral medial malleolar region   Allergic/Immunologic: Negative  Neurological: Negative for dizziness, facial asymmetry and headaches  Hematological: Negative  Negative for adenopathy  Psychiatric/Behavioral: Negative  Negative for agitation, behavioral problems and confusion  All other systems reviewed and are negative  Physical Exam  Physical Exam  Vitals signs reviewed  HENT:      Head: Normocephalic  Right Ear: External ear normal       Left Ear: External ear normal       Nose: Nose normal       Mouth/Throat:      Pharynx: Oropharynx is clear  No oropharyngeal exudate  Eyes:      General:         Right eye: No discharge  Left eye: No discharge  Conjunctiva/sclera: Conjunctivae normal    Neck:      Musculoskeletal: Normal range of motion  Cardiovascular:      Rate and Rhythm: Normal rate        Heart sounds: No murmur  No friction rub  Pulmonary:      Effort: Pulmonary effort is normal  No respiratory distress  Breath sounds: No stridor  Abdominal:      General: Abdomen is flat  There is no distension  Palpations: There is no mass  Tenderness: There is no abdominal tenderness  Hernia: No hernia is present  Musculoskeletal:        Feet:    Skin:     General: Skin is warm  Capillary Refill: Capillary refill takes less than 2 seconds  Coloration: Skin is not jaundiced or pale  Comments: Swelling with erythema and warmth to the left medial lateral malleolar region  Neurological:      General: No focal deficit present  Mental Status: She is alert  Cranial Nerves: No cranial nerve deficit  Sensory: No sensory deficit  Psychiatric:         Mood and Affect: Mood normal          Thought Content:  Thought content normal          Judgment: Judgment normal          Vital Signs  ED Triage Vitals [09/09/20 1836]   Temperature Pulse Respirations Blood Pressure SpO2   (!) 97 °F (36 1 °C) 102 (!) 24 (!) 195/99 97 %      Temp Source Heart Rate Source Patient Position - Orthostatic VS BP Location FiO2 (%)   Temporal Monitor Sitting Right arm --      Pain Score       9           Vitals:    09/09/20 1836 09/09/20 1915 09/09/20 1930 09/09/20 2000   BP: (!) 195/99 (!) 193/86 (!) 186/76 (!) 179/79   Pulse: 102 83 85 86   Patient Position - Orthostatic VS: Sitting Sitting Sitting Sitting         Visual Acuity      ED Medications  Medications   clindamycin (CLEOCIN) IVPB (premix) 600 mg 50 mL (0 mg Intravenous Stopped 9/9/20 1938)   acetaminophen (TYLENOL) tablet 650 mg (650 mg Oral Given 9/9/20 1909)       Diagnostic Studies  Results Reviewed     Procedure Component Value Units Date/Time    Troponin I [[id number]]  (Normal) Collected:  09/09/20 1902    Lab Status:  Final result Specimen:  Blood from Arm, Left Updated:  09/09/20 1924     Troponin I <0 02 ng/mL     Uric acid [[id number]]  (Normal) Collected:  09/09/20 1905    Lab Status:  Final result Specimen:  Blood from Arm, Right Updated:  09/09/20 1924     Uric Acid 6 6 mg/dL     Comprehensive metabolic panel [[id number]]  (Abnormal) Collected:  09/09/20 1902    Lab Status:  Final result Specimen:  Blood from Arm, Left Updated:  09/09/20 1922     Sodium 140 mmol/L      Potassium 3 8 mmol/L      Chloride 104 mmol/L      CO2 27 mmol/L      ANION GAP 9 mmol/L      BUN 21 mg/dL      Creatinine 1 09 mg/dL      Glucose 88 mg/dL      Calcium 8 6 mg/dL      AST 20 U/L      ALT 19 U/L      Alkaline Phosphatase 98 U/L      Total Protein 8 4 g/dL      Albumin 3 2 g/dL      Total Bilirubin 0 30 mg/dL      eGFR 54 ml/min/1 73sq m     Narrative:       [facility] guidelines for Chronic Kidney Disease (CKD):     Stage 1 with normal or high GFR (GFR > 90 mL/min/1 73 square meters)    Stage 2 Mild CKD (GFR = 60-89 mL/min/1 73 square meters)    Stage 3A Moderate CKD (GFR = 45-59 mL/min/1 73 square meters)    Stage 3B Moderate CKD (GFR = 30-44 mL/min/1 73 square meters)    Stage 4 Severe CKD (GFR = 15-29 mL/min/1 73 square meters)    Stage 5 End Stage CKD (GFR <15 mL/min/1 73 square meters)  Note: GFR calculation is accurate only with a steady state creatinine    Protime-INR [[id number]]  (Abnormal) Collected:  09/09/20 1902    Lab Status:  Final result Specimen:  Blood from Arm, Left Updated:  09/09/20 1918     Protime 19 5 seconds      INR 1 68    APTT [[id number]]  (Normal) Collected:  09/09/20 1902    Lab Status:  Final result Specimen:  Blood from Arm, Left Updated:  09/09/20 1918     PTT 34 seconds     Blood culture #2 [[id number]] Collected:  09/09/20 1902    Lab Status:   In process Specimen:  Blood from Arm, Right Updated:  09/09/20 1911    CBC and differential [[id number]]  (Abnormal) Collected:  09/09/20 1902    Lab Status:  Final result Specimen:  Blood from Arm, Left Updated:  09/09/20 1911     WBC 12 30 Thousand/uL RBC 4 41 Million/uL      Hemoglobin 12 6 g/dL      Hematocrit 39 7 %      MCV 90 fL      MCH 28 6 pg      MCHC 31 7 g/dL      RDW 14 7 %      MPV 9 6 fL      Platelets 806 Thousands/uL      nRBC 0 /100 WBCs      Neutrophils Relative 64 %      Immat GRANS % 1 %      Lymphocytes Relative 25 %      Monocytes Relative 7 %      Eosinophils Relative 2 %      Basophils Relative 1 %      Neutrophils Absolute 7 78 Thousands/µL      Immature Grans Absolute 0 12 Thousand/uL      Lymphocytes Absolute 3 13 Thousands/µL      Monocytes Absolute 0 91 Thousand/µL      Eosinophils Absolute 0 26 Thousand/µL      Basophils Absolute 0 10 Thousands/µL     Blood culture #1 [[id number]] Collected:  09/09/20 1902    Lab Status: In process Specimen:  Blood from Arm, Left Updated:  09/09/20 1905                 XR tibia fibula 2 views LEFT   ED Interpretation by Parvez Mina DO (09/09 1918)   No fx       XR foot 3+ views LEFT   ED Interpretation by Parvez Mina DO (09/09 1918)   No fx       VAS lower limb venous duplex study, complete bilateral    (Results Pending)              Procedures  ECG 12 Lead Documentation Only    Date/Time: 9/9/2020 7:19 PM  Performed by: Parvez Mina DO  Authorized by: Parvez Mina DO     Indications / Diagnosis:  Leg swelling   ECG reviewed by me, the ED Provider: yes    Patient location:  ED  Previous ECG:     Previous ECG:  Unavailable  Interpretation:     Interpretation: non-specific    Rate:     ECG rate:  84    ECG rate assessment: normal    Rhythm:     Rhythm: sinus rhythm               ED Course                           SBIRT 20yo+      Most Recent Value   SBIRT (22 yo +)   In order to provide better care to our patients, we are screening all of our patients for alcohol and drug use  Would it be okay to ask you these screening questions? No Filed at: 09/09/2020 1842   Initial Alcohol Screen: US AUDIT-C    1   How often do you have a drink containing alcohol?  0 Filed at: 09/09/2020 1842 2  How many drinks containing alcohol do you have on a typical day you are drinking? 0 Filed at: 09/09/2020 1842   3a  Male UNDER 65: How often do you have five or more drinks on one occasion? 0 Filed at: 09/09/2020 1842   3b  FEMALE Any Age, or MALE 65+: How often do you have 4 or more drinks on one occassion? 0 Filed at: 09/09/2020 1842   Audit-C Score  0 Filed at: 09/09/2020 1842   CAMACHO: How many times in the past year have you    Used an illegal drug or used a prescription medication for non-medical reasons? Never Filed at: 09/09/2020 1842                  MDM  Number of Diagnoses or Management Options  Diagnosis management comments: Differential diagnosis 1  Cellulitis 2  DVT 3  Gout 4          Amount and/or Complexity of Data Reviewed  Clinical lab tests: ordered and reviewed  Tests in the radiology section of CPT®: ordered and reviewed  Tests in the medicine section of CPT®: ordered and reviewed    Risk of Complications, Morbidity, and/or Mortality  Presenting problems: high  Diagnostic procedures: high  Management options: high        Disposition  Final diagnoses:   Cellulitis of left leg   Status post total right knee replacement   Morbid obesity ([address] )   Essential hypertension   Edema   Polycystic ovarian syndrome   [place] filter in place   Esophageal reflux   Hyperlipidemia, unspecified hyperlipidemia type     Time reflects when diagnosis was documented in both MDM as applicable and the Disposition within this note     Time User Action Codes Description Comment    9/9/2020  7:42 PM Runell Mittie Add [H95 363] Cellulitis of left leg     9/9/2020  7:42 PM Runell Mittie Add [G12 067] Status post total right knee replacement     9/9/2020  7:42 PM Runell Mittie Add [E66 01] Morbid obesity ([address] )     9/9/2020  7:42 PM Runell Mittie Add [I10] Essential hypertension     9/9/2020  7:42 PM Runell Ana Add [R60 9] Edema     9/9/2020  7:42 PM Runell Mittie Add [E28 2] Polycystic ovarian syndrome     9/9/2020  7:42 PM Milly Houston Add [Y69 770] [place] filter in place     9/9/2020  7:42 PM Milly Houston Add [K21 9] Esophageal reflux     9/9/2020  7:42 PM Milly Yuni Add [E78 5] Hyperlipidemia, unspecified hyperlipidemia type       ED Disposition     ED Disposition Condition Date/Time Comment    Admit Stable Wed Sep 9, 2020  7:42 PM         Follow-up Information    None         Patient's Medications   Discharge Prescriptions    No medications on file     No discharge procedures on file      PDMP Review       Value Time User    PDMP Reviewed  Yes 3/18/2020  6:01 PM Orin Jenkins [facility]          ED Provider  Electronically Signed by           Rosendo Yin DO  09/09/20 2029

## 2020-09-10 VITALS
HEIGHT: 63 IN | RESPIRATION RATE: 18 BRPM | BODY MASS INDEX: 51.91 KG/M2 | TEMPERATURE: 97.4 F | HEART RATE: 78 BPM | SYSTOLIC BLOOD PRESSURE: 137 MMHG | DIASTOLIC BLOOD PRESSURE: 61 MMHG | OXYGEN SATURATION: 97 % | WEIGHT: 293 LBS

## 2020-09-10 PROBLEM — D64.9 ANEMIA: Status: ACTIVE | Noted: 2020-09-10

## 2020-09-10 PROBLEM — E83.39 HYPERPHOSPHATEMIA: Status: ACTIVE | Noted: 2020-09-10

## 2020-09-10 PROBLEM — L03.116 CELLULITIS OF LEFT LOWER EXTREMITY: Status: ACTIVE | Noted: 2020-09-09

## 2020-09-10 LAB
25(OH)D3 SERPL-MCNC: 28.4 NG/ML (ref 30–100)
ANION GAP SERPL CALCULATED.3IONS-SCNC: 10 MMOL/L (ref 4–13)
ATRIAL RATE: 84 BPM
BUN SERPL-MCNC: 20 MG/DL (ref 5–25)
CALCIUM SERPL-MCNC: 8.4 MG/DL (ref 8.3–10.1)
CHLORIDE SERPL-SCNC: 106 MMOL/L (ref 100–108)
CO2 SERPL-SCNC: 24 MMOL/L (ref 21–32)
CREAT SERPL-MCNC: 1 MG/DL (ref 0.6–1.3)
ERYTHROCYTE [DISTWIDTH] IN BLOOD BY AUTOMATED COUNT: 14.9 % (ref 11.6–15.1)
EST. AVERAGE GLUCOSE BLD GHB EST-MCNC: 114 MG/DL
GFR SERPL CREATININE-BSD FRML MDRD: 60 ML/MIN/1.73SQ M
GLUCOSE SERPL-MCNC: 101 MG/DL (ref 65–140)
GLUCOSE SERPL-MCNC: 95 MG/DL (ref 65–140)
HBA1C MFR BLD: 5.6 %
HCT VFR BLD AUTO: 35 % (ref 34.8–46.1)
HGB BLD-MCNC: 10.9 G/DL (ref 11.5–15.4)
INR PPP: 1.85 (ref 0.84–1.19)
MAGNESIUM SERPL-MCNC: 2.1 MG/DL (ref 1.6–2.6)
MCH RBC QN AUTO: 27.8 PG (ref 26.8–34.3)
MCHC RBC AUTO-ENTMCNC: 31.1 G/DL (ref 31.4–37.4)
MCV RBC AUTO: 89 FL (ref 82–98)
P AXIS: 38 DEGREES
PHOSPHATE SERPL-MCNC: 4.6 MG/DL (ref 2.3–4.1)
PLATELET # BLD AUTO: 340 THOUSANDS/UL (ref 149–390)
PMV BLD AUTO: 9.8 FL (ref 8.9–12.7)
POTASSIUM SERPL-SCNC: 3.8 MMOL/L (ref 3.5–5.3)
PR INTERVAL: 126 MS
PROTHROMBIN TIME: 20.9 SECONDS (ref 11.6–14.5)
QRS AXIS: 35 DEGREES
QRSD INTERVAL: 88 MS
QT INTERVAL: 412 MS
QTC INTERVAL: 486 MS
RBC # BLD AUTO: 3.92 MILLION/UL (ref 3.81–5.12)
SODIUM SERPL-SCNC: 140 MMOL/L (ref 136–145)
T WAVE AXIS: 22 DEGREES
VENTRICULAR RATE: 84 BPM
WBC # BLD AUTO: 9.5 THOUSAND/UL (ref 4.31–10.16)

## 2020-09-10 PROCEDURE — 85027 COMPLETE CBC AUTOMATED: CPT | Performed by: NURSE PRACTITIONER

## 2020-09-10 PROCEDURE — 93970 EXTREMITY STUDY: CPT | Performed by: SURGERY

## 2020-09-10 PROCEDURE — 80048 BASIC METABOLIC PNL TOTAL CA: CPT | Performed by: NURSE PRACTITIONER

## 2020-09-10 PROCEDURE — 90471 IMMUNIZATION ADMIN: CPT | Performed by: INTERNAL MEDICINE

## 2020-09-10 PROCEDURE — 90670 PCV13 VACCINE IM: CPT | Performed by: INTERNAL MEDICINE

## 2020-09-10 PROCEDURE — 93010 ELECTROCARDIOGRAM REPORT: CPT | Performed by: INTERNAL MEDICINE

## 2020-09-10 PROCEDURE — 84100 ASSAY OF PHOSPHORUS: CPT | Performed by: NURSE PRACTITIONER

## 2020-09-10 PROCEDURE — 87081 CULTURE SCREEN ONLY: CPT | Performed by: INTERNAL MEDICINE

## 2020-09-10 PROCEDURE — 82306 VITAMIN D 25 HYDROXY: CPT | Performed by: INTERNAL MEDICINE

## 2020-09-10 PROCEDURE — 85610 PROTHROMBIN TIME: CPT | Performed by: NURSE PRACTITIONER

## 2020-09-10 PROCEDURE — 82948 REAGENT STRIP/BLOOD GLUCOSE: CPT

## 2020-09-10 PROCEDURE — 83735 ASSAY OF MAGNESIUM: CPT | Performed by: NURSE PRACTITIONER

## 2020-09-10 PROCEDURE — 99217 PR OBSERVATION CARE DISCHARGE MANAGEMENT: CPT | Performed by: INTERNAL MEDICINE

## 2020-09-10 RX ORDER — POTASSIUM CHLORIDE 20 MEQ/1
20 TABLET, EXTENDED RELEASE ORAL ONCE
Status: COMPLETED | OUTPATIENT
Start: 2020-09-10 | End: 2020-09-10

## 2020-09-10 RX ORDER — ACETAMINOPHEN 325 MG/1
650 TABLET ORAL EVERY 6 HOURS PRN
Refills: 0
Start: 2020-09-10 | End: 2022-02-16 | Stop reason: ALTCHOICE

## 2020-09-10 RX ORDER — ACETAMINOPHEN 325 MG/1
650 TABLET ORAL EVERY 6 HOURS PRN
Status: DISCONTINUED | OUTPATIENT
Start: 2020-09-10 | End: 2020-09-10

## 2020-09-10 RX ORDER — PRAVASTATIN SODIUM 40 MG
40 TABLET ORAL DAILY
Qty: 90 TABLET | Refills: 0
Start: 2020-09-10 | End: 2020-11-03 | Stop reason: SDUPTHER

## 2020-09-10 RX ORDER — WARFARIN SODIUM 5 MG/1
TABLET ORAL
Refills: 0
Start: 2020-09-10 | End: 2020-09-17 | Stop reason: HOSPADM

## 2020-09-10 RX ORDER — ACETAMINOPHEN 325 MG/1
650 TABLET ORAL EVERY 6 HOURS PRN
Status: DISCONTINUED | OUTPATIENT
Start: 2020-09-10 | End: 2020-09-10 | Stop reason: HOSPADM

## 2020-09-10 RX ORDER — BENAZEPRIL HYDROCHLORIDE 20 MG/1
20 TABLET ORAL DAILY
Status: DISCONTINUED | OUTPATIENT
Start: 2020-09-10 | End: 2020-09-10 | Stop reason: HOSPADM

## 2020-09-10 RX ORDER — AMLODIPINE BESYLATE 5 MG/1
5 TABLET ORAL DAILY
Status: DISCONTINUED | OUTPATIENT
Start: 2020-09-10 | End: 2020-09-10 | Stop reason: HOSPADM

## 2020-09-10 RX ORDER — SULFAMETHOXAZOLE AND TRIMETHOPRIM 800; 160 MG/1; MG/1
1 TABLET ORAL EVERY 12 HOURS SCHEDULED
Qty: 10 TABLET | Refills: 0 | Status: SHIPPED | OUTPATIENT
Start: 2020-09-10 | End: 2020-09-17 | Stop reason: HOSPADM

## 2020-09-10 RX ADMIN — CLINDAMYCIN PHOSPHATE 600 MG: 600 INJECTION, SOLUTION INTRAVENOUS at 11:00

## 2020-09-10 RX ADMIN — CLINDAMYCIN PHOSPHATE 600 MG: 600 INJECTION, SOLUTION INTRAVENOUS at 02:59

## 2020-09-10 RX ADMIN — ACETAMINOPHEN 650 MG: 325 TABLET, FILM COATED ORAL at 06:29

## 2020-09-10 RX ADMIN — FUROSEMIDE 20 MG: 20 TABLET ORAL at 08:53

## 2020-09-10 RX ADMIN — PNEUMOCOCCAL 13-VALENT CONJUGATE VACCINE 0.5 ML: 2.2; 2.2; 2.2; 2.2; 2.2; 4.4; 2.2; 2.2; 2.2; 2.2; 2.2; 2.2; 2.2 INJECTION, SUSPENSION INTRAMUSCULAR at 11:41

## 2020-09-10 RX ADMIN — AMLODIPINE BESYLATE 5 MG: 5 TABLET ORAL at 08:53

## 2020-09-10 RX ADMIN — BENAZEPRIL HYDROCHLORIDE 20 MG: 20 TABLET, COATED ORAL at 08:53

## 2020-09-10 RX ADMIN — POTASSIUM CHLORIDE 20 MEQ: 1500 TABLET, EXTENDED RELEASE ORAL at 11:00

## 2020-09-10 RX ADMIN — FOLIC ACID-PYRIDOXINE-CYANOCOBALAMIN TAB 2.5-25-2 MG 1 TABLET: 2.5-25-2 TAB at 08:53

## 2020-09-10 NOTE — ASSESSMENT & PLAN NOTE
Left lower extremity cellulitis with +2 swelling  Cleocin initiated emergency room-will continue  Admit to med surge   Monitor per protocol

## 2020-09-10 NOTE — H&P
H&P- Caryle Coffin 1955, 62 y o  female MRN: [id number]    Unit/Bed#: 411-01 Encounter: [id number]    Primary Care Provider: Demarcus Campos MD   Date and time admitted to hospital: 9/9/2020  6:31 PM        * Cellulitis  Assessment & Plan  Left lower extremity cellulitis with +2 swelling  Cleocin initiated emergency room-will continue  Admit to med surge   Monitor per protocol    Leukocytosis  Assessment & Plan  As evidence by white blood cell count of 12 30  Most likely secondary to left lower extremity cellulitis  Cleocin initiated emergency room-will continue  Trend CBC  Monitor vital signs accordingly    Diabetes [facility])  Assessment & Plan  Lab Results   Component Value Date    HGBA1C 5 7 (H) 03/06/2020       Recent Labs     09/09/20 2127   POCGLU 94       Blood Sugar Average: Last 72 hrs:  (P) 94     Collect new A1c  Accu-Cheks a c  HS  Insulin sliding scale  Consistent carb diet    Obstructive sleep apnea on CPAP  Assessment & Plan  Respiratory protocol in place  CPAP ordered    Essential hypertension  Assessment & Plan  Pressure currently stable  Admit to med surge  Monitor per protocol  Continue prior to admission medication    Renal disorder  Assessment & Plan  Current creatinine 1 09 patient is is currently at baseline  Cautious use of nephrotoxic agents  Trend BMP    Morbid obesity with BMI of 60 0-69 9, adult ([facility] )  Assessment & Plan  As evidence by a BMI of 70 49  Recommend outpatient nutritional counseling on lifestyle modification    History of DVT (deep vein thrombosis)  Assessment & Plan  Patient has a history of DVT  Patient is currently anticoagulated on Coumadin  Current INR is 1 68 which is subtherapeutic  Continue patient's home Coumadin of 7 5 mg q d    Trend INR daily  Bilateral lower extremity duplex was ordered-awaiting official radiological read        VTE Prophylaxis: Warfarin (Coumadin)  / sequential compression device   Code Status: Full  POLST: POLST is not applicable to this patient    Anticipated Length of Stay:  Patient will be admitted on an Observation basis with an anticipated length of stay of  Less than 2 midnights  Justification for Hospital Stay: Celluitis    Total Time for Visit, including Counseling / Coordination of Care: 1 hour  Greater than 50% of this total time spent on direct patient counseling and coordination of care  Chief Complaint:   Left ankle swelling "for a few days" but states it is always swollen  History of Present Illness:    Cecy Lopez is a 62 y o  female past medical history of DVTs currently anticoagulated on Coumadin INR is currently subtherapeutic, sleep apnea, renal disorder, obesity, hypertension, and a blood clotting disorder  Presented to the emergency room for PCP for evaluation of left lower extremity swelling and redness  Patient states that the redness and swelling began a few days ago and is increased today and she feels is spreading to her heel of her foot  Images and labs were collected emergency room-see below  Significant findings included mildly elevated white blood cell count, awaiting official radiological read on bilateral lower extremity duplex  Patient was initiated on Cleocin for cellulitis of left lower extremity-will continue  Patient denies lightheadedness dizziness fevers chills chest pain shortness of breath abdominal pain nausea vomiting diarrhea dysuria urgency or frequency  Patient be admitted to [facility] for observation continue IV antibiotics for now  Review of Systems:    Review of Systems   Cardiovascular: Positive for leg swelling (lle +2 medial ankle region)  Skin: Positive for rash (left medial ankle region )  All other systems reviewed and are negative        Past Medical and Surgical History:     Past Medical History:   Diagnosis Date    [facility] clot[facility] disorder ([facility] )     [facility]     Hypercoagulable state ([facility] )     Hypertension     Lyme disease     Obesity     PCOS (polycystic ovarian syndrome)     Renal disorder     kidney stones    Sleep apnea     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)        Past Surgical History:   Procedure Laterality Date    ANKLE SURGERY Right     APPENDECTOMY      BREAST BIOPSY      BREAST BIOPSY      CLOSED REDUCTION METATARSAL FRACTURE Right     FOOT SURGERY      HEMORROIDECTOMY      HYSTERECTOMY      JOINT REPLACEMENT Bilateral     knee    LITHOTRIPSY      VENA CAVA FILTER PLACEMENT      onset: 1/10/10       Meds/Allergies:    Prior to Admission medications    Medication Sig Start Date End Date Taking? Authorizing Provider   amLODIPine-benazepril (LOTREL 5-20) 5-20 MG per capsule Take 1 capsule by mouth daily 7/6/20  Yes Noam Richardson MD   cetirizine (ZYRTEC ALLERGY) 10 mg tablet Take 1 tablet by mouth daily as needed   Yes Historical Provider, MD   enoxaparin (LOVENOX) 150 mg/mL injection Inject 1 mL under the skin on the mornings of 2/5/2020 and 2/6/2020 1/30/20  Yes Jaspal Alicea MD   fluticasone (FLONASE) 50 mcg/act nasal spray into each nostril 8/12/14  Yes Historical Provider, MD   folic acid-pyridoxine-cyanocobalamin (Folbic) 2 5-25-2 mg Take 1 tablet by mouth daily 5/27/20  Yes AFIA Sosa   furosemide (LASIX) 20 mg tablet Take 1 tablet (20 mg total) by mouth daily 1/2/20  Yes AFIA Sosa   Iodoquinol-HC-Aloe Polysacch (ALCORTIN A) 1-2-1 % GEL Apply topically   Yes Historical Provider, MD   metFORMIN (GLUCOPHAGE-XR) 500 mg 24 hr tablet Take 1 tablet (500 mg total) by mouth 4 (four) times a day 7/6/20  Yes Noam Richardson MD   montelukast (SINGULAIR) 10 mg tablet Take 1 tablet (10 mg total) by mouth daily at bedtime 7/6/20  Yes Noam Richardson MD   multivitamin [facility]) TABS Take 1 tablet by mouth daily     Yes Historical Provider, MD   nystatin (MYCOSTATIN) powder Apply topically 3 (three) times a day 4/17/18  Yes Noam Richardson MD   pravastatin (PRAVACHOL) 40 mg tablet TAKE 1 TABLET DAILY 6/23/20  Yes Raymond Priest MD   triamcinolone (KENALOG) 0 1 % cream  5/5/18  Yes Historical Provider, MD   warfarin (COUMADIN) 5 mg tablet TAKE 1 & 1/2 (ONE & ONE-HALF) TABLETS BY MOUTH ONCE DAILY or AS DIRECTED 5/29/20  Yes Chandler Scherer MD   albuterol (PROVENTIL HFA,VENTOLIN HFA) 90 mcg/act inhaler Inhale 2 puffs every 6 (six) hours as needed for wheezing      Historical Provider, MD   alendronate (FOSAMAX) 70 mg tablet Take 70 mg by mouth every 7 days    Historical Provider, MD   amoxicillin (AMOXIL) 500 mg capsule TAKE 4 CAPSULES BY MOUTH 1HR BEFORE APPT 10/15/18   Historical Provider, MD   Cholecalciferol (VITAMIN D3) 67693 units TABS Take 1 tablet by mouth every 14 (fourteen) days    Historical Provider, MD   clobetasol (TEMOVATE) 0 05 % ointment Apply topically as directed two times daily    Historical Provider, MD   diphenoxylate-atropine (LOMOTIL) 2 5-0 025 mg per tablet Take 1 tablet by mouth 4 (four) times a day as needed for diarrhea  Patient not taking: Reported on 4/16/2020 3/18/20   AFIA Sosa   ergocalciferol (VITAMIN D2) 50,000 units Take by mouth 3/30/11   Historical Provider, MD   fluticasone-vilanterol (BREO ELLIPTA) 100-25 mcg/inh inhaler Inhale daily 9/14/16   Historical Provider, MD   mometasone (NASONEX) 50 mcg/act nasal spray 2 sprays into each nostril daily    Historical Provider, MD   ondansetron (ZOFRAN) 8 mg tablet Take 1 tablet (8 mg total) by mouth every 8 (eight) hours as needed for nausea or vomiting  Patient not taking: Reported on 4/16/2020 6/25/19   AFIA Sosa   pantoprazole (PROTONIX) 40 mg tablet Take 1 tablet (40 mg total) by mouth daily  Patient not taking: Reported on 4/16/2020 1/4/19   Raymond Priest MD   SSD 1 % cream APPLY CREAM TOPICALLY DAILY 1/29/20   Raymond Priest MD   triamcinolone (KENALOG) 0 5 % cream APPLY TOPICALLY TO RIGHT ANKLE EVERY 12 HOURS 3/22/17   Historical Provider, MD     I have reviewed home medications using allscripts  Allergies: Allergies   Allergen Reactions    Azithromycin Diarrhea     Other reaction(s): Unknown    Ciprofloxacin Confusion, Dizziness and Other (See Comments)     Other reaction(s): Other (Please comment)  Dizziness, weepy  Other reaction(s): Other (See Comments)  "Dizziness"      Clindamycin Other (See Comments)     Acid Reflux    Ancef [Cefazolin] Rash     States tolerated Keflex       Social History:     Marital Status: /Civil Union     Substance Use History:   Social History     Substance and Sexual Activity   Alcohol Use No     Social History     Tobacco Use   Smoking Status Never Smoker   Smokeless Tobacco Never Used     Social History     Substance and Sexual Activity   Drug Use No       Family History:    Family History   Problem Relation Age of Onset    Pulmonary embolism Mother     Hypercoagulant Ability Mother         primary state    Hypercoagulant Ability Maternal Grandmother         primary state    No Known Problems Father        Physical Exam:     Vitals:   Blood Pressure: (!) 177/73 (09/09/20 2030)  Pulse: 83 (09/09/20 2030)  Temperature: (!) 97 °F (36 1 °C) (09/09/20 1836)  Temp Source: Temporal (09/09/20 1836)  Respirations: 20 (09/09/20 2030)  Weight - Scale: (!) 184 kg (404 lb 12 2 oz) (09/09/20 1837)  SpO2: 97 % (09/09/20 2030)    Physical Exam  Constitutional:       Appearance: Normal appearance  HENT:      Head: Normocephalic and atraumatic  Eyes:      Extraocular Movements: Extraocular movements intact  Pupils: Pupils are equal, round, and reactive to light  Cardiovascular:      Rate and Rhythm: Normal rate and regular rhythm  Pulmonary:      Effort: Pulmonary effort is normal       Breath sounds: Normal breath sounds  Abdominal:      General: Abdomen is flat  Bowel sounds are normal       Palpations: Abdomen is soft  Musculoskeletal: Normal range of motion  General: No swelling or tenderness  Skin:     Capillary Refill: Capillary refill takes 2 to 3 seconds  Neurological:      General: No focal deficit present  Mental Status: She is alert and oriented to person, place, and time  GCS: GCS eye subscore is 4  GCS verbal subscore is 5  GCS motor subscore is 6  Psychiatric:         Mood and Affect: Mood normal          Behavior: Behavior normal          Thought Content: Thought content normal          Judgment: Judgment normal          Additional Data:     Lab Results: I have personally reviewed pertinent reports  Results from last 7 days   Lab Units 09/09/20  1902   WBC Thousand/uL 12 30*   HEMOGLOBIN g/dL 12 6   HEMATOCRIT % 39 7   PLATELETS Thousands/uL 383   NEUTROS PCT % 64   LYMPHS PCT % 25   MONOS PCT % 7   EOS PCT % 2     Results from last 7 days   Lab Units 09/09/20  1902   POTASSIUM mmol/L 3 8   CHLORIDE mmol/L 104   CO2 mmol/L 27   BUN mg/dL 21   CREATININE mg/dL 1 09   CALCIUM mg/dL 8 6   ALK PHOS U/L 98   ALT U/L 19   AST U/L 20     Results from last 7 days   Lab Units 09/09/20  1902   INR  1 68*       Imaging: I have personally reviewed pertinent reports  No results found  RABBL Everywhere Records Reviewed: Yes     ** Please Note: This note has been constructed using a voice recognition system   **

## 2020-09-10 NOTE — RESPIRATORY THERAPY NOTE
Patient stated that she does not want to wear a CPAP while here, because hse hasn't been wearing hers at home

## 2020-09-10 NOTE — ASSESSMENT & PLAN NOTE
· Secondary to left lower extremity cellulitis  · Resolved by the time of discharge  · Follow CBC as an outpatient with her PCP

## 2020-09-10 NOTE — ASSESSMENT & PLAN NOTE
· Treated with IV clindamycin during hospitalization  · Blood cultures x 2 sets and a MRSA culture were checked with results pending at the time of discharge  · Discharge home on bactrim DS, 1 tablet PO every 12 hours for a 5-day course    · Bactrim DS was chosen as the antibiotic of choice secondary to her multiple antibiotic allergies  Outpatient follow-up with PCP in regards to this matter

## 2020-09-10 NOTE — RESPIRATORY THERAPY NOTE
RT Protocol Note  Lucretia Caceres 62 y o  female MRN: [id number]  Unit/Bed#: 863-01 Encounter: [id number]    Assessment    Principal Problem:    Cellulitis  Active Problems:    History of DVT (deep vein thrombosis)    Morbid obesity with BMI of 60 0-69 9, adult ([facility])    Renal disorder    Essential hypertension    Obstructive sleep apnea on CPAP    Diabetes ([facility])    Leukocytosis      Home Pulmonary Medications:  Proventil MDI Q 6 PRN     Pt has a CPAP @ home but is not using it, and she stated that she does not want to use it here      Past Medical History:   Diagnosis Date    [facility] clotting disorder ([facility] )     Homocysteinemia ([facility])     Hypercoagulable state ([facility] )     Hypertension     Lyme disease     Obesity     PCOS (polycystic ovarian syndrome)     Renal disorder     kidney stones    Sleep apnea     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 ([facility])      Social History     Socioeconomic History    Marital status: /Civil Union     Spouse name: None    Number of children: None    Years of education: None    Highest education level: None   Occupational History    Occupation: unemployed   Social Needs    Financial resource strain: None    Food insecurity     Worry: None     Inability: None    Transportation needs     Medical: None     Non-medical: None   Tobacco Use    Smoking status: Never Smoker    Smokeless tobacco: Never Used   Substance and Sexual Activity    Alcohol use: No    Drug use: No    Sexual activity: None   Lifestyle    Physical activity     Days per week: None     Minutes per session: None    Stress: None   Relationships    Social connections     Talks on phone: None     Gets together: None     Attends [employer] service: None     Active member of club or organization: None     Attends meetings of clubs or organizations: None     Relationship status: None    Intimate partner violence     Fear of current or ex partner: None Emotionally abused: None     Physically abused: None     Forced sexual activity: None   Other Topics Concern    None   Social History Narrative    None       Subjective         Objective    Physical Exam:        Vitals:  Blood pressure 147/73, pulse 85, temperature 98 5 °F (36 9 °C), resp  rate 18, height 5' 3" (1 6 m), weight (!) 180 kg (397 lb 14 9 oz), SpO2 97 %  Imaging and other studies: I have personally reviewed pertinent reports  Plan           Will keep treatment as ordered by PA

## 2020-09-10 NOTE — ASSESSMENT & PLAN NOTE
Patient has a history of DVT  Patient is currently anticoagulated on Coumadin  Current INR is 1 68 which is subtherapeutic  Continue patient's home Coumadin of 7 5 mg q d    Trend INR daily  Bilateral lower extremity duplex was ordered-awaiting official radiological read

## 2020-09-10 NOTE — ASSESSMENT & PLAN NOTE
Lab Results   Component Value Date    HGBA1C 5 7 (H) 03/06/2020       Recent Labs     09/09/20 2127   POCGLU 94       Blood Sugar Average: Last 72 hrs:  (P) 94     Collect new A1c  Accu-Cheks a c  HS  Insulin sliding scale  Consistent carb diet

## 2020-09-10 NOTE — UTILIZATION REVIEW
Initial Clinical Review    Admission: Date/Time/Statement:   Admission Orders (From admission, onward)     Ordered        09/09/20 2028  Place in Observation  Once                   Orders Placed This Encounter   Procedures    Place in Observation     Standing Status:   Standing     Number of Occurrences:   1     Order Specific Question:   Admitting Physician     Answer:   Mechelle Leone     Order Specific Question:   Level of Care     Answer:   Med Surg [16]     ED Arrival Information     Expected Arrival Acuity Means of Arrival Escorted By Service Admission Type    - 9/9/2020 18:27 Urgent Walk-In Self General Medicine Urgent    Arrival Complaint    swollen ankle        Chief Complaint   Patient presents with    Cellulitis     Pt c/o L ankle swelling "for a few days" but states it is always swollen  C/o pain but denies injury  Now stating she saw her PCP today who told her to come to ED for this issue  Assessment/Plan:   25-year-old female with a history of left ankle pain and swelling for at least 2 weeks presents to the emerged department for evaluation  She is concerned that she may be developing cellulitis  Patient does have a history of DVTs in the past   Patient has no calf tenderness or swelling  Patient is currently taking Coumadin  Patient called her PCP today and she was referred to the emergency department    Cellulitis  Assessment & Plan  Left lower extremity cellulitis with +2 swelling  Cleocin initiated emergency room-will continue  Admit to med surge   Monitor per protocol     Leukocytosis  Assessment & Plan  As evidence by white blood cell count of 12 30  Most likely secondary to left lower extremity cellulitis  Cleocin initiated emergency room-will continue  Trend CBC  Monitor vital signs accordingly     Diabetes [facility])  Assessment & Plan        Lab Results   Component Value Date     HGBA1C 5 7 (H) 03/06/2020             Recent Labs     09/09/20  2127   POCGLU 94         Blood Sugar Average: Last 72 hrs:  (P) 94      Collect new A1c  Accu-Cheks a c  HS  Insulin sliding scale  Consistent carb diet     Obstructive sleep apnea on CPAP  Assessment & Plan  Respiratory protocol in place  CPAP ordered     Essential hypertension  Assessment & Plan  Pressure currently stable  Admit to med surge  Monitor per protocol  Continue prior to admission medication     Renal disorder  Assessment & Plan  Current creatinine 1 09 patient is is currently at baseline  Cautious use of nephrotoxic agents  Trend BMP     Morbid obesity with BMI of 60 0-69 9, adult (HCC)  Assessment & Plan  As evidence by a BMI of 70 49  Recommend outpatient nutritional counseling on lifestyle modification     History of DVT (deep vein thrombosis)  Assessment & Plan  Patient has a history of DVT  Patient is currently anticoagulated on Coumadin  Current INR is 1 68 which is subtherapeutic  Continue patient's home Coumadin of 7 5 mg q d    Trend INR daily  Bilateral lower extremity duplex was ordered-awaiting official radiological read      ED Triage Vitals [09/09/20 1836]   Temperature Pulse Respirations Blood Pressure SpO2   (!) 97 °F (36 1 °C) 102 (!) 24 (!) 195/99 97 %      Temp Source Heart Rate Source Patient Position - Orthostatic VS BP Location FiO2 (%)   Temporal Monitor Sitting Right arm --      Pain Score       9          Wt Readings from Last 1 Encounters:   09/10/20 (!) 180 kg (397 lb 14 9 oz)     Additional Vital Signs:   Date/Time   Temp   Pulse   Resp   BP   MAP (mmHg)   SpO2   O2 Device   Patient Position - Orthostatic VS    09/10/20 0827         18                   09/10/20 0716   97 4 °F (36 3 °C)Abnormal     76   18   139/73   95   96 %          09/09/20 2318         18   De smet          09/09/20 2300                     None (Room air)       09/09/20 2139                     None (Room air)         Pertinent Labs/Diagnostic Test Results:   Results from last 7 days   Lab Units 09/10/20  0545 09/09/20  1902   WBC Thousand/uL 9 50 12 30*   HEMOGLOBIN g/dL 10 9* 12 6   HEMATOCRIT % 35 0 39 7   PLATELETS Thousands/uL 340 383   NEUTROS ABS Thousands/µL  --  7 78*     Results from last 7 days   Lab Units 09/10/20  0545 09/09/20  1902   SODIUM mmol/L 140 140   POTASSIUM mmol/L 3 8 3 8   CHLORIDE mmol/L 106 104   CO2 mmol/L 24 27   ANION GAP mmol/L 10 9   BUN mg/dL 20 21   CREATININE mg/dL 1 00 1 09   EGFR ml/min/1 73sq m 60 54   CALCIUM mg/dL 8 4 8 6   MAGNESIUM mg/dL 2 1  --    PHOSPHORUS mg/dL 4 6*  --      Results from last 7 days   Lab Units 09/09/20  1902   AST U/L 20   ALT U/L 19   ALK PHOS U/L 98   TOTAL PROTEIN g/dL 8 4*   ALBUMIN g/dL 3 2*   TOTAL BILIRUBIN mg/dL 0 30     Results from last 7 days   Lab Units 09/10/20  0727 09/09/20  2127   POC GLUCOSE mg/dl 95 94     Results from last 7 days   Lab Units 09/10/20  0545 09/09/20  1902   GLUCOSE RANDOM mg/dL 101 88     Results from last 7 days   Lab Units 09/09/20  1902   TROPONIN I ng/mL <0 02     Results from last 7 days   Lab Units 09/10/20  0545 09/09/20  1902   PROTIME seconds 20 9* 19 5*   INR  1 85* 1 68*   PTT seconds  --  34     9/9  Xray L foot=No acute osseous abnormality  Xray L tib/fib=No acute osseous abnormality  Status post total knee arthroplasty  ble venous duplex=  RIGHT LOWER LIMB:  No gross evidence of acute deep vein thrombosis  No evidence of superficial thrombophlebitis noted  Doppler evaluation shows a normal response to augmentation maneuvers  Popliteal, posterior tibial and anterior tibial arterial Doppler waveforms are triphasic  LEFT LOWER LIMB:  No gross evidence of acute deep vein thrombosis  No evidence of superficial thrombophlebitis noted  Doppler evaluation shows a normal response to augmentation maneuvers  Popliteal and anterior tibial arterial Doppler waveforms are triphasic      ED Treatment:   Medication Administration from 09/09/2020 1827 to 09/09/2020 2047       Date/Time Order Dose Route Action     09/09/2020 1911 clindamycin (CLEOCIN) IVPB (premix) 600 mg 50 mL 600 mg Intravenous New Bag     09/09/2020 1909 acetaminophen (TYLENOL) tablet 650 mg 650 mg Oral Given        Past Medical History:   Diagnosis Date    Blood clotting disorder ([facility] )     Homocysteinemia ([facility] )     Hypercoagulable state ([facility] )     Hypertension     Lyme disease     Obesity     PCOS (polycystic ovarian syndrome)     Renal disorder     kidney stones    Sleep apnea     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)      Present on Admission:   Cellulitis   Essential hypertension   Leukocytosis   Renal disorder    Admitting Diagnosis: Morbid obesity ([facility] ) [E66 01]  Esophageal reflux [K21 9]  Edema [R60 9]  Polycystic ovarian syndrome [E28 2]  Essential hypertension [I10]  Swollen ankles [M25 471, M25 472]  Cellulitis of left leg [L03 116]  Elida filter in place [Y49 614]  Status post total right knee replacement [Z96 651]  Hyperlipidemia, unspecified hyperlipidemia type [E78 5]  Age/Sex: 62 y o  female  Admission Orders:  accuchecks with coverage scale  scd    Scheduled Medications:  amLODIPine, 5 mg, Oral, Daily  benazepril, 20 mg, Oral, Daily  clindamycin, 600 mg, Intravenous, Q8H  fluticasone-vilanterol, 1 puff, Inhalation, Daily  folic acid-pyridoxine-cyanocobalamin, 1 tablet, Oral, Daily  furosemide, 20 mg, Oral, Daily  insulin lispro, 4-20 Units, Subcutaneous, TID AC  insulin lispro, 4-20 Units, Subcutaneous, HS  montelukast, 10 mg, Oral, HS  pravastatin, 40 mg, Oral, After Dinner  warfarin, 7 5 mg, Oral, Daily (warfarin)    PRN Meds:  acetaminophen, 650 mg, Oral, Q6H PRN  albuterol, 2 puff, Inhalation, Q6H PRN  pneumococcal 23-valent polysaccharide vaccine, 0 5 mL, Subcutaneous, Prior to discharge    Network Utilization Review Department  [email or web address]  ATTENTION: Please call with any questions or concerns to [phone] and carefully listen to the prompts so that you are directed to the right person  All voicemails are confidential   Radha Marques all requests for admission clinical reviews, approved or denied determinations and any other requests to dedicated fax number below belonging to the campus where the patient is receiving treatment   List of dedicated fax numbers for the Facilities:  [address] DENIALS (Administrative/Medical Necessity) [phone]   [address]  [address] (Maternity/NICU/Pediatrics) [phone]   Robert Conley [phone]   Micheleju Ervins [phone]   Jaquan Johnson [phone]   [facility] [phone]   [address] [place] [address] [phone]   [facility]  [phone]   [address] [facility] [address]  [address] [facility] [address] [facility] [phone]

## 2020-09-10 NOTE — ASSESSMENT & PLAN NOTE
· Continue PO amlodipine and PO benazepril  · Follow the blood pressure trend as an outpatient with her PCP

## 2020-09-10 NOTE — CASE MANAGEMENT
Cm met with the patient to evaluate the patients prior function and living situation and any barriers to d/c and form a safe d/c plan  Cm also evaluated the patient for any services in the home or needs for services  Pt resides at home alone (with her dog) in a house  Ramp to enter  Pt has a walker, cane and tub bench (doesn't use an AD to ambulate)  No services, hx of OP therapy at [facility] in [facility]  Pt drives  Pharmacy is [facility] in [phone] and PCP is Dr Laurie Allen  Plans at this time are home on dc with OP follow up    Pt will be driving herself home as her car is here

## 2020-09-10 NOTE — PLAN OF CARE
Problem: Potential for Falls  Goal: Patient will remain free of falls  Description: INTERVENTIONS:  - Assess patient frequently for physical needs  -  Identify cognitive and physical deficits and behaviors that affect risk of falls    -  [place] fall precautions as indicated by assessment   - Educate patient/family on patient safety including physical limitations  - Instruct patient to call for assistance with activity based on assessment  - Modify environment to reduce risk of injury  - Consider OT/PT consult to assist with strengthening/mobility  Outcome: Progressing     Problem: PAIN - ADULT  Goal: Verbalizes/displays adequate comfort level or baseline comfort level  Description: Interventions:  - Encourage patient to monitor pain and request assistance  - Assess pain using appropriate pain scale  - Administer analgesics based on type and severity of pain and evaluate response  - Implement non-pharmacological measures as appropriate and evaluate response  - Consider cultural and social influences on pain and pain management  - Notify physician/advanced practitioner if interventions unsuccessful or patient reports new pain  Outcome: Progressing     Problem: INFECTION - ADULT  Goal: Absence or prevention of progression during hospitalization  Description: INTERVENTIONS:  - Assess and monitor for signs and symptoms of infection  - Monitor lab/diagnostic results  - Monitor all insertion sites, i e  indwelling lines, tubes, and drains  - Monitor endotracheal if appropriate and nasal secretions for changes in amount and color  - [place] appropriate cooling/warming therapies per order  - Administer medications as ordered  - Instruct and encourage patient and family to use good hand hygiene technique  - Identify and instruct in appropriate isolation precautions for identified infection/condition  Outcome: Progressing     Problem: SAFETY ADULT  Goal: Maintain or return to baseline ADL function  Description: INTERVENTIONS:  -  Assess patient's ability to carry out ADLs; assess patient's baseline for ADL function and identify physical deficits which impact ability to perform ADLs (bathing, care of mouth/teeth, toileting, grooming, dressing, etc )  - Assess/evaluate cause of self-care deficits   - Assess range of motion  - Assess patient's mobility; develop plan if impaired  - Assess patient's need for assistive devices and provide as appropriate  - Encourage maximum independence but intervene and supervise when necessary  - Involve family in performance of ADLs  - Assess for home care needs following discharge   - Consider OT consult to assist with ADL evaluation and planning for discharge  - Provide patient education as appropriate  Outcome: Progressing  Goal: Maintain or return mobility status to optimal level  Description: INTERVENTIONS:  - Assess patient's baseline mobility status (ambulation, transfers, stairs, etc )    - Identify cognitive and physical deficits and behaviors that affect mobility  - Identify mobility aids required to assist with transfers and/or ambulation (gait belt, sit-to-stand, lift, walker, cane, etc )  - [place] fall precautions as indicated by assessment  - Record patient progress and toleration of activity level on Mobility SBAR; progress patient to next Phase/Stage  - Instruct patient to call for assistance with activity based on assessment  - Consider rehabilitation consult to assist with strengthening/weightbearing, etc   Outcome: Progressing     Problem: DISCHARGE PLANNING  Goal: Discharge to home or other facility with appropriate resources  Description: INTERVENTIONS:  - Identify barriers to discharge w/patient and caregiver  - Arrange for needed discharge resources and transportation as appropriate  - Identify discharge learning needs (meds, wound care, etc )  - Arrange for interpretive services to assist at discharge as needed  - Refer to Case Management Department for coordinating discharge planning if the patient needs post-hospital services based on physician/advanced practitioner order or complex needs related to functional status, cognitive ability, or social support system  Outcome: Progressing     Problem: Knowledge Deficit  Goal: Patient/family/caregiver demonstrates understanding of disease process, treatment plan, medications, and discharge instructions  Description: Complete learning assessment and assess knowledge base    Interventions:  - Provide teaching at level of understanding  - Provide teaching via preferred learning methods  Outcome: Progressing     Problem: SKIN/TISSUE INTEGRITY - ADULT  Goal: Skin integrity remains intact  Description: INTERVENTIONS  - Identify patients at risk for skin breakdown  - Assess and monitor skin integrity  - Assess and monitor nutrition and hydration status  - Monitor labs (i e  albumin)  - Assess for incontinence   - Turn and reposition patient  - Assist with mobility/ambulation  - Relieve pressure over bony prominences  - Avoid friction and shearing  - Provide appropriate hygiene as needed including keeping skin clean and dry  - Evaluate need for skin moisturizer/barrier cream  - Collaborate with interdisciplinary team (i e  Nutrition, Rehabilitation, etc )   - Patient/family teaching  Outcome: Progressing

## 2020-09-10 NOTE — DISCHARGE SUMMARY
Discharge- Al Pat 1955, 62 y o  female MRN: [id number]    Unit/Bed#: 266-86 Encounter: [id number]    Primary Care Provider: Abdullahi Kasper MD   Date and time admitted to hospital: 9/9/2020  6:31 PM        * Cellulitis of left lower extremity  Assessment & Plan  · Treated with IV clindamycin during hospitalization  · Blood cultures x 2 sets and a MRSA culture were checked with results pending at the time of discharge  · Discharge home on bactrim DS, 1 tablet PO every 12 hours for a 5-day course  · Bactrim DS was chosen as the antibiotic of choice secondary to her multiple antibiotic allergies  Outpatient follow-up with PCP in regards to this matter    Hyperphosphatemia  Assessment & Plan  · Follow the phosphorus level after discharge with her PCP    Anemia  Assessment & Plan  Outpatient follow-up and work-up with PCP in regards to this matter      Leukocytosis  Assessment & Plan  · Secondary to left lower extremity cellulitis  · Resolved by the time of discharge  · Follow CBC as an outpatient with her PCP    Obstructive sleep apnea on CPAP  Assessment & Plan  · Continue CPAP QHS and any time while sleeping at her home CPAP settings    Essential hypertension  Assessment & Plan  · Continue PO amlodipine and PO benazepril  · Follow the blood pressure trend as an outpatient with her PCP    PCOS (polycystic ovarian syndrome)  Assessment & Plan  · Continue PO metformin  · Outpatient Endocrinology evaluation    Morbid obesity with BMI of 70 and over, adult [facility])  Assessment & Plan  · Lifestyle modifications are recommended including weight loss, improving her diet, and increasing her amount of activity      History of DVT (deep vein thrombosis)  Assessment & Plan  · On chronic coumadin anticoagulation with a subtherapeutic INR  · Discharge home on coumadin 7 5 mg PO Qdaily  · Recheck a PT/INR on Monday, 09/14/2020  Outpatient follow-up with Cardiology and with her PCP in regards to this matter for coumadin dosing        Discharging Physician / Practitioner: Yeison De La Torre DO  PCP: Bjorn Favre, MD  Admission Date:   Admission Orders (From admission, onward)     Ordered        09/09/20 2028  Place in Observation  Once                   Discharge Date: 09/10/20      Consultations During Hospital Stay:  · None    Procedures Performed:   · None    Significant Findings / Test Results:   Xr Tibia Fibula 2 Views Left    Result Date: 9/10/2020  Impression: No acute osseous abnormality  Status post total knee arthroplasty  Workstation performed: [id number]     Xr Foot 3+ Views Left    Result Date: 9/10/2020  Impression: No acute osseous abnormality  Workstation performed: [id number]     VAS lower limb venous duplex study, complete bilateral   Status: Final result    PACS Images      Show images for VAS lower limb venous duplex study, complete bilateral    Study Result        [facility] REPORT  CLINICAL:  Indications:  Patient presents with bilateral lower extremity pain and swelling  Risk Factors  The patient has history of Obesity, PE, and DVT  Patient has a known clotting disorder with IVC filter and currently on Coumadin  therapy  FINDINGS:     Segment   Right            Left                  Impression       Impression         CFV       Normal (Patent)  Normal (Patent)    Peroneal  Not Visualized   Not Visualized              CONCLUSION:  Impression:  Technically difficult/limited study to body habitus, patient's inability to  tolerate compressions in the bilateral thighs, and poor visualization of calf  veins  RIGHT LOWER LIMB:  No gross evidence of acute deep vein thrombosis  No evidence of superficial thrombophlebitis noted  Doppler evaluation shows a normal response to augmentation maneuvers  Popliteal, posterior tibial and anterior tibial arterial Doppler waveforms are  triphasic  LEFT LOWER LIMB:  No gross evidence of acute deep vein thrombosis    No evidence of superficial thrombophlebitis noted  Doppler evaluation shows a normal response to augmentation maneuvers  Popliteal and anterior tibial arterial Doppler waveforms are triphasic  Technical findings were given to Dr Sally Scott  SIGNATURE:  Electronically Signed by: Tonio Lowery MD on 2020-09-10 08:59:31 AM     ECG 12 lead   Order: [id number]   Status:  Final result   Visible to patient:  No (inaccessible in MyChart)   Next appt:  04/22/2021 at 10:00 AM in Hematology and Oncology ([place], [place])    Ref Range & Units  9/9/20 1900   Ventricular Rate  BPM  84    Atrial Rate  BPM  84    [place] Interval  ms  126    QRSD Interval  ms  88    QT Interval  ms  412    QTC Interval  ms  486    P [place]  degrees  38    QRS Axis  degrees  35    T Wave Axis  degrees  22       Narrative & Impression     Normal sinus rhythm  Normal ECG  When compared with ECG of 30-JUN-2016 21:24,  Nonspecific T wave abnormality no longer evident in Lateral leads  Confirmed by Christel Bales ([id number]) on 9/10/2020 12:59:02 PM      Specimen Collected: 09/09/20 19:00    Last Resulted: 09/10/20 12:59            Microbiology Results (last 21 days)     Collected  Updated  Procedure  Result Status      09/10/2020 0726  MRSA culture [[id number]]    Nares from Nose     In process  Component  Value    No component results               09/09/2020 1902  09/10/2020 1001  Blood culture #1 [[id number]]    Blood from Arm, Left     Preliminary result  Component  Value    Blood Culture  Received in Microbiology Lab  Culture in Progress  P               09/09/2020 1902  09/10/2020 1001  Blood culture #2 [[id number]]    Blood from Arm, Right     Preliminary result  Component  Value    Blood Culture  Received in Microbiology Lab  Culture in Progress  P           Incidental Findings:   · None     Test Results Pending at Discharge (will require follow up):   Microbiology Results (last 21 days)     Collected  Updated  Procedure  Result Status      09/10/2020 0726  MRSA culture [[id number]]    Nares from Nose     In process  Component  Value    No component results               09/09/2020 1902  09/10/2020 1001  Blood culture #1 [[id number]]    Blood from Arm, Left     Preliminary result  Component  Value    Blood Culture  Received in Microbiology Lab  Culture in Progress  P               09/09/2020 1902  09/10/2020 1001  Blood culture #2 [[id number]]    Blood from Arm, Right     Preliminary result  Component  Value    Blood Culture  Received in Microbiology Lab  Culture in Progress  P              Outpatient Tests Requested:  · PT/INR CBC with diff , CMP, Magnesium level, and Phosphorus level on Monday, 09/14/2020, with her PCP    Complications:  None    Reason for Admission:  Cellulitis of the left lower extremity    Hospital Course:     Brianna Mon is a 62 y o  female patient who originally presented to the hospital on 9/9/2020 due to erythema and edema of the medial left ankle region  Please see above list of diagnoses and related plan for additional information  Condition at Discharge: good     Discharge Day Visit / Exam:     Subjective: The patient was seen and examined  The patient is doing better  The erythema and edema of the left ankle region have improved  No fevers or chills  No chest pain  No shortness of breath  No abdominal pain  No nausea or vomiting      Vitals: Blood Pressure: 137/61 (09/10/20 0853)  Pulse: 78 (09/10/20 0853)  Temperature: (!) 97 4 °F (36 3 °C) (09/10/20 0716)  Temp Source: Temporal (09/09/20 1836)  Respirations: 18 (09/10/20 0827)  Height: 5' 3" (160 cm) (09/09/20 2058)  Weight - Scale: (!) 180 kg (397 lb 14 9 oz) (09/10/20 0536)  SpO2: 97 % (09/10/20 0853)  Exam:   Physical Exam  General:  NAD, follows commands  HEENT:  NC/AT, mucous membranes moist  Neck:  Supple, No JVP elevation  CV:  + S1, + S2, RRR  Pulm:  Lung fields are CTA bilaterally  Abd:  Soft, Non-tender, Non-distended  Ext:  No clubbing/cyanosis, Improved edema of the left medial ankle region  Skin:  Improved erythema of the left medial ankle region  Neuro:  Awake, alert, oriented  Psych:  Normal mood and affect    Discharge instructions/Information to patient and family:  See after visit summary for information provided to patient and family  Provisions for Follow-Up Care:  See after visit summary for information related to follow-up care and any pertinent home health orders  Disposition:     Home    Planned Readmission:  No     Discharge Statement:  I spent 25 minutes discharging the patient  This time was spent on the day of discharge  I had direct contact with the patient on the day of discharge  Greater than 50% of the total time was spent examining patient, answering all patient questions, arranging and discussing plan of care with patient as well as directly providing post-discharge instructions  Additional time then spent on discharge activities  Discharge Medications:  See after visit summary for reconciled discharge medications provided to patient and family        ** Please Note: This note has been constructed using a voice recognition system **

## 2020-09-10 NOTE — ASSESSMENT & PLAN NOTE
Current creatinine 1 09 patient is is currently at baseline  Cautious use of nephrotoxic agents  Trend BMP

## 2020-09-10 NOTE — ASSESSMENT & PLAN NOTE
As evidence by a BMI of 70 49  Recommend outpatient nutritional counseling on lifestyle modification

## 2020-09-10 NOTE — ASSESSMENT & PLAN NOTE
· On chronic coumadin anticoagulation with a subtherapeutic INR  · Discharge home on coumadin 7 5 mg PO Qdaily  · Recheck a PT/INR on Monday, 09/14/2020  Outpatient follow-up with Cardiology and with her PCP in regards to this matter for coumadin dosing

## 2020-09-10 NOTE — DISCHARGE INSTRUCTIONS
Cellulitis   WHAT YOU NEED TO KNOW:   What is cellulitis? Cellulitis is a skin infection caused by bacteria  Cellulitis usually appears on the legs and feet, arms and hands, or face  What increases my risk for cellulitis? · An injury that breaks the skin, such as a bite, scratch, or cut    · Sores or injuries exposed to hot tub water or water in ponds, streams, or oceans    · Shared belongings, such as towels or exercise equipment    · Drugs that are injected    · A weak immune system or diabetes    · Lymphedema, chronic venous insufficiency, peripheral vascular disease, or deep vein thrombosis  What are the signs and symptoms of cellulitis? · A red, warm, swollen area on your skin    · Pain when the area is touched    · Bumps or blisters (abscess) that may drain pus    · Bumpy, raised skin that feels like an orange peel  How is cellulitis diagnosed? Your healthcare provider may know you have cellulitis by looking at and feeling your skin  Tell him or her how long you have had symptoms, and if anything helps decrease your symptoms  Tell your healthcare provider if you have ever had a cellulitis infection  He or she may not know which kind of bacteria caused your cellulitis  You may  need any of the following tests:  · Blood tests  may show which kind of bacteria is causing your infection  Blood tests may also show if the infection is in your blood  · A sample of tissue or fluid from your infected skin  may show what is causing your infection  The sample may also show if your infection is caused by another kind of skin disorder  · An x-ray, ultrasound, CT, or MRI  may show if the infection has spread  You may be given contrast liquid to help the infection show up better in the pictures  Tell the healthcare provider if you have ever had an allergic reaction to contrast liquid  Do not enter the MRI room with anything metal  Metal can cause serious injury   Tell the healthcare provider if you have any metal in or on your body  How is cellulitis treated? Treatment may decrease symptoms, stop the infection from spreading, and cure the infection  Treatment depends on how severe your cellulitis is  Cellulitis may go away on its own  You may  instead need antibiotics to help treat the bacterial infection  Your healthcare provider may draw a [place] around the edges of your cellulitis  If your cellulitis spreads, your healthcare provider will see it outside of the [place]  How can I manage my symptoms? · Elevate the area above the level of your heart  as often as you can  This will help decrease swelling and pain  Prop the area on pillows or blankets to keep it elevated comfortably  · Clean the area daily until the wound scabs over  Gently wash the area with soap and water  Pat dry  Use dressings as directed  · Place cool or warm, wet cloths on the area as directed  Use clean cloths and clean water  Leave it on the area until the cloth is room temperature  Pat the area dry with a clean, dry cloth  The cloths may help decrease pain  How can I prevent cellulitis? · Do not scratch bug bites or areas of injury  You increase your risk for cellulitis by scratching these areas  · Do not share personal items, such as towels, clothing, and razors  · Clean exercise equipment  with germ-killing  before and after you use it  · Wash your hands often  Use soap and water  Wash your hands after you use the bathroom, change a child's diapers, or sneeze  Wash your hands before you prepare or eat food  Use lotion to prevent dry, cracked skin  · Wear pressure stockings as directed  You may be told to wear the stockings if you have peripheral edema  Peripheral edema is swelling in your legs  The stockings improve blood flow and decrease swelling  · Treat athlete's foot  This can help prevent the spread of a bacterial skin infection    Call 911 if:   · You have sudden trouble breathing or chest pain  When should I seek immediate care? · Your wound gets larger and more painful  · You feel a crackling under your skin when you touch it  · You have purple dots or bumps on your skin, or you see bleeding under your skin  · You have new swelling and pain in your legs  · The red, warm, swollen area gets larger  · You see red streaks coming from the infected area  When should I contact my healthcare provider? · You have a fever  · Your fever or pain does not go away or gets worse  · The area does not get smaller after 2 days of antibiotics  · You have questions or concerns about your condition or care  CARE AGREEMENT:   You have the right to help plan your care  Learn about your health condition and how it may be treated  Discuss treatment options with your caregivers to decide what care you want to receive  You always have the right to refuse treatment  The above information is an  only  It is not intended as medical advice for individual conditions or treatments  Talk to your doctor, nurse or pharmacist before following any medical regimen to see if it is safe and effective for you  © 2017 [employer] Information is for End User's use only and may not be sold, redistributed or otherwise used for commercial purposes  All illustrations and images included in CareNotes® are the copyrighted property of [employer]  or Mike Herrmann

## 2020-09-10 NOTE — NURSING NOTE
Leonel Schlatter RN reviewed AVS with patient - patient confirmed she understood all instructions  Patient walked to elevators with RN in stable condition

## 2020-09-10 NOTE — ASSESSMENT & PLAN NOTE
As evidence by white blood cell count of 12 30  Most likely secondary to left lower extremity cellulitis  [place] initiated emergency room-will continue  Trend CBC  Monitor vital signs accordingly

## 2020-09-10 NOTE — CASE MANAGEMENT
Discussed with patient preferences on discharge;understanding how to manage health at home; purpose of taking medications; importance of follow up care/appointments; and symptoms to watch out for once discharged home  Pt is being dc'd home on this date and CM in basket messaged pt's PCP office:Dr Simon at [facility] re: a follow up appt

## 2020-09-10 NOTE — PLAN OF CARE
Problem: Potential for Falls  Goal: Patient will remain free of falls  Description: INTERVENTIONS:  - Assess patient frequently for physical needs  -  Identify cognitive and physical deficits and behaviors that affect risk of falls    -  [place] fall precautions as indicated by assessment   - Educate patient/family on patient safety including physical limitations  - Instruct patient to call for assistance with activity based on assessment  - Modify environment to reduce risk of injury  - Consider OT/PT consult to assist with strengthening/mobility  Outcome: Progressing     Problem: PAIN - ADULT  Goal: Verbalizes/displays adequate comfort level or baseline comfort level  Description: Interventions:  - Encourage patient to monitor pain and request assistance  - Assess pain using appropriate pain scale  - Administer analgesics based on type and severity of pain and evaluate response  - Implement non-pharmacological measures as appropriate and evaluate response  - Consider cultural and social influences on pain and pain management  - Notify physician/advanced practitioner if interventions unsuccessful or patient reports new pain  Outcome: Progressing     Problem: INFECTION - ADULT  Goal: Absence or prevention of progression during hospitalization  Description: INTERVENTIONS:  - Assess and monitor for signs and symptoms of infection  - Monitor lab/diagnostic results  - Monitor all insertion sites, i e  indwelling lines, tubes, and drains  - Monitor endotracheal if appropriate and nasal secretions for changes in amount and color  - [place] appropriate cooling/warming therapies per order  - Administer medications as ordered  - Instruct and encourage patient and family to use good hand hygiene technique  - Identify and instruct in appropriate isolation precautions for identified infection/condition  Outcome: Progressing     Problem: SAFETY ADULT  Goal: Maintain or return to baseline ADL function  Description: INTERVENTIONS:  -  Assess patient's ability to carry out ADLs; assess patient's baseline for ADL function and identify physical deficits which impact ability to perform ADLs (bathing, care of mouth/teeth, toileting, grooming, dressing, etc )  - Assess/evaluate cause of self-care deficits   - Assess range of motion  - Assess patient's mobility; develop plan if impaired  - Assess patient's need for assistive devices and provide as appropriate  - Encourage maximum independence but intervene and supervise when necessary  - Involve family in performance of ADLs  - Assess for home care needs following discharge   - Consider OT consult to assist with ADL evaluation and planning for discharge  - Provide patient education as appropriate  Outcome: Progressing  Goal: Maintain or return mobility status to optimal level  Description: INTERVENTIONS:  - Assess patient's baseline mobility status (ambulation, transfers, stairs, etc )    - Identify cognitive and physical deficits and behaviors that affect mobility  - Identify mobility aids required to assist with transfers and/or ambulation (gait belt, sit-to-stand, lift, walker, cane, etc )  - [place] fall precautions as indicated by assessment  - Record patient progress and toleration of activity level on Mobility SBAR; progress patient to next Phase/Stage  - Instruct patient to call for assistance with activity based on assessment  - Consider rehabilitation consult to assist with strengthening/weightbearing, etc   Outcome: Progressing     Problem: DISCHARGE PLANNING  Goal: Discharge to home or other facility with appropriate resources  Description: INTERVENTIONS:  - Identify barriers to discharge w/patient and caregiver  - Arrange for needed discharge resources and transportation as appropriate  - Identify discharge learning needs (meds, wound care, etc )  - Arrange for interpretive services to assist at discharge as needed  - Refer to Case Management Department for coordinating discharge planning if the patient needs post-hospital services based on physician/advanced practitioner order or complex needs related to functional status, cognitive ability, or social support system  Outcome: Progressing     Problem: Knowledge Deficit  Goal: Patient/family/caregiver demonstrates understanding of disease process, treatment plan, medications, and discharge instructions  Description: Complete learning assessment and assess knowledge base    Interventions:  - Provide teaching at level of understanding  - Provide teaching via preferred learning methods  Outcome: Progressing     Problem: SKIN/TISSUE INTEGRITY - ADULT  Goal: Skin integrity remains intact  Description: INTERVENTIONS  - Identify patients at risk for skin breakdown  - Assess and monitor skin integrity  - Assess and monitor nutrition and hydration status  - Monitor labs (i e  albumin)  - Assess for incontinence   - Turn and reposition patient  - Assist with mobility/ambulation  - Relieve pressure over bony prominences  - Avoid friction and shearing  - Provide appropriate hygiene as needed including keeping skin clean and dry  - Evaluate need for skin moisturizer/barrier cream  - Collaborate with interdisciplinary team (i e  Nutrition, Rehabilitation, etc )   - Patient/family teaching  Outcome: Progressing

## 2020-09-11 ENCOUNTER — TRANSITIONAL CARE MANAGEMENT (OUTPATIENT)
Dept: FAMILY MEDICINE CLINIC | Facility: CLINIC | Age: 65
End: 2020-09-11

## 2020-09-11 LAB — MRSA NOSE QL CULT: NORMAL

## 2020-09-14 ENCOUNTER — HOSPITAL ENCOUNTER (INPATIENT)
Facility: HOSPITAL | Age: 65
LOS: 2 days | Discharge: HOME | DRG: 603 | End: 2020-09-17
Attending: EMERGENCY MEDICINE | Admitting: INTERNAL MEDICINE
Payer: COMMERCIAL

## 2020-09-14 DIAGNOSIS — Z86.718 HISTORY OF DVT (DEEP VEIN THROMBOSIS): ICD-10-CM

## 2020-09-14 DIAGNOSIS — L03.116 LEFT LEG CELLULITIS: Primary | ICD-10-CM

## 2020-09-14 LAB
ALBUMIN SERPL BCP-MCNC: 2.9 G/DL (ref 3.5–5)
ALP SERPL-CCNC: 72 U/L (ref 46–116)
ALT SERPL W P-5'-P-CCNC: 18 U/L (ref 12–78)
ANION GAP SERPL CALCULATED.3IONS-SCNC: 7 MMOL/L (ref 4–13)
APTT PPP: 52 SECONDS (ref 23–37)
AST SERPL W P-5'-P-CCNC: 23 U/L (ref 5–45)
BASOPHILS # BLD AUTO: 0.08 THOUSANDS/ΜL (ref 0–0.1)
BASOPHILS NFR BLD AUTO: 1 % (ref 0–1)
BILIRUB SERPL-MCNC: 0.3 MG/DL (ref 0.2–1)
BUN SERPL-MCNC: 16 MG/DL (ref 5–25)
CALCIUM SERPL-MCNC: 8.7 MG/DL (ref 8.3–10.1)
CHLORIDE SERPL-SCNC: 103 MMOL/L (ref 100–108)
CO2 SERPL-SCNC: 27 MMOL/L (ref 21–32)
CREAT SERPL-MCNC: 1.46 MG/DL (ref 0.6–1.3)
EOSINOPHIL # BLD AUTO: 0.23 THOUSAND/ΜL (ref 0–0.61)
EOSINOPHIL NFR BLD AUTO: 2 % (ref 0–6)
ERYTHROCYTE [DISTWIDTH] IN BLOOD BY AUTOMATED COUNT: 14.9 % (ref 11.6–15.1)
GFR SERPL CREATININE-BSD FRML MDRD: 38 ML/MIN/1.73SQ M
GLUCOSE SERPL-MCNC: 91 MG/DL (ref 65–140)
HCT VFR BLD AUTO: 36.9 % (ref 34.8–46.1)
HGB BLD-MCNC: 11.5 G/DL (ref 11.5–15.4)
IMM GRANULOCYTES # BLD AUTO: 0.06 THOUSAND/UL (ref 0–0.2)
IMM GRANULOCYTES NFR BLD AUTO: 1 % (ref 0–2)
INR PPP: 2.62 (ref 0.84–1.19)
LACTATE SERPL-SCNC: 1 MMOL/L (ref 0.5–2)
LYMPHOCYTES # BLD AUTO: 1.71 THOUSANDS/ΜL (ref 0.6–4.47)
LYMPHOCYTES NFR BLD AUTO: 16 % (ref 14–44)
MCH RBC QN AUTO: 28.2 PG (ref 26.8–34.3)
MCHC RBC AUTO-ENTMCNC: 31.2 G/DL (ref 31.4–37.4)
MCV RBC AUTO: 90 FL (ref 82–98)
MONOCYTES # BLD AUTO: 0.75 THOUSAND/ΜL (ref 0.17–1.22)
MONOCYTES NFR BLD AUTO: 7 % (ref 4–12)
NEUTROPHILS # BLD AUTO: 7.81 THOUSANDS/ΜL (ref 1.85–7.62)
NEUTS SEG NFR BLD AUTO: 73 % (ref 43–75)
NRBC BLD AUTO-RTO: 0 /100 WBCS
PLATELET # BLD AUTO: 373 THOUSANDS/UL (ref 149–390)
PMV BLD AUTO: 10.1 FL (ref 8.9–12.7)
POTASSIUM SERPL-SCNC: 4.7 MMOL/L (ref 3.5–5.3)
PROT SERPL-MCNC: 8.2 G/DL (ref 6.4–8.2)
PROTHROMBIN TIME: 27.5 SECONDS (ref 11.6–14.5)
RBC # BLD AUTO: 4.08 MILLION/UL (ref 3.81–5.12)
SODIUM SERPL-SCNC: 137 MMOL/L (ref 136–145)
WBC # BLD AUTO: 10.64 THOUSAND/UL (ref 4.31–10.16)

## 2020-09-14 PROCEDURE — 96375 TX/PRO/DX INJ NEW DRUG ADDON: CPT

## 2020-09-14 PROCEDURE — 85730 THROMBOPLASTIN TIME PARTIAL: CPT | Performed by: EMERGENCY MEDICINE

## 2020-09-14 PROCEDURE — 85610 PROTHROMBIN TIME: CPT | Performed by: EMERGENCY MEDICINE

## 2020-09-14 PROCEDURE — 99220 PR INITIAL OBSERVATION CARE/DAY 70 MINUTES: CPT | Performed by: FAMILY MEDICINE

## 2020-09-14 PROCEDURE — 36415 COLL VENOUS BLD VENIPUNCTURE: CPT | Performed by: EMERGENCY MEDICINE

## 2020-09-14 PROCEDURE — 85025 COMPLETE CBC W/AUTO DIFF WBC: CPT | Performed by: EMERGENCY MEDICINE

## 2020-09-14 PROCEDURE — 87040 BLOOD CULTURE FOR BACTERIA: CPT | Performed by: EMERGENCY MEDICINE

## 2020-09-14 PROCEDURE — 83605 ASSAY OF LACTIC ACID: CPT | Performed by: EMERGENCY MEDICINE

## 2020-09-14 PROCEDURE — 96374 THER/PROPH/DIAG INJ IV PUSH: CPT

## 2020-09-14 PROCEDURE — 99285 EMERGENCY DEPT VISIT HI MDM: CPT

## 2020-09-14 PROCEDURE — 99285 EMERGENCY DEPT VISIT HI MDM: CPT | Performed by: EMERGENCY MEDICINE

## 2020-09-14 PROCEDURE — 80053 COMPREHEN METABOLIC PANEL: CPT | Performed by: EMERGENCY MEDICINE

## 2020-09-14 RX ORDER — WARFARIN SODIUM 7.5 MG/1
7.5 TABLET ORAL
Status: DISCONTINUED | OUTPATIENT
Start: 2020-09-14 | End: 2020-09-16

## 2020-09-14 RX ORDER — CLINDAMYCIN PHOSPHATE 600 MG/50ML
600 INJECTION INTRAVENOUS EVERY 8 HOURS
Status: DISCONTINUED | OUTPATIENT
Start: 2020-09-15 | End: 2020-09-17 | Stop reason: HOSPADM

## 2020-09-14 RX ORDER — MONTELUKAST SODIUM 10 MG/1
10 TABLET ORAL
Status: DISCONTINUED | OUTPATIENT
Start: 2020-09-14 | End: 2020-09-17 | Stop reason: HOSPADM

## 2020-09-14 RX ORDER — MORPHINE SULFATE 4 MG/ML
4 INJECTION, SOLUTION INTRAMUSCULAR; INTRAVENOUS ONCE
Status: COMPLETED | OUTPATIENT
Start: 2020-09-14 | End: 2020-09-14

## 2020-09-14 RX ORDER — PRAVASTATIN SODIUM 40 MG
40 TABLET ORAL DAILY
Status: DISCONTINUED | OUTPATIENT
Start: 2020-09-15 | End: 2020-09-17 | Stop reason: HOSPADM

## 2020-09-14 RX ORDER — ONDANSETRON 2 MG/ML
4 INJECTION INTRAMUSCULAR; INTRAVENOUS ONCE
Status: COMPLETED | OUTPATIENT
Start: 2020-09-14 | End: 2020-09-14

## 2020-09-14 RX ORDER — CLINDAMYCIN PHOSPHATE 600 MG/50ML
600 INJECTION INTRAVENOUS ONCE
Status: COMPLETED | OUTPATIENT
Start: 2020-09-14 | End: 2020-09-14

## 2020-09-14 RX ADMIN — SODIUM CHLORIDE 1000 ML: 0.9 INJECTION, SOLUTION INTRAVENOUS at 17:19

## 2020-09-14 RX ADMIN — MORPHINE SULFATE 4 MG: 4 INJECTION INTRAVENOUS at 16:54

## 2020-09-14 RX ADMIN — CLINDAMYCIN PHOSPHATE 600 MG: 600 INJECTION, SOLUTION INTRAVENOUS at 16:58

## 2020-09-14 RX ADMIN — ONDANSETRON 4 MG: 2 INJECTION INTRAMUSCULAR; INTRAVENOUS at 16:53

## 2020-09-14 RX ADMIN — SODIUM CHLORIDE 250 ML: 0.9 INJECTION, SOLUTION INTRAVENOUS at 16:51

## 2020-09-14 RX ADMIN — MONTELUKAST 10 MG: 10 TABLET, FILM COATED ORAL at 21:05

## 2020-09-14 RX ADMIN — WARFARIN SODIUM 7.5 MG: 7.5 TABLET ORAL at 21:05

## 2020-09-14 NOTE — ED PROVIDER NOTES
History  Chief Complaint   Patient presents with    Leg Swelling     Left leg swelling , redness, pain  Is being treated for Cellulitis  Has 2 doses of Antibiotic left  HPI     Pt presents from home, hx of morbid obesity, PCOS, HTN, DVT b/l LE, b/l LE cellulitis in the past, c/o left leg erythema, warmth and TTP  Pt was admitted from 9/9 to 9/10 for left LE cellulitis  She was discharged on PO bactrim, but she states the cellulitis has returned and is worse  She o/w denies any symptoms  Pt denies ha, fevers, cough, cp, sob, n/v/d/c, abd pain, dysuria, focal def or syncope  Prior to Admission Medications   Prescriptions Last Dose Informant Patient Reported? Taking? Iodoquinol-HC-Aloe Polysacch (ALCORTIN A) 1-2-1 % GEL Past Week at Unknown time Self Yes Yes   Sig: Apply topically 2 (two) times a day as needed    SSD 1 % cream 9/13/2020 at Unknown time  No Yes   Sig: APPLY CREAM TOPICALLY DAILY   Patient taking differently: daily as needed    acetaminophen (TYLENOL) 325 mg tablet   No No   Sig: Take 2 tablets (650 mg total) by mouth every 6 (six) hours as needed for mild pain, moderate pain, headaches or fever   albuterol (PROVENTIL HFA,VENTOLIN HFA) 90 mcg/act inhaler Past Month at Unknown time Self Yes Yes   Sig: Inhale 2 puffs every 6 (six) hours as needed for wheezing     alendronate (FOSAMAX) 70 mg tablet Not Taking at Unknown time Self Yes No   Sig: Take 70 mg by mouth every 7 days    amLODIPine-benazepril (LOTREL 5-20) 5-20 MG per capsule 9/13/2020 at Unknown time  No Yes   Sig: Take 1 capsule by mouth daily   cetirizine (ZYRTEC ALLERGY) 10 mg tablet 9/13/2020 at Unknown time Self Yes Yes   Sig: Take 1 tablet by mouth daily as needed   folic acid-pyridoxine-cyanocobalamin (Folbic) 2 5-25-2 mg 9/13/2020 at Unknown time  No Yes   Sig: Take 1 tablet by mouth daily   metFORMIN (GLUCOPHAGE-XR) 500 mg 24 hr tablet 9/13/2020 at Unknown time  No Yes   Sig: Take 1 tablet (500 mg total) by mouth 4 (four) times a day   mometasone (NASONEX) 50 mcg/act nasal spray Past Month at Unknown time Self Yes Yes   Si sprays into each nostril daily   montelukast (SINGULAIR) 10 mg tablet 2020 at Unknown time  No Yes   Sig: Take 1 tablet (10 mg total) by mouth daily at bedtime   multivitamin (THERAGRAN) TABS 2020 at Unknown time Self Yes Yes   Sig: Take 1 tablet by mouth daily     nystatin (MYCOSTATIN) powder  Self No No   Sig: Apply topically 3 (three) times a day   Patient taking differently: Apply topically 3 (three) times a day as needed    pravastatin (PRAVACHOL) 40 mg tablet 2020 at Unknown time  No Yes   Sig: Take 1 tablet (40 mg total) by mouth daily   sulfamethoxazole-trimethoprim (BACTRIM DS) 800-160 mg per tablet 2020 at Unknown time  No Yes   Sig: Take 1 tablet by mouth every 12 (twelve) hours for 5 days   triamcinolone (KENALOG) 0 1 % cream Past Month at Unknown time Self Yes Yes   Si (two) times a day as needed    warfarin (COUMADIN) 5 mg tablet 2020 at Unknown time  No Yes   Sig: Take 7 5 mg daily until you have your PT/INR rechecked on Monday then contact Dr Kahn Began office to find out if the dose should be changed      Facility-Administered Medications: None       Past Medical History:   Diagnosis Date    Blood clotting disorder ([facility] )     [facility]     Hypercoagulable state ([facility] )     Hypertension     Lyme disease     Obesity     PCOS (polycystic ovarian syndrome)     Renal disorder     kidney stones    Sleep apnea     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 ([facility] )        Past Surgical History:   Procedure Laterality Date    ANKLE SURGERY Right     APPENDECTOMY      BREAST BIOPSY      BREAST BIOPSY      CLOSED REDUCTION METATARSAL FRACTURE Right     FOOT SURGERY      HEMORROIDECTOMY      HYSTERECTOMY      JOINT REPLACEMENT Bilateral     knee    LITHOTRIPSY      VENA CAVA FILTER PLACEMENT      onset: 1/10/10 Family History   Problem Relation Age of Onset    Pulmonary embolism Mother     Hypercoagulant Ability Mother         primary state   [place] Hypercoagulant Ability Maternal Grandmother         primary state    No Known Problems Father      I have reviewed and agree with the history as documented  E-Cigarette/Vaping    E-Cigarette Use Never User      E-Cigarette/Vaping Substances     Social History     Tobacco Use    Smoking status: Never Smoker    Smokeless tobacco: Never Used   Substance Use Topics    Alcohol use: Never     Frequency: Never     Binge frequency: Never    Drug use: No       Review of Systems   Constitutional: Negative for activity change, appetite change, diaphoresis, fatigue and fever  HENT: Negative for congestion, facial swelling, mouth sores and trouble swallowing  Eyes: Negative for photophobia, discharge and visual disturbance  Respiratory: Negative for apnea, cough, shortness of breath and wheezing  Cardiovascular: Negative for chest pain and leg swelling  Gastrointestinal: Negative for abdominal pain, constipation, diarrhea, nausea and vomiting  Endocrine: Negative for heat intolerance and polydipsia  Genitourinary: Negative for dysuria, flank pain, frequency and hematuria  Musculoskeletal: Positive for myalgias  Negative for back pain, gait problem and neck pain  Skin: Positive for rash  Negative for wound  Allergic/Immunologic: Negative for immunocompromised state  Neurological: Negative for dizziness, syncope, weakness, light-headedness and headaches  Hematological: Negative for adenopathy  Psychiatric/Behavioral: Negative for agitation, confusion and self-injury  The patient is not nervous/anxious  Physical Exam  Physical Exam  Vitals signs and nursing note reviewed  Constitutional:       General: She is not in acute distress  Appearance: She is well-developed  She is not diaphoretic  HENT:      Head: Normocephalic and atraumatic  Right Ear: External ear normal       Left Ear: External ear normal       Nose: Nose normal       Mouth/Throat:      Pharynx: No oropharyngeal exudate  Eyes:      General: No scleral icterus  Right eye: No discharge  Left eye: No discharge  Conjunctiva/sclera: Conjunctivae normal       Pupils: Pupils are equal, round, and reactive to light  Neck:      Musculoskeletal: Normal range of motion and neck supple  Thyroid: No thyromegaly  Vascular: No JVD  Trachea: No tracheal deviation  Cardiovascular:      Rate and Rhythm: Normal rate and regular rhythm  Heart sounds: Normal heart sounds  No murmur  Pulmonary:      Effort: Pulmonary effort is normal  No respiratory distress  Breath sounds: Normal breath sounds  No stridor  No wheezing or rales  Chest:      Chest wall: No tenderness  Abdominal:      General: Bowel sounds are normal  There is no distension  Palpations: Abdomen is soft  There is no mass  Tenderness: There is no abdominal tenderness  There is no guarding or rebound  Musculoskeletal: Normal range of motion  General: No tenderness or deformity  Lymphadenopathy:      Cervical: No cervical adenopathy  Skin:     General: Skin is warm and dry  Coloration: Skin is not pale  Findings: Erythema present  No rash  Neurological:      Mental Status: She is alert and oriented to person, place, and time  Cranial Nerves: No cranial nerve deficit  Motor: No abnormal muscle tone  Coordination: Coordination normal       Deep Tendon Reflexes: Reflexes are normal and symmetric  Reflexes normal    Psychiatric:         Behavior: Behavior normal          Thought Content:  Thought content normal          Judgment: Judgment normal          Vital Signs  ED Triage Vitals [09/14/20 1436]   Temperature Pulse Respirations Blood Pressure SpO2   98 1 °F (36 7 °C) 88 16 (!) 195/87 98 %      Temp Source Heart Rate Source Patient Position - Orthostatic VS BP Location FiO2 (%)   Temporal Monitor Lying Left arm --      Pain Score       Worst Possible Pain           Vitals:    09/14/20 1715 09/14/20 1919 09/14/20 2303 09/15/20 0742   BP: (!) 179/81 146/81 (!) 93/49 138/76   Pulse: 90 84 82 79   Patient Position - Orthostatic VS: Sitting Lying           Visual Acuity      ED Medications  Medications   amLODIPine-benazepril (LOTREL 10/20) combo dose ( Oral Given 9/15/20 0815)   montelukast (SINGULAIR) tablet 10 mg (10 mg Oral Given 9/14/20 2105)   pravastatin (PRAVACHOL) tablet 40 mg (40 mg Oral Given 9/15/20 0815)   warfarin (COUMADIN) tablet 7 5 mg (7 5 mg Oral Given 9/14/20 2105)   clindamycin (CLEOCIN) IVPB (premix) 600 mg 50 mL (600 mg Intravenous New Bag 9/15/20 0815)   sodium chloride 0 9 % bolus 1,000 mL (1,000 mL Intravenous Not Given 9/14/20 2013)   acetaminophen (TYLENOL) tablet 650 mg ( Oral See Alternative 9/15/20 0815)     Or   oxyCODONE-acetaminophen (PERCOCET) 5-325 mg per tablet 1 tablet (1 tablet Oral Given 9/15/20 0815)   sodium chloride 0 9 % bolus 250 mL (0 mL Intravenous Stopped 9/14/20 1919)   ondansetron (ZOFRAN) injection 4 mg (4 mg Intravenous Given 9/14/20 1653)   morphine (PF) 4 mg/mL injection 4 mg (4 mg Intravenous Given 9/14/20 1654)   clindamycin (CLEOCIN) IVPB (premix) 600 mg 50 mL (0 mg Intravenous Stopped 9/14/20 1745)   sodium chloride 0 9 % bolus 1,000 mL (0 mL Intravenous Stopped 9/14/20 1919)       Diagnostic Studies  Results Reviewed     Procedure Component Value Units Date/Time    Protime-INR [[id number]]  (Abnormal) Collected:  09/15/20 6281    Lab Status:  Final result Specimen:  Blood from Arm, Right Updated:  09/15/20 0526     Protime 29 6 seconds      INR 2 89    Blood culture [[id number]] Collected:  09/14/20 1545    Lab Status:  Preliminary result Specimen:  Blood from Arm, Left Updated:  09/15/20 0001     Blood Culture Received in Microbiology Lab  Culture in Progress      Blood culture [[id number]] Collected:  09/14/20 1558    Lab Status:  Preliminary result Specimen:  Blood from Arm, Left Updated:  09/15/20 0001     Blood Culture Received in Microbiology Lab  Culture in Progress  Lactic acid, plasma [[id number]]  (Normal) Collected:  09/14/20 1558    Lab Status:  Final result Specimen:  Blood from Arm, Left Updated:  09/14/20 1625     LACTIC ACID 1 0 mmol/L     Narrative:       Result may be elevated if tourniquet was used during collection      Comprehensive metabolic panel [[id number]]  (Abnormal) Collected:  09/14/20 1558    Lab Status:  Final result Specimen:  Blood from Arm, Left Updated:  09/14/20 1622     Sodium 137 mmol/L      Potassium 4 7 mmol/L      Chloride 103 mmol/L      CO2 27 mmol/L      ANION GAP 7 mmol/L      BUN 16 mg/dL      Creatinine 1 46 mg/dL      Glucose 91 mg/dL      Calcium 8 7 mg/dL      AST 23 U/L      ALT 18 U/L      Alkaline Phosphatase 72 U/L      Total Protein 8 2 g/dL      Albumin 2 9 g/dL      Total Bilirubin 0 30 mg/dL      eGFR 38 ml/min/1 73sq m     Narrative:       [place] guidelines for Chronic Kidney Disease (CKD):     Stage 1 with normal or high GFR (GFR > 90 mL/min/1 73 square meters)    Stage 2 Mild CKD (GFR = 60-89 mL/min/1 73 square meters)    Stage 3A Moderate CKD (GFR = 45-59 mL/min/1 73 square meters)    Stage 3B Moderate CKD (GFR = 30-44 mL/min/1 73 square meters)    Stage 4 Severe CKD (GFR = 15-29 mL/min/1 73 square meters)    Stage 5 End Stage CKD (GFR <15 mL/min/1 73 square meters)  Note: GFR calculation is accurate only with a steady state creatinine    Protime-INR [[id number]]  (Abnormal) Collected:  09/14/20 1558    Lab Status:  Final result Specimen:  Blood from Arm, Left Updated:  09/14/20 1620     Protime 27 5 seconds      INR 2 62    APTT [[id number]]  (Abnormal) Collected:  09/14/20 1558    Lab Status:  Final result Specimen:  Blood from Arm, Left Updated:  09/14/20 1620     PTT 52 seconds     CBC and differential [[id number]]  (Abnormal) Collected:  09/14/20 1558    Lab Status:  Final result Specimen:  Blood from Arm, Left Updated:  09/14/20 1606     WBC 10 64 Thousand/uL      RBC 4 08 Million/uL      Hemoglobin 11 5 g/dL      Hematocrit 36 9 %      MCV 90 fL      MCH 28 2 pg      MCHC 31 2 g/dL      RDW 14 9 %      MPV 10 1 fL      Platelets 554 Thousands/uL      nRBC 0 /100 WBCs      Neutrophils Relative 73 %      Immat GRANS % 1 %      Lymphocytes Relative 16 %      Monocytes Relative 7 %      Eosinophils Relative 2 %      Basophils Relative 1 %      Neutrophils Absolute 7 81 Thousands/µL      Immature Grans Absolute 0 06 Thousand/uL      Lymphocytes Absolute 1 71 Thousands/µL      Monocytes Absolute 0 75 Thousand/µL      Eosinophils Absolute 0 23 Thousand/µL      Basophils Absolute 0 08 Thousands/µL                  VAS lower limb venous duplex study, unilateral/limited   Final Result by Chepe Robles DO (09/15 1123)                 Procedures  Procedures         ED Course                                       MDM  Number of Diagnoses or Management Options  Diagnosis management comments: IMP: LLE cellulitis likely without osteomyelitis or abscess  Doubt bacteremia, vascular occlusion, nec fascitis  Plan: check sepsis labs, give iv abx and iv narcotic pain meds prn   - leukocytosis 10 6  - BUN/Cr 16/1 46  - Pt with likely cellulitis of the LLE failing outpatient abx  Will admit to medicine         Amount and/or Complexity of Data Reviewed  Clinical lab tests: ordered and reviewed  Tests in the medicine section of CPT®: ordered and reviewed  Decide to obtain previous medical records or to obtain history from someone other than the patient: yes  Review and summarize past medical records: yes  Discuss the patient with other providers: yes (Hospitalist)  Independent visualization of images, tracings, or specimens: yes    Risk of Complications, Morbidity, and/or Mortality  Presenting problems: high  Diagnostic procedures: moderate  Management options: moderate    Patient Progress  Patient progress: improved      Disposition  Final diagnoses:   Left leg cellulitis     Time reflects when diagnosis was documented in both MDM as applicable and the Disposition within this note     Time User Action Codes Description Comment    9/14/2020  5:07 PM Fani Gandara Add [[id number] [id number]] Left leg cellulitis       ED Disposition     ED Disposition Condition Date/Time Comment    Admit Stable Mon Sep 14, 2020  5:07 PM Case was discussed with Dr Shaunna Bhatia and the patient's admission status was agreed to be Admission Status: observation status to the service of Dr Shaunna Bhatia  Follow-up Information    None         Current Discharge Medication List      CONTINUE these medications which have NOT CHANGED    Details   albuterol (PROVENTIL HFA,VENTOLIN HFA) 90 mcg/act inhaler Inhale 2 puffs every 6 (six) hours as needed for wheezing        amLODIPine-benazepril (LOTREL 5-20) 5-20 MG per capsule Take 1 capsule by mouth daily  Qty: 90 capsule, Refills: 3    Associated Diagnoses: Essential hypertension      cetirizine (ZYRTEC ALLERGY) 10 mg tablet Take 1 tablet by mouth daily as needed      folic acid-pyridoxine-cyanocobalamin (Folbic) 2 5-25-2 mg Take 1 tablet by mouth daily  Qty: 90 tablet, Refills: 3    Associated Diagnoses: Blood clotting disorder (HCC)      Iodoquinol-HC-Aloe Polysacch (ALCORTIN A) 1-2-1 % GEL Apply topically 2 (two) times a day as needed       metFORMIN (GLUCOPHAGE-XR) 500 mg 24 hr tablet Take 1 tablet (500 mg total) by mouth 4 (four) times a day  Qty: 360 tablet, Refills: 3    Associated Diagnoses: PCOS (polycystic ovarian syndrome)      mometasone (NASONEX) 50 mcg/act nasal spray 2 sprays into each nostril daily      montelukast (SINGULAIR) 10 mg tablet Take 1 tablet (10 mg total) by mouth daily at bedtime  Qty: 90 tablet, Refills: 3    Associated Diagnoses: Environmental and seasonal allergies      multivitamin (THERAGRAN) TABS Take 1 tablet by mouth daily  pravastatin (PRAVACHOL) 40 mg tablet Take 1 tablet (40 mg total) by mouth daily  Qty: 90 tablet, Refills: 0    Associated Diagnoses: Hyperlipidemia, unspecified hyperlipidemia type      SSD 1 % cream APPLY CREAM TOPICALLY DAILY  Qty: 800 g, Refills: 0    Associated Diagnoses: Cellulitis of right lower leg      sulfamethoxazole-trimethoprim (BACTRIM DS) 800-160 mg per tablet Take 1 tablet by mouth every 12 (twelve) hours for 5 days  Qty: 10 tablet, Refills: 0    Associated Diagnoses: Cellulitis, unspecified cellulitis site      triamcinolone (KENALOG) 0 1 % cream 2 (two) times a day as needed       warfarin (COUMADIN) 5 mg tablet Take 7 5 mg daily until you have your PT/INR rechecked on Monday then contact Dr Darinel Palma office to find out if the dose should be changed  Refills: 0    Associated Diagnoses: History of DVT (deep vein thrombosis)      acetaminophen (TYLENOL) 325 mg tablet Take 2 tablets (650 mg total) by mouth every 6 (six) hours as needed for mild pain, moderate pain, headaches or fever  Refills: 0    Comments: Do not exceed a total of 3 grams of tylenol/acetaminophen in a 24-hour period  Associated Diagnoses: Cellulitis, unspecified cellulitis site      alendronate (FOSAMAX) 70 mg tablet Take 70 mg by mouth every 7 days       nystatin (MYCOSTATIN) powder Apply topically 3 (three) times a day  Qty: 60 g, Refills: 3    Associated Diagnoses: Fungal rash of trunk           No discharge procedures on file      PDMP Review       Value Time User    PDMP Reviewed  Yes 3/18/2020  6:01 PM Orin Rocha [facility] Provider  Electronically Signed by           Fam Gifford DO  09/15/20 4498

## 2020-09-14 NOTE — H&P
H&P- Jagdish Barrios 1955, 62 y o  female MRN: [id number]    Unit/Bed#: [facility] Encounter: [id number]    Primary Care Provider: Herman Morris MD   Date and time admitted to hospital: 9/14/2020  2:32 PM        Left leg cellulitis  Assessment & Plan  Presents with several days of LLE erythema, warmth and tenderness previously on bactrim  IV Clinda in ED will continue  No WBC, will trend   Pain management   DVT Studies       Abnormality of coagulation (HCC)  Assessment & Plan  Chronic stable  Continue Coumadin    Hyperlipidemia  Assessment & Plan  Chronic stable  Continue high-dose statin    Essential hypertension  Assessment & Plan  Chronic stable condition  Continue home medication          VTE Prophylaxis: Pharmacologic VTE Prophylaxis contraindicated due to On Coumadin  / sequential compression device   Code Status:  Full  POLST: POLST form is not discussed and not completed at this time  Anticipated Length of Stay:  Patient will be admitted on an Observation basis with an anticipated length of stay of  < 2 midnights  Justification for Hospital Stay:  Cellulitis    Total Time for Visit, including Counseling / Coordination of Care: 30 minutes  Greater than 50% of this total time spent on direct patient counseling and coordination of care  Chief Complaint:   Cellulitis    History of Present Illness:    Jagdish Barrios is a 62 y o  female who presents with left lower leg cellulitis  Patient was previously discharged from this facility for similar issues, which was diagnosed with a left lower leg cellulitis and discharged home on a course of Bactrim  Patient states since time of discharge, she has not seen any regression of her cellulitic picture  Over the past 24 hours she feels of the cellulitis has gotten worse, and as evidence by warmth, swelling, and severe tenderness to palpation  States she is unable to ambulate secondary to the pain in her left foot    Patient has extensive history of blood clots and bilateral cellulitis  Denies any other issues or concerns at time of exam, denies any recent travel, denies any sick contacts        Review of Systems:    Review of Systems   Constitutional: Negative for activity change, appetite change, chills, fatigue and fever  HENT: Negative for congestion, dental problem, drooling, ear discharge, ear pain, facial swelling, postnasal drip, rhinorrhea and sinus pain  Eyes: Negative for photophobia, pain, discharge and itching  Respiratory: Negative for apnea, cough, chest tightness and shortness of breath  Cardiovascular: Negative for chest pain and leg swelling  Gastrointestinal: Negative for abdominal distention, abdominal pain, anal bleeding, constipation, diarrhea and nausea  Endocrine: Negative for cold intolerance, heat intolerance and polydipsia  Genitourinary: Negative for difficulty urinating  Musculoskeletal: Positive for gait problem and joint swelling  Negative for arthralgias and myalgias  Skin: Positive for rash  Negative for color change and pallor  Allergic/Immunologic: Negative for immunocompromised state  Neurological: Negative for dizziness, seizures, facial asymmetry, weakness, light-headedness, numbness and headaches  Psychiatric/Behavioral: Negative for agitation, behavioral problems, confusion, decreased concentration and dysphoric mood         Past Medical and Surgical History:     Past Medical History:   Diagnosis Date    [facility] clotting disorder ([facility] )     [facility]     Hypercoagulable state ([facility] )     Hypertension     Lyme disease     Obesity     PCOS (polycystic ovarian syndrome)     Renal disorder     kidney stones    Sleep apnea     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)        Past Surgical History:   Procedure Laterality Date    ANKLE SURGERY Right     APPENDECTOMY      BREAST BIOPSY      BREAST BIOPSY      CLOSED REDUCTION METATARSAL FRACTURE Right     FOOT SURGERY      HEMORROIDECTOMY      HYSTERECTOMY      JOINT REPLACEMENT Bilateral     knee    LITHOTRIPSY      VENA CAVA FILTER PLACEMENT      onset: 1/10/10       Meds/Allergies:    Prior to Admission medications    Medication Sig Start Date End Date Taking? Authorizing Provider   acetaminophen (TYLENOL) 325 mg tablet Take 2 tablets (650 mg total) by mouth every 6 (six) hours as needed for mild pain, moderate pain, headaches or fever 9/10/20   Jamil Maynard DO   albuterol (PROVENTIL HFA,VENTOLIN HFA) 90 mcg/act inhaler Inhale 2 puffs every 6 (six) hours as needed for wheezing  Historical Provider, MD   alendronate (FOSAMAX) 70 mg tablet Take 70 mg by mouth every 7 days     Historical Provider, MD   amLODIPine-benazepril (LOTREL 5-20) 5-20 MG per capsule Take 1 capsule by mouth daily 7/6/20   Carisa Fritz MD   cetirizine (ZYRTEC ALLERGY) 10 mg tablet Take 1 tablet by mouth daily as needed    Historical Provider, MD   folic acid-pyridoxine-cyanocobalamin (Folbic) 2 5-25-2 mg Take 1 tablet by mouth daily 5/27/20   AFIA Sosa   Iodoquinol-HC-Aloe Polysacch (ALCORTIN A) 1-2-1 % GEL Apply topically    Historical Provider, MD   metFORMIN (GLUCOPHAGE-XR) 500 mg 24 hr tablet Take 1 tablet (500 mg total) by mouth 4 (four) times a day 7/6/20   Carisa Fritz MD   mometasone (NASONEX) 50 mcg/act nasal spray 2 sprays into each nostril daily    Historical Provider, MD   montelukast (SINGULAIR) 10 mg tablet Take 1 tablet (10 mg total) by mouth daily at bedtime 7/6/20   Carisa Fritz MD   multivitamin [facility]) TABS Take 1 tablet by mouth daily      Historical Provider, MD   nystatin (MYCOSTATIN) powder Apply topically 3 (three) times a day 4/17/18   Carisa Fritz MD   pravastatin (PRAVACHOL) 40 mg tablet Take 1 tablet (40 mg total) by mouth daily 9/10/20   Jamil Maynard DO   SSD 1 % cream APPLY CREAM TOPICALLY DAILY 1/29/20   Carisa Fritz MD sulfamethoxazole-trimethoprim (BACTRIM DS) 800-160 mg per tablet Take 1 tablet by mouth every 12 (twelve) hours for 5 days 9/10/20 9/15/20  Vianca Goncalves DO   triamcinolone (KENALOG) 0 1 % cream  5/5/18   Historical Provider, MD   warfarin (COUMADIN) 5 mg tablet Take 7 5 mg daily until you have your PT/INR rechecked on Monday then contact Dr Vamsi Isbell office to find out if the dose should be changed 9/10/20   Vianca Goncalves DO     I have reviewed home medications with patient personally  Allergies: Allergies   Allergen Reactions    Azithromycin Diarrhea     Other reaction(s): Unknown    Ciprofloxacin Confusion, Dizziness and Other (See Comments)     Other reaction(s): Other (Please comment)  Dizziness, weepy  Other reaction(s): Other (See Comments)  "Dizziness"      Clindamycin Other (See Comments)     Acid Reflux    Ancef [Cefazolin] Rash     States tolerated Keflex       Social History:     Social History     Substance and Sexual Activity   Alcohol Use Never    Frequency: Never    Binge frequency: Never     Social History     Tobacco Use   Smoking Status Never Smoker   Smokeless Tobacco Never Used     Social History     Substance and Sexual Activity   Drug Use No       Family History:    non-contributory    Physical Exam:     Vitals:   Blood Pressure: (!) 179/81 (09/14/20 1715)  Pulse: 90 (09/14/20 1715)  Temperature: 98 1 °F (36 7 °C) (09/14/20 1436)  Temp Source: Temporal (09/14/20 1436)  Respirations: 16 (09/14/20 1715)  Height: 5' 3" (160 cm) (09/14/20 1436)  Weight - Scale: (!) 177 kg (390 lb 3 4 oz) (09/14/20 1436)  SpO2: 98 % (09/14/20 1715)    Physical Exam  Vitals signs and nursing note reviewed  Constitutional:       Appearance: She is obese  She is not ill-appearing or diaphoretic  HENT:      Head: Normocephalic and atraumatic  Nose: Nose normal  No congestion or rhinorrhea  Mouth/Throat:      Mouth: Mucous membranes are dry        Pharynx: No oropharyngeal exudate or posterior oropharyngeal erythema  Neck:      Musculoskeletal: No neck rigidity or muscular tenderness  Vascular: No carotid bruit  Cardiovascular:      Rate and Rhythm: Normal rate and regular rhythm  Pulmonary:      Effort: No respiratory distress  Breath sounds: No stridor  No wheezing or rhonchi  Abdominal:      General: Abdomen is flat  There is no distension  Palpations: Abdomen is soft  There is no mass  Tenderness: There is no abdominal tenderness  Hernia: No hernia is present  Musculoskeletal: Normal range of motion  Legs:       Comments: Area of cellulitic changes, associated with warmth tenderness and erythema  No crepitus or obvious deformities  Intact distal and proximal sensation motor function  1+ pitting edema left lower extremity   Lymphadenopathy:      Cervical: No cervical adenopathy  Skin:     Capillary Refill: Capillary refill takes less than 2 seconds  Neurological:      General: No focal deficit present  Mental Status: She is alert  Mental status is at baseline  Additional Data:     Lab Results: I have personally reviewed pertinent reports  Results from last 7 days   Lab Units 09/14/20  1558   WBC Thousand/uL 10 64*   HEMOGLOBIN g/dL 11 5   HEMATOCRIT % 36 9   PLATELETS Thousands/uL 373   NEUTROS PCT % 73   LYMPHS PCT % 16   MONOS PCT % 7   EOS PCT % 2     Results from last 7 days   Lab Units 09/14/20  1558   POTASSIUM mmol/L 4 7   CHLORIDE mmol/L 103   CO2 mmol/L 27   BUN mg/dL 16   CREATININE mg/dL 1 46*   CALCIUM mg/dL 8 7   ALK PHOS U/L 72   ALT U/L 18   AST U/L 23     Results from last 7 days   Lab Units 09/14/20  1558   INR  2 62*       Imaging: I have personally reviewed pertinent reports  Xr Tibia Fibula 2 Views Left    Result Date: 9/10/2020  Narrative: LEFT TIBIA AND FIBULA INDICATION:   swelling  COMPARISON:  None VIEWS:  XR TIBIA FIBULA 2 VW LEFT FINDINGS: There is no acute fracture or dislocation   Total knee arthroplasty identified  No evidence of hardware complication  Prominent calcaneal spurs  No lytic or blastic osseous lesion  Soft tissues are unremarkable  Impression: No acute osseous abnormality  Status post total knee arthroplasty  Workstation performed: [id number]     Xr Foot 3+ Views Left    Result Date: 9/10/2020  Narrative: LEFT FOOT INDICATION:   swelling  COMPARISON:  None VIEWS:  XR FOOT 3+ VW LEFT FINDINGS: There is no acute fracture or dislocation  Calcaneal spur(s) noted  No lytic or blastic osseous lesion  Soft tissues are unremarkable  Impression: No acute osseous abnormality  Workstation performed: [id number]     Vas Lower Limb Venous Duplex Study, Complete Bilateral    Result Date: 9/10/2020  Narrative:  [facility] REPORT [facility]INICAL: Indications: Patient presents with bilateral lower extremity pain and swelling  Risk Factors The patient has history of Obesity, PE, and DVT  Patient has a known clotting disorder with IVC filter and currently on Coumadin therapy  FINDINGS:  Segment   Right            Left                Impression       Impression       CFV       Normal (Patent)  Normal (Patent)  Peroneal  Not Visualized   Not Visualized      CONCLUSION: Impression: Technically difficult/limited study to body habitus, patient's inability to tolerate compressions in the bilateral thighs, and poor visualization of calf veins  RIGHT LOWER LIMB: No gross evidence of acute deep vein thrombosis  No evidence of superficial thrombophlebitis noted  Doppler evaluation shows a normal response to augmentation maneuvers  Popliteal, posterior tibial and anterior tibial arterial Doppler waveforms are triphasic  LEFT LOWER LIMB: No gross evidence of acute deep vein thrombosis  No evidence of superficial thrombophlebitis noted  Doppler evaluation shows a normal response to augmentation maneuvers  Popliteal and anterior tibial arterial Doppler waveforms are triphasic    Technical findings were given to Dr Roel Boland  SIGNATURE: Electronically Signed by: Javy Hearn MD on 2020-09-10 08:59:31 AM      [facility] / [facility] Everywhere Records Reviewed: Yes     ** Please Note: This note has been constructed using a voice recognition system   **

## 2020-09-14 NOTE — ASSESSMENT & PLAN NOTE
Presents with several days of LLE erythema, warmth and tenderness previously on bactrim  IV Clinda in ED will continue  No WBC, will trend   Pain management   DVT Studies

## 2020-09-15 ENCOUNTER — APPOINTMENT (OUTPATIENT)
Dept: NON INVASIVE DIAGNOSTICS | Facility: HOSPITAL | Age: 65
DRG: 603 | End: 2020-09-15
Payer: COMMERCIAL

## 2020-09-15 LAB
BACTERIA BLD CULT: NORMAL
BACTERIA BLD CULT: NORMAL
INR PPP: 2.89 (ref 0.84–1.19)
PLATELET # BLD AUTO: 353 THOUSANDS/UL (ref 149–390)
PMV BLD AUTO: 9.9 FL (ref 8.9–12.7)
PROTHROMBIN TIME: 29.6 SECONDS (ref 11.6–14.5)

## 2020-09-15 PROCEDURE — 85049 AUTOMATED PLATELET COUNT: CPT | Performed by: STUDENT IN AN ORGANIZED HEALTH CARE EDUCATION/TRAINING PROGRAM

## 2020-09-15 PROCEDURE — 93971 EXTREMITY STUDY: CPT

## 2020-09-15 PROCEDURE — 99233 SBSQ HOSP IP/OBS HIGH 50: CPT | Performed by: STUDENT IN AN ORGANIZED HEALTH CARE EDUCATION/TRAINING PROGRAM

## 2020-09-15 PROCEDURE — 93971 EXTREMITY STUDY: CPT | Performed by: SURGERY

## 2020-09-15 PROCEDURE — 85610 PROTHROMBIN TIME: CPT | Performed by: STUDENT IN AN ORGANIZED HEALTH CARE EDUCATION/TRAINING PROGRAM

## 2020-09-15 RX ORDER — ACETAMINOPHEN 325 MG/1
650 TABLET ORAL EVERY 6 HOURS PRN
Status: DISCONTINUED | OUTPATIENT
Start: 2020-09-15 | End: 2020-09-17 | Stop reason: HOSPADM

## 2020-09-15 RX ORDER — OXYCODONE HYDROCHLORIDE AND ACETAMINOPHEN 5; 325 MG/1; MG/1
1 TABLET ORAL EVERY 6 HOURS PRN
Status: DISCONTINUED | OUTPATIENT
Start: 2020-09-15 | End: 2020-09-17 | Stop reason: HOSPADM

## 2020-09-15 RX ADMIN — CLINDAMYCIN PHOSPHATE 600 MG: 600 INJECTION, SOLUTION INTRAVENOUS at 00:49

## 2020-09-15 RX ADMIN — ACETAMINOPHEN 650 MG: 325 TABLET ORAL at 01:46

## 2020-09-15 RX ADMIN — WARFARIN SODIUM 7.5 MG: 7.5 TABLET ORAL at 18:35

## 2020-09-15 RX ADMIN — ACETAMINOPHEN 650 MG: 325 TABLET ORAL at 22:15

## 2020-09-15 RX ADMIN — OXYCODONE HYDROCHLORIDE AND ACETAMINOPHEN 1 TABLET: 5; 325 TABLET ORAL at 08:15

## 2020-09-15 RX ADMIN — BENAZEPRIL HYDROCHLORIDE: 20 TABLET, COATED ORAL at 08:15

## 2020-09-15 RX ADMIN — ACETAMINOPHEN 650 MG: 325 TABLET ORAL at 16:01

## 2020-09-15 RX ADMIN — CLINDAMYCIN PHOSPHATE 600 MG: 600 INJECTION, SOLUTION INTRAVENOUS at 08:15

## 2020-09-15 RX ADMIN — CLINDAMYCIN PHOSPHATE 600 MG: 600 INJECTION, SOLUTION INTRAVENOUS at 16:03

## 2020-09-15 RX ADMIN — PRAVASTATIN SODIUM 40 MG: 40 TABLET ORAL at 08:15

## 2020-09-15 RX ADMIN — MONTELUKAST 10 MG: 10 TABLET, FILM COATED ORAL at 21:01

## 2020-09-15 NOTE — PLAN OF CARE
Problem: PAIN - ADULT  Goal: Verbalizes/displays adequate comfort level or baseline comfort level  Description: Interventions:  - Encourage patient to monitor pain and request assistance  - Assess pain using appropriate pain scale  - Administer analgesics based on type and severity of pain and evaluate response  - Implement non-pharmacological measures as appropriate and evaluate response  - Consider cultural and social influences on pain and pain management  - Notify physician/advanced practitioner if interventions unsuccessful or patient reports new pain  Outcome: Progressing     Problem: INFECTION - ADULT  Goal: Absence or prevention of progression during hospitalization  Description: INTERVENTIONS:  - Assess and monitor for signs and symptoms of infection  - Monitor lab/diagnostic results  - Monitor all insertion sites, i e  indwelling lines, tubes, and drains  - Monitor endotracheal if appropriate and nasal secretions for changes in amount and color  - [place] appropriate cooling/warming therapies per order  - Administer medications as ordered  - Instruct and encourage patient and family to use good hand hygiene technique  - Identify and instruct in appropriate isolation precautions for identified infection/condition  Outcome: Progressing  Goal: Absence of fever/infection during neutropenic period  Description: INTERVENTIONS:  - Monitor WBC    Outcome: Progressing     Problem: SAFETY ADULT  Goal: Patient will remain free of falls  Description: INTERVENTIONS:  - Assess patient frequently for physical needs  -  Identify cognitive and physical deficits and behaviors that affect risk of falls    -  [place] fall precautions as indicated by assessment   - Educate patient/family on patient safety including physical limitations  - Instruct patient to call for assistance with activity based on assessment  - Modify environment to reduce risk of injury  - Consider OT/PT consult to assist with strengthening/mobility  Outcome: Progressing  Goal: Maintain or return to baseline ADL function  Description: INTERVENTIONS:  -  Assess patient's ability to carry out ADLs; assess patient's baseline for ADL function and identify physical deficits which impact ability to perform ADLs (bathing, care of mouth/teeth, toileting, grooming, dressing, etc )  - Assess/evaluate cause of self-care deficits   - Assess range of motion  - Assess patient's mobility; develop plan if impaired  - Assess patient's need for assistive devices and provide as appropriate  - Encourage maximum independence but intervene and supervise when necessary  - Involve family in performance of ADLs  - Assess for home care needs following discharge   - Consider OT consult to assist with ADL evaluation and planning for discharge  - Provide patient education as appropriate  Outcome: Progressing  Goal: Maintain or return mobility status to optimal level  Description: INTERVENTIONS:  - Assess patient's baseline mobility status (ambulation, transfers, stairs, etc )    - Identify cognitive and physical deficits and behaviors that affect mobility  - Identify mobility aids required to assist with transfers and/or ambulation (gait belt, sit-to-stand, lift, walker, cane, etc )  - [place] fall precautions as indicated by assessment  - Record patient progress and toleration of activity level on Mobility SBAR; progress patient to next Phase/Stage  - Instruct patient to call for assistance with activity based on assessment  - Consider rehabilitation consult to assist with strengthening/weightbearing, etc   Outcome: Progressing     Problem: DISCHARGE PLANNING  Goal: Discharge to home or other facility with appropriate resources  Description: INTERVENTIONS:  - Identify barriers to discharge w/patient and caregiver  - Arrange for needed discharge resources and transportation as appropriate  - Identify discharge learning needs (meds, wound care, etc )  - Arrange for interpretive services to assist at discharge as needed  - Refer to Case Management Department for coordinating discharge planning if the patient needs post-hospital services based on physician/advanced practitioner order or complex needs related to functional status, cognitive ability, or social support system  Outcome: Progressing     Problem: Knowledge Deficit  Goal: Patient/family/caregiver demonstrates understanding of disease process, treatment plan, medications, and discharge instructions  Description: Complete learning assessment and assess knowledge base    Interventions:  - Provide teaching at level of understanding  - Provide teaching via preferred learning methods  Outcome: Progressing

## 2020-09-15 NOTE — ASSESSMENT & PLAN NOTE
Presents with several days of LLE erythema, warmth and tenderness previously on bactrim  IV Clinda in ED will continue  No WBC, will trend   Pain management   DVT Studies pending  Slight improvement in clinical picture, however patient will likely require at least another 24 hours of IV antibiotics  Can likely switch to p o   Tomorrow discharge

## 2020-09-15 NOTE — PROGRESS NOTES
Progress Note - Steve Hurtado 1955, 62 y o  female MRN: [id number]    Unit/Bed#: 424-01 Encounter: [id number]    Primary Care Provider: Cata Hilliard MD   Date and time admitted to hospital: 9/14/2020  2:32 PM        * Left leg cellulitis  Assessment & Plan  Presents with several days of LLE erythema, warmth and tenderness previously on bactrim  IV Clinda in ED will continue  No WBC, will trend   Pain management   DVT Studies pending  Slight improvement in clinical picture, however patient will likely require at least another 24 hours of IV antibiotics  Can likely switch to p o  Tomorrow discharge      Abnormality of coagulation (HCC)  Assessment & Plan  Chronic stable  Continue Coumadin    Hyperlipidemia  Assessment & Plan  Chronic stable  Continue high-dose statin    Essential hypertension  Assessment & Plan  Chronic stable condition  Continue home medication      Subjective:     Patient seen examined at bedside, resting comfortably no acute distress  No acute overnight events as reported by both patient and staff  No new issues or concerns at time examination      Objective:     Vitals: Blood pressure 138/76, pulse 79, temperature 98 2 °F (36 8 °C), resp  rate 18, height 5' 4" (1 626 m), weight (!) 195 kg (429 lb 14 4 oz), SpO2 95 %  ,Body mass index is 73 79 kg/m²  Wt Readings from Last 3 Encounters:   09/14/20 (!) 195 kg (429 lb 14 4 oz)   09/10/20 (!) 180 kg (397 lb 14 9 oz)   02/18/20 (!) 180 kg (397 lb)       Intake/Output Summary (Last 24 hours) at 9/15/2020 1015  Last data filed at 9/15/2020 0842  Gross per 24 hour   Intake 1420 ml   Output --   Net 1420 ml       Physical Exam:     Vitals signs and nursing note reviewed  Constitutional:       Appearance: She is obese  She is not ill-appearing or diaphoretic  HENT:      Head: Normocephalic and atraumatic  Nose: Nose normal  No congestion or rhinorrhea  Mouth/Throat:      Mouth: Mucous membranes are dry        Pharynx: No oropharyngeal exudate or posterior oropharyngeal erythema  Neck:      Musculoskeletal: No neck rigidity or muscular tenderness  Vascular: No carotid bruit  Cardiovascular:      Rate and Rhythm: Normal rate and regular rhythm  Pulmonary:      Effort: No respiratory distress  Breath sounds: No stridor  No wheezing or rhonchi  Abdominal:      General: Abdomen is flat  There is no distension  Palpations: Abdomen is soft  There is no mass  Tenderness: There is no abdominal tenderness  Hernia: No hernia is present  Musculoskeletal: Normal range of motion  Legs:       Comments: Area of cellulitic changes, associated with warmth tenderness and erythema  No crepitus or obvious deformities  Intact distal and proximal sensation motor function  1+ pitting edema left lower extremity  slight improvement compared to yesterday  Lymphadenopathy:      Cervical: No cervical adenopathy  Skin:     Capillary Refill: Capillary refill takes less than 2 seconds  Neurological:      General: No focal deficit present  Mental Status: She is alert  Mental status is at baseline          Recent Results (from the past 24 hour(s))   Blood culture    Collection Time: 09/14/20  3:45 PM    Specimen: Arm, Left; Blood   Result Value Ref Range    Blood Culture Received in Microbiology Lab  Culture in Progress      CBC and differential    Collection Time: 09/14/20  3:58 PM   Result Value Ref Range    WBC 10 64 (H) 4 31 - 10 16 Thousand/uL    RBC 4 08 3 81 - 5 12 Million/uL    Hemoglobin 11 5 11 5 - 15 4 g/dL    Hematocrit 36 9 34 8 - 46 1 %    MCV 90 82 - 98 fL    MCH 28 2 26 8 - 34 3 pg    MCHC 31 2 (L) 31 4 - 37 4 g/dL    RDW 14 9 11 6 - 15 1 %    MPV 10 1 8 9 - 12 7 fL    Platelets 816 576 - 859 Thousands/uL    nRBC 0 /100 WBCs    Neutrophils Relative 73 43 - 75 %    Immat GRANS % 1 0 - 2 %    Lymphocytes Relative 16 14 - 44 %    Monocytes Relative 7 4 - 12 %    Eosinophils Relative 2 0 - 6 % Basophils Relative 1 0 - 1 %    Neutrophils Absolute 7 81 (H) 1 85 - 7 62 Thousands/µL    Immature Grans Absolute 0 06 0 00 - 0 20 Thousand/uL    Lymphocytes Absolute 1 71 0 60 - 4 47 Thousands/µL    Monocytes Absolute 0 75 0 17 - 1 22 Thousand/µL    Eosinophils Absolute 0 23 0 00 - 0 61 Thousand/µL    Basophils Absolute 0 08 0 00 - 0 10 Thousands/µL   Comprehensive metabolic panel    Collection Time: 09/14/20  3:58 PM   Result Value Ref Range    Sodium 137 136 - 145 mmol/L    Potassium 4 7 3 5 - 5 3 mmol/L    Chloride 103 100 - 108 mmol/L    CO2 27 21 - 32 mmol/L    ANION GAP 7 4 - 13 mmol/L    BUN 16 5 - 25 mg/dL    Creatinine 1 46 (H) 0 60 - 1 30 mg/dL    Glucose 91 65 - 140 mg/dL    Calcium 8 7 8 3 - 10 1 mg/dL    AST 23 5 - 45 U/L    ALT 18 12 - 78 U/L    Alkaline Phosphatase 72 46 - 116 U/L    Total Protein 8 2 6 4 - 8 2 g/dL    Albumin 2 9 (L) 3 5 - 5 0 g/dL    Total Bilirubin 0 30 0 20 - 1 00 mg/dL    eGFR 38 ml/min/1 73sq m   Protime-INR    Collection Time: 09/14/20  3:58 PM   Result Value Ref Range    Protime 27 5 (H) 11 6 - 14 5 seconds    INR 2 62 (H) 0 84 - 1 19   APTT    Collection Time: 09/14/20  3:58 PM   Result Value Ref Range    PTT 52 (H) 23 - 37 seconds   Blood culture    Collection Time: 09/14/20  3:58 PM    Specimen: Arm, Left; Blood   Result Value Ref Range    Blood Culture Received in Microbiology Lab  Culture in Progress      Lactic acid, plasma    Collection Time: 09/14/20  3:58 PM   Result Value Ref Range    LACTIC ACID 1 0 0 5 - 2 0 mmol/L   Protime-INR    Collection Time: 09/15/20  4:47 AM   Result Value Ref Range    Protime 29 6 (H) 11 6 - 14 5 seconds    INR 2 89 (H) 0 84 - 1 19   Platelet count    Collection Time: 09/15/20  4:52 AM   Result Value Ref Range    Platelets 858 271 - 431 Thousands/uL    MPV 9 9 8 9 - 12 7 fL       Current Facility-Administered Medications   Medication Dose Route Frequency Provider Last Rate Last Dose    acetaminophen (TYLENOL) tablet 650 mg  650 mg Oral Q6H PRN Arina Noe MD   650 mg at 09/15/20 0146    Or    oxyCODONE-acetaminophen (PERCOCET) 5-325 mg per tablet 1 tablet  1 tablet Oral Q6H PRN Arina Noe MD   1 tablet at 09/15/20 0815    amLODIPine-benazepril (Eliseo President 10/20) combo dose   Oral Daily Adams Reddy MD        clindamycin (CLEOCIN) IVPB (premix) 600 mg 50 mL  600 mg Intravenous Q8H Adams Reddy  mL/hr at 09/15/20 0815 600 mg at 09/15/20 0815    montelukast (SINGULAIR) tablet 10 mg  10 mg Oral HS Adams Reddy MD   10 mg at 09/14/20 2105    pravastatin (PRAVACHOL) tablet 40 mg  40 mg Oral Daily Adams Reddy MD   40 mg at 09/15/20 0815    sodium chloride 0 9 % bolus 1,000 mL  1,000 mL Intravenous Once Adams Reddy MD        warfarin (COUMADIN) tablet 7 5 mg  7 5 mg Oral Daily (warfarin) Adams Reddy MD   7 5 mg at 09/14/20 2105       Invasive Devices     Peripheral Intravenous Line            Peripheral IV 09/14/20 Left;Ventral (anterior) Antecubital less than 1 day                Lab, Imaging and other studies: I have personally reviewed pertinent reports      VTE Pharmacologic Prophylaxis: Warfarin (Coumadin)  VTE Mechanical Prophylaxis: sequential compression device    Adams Reddy MD 10:15 AM  09/15/20

## 2020-09-15 NOTE — SOCIAL WORK
Met with pt to discuss role as  in helping pt to develop discharge plan and to help pt carry out their plan  Pt lives in a house alone  Pt has a ramp on the outside  Pt has a walker , cane and tub bench at home   Pt uses no device to ambulate  Pt is independent with ADL's  Pt does her own cooking and cleaning  Pt drives   Pt has never had any home care or any services in the home  Pt's PCP is Morales Rodriguez  Pt uses the [facility] in [facility]  I will continue to follow for any Case Management needs

## 2020-09-15 NOTE — UTILIZATION REVIEW
Notification of Inpatient Admission/Inpatient Authorization Request   This is a Notification of Inpatient Admission for [address]  Be advised that this patient was admitted to our facility under Inpatient Status  Contact Raza Bass at [phone] for additional admission information  [facility] DEPT  DEDICATED [phone]  Patient Name:   Ki Marroquin   YOB: 1955       [address]   [address]:   [address]  Tax ID: [id number]  NPI: [id number] Attending Provider/NPI:  Phone:  Address: Joann Douglass [[id number]]  [phone]  Same as Facility   Place of Service Code: 24 Place of Service Name:  [address] [place] [address]   Start Date: 9/15/20 1014 Discharge Date & Time: No discharge date for patient encounter  Type of Admission: Inpatient Status Discharge Disposition   (if discharged): Home/Self Care   Patient Diagnoses: Leg swelling [M79 89]  Left leg cellulitis [L25 441]     Orders: Admission Orders (From admission, onward)     Ordered        09/15/20 1014  Inpatient Admission  Once         09/14/20 1709  Place in Observation (expected length of stay for this patient is less than two midnights)  Once                    Assigned Utilization Review Contact: Raza Bass  Utilization   Network Utilization Review Department  Phone: [phone]; Fax [phone]  Email: [email or web address]   ATTENTION PAYERS: Please call the assigned Utilization  directly with any questions or concerns ALL voicemails in the department are confidential  Send all requests for admission clinical reviews, approved or denied determinations and any other requests to dedicated fax number belonging to the campus where the patient is receiving treatment

## 2020-09-15 NOTE — UTILIZATION REVIEW
Initial Clinical Review  Observation 9/14/20 @ [phone], converted to inpatient admission 9/15/20 @ 1014 for continued care & tx for LLE cellulitis  Per MD 9/15:  IVABT in progress for tx LLE cellulitis after failing outpatient tx on po bactrim  Area of cellulitic changes, associated with persistent warmth tenderness and erythema   No crepitus or obvious deformities  Intact distal and proximal sensation motor function   1+ pitting edema left lower extremity  Scheduled for doppler studies  Admission: Date/Time/Statement:   Admission Orders (From admission, onward)     Ordered        09/15/20 1014  Inpatient Admission  Once                   Orders Placed This Encounter   Procedures    Inpatient Admission     Standing Status:   Standing     Number of Occurrences:   1     Order Specific Question:   Admitting Physician     Answer:   Dread Sharp [[id number]]     Order Specific Question:   Level of Care     Answer:   Med Surg [[id number]]     Order Specific Question:   Estimated length of stay     Answer:   More than 2 Midnights     Order Specific Question:   Certification     Answer:   I certify that inpatient services are medically necessary for this patient for a duration of greater than two midnights  See H&P and MD Progress Notes for additional information about the patient's course of treatment  ED Arrival Information     Expected Arrival Acuity Means of Arrival Escorted By Service Admission Type    - 9/14/2020 14:25 Urgent Walk-In Self Hospitalist Urgent    Arrival Complaint    L Foot/Ankle Pain        Chief Complaint   Patient presents with    Leg Swelling     Left leg swelling , redness, pain  Is being treated for Cellulitis  Has 2 doses of Antibiotic left  Assessment/Plan:   62 yopf Pt presents from home, hx of morbid obesity, PCOS, HTN, DVT b/l LE, b/l LE cellulitis in the past, c/o left leg erythema, warmth and TTP  Pt was admitted from 9/9 to 9/10 for left LE cellulitis    She was discharged on PO bactrim, but she states the cellulitis has returned and is worse  She o/w denies any symptoms  Pt denies ha, fevers, cough, cp, sob, n/v/d/c, abd pain, dysuria, focal def or syncope  LLE Cellulitis:  Will place patient back on IV clindamycin which showing improvement on previous hospitalization, follow-up blood cultures, follow up venous duplex, PT/OT   CIRILO: possibly due to pre-renal azotemia edith lau patient was taking bactrim, she received 1 2 L NS in ED, will continue with IVF and check urine electrolytes     Hypercoagulable Syndrome: history of recurrent DVT and elevated homocystine,  established with Hem/Onc, continue coumadin and follow INR closely, follow up Venous duplex     ED Triage Vitals [09/14/20 1436]   Temperature Pulse Respirations Blood Pressure SpO2   98 1 °F (36 7 °C) 88 16 (!) 195/87 98 %      Temp Source Heart Rate Source Patient Position - Orthostatic VS BP Location FiO2 (%)   Temporal Monitor Lying Left arm --      Pain Score       Worst Possible Pain          Wt Readings from Last 1 Encounters:   09/14/20 (!) 195 kg (429 lb 14 4 oz)     Additional Vital Signs:   Date/Time   Temp   Pulse   Resp   BP   MAP (mmHg)   SpO2   O2 Device   Patient Position - Orthostatic VS    09/15/20 07:42:51   98 2 °F (36 8 °C)   79   18   138/76   97   95 %   --   --    09/14/20 23:03:51   99 3 °F (37 4 °C)   82   22   93/49  Abnormal     64   93 %   --   --    09/14/20 19:19:31   98 5 °F (36 9 °C)   84   18   146/81   103   97 %   None (Room air)   Lying    09/14/20 1715   --   90   16   179/81  Abnormal     --   98 %   None (Room air)   Sitting    09/14/20 1436   98 1 °F (36 7 °C)   88   16   195/87  Abnormal     --   98 %   None (Room air)   Lying      Pertinent Labs/Diagnostic Test Results:   Results from last 7 days   Lab Units 09/15/20  0452 09/14/20  1558 09/10/20  0545 09/09/20  1902   WBC Thousand/uL  --  10 64* 9 50 12 30*   HEMOGLOBIN g/dL  --  11 5 10 9* 12 6   HEMATOCRIT %  --  36 9 35 0 39 7 PLATELETS Thousands/uL 353 373 340 383   NEUTROS ABS Thousands/µL  --  7 81*  --  7 78*     Results from last 7 days   Lab Units 09/14/20  1558 09/10/20  0545 09/09/20  1902   SODIUM mmol/L 137 140 140   POTASSIUM mmol/L 4 7 3 8 3 8   CHLORIDE mmol/L 103 106 104   CO2 mmol/L 27 24 27   ANION GAP mmol/L 7 10 9   BUN mg/dL 16 20 21   CREATININE mg/dL 1 46* 1 00 1 09   EGFR ml/min/1 73sq m 38 60 54   CALCIUM mg/dL 8 7 8 4 8 6   MAGNESIUM mg/dL  --  2 1  --    PHOSPHORUS mg/dL  --  4 6*  --      Results from last 7 days   Lab Units 09/14/20  1558 09/09/20  1902   AST U/L 23 20   ALT U/L 18 19   ALK PHOS U/L 72 98   TOTAL PROTEIN g/dL 8 2 8 4*   ALBUMIN g/dL 2 9* 3 2*   TOTAL BILIRUBIN mg/dL 0 30 0 30     Results from last 7 days   Lab Units 09/10/20  0727 09/09/20  2127   POC GLUCOSE mg/dl 95 94     Results from last 7 days   Lab Units 09/14/20  1558 09/10/20  0545 09/09/20  1902   GLUCOSE RANDOM mg/dL 91 101 88     Results from last 7 days   Lab Units 09/09/20  2128   HEMOGLOBIN A1C % 5 6   EAG mg/dl 114     Results from last 7 days   Lab Units 09/09/20  1902   TROPONIN I ng/mL <0 02     Results from last 7 days   Lab Units 09/15/20  0447 09/14/20  1558 09/10/20  0545 09/09/20  1902   PROTIME seconds 29 6* 27 5* 20 9* 19 5*   INR  2 89* 2 62* 1 85* 1 68*   PTT seconds  --  52*  --  34     Results from last 7 days   Lab Units 09/14/20  1558   LACTIC ACID mmol/L 1 0     Results from last 7 days   Lab Units 09/14/20  1558 09/14/20  1545 09/09/20  1902   BLOOD CULTURE  Received in Microbiology Lab  Culture in Progress  Received in Microbiology Lab  Culture in Progress  No Growth After 5 Days  No Growth After 5 Days  9/15  ble venous duplex=  RIGHT LOWER LIMB LIMITED:  Evaluation shows no evidence of thrombus in the common femoral vein  Doppler evaluation shows a normal response to augmentation maneuvers    LEFT LOWER LIMB:  No evidence of acute or chronic deep vein thrombosis  No evidence of superficial thrombophlebitis noted  Doppler evaluation shows a normal response to augmentation maneuvers  Popliteal, posterior tibial and anterior tibial arterial Doppler waveforms are triphasic  ED Treatment:   Medication Administration from 09/14/2020 1425 to 09/14/2020 1914       Date/Time Order Dose Route Action     09/14/2020 1651 sodium chloride 0 9 % bolus 250 mL 250 mL Intravenous New Bag     09/14/2020 1653 ondansetron (ZOFRAN) injection 4 mg 4 mg Intravenous Given     09/14/2020 1654 morphine (PF) 4 mg/mL injection 4 mg 4 mg Intravenous Given     09/14/2020 1658 clindamycin (CLEOCIN) IVPB (premix) 600 mg 50 mL 600 mg Intravenous New Bag     09/14/2020 1719 sodium chloride 0 9 % bolus 1,000 mL 1,000 mL Intravenous New Bag        Past Medical History:   Diagnosis Date    [facility] clotting disorder ([facility] )     [facility]     Hypercoagulable state ([facility] )     Hypertension     Lyme disease     Obesity     PCOS (polycystic ovarian syndrome)     Renal disorder     kidney stones    Sleep apnea     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)      Present on Admission:   Left leg cellulitis   Abnormality of coagulation ([facility] )   Essential hypertension   Hyperlipidemia    Admitting Diagnosis: Leg swelling [M79 89]  Left leg cellulitis [L03 116]  Age/Sex: 62 y o  female  Admission Orders:  Scd/foot pumps    Scheduled Medications:  amLODIPine-benazepril (LOTREL 10/20) combo dose, , Oral, Daily  clindamycin, 600 mg, Intravenous, Q8H  montelukast, 10 mg, Oral, HS  pravastatin, 40 mg, Oral, Daily  warfarin, 7 5 mg, Oral, Daily (warfarin)    PRN Meds:  acetaminophen, 650 mg, Oral, Q6H PRN    Or  oxyCODONE-acetaminophen, 1 tablet, Oral, Q6H PRN    Network Utilization Review Department  [email or web address]  ATTENTION: Please call with any questions or concerns to [phone] and carefully listen to the prompts so that you are directed to the right person  All voicemails are confidential   Bianca Turcios all requests for admission clinical reviews, approved or denied determinations and any other requests to dedicated fax number below belonging to the campus where the patient is receiving treatment   List of dedicated fax numbers for the Facilities:  [address] DENIALS (Administrative/Medical Necessity) [phone]   1000  [facility] (Maternity/NICU/Pediatrics) [phone]   [facility] [phone]   Eulalio Abreu [phone]   Leanna Zimmerman [phone]   Vince Shaw [phone]   [facility] [address] [place] [address] [phone]   [facility]  [phone]   [address] [place] [address], [facility]  [facility] [address] [facility] [phone]

## 2020-09-15 NOTE — NURSING NOTE
Vas lower limb venous duplex ordered stat  Discussed with Dr Fernandes and he states that the study can be done tomorrow

## 2020-09-15 NOTE — PLAN OF CARE
Problem: PAIN - ADULT  Goal: Verbalizes/displays adequate comfort level or baseline comfort level  Description: Interventions:  - Encourage patient to monitor pain and request assistance  - Assess pain using appropriate pain scale  - Administer analgesics based on type and severity of pain and evaluate response  - Implement non-pharmacological measures as appropriate and evaluate response  - Consider cultural and social influences on pain and pain management  - Notify physician/advanced practitioner if interventions unsuccessful or patient reports new pain  Outcome: Progressing     Problem: INFECTION - ADULT  Goal: Absence or prevention of progression during hospitalization  Description: INTERVENTIONS:  - Assess and monitor for signs and symptoms of infection  - Monitor lab/diagnostic results  - Monitor all insertion sites, i e  indwelling lines, tubes, and drains  - Monitor endotracheal if appropriate and nasal secretions for changes in amount and color  - [place] appropriate cooling/warming therapies per order  - Administer medications as ordered  - Instruct and encourage patient and family to use good hand hygiene technique  - Identify and instruct in appropriate isolation precautions for identified infection/condition  Outcome: Progressing  Goal: Absence of fever/infection during neutropenic period  Description: INTERVENTIONS:  - Monitor WBC    Outcome: Progressing     Problem: SAFETY ADULT  Goal: Patient will remain free of falls  Description: INTERVENTIONS:  - Assess patient frequently for physical needs  -  Identify cognitive and physical deficits and behaviors that affect risk of falls    -  [place] fall precautions as indicated by assessment   - Educate patient/family on patient safety including physical limitations  - Instruct patient to call for assistance with activity based on assessment  - Modify environment to reduce risk of injury  - Consider OT/PT consult to assist with strengthening/mobility  Outcome: Progressing  Goal: Maintain or return to baseline ADL function  Description: INTERVENTIONS:  -  Assess patient's ability to carry out ADLs; assess patient's baseline for ADL function and identify physical deficits which impact ability to perform ADLs (bathing, care of mouth/teeth, toileting, grooming, dressing, etc )  - Assess/evaluate cause of self-care deficits   - Assess range of motion  - Assess patient's mobility; develop plan if impaired  - Assess patient's need for assistive devices and provide as appropriate  - Encourage maximum independence but intervene and supervise when necessary  - Involve family in performance of ADLs  - Assess for home care needs following discharge   - Consider OT consult to assist with ADL evaluation and planning for discharge  - Provide patient education as appropriate  Outcome: Progressing  Goal: Maintain or return mobility status to optimal level  Description: INTERVENTIONS:  - Assess patient's baseline mobility status (ambulation, transfers, stairs, etc )    - Identify cognitive and physical deficits and behaviors that affect mobility  - Identify mobility aids required to assist with transfers and/or ambulation (gait belt, sit-to-stand, lift, walker, cane, etc )  - [place] fall precautions as indicated by assessment  - Record patient progress and toleration of activity level on Mobility SBAR; progress patient to next Phase/Stage  - Instruct patient to call for assistance with activity based on assessment  - Consider rehabilitation consult to assist with strengthening/weightbearing, etc   Outcome: Progressing     Problem: DISCHARGE PLANNING  Goal: Discharge to home or other facility with appropriate resources  Description: INTERVENTIONS:  - Identify barriers to discharge w/patient and caregiver  - Arrange for needed discharge resources and transportation as appropriate  - Identify discharge learning needs (meds, wound care, etc )  - Arrange for interpretive services to assist at discharge as needed  - Refer to Case Management Department for coordinating discharge planning if the patient needs post-hospital services based on physician/advanced practitioner order or complex needs related to functional status, cognitive ability, or social support system  Outcome: Progressing     Problem: Knowledge Deficit  Goal: Patient/family/caregiver demonstrates understanding of disease process, treatment plan, medications, and discharge instructions  Description: Complete learning assessment and assess knowledge base  Interventions:  - Provide teaching at level of understanding  - Provide teaching via preferred learning methods  Outcome: Progressing     Problem: Potential for Falls  Goal: Patient will remain free of falls  Description: INTERVENTIONS:  - Assess patient frequently for physical needs  -  Identify cognitive and physical deficits and behaviors that affect risk of falls    -  [place] fall precautions as indicated by assessment   - Educate patient/family on patient safety including physical limitations  - Instruct patient to call for assistance with activity based on assessment  - Modify environment to reduce risk of injury  - Consider OT/PT consult to assist with strengthening/mobility  Outcome: Progressing     Problem: Prexisting or High Potential for Compromised Skin Integrity  Goal: Skin integrity is maintained or improved  Description: INTERVENTIONS:  - Identify patients at risk for skin breakdown  - Assess and monitor skin integrity  - Assess and monitor nutrition and hydration status  - Monitor labs   - Assess for incontinence   - Turn and reposition patient  - Assist with mobility/ambulation  - Relieve pressure over bony prominences  - Avoid friction and shearing  - Provide appropriate hygiene as needed including keeping skin clean and dry  - Evaluate need for skin moisturizer/barrier cream  - Collaborate with interdisciplinary team   - Patient/family teaching  - Consider wound care consult   Outcome: Progressing

## 2020-09-15 NOTE — MALNUTRITION/BMI
This medical record reflects one or more clinical indicators suggestive of malnutrition and/or morbid obesity  Malnutrition Findings:       None       BMI Findings:  BMI Classifications: Morbid Obesity > 70     Body mass index is 73 79 kg/m²  See Nutrition note dated 9/15/20 for additional details  Completed nutrition assessment is viewable in the nutrition documentation

## 2020-09-16 ENCOUNTER — APPOINTMENT (INPATIENT)
Dept: CT IMAGING | Facility: HOSPITAL | Age: 65
DRG: 603 | End: 2020-09-16
Payer: COMMERCIAL

## 2020-09-16 LAB
ANION GAP SERPL CALCULATED.3IONS-SCNC: 9 MMOL/L (ref 4–13)
BASOPHILS # BLD AUTO: 0.08 THOUSANDS/ΜL (ref 0–0.1)
BASOPHILS NFR BLD AUTO: 1 % (ref 0–1)
BUN SERPL-MCNC: 16 MG/DL (ref 5–25)
CALCIUM SERPL-MCNC: 8.6 MG/DL (ref 8.3–10.1)
CHLORIDE SERPL-SCNC: 104 MMOL/L (ref 100–108)
CO2 SERPL-SCNC: 24 MMOL/L (ref 21–32)
CREAT SERPL-MCNC: 1.18 MG/DL (ref 0.6–1.3)
EOSINOPHIL # BLD AUTO: 0.31 THOUSAND/ΜL (ref 0–0.61)
EOSINOPHIL NFR BLD AUTO: 5 % (ref 0–6)
ERYTHROCYTE [DISTWIDTH] IN BLOOD BY AUTOMATED COUNT: 14.6 % (ref 11.6–15.1)
GFR SERPL CREATININE-BSD FRML MDRD: 49 ML/MIN/1.73SQ M
GLUCOSE SERPL-MCNC: 100 MG/DL (ref 65–140)
HCT VFR BLD AUTO: 35.4 % (ref 34.8–46.1)
HGB BLD-MCNC: 11.2 G/DL (ref 11.5–15.4)
IMM GRANULOCYTES # BLD AUTO: 0.03 THOUSAND/UL (ref 0–0.2)
IMM GRANULOCYTES NFR BLD AUTO: 1 % (ref 0–2)
INR PPP: 3.62 (ref 0.84–1.19)
LYMPHOCYTES # BLD AUTO: 2.37 THOUSANDS/ΜL (ref 0.6–4.47)
LYMPHOCYTES NFR BLD AUTO: 36 % (ref 14–44)
MAGNESIUM SERPL-MCNC: 2.3 MG/DL (ref 1.6–2.6)
MCH RBC QN AUTO: 28.3 PG (ref 26.8–34.3)
MCHC RBC AUTO-ENTMCNC: 31.6 G/DL (ref 31.4–37.4)
MCV RBC AUTO: 89 FL (ref 82–98)
MONOCYTES # BLD AUTO: 0.55 THOUSAND/ΜL (ref 0.17–1.22)
MONOCYTES NFR BLD AUTO: 8 % (ref 4–12)
NEUTROPHILS # BLD AUTO: 3.31 THOUSANDS/ΜL (ref 1.85–7.62)
NEUTS SEG NFR BLD AUTO: 49 % (ref 43–75)
NRBC BLD AUTO-RTO: 0 /100 WBCS
PLATELET # BLD AUTO: 347 THOUSANDS/UL (ref 149–390)
PMV BLD AUTO: 9.9 FL (ref 8.9–12.7)
POTASSIUM SERPL-SCNC: 4.1 MMOL/L (ref 3.5–5.3)
PROTHROMBIN TIME: 35.2 SECONDS (ref 11.6–14.5)
RBC # BLD AUTO: 3.96 MILLION/UL (ref 3.81–5.12)
SODIUM SERPL-SCNC: 137 MMOL/L (ref 136–145)
WBC # BLD AUTO: 6.65 THOUSAND/UL (ref 4.31–10.16)

## 2020-09-16 PROCEDURE — 80048 BASIC METABOLIC PNL TOTAL CA: CPT | Performed by: INTERNAL MEDICINE

## 2020-09-16 PROCEDURE — 85025 COMPLETE CBC W/AUTO DIFF WBC: CPT | Performed by: INTERNAL MEDICINE

## 2020-09-16 PROCEDURE — 83735 ASSAY OF MAGNESIUM: CPT | Performed by: INTERNAL MEDICINE

## 2020-09-16 PROCEDURE — 73701 CT LOWER EXTREMITY W/DYE: CPT

## 2020-09-16 PROCEDURE — 99232 SBSQ HOSP IP/OBS MODERATE 35: CPT | Performed by: INTERNAL MEDICINE

## 2020-09-16 PROCEDURE — 85610 PROTHROMBIN TIME: CPT | Performed by: STUDENT IN AN ORGANIZED HEALTH CARE EDUCATION/TRAINING PROGRAM

## 2020-09-16 RX ORDER — FUROSEMIDE 10 MG/ML
40 INJECTION INTRAMUSCULAR; INTRAVENOUS ONCE
Status: DISCONTINUED | OUTPATIENT
Start: 2020-09-16 | End: 2020-09-17 | Stop reason: HOSPADM

## 2020-09-16 RX ORDER — WARFARIN SODIUM 5 MG/1
5 TABLET ORAL
Status: DISCONTINUED | OUTPATIENT
Start: 2020-09-16 | End: 2020-09-16

## 2020-09-16 RX ADMIN — OXYCODONE HYDROCHLORIDE AND ACETAMINOPHEN 1 TABLET: 5; 325 TABLET ORAL at 04:36

## 2020-09-16 RX ADMIN — CLINDAMYCIN PHOSPHATE 600 MG: 600 INJECTION, SOLUTION INTRAVENOUS at 08:20

## 2020-09-16 RX ADMIN — CLINDAMYCIN PHOSPHATE 600 MG: 600 INJECTION, SOLUTION INTRAVENOUS at 16:03

## 2020-09-16 RX ADMIN — BENAZEPRIL HYDROCHLORIDE: 20 TABLET, COATED ORAL at 08:19

## 2020-09-16 RX ADMIN — MONTELUKAST 10 MG: 10 TABLET, FILM COATED ORAL at 21:38

## 2020-09-16 RX ADMIN — PRAVASTATIN SODIUM 40 MG: 40 TABLET ORAL at 08:20

## 2020-09-16 RX ADMIN — IOHEXOL 100 ML: 350 INJECTION, SOLUTION INTRAVENOUS at 11:07

## 2020-09-16 RX ADMIN — OXYCODONE HYDROCHLORIDE AND ACETAMINOPHEN 1 TABLET: 5; 325 TABLET ORAL at 10:44

## 2020-09-16 RX ADMIN — CLINDAMYCIN PHOSPHATE 600 MG: 600 INJECTION, SOLUTION INTRAVENOUS at 01:08

## 2020-09-16 NOTE — PROGRESS NOTES
Progress Note - Melvi Leonardo 1955, 62 y o  female MRN: [id number]    Unit/Bed#: 424-01 Encounter: [id number]    Primary Care Provider: Ray Chino MD   Date and time admitted to hospital: 9/14/2020  2:32 PM        * Left leg cellulitis  Assessment & Plan  Presents with several days of LLE erythema, warmth and tenderness previously on bactrim  IV Clinda in ED will continue  No WBC, will trend   Still having severe pain, will CT to rule out abscess  Pain management   DVT Studies negative      Abnormality of coagulation (HCC)  Assessment & Plan  Chronic stable  Elevated INR, hold Coumadin repeat INR    Hyperlipidemia  Assessment & Plan  Chronic stable  Continue high-dose statin    Essential hypertension  Assessment & Plan  Chronic stable condition  Continue home medication    Morbid obesity ([address] )  Assessment & Plan  BMI of 73 79 due to excessive calories, recommend weight loss and lifestyle modifications  Subjective:     Patient seen examined at bedside, resting comfortably no acute distress  No acute overnight events as reported by both patient and staff  She continues to endorse severe pain on the lateral and medial malleoli, of her left foot No new issues or concerns at time examination      Objective:     Vitals: Blood pressure 121/68, pulse 75, temperature 97 7 °F (36 5 °C), resp  rate 18, height 5' 4" (1 626 m), weight (!) 195 kg (429 lb 14 4 oz), SpO2 99 %  ,Body mass index is 73 79 kg/m²  Wt Readings from Last 3 Encounters:   09/14/20 (!) 195 kg (429 lb 14 4 oz)   09/10/20 (!) 180 kg (397 lb 14 9 oz)   02/18/20 (!) 180 kg (397 lb)       Intake/Output Summary (Last 24 hours) at 9/16/2020 1108  Last data filed at 9/16/2020 0501  Gross per 24 hour   Intake 1200 ml   Output --   Net 1200 ml       Physical Exam:   signs and nursing note reviewed  Constitutional:       Appearance: She is obese  She is not ill-appearing or diaphoretic  HENT:      Head: Normocephalic and atraumatic     Nose: Nose normal  No congestion or rhinorrhea       Mouth/Throat:      Mouth: Mucous membranes are moist      Pharynx: No oropharyngeal exudate or posterior oropharyngeal erythema  Neck:      Musculoskeletal: No neck rigidity or muscular tenderness       Vascular: No carotid bruit  Cardiovascular:      Rate and Rhythm: Normal rate and regular rhythm  Pulmonary:      Effort: No respiratory distress       Breath sounds: No stridor  No wheezing or rhonchi  Abdominal:      General: Abdomen is flat  There is no distension       Palpations: Abdomen is soft  There is no mass       Tenderness: There is no abdominal tenderness       Hernia: No hernia is present  Musculoskeletal: Normal range of motion         Legs:       Comments: Area of cellulitic changes, associated with warmth tenderness and erythema   No crepitus or obvious deformities   Intact distal and proximal sensation motor function   1+ pitting edema left lower extremity  slight improvement compared to yesterday  Lymphadenopathy:      Cervical: No cervical adenopathy  Skin:     Capillary Refill: Capillary refill takes less than 2 seconds  Neurological:      General: No focal deficit present       Mental Status: She is alert   Mental status is at baseline          Recent Results (from the past 24 hour(s))   Protime-INR    Collection Time: 09/16/20  4:46 AM   Result Value Ref Range    Protime 35 2 (H) 11 6 - 14 5 seconds    INR 3 62 (H) 0 84 - 1 19   CBC and differential    Collection Time: 09/16/20  4:46 AM   Result Value Ref Range    WBC 6 65 4 31 - 10 16 Thousand/uL    RBC 3 96 3 81 - 5 12 Million/uL    Hemoglobin 11 2 (L) 11 5 - 15 4 g/dL    Hematocrit 35 4 34 8 - 46 1 %    MCV 89 82 - 98 fL    MCH 28 3 26 8 - 34 3 pg    MCHC 31 6 31 4 - 37 4 g/dL    RDW 14 6 11 6 - 15 1 %    MPV 9 9 8 9 - 12 7 fL    Platelets 586 674 - 645 Thousands/uL    nRBC 0 /100 WBCs    Neutrophils Relative 49 43 - 75 %    Immat GRANS % 1 0 - 2 %    Lymphocytes Relative 36 14 - 44 %    Monocytes Relative 8 4 - 12 %    Eosinophils Relative 5 0 - 6 %    Basophils Relative 1 0 - 1 %    Neutrophils Absolute 3 31 1 85 - 7 62 Thousands/µL    Immature Grans Absolute 0 03 0 00 - 0 20 Thousand/uL    Lymphocytes Absolute 2 37 0 60 - 4 47 Thousands/µL    Monocytes Absolute 0 55 0 17 - 1 22 Thousand/µL    Eosinophils Absolute 0 31 0 00 - 0 61 Thousand/µL    Basophils Absolute 0 08 0 00 - 0 10 Thousands/µL   Basic metabolic panel    Collection Time: 09/16/20  4:46 AM   Result Value Ref Range    Sodium 137 136 - 145 mmol/L    Potassium 4 1 3 5 - 5 3 mmol/L    Chloride 104 100 - 108 mmol/L    CO2 24 21 - 32 mmol/L    ANION GAP 9 4 - 13 mmol/L    BUN 16 5 - 25 mg/dL    Creatinine 1 18 0 60 - 1 30 mg/dL    Glucose 100 65 - 140 mg/dL    Calcium 8 6 8 3 - 10 1 mg/dL    eGFR 49 ml/min/1 73sq m   Magnesium    Collection Time: 09/16/20  4:46 AM   Result Value Ref Range    Magnesium 2 3 1 6 - 2 6 mg/dL       Current Facility-Administered Medications   Medication Dose Route Frequency Provider Last Rate Last Dose    acetaminophen (TYLENOL) tablet 650 mg  650 mg Oral Q6H PRN Reddy Ya MD   650 mg at 09/15/20 2215    Or    oxyCODONE-acetaminophen (PERCOCET) 5-325 mg per tablet 1 tablet  1 tablet Oral Q6H PRN Reddy Ya MD   1 tablet at 09/16/20 1044    amLODIPine-benazepril (LOTREL 10/20) combo dose   Oral Daily Lyssa Drew MD        clindamycin (CLEOCIN) IVPB (premix) 600 mg 50 mL  600 mg Intravenous Q8H Lyssa Drew  mL/hr at 09/16/20 0820 600 mg at 09/16/20 0820    furosemide (LASIX) injection 40 mg  40 mg Intravenous Once Lyssa Drew MD        montelukast (SINGULAIR) tablet 10 mg  10 mg Oral HS Lyssa Drew MD   10 mg at 09/15/20 2101    pravastatin (PRAVACHOL) tablet 40 mg  40 mg Oral Daily Lyssa Drew MD   40 mg at 09/16/20 0820    sodium chloride 0 9 % bolus 1,000 mL  1,000 mL Intravenous Once Lyssa MD Rajendra           Invasive Devices     Peripheral Intravenous Line            Peripheral IV 09/14/20 Left;Ventral (anterior) Antecubital 1 day                Lab, Imaging and other studies: I have personally reviewed pertinent reports  VTE Pharmacologic Prophylaxis: RX contraindicated due to:  On Coumadin  VTE Mechanical Prophylaxis: sequential compression device    Elvis Garcia MD 11:08 AM  09/16/20

## 2020-09-16 NOTE — PLAN OF CARE
Problem: PAIN - ADULT  Goal: Verbalizes/displays adequate comfort level or baseline comfort level  Description: Interventions:  - Encourage patient to monitor pain and request assistance  - Assess pain using appropriate pain scale  - Administer analgesics based on type and severity of pain and evaluate response  - Implement non-pharmacological measures as appropriate and evaluate response  - Consider cultural and social influences on pain and pain management  - Notify physician/advanced practitioner if interventions unsuccessful or patient reports new pain  Outcome: Progressing     Problem: INFECTION - ADULT  Goal: Absence or prevention of progression during hospitalization  Description: INTERVENTIONS:  - Assess and monitor for signs and symptoms of infection  - Monitor lab/diagnostic results  - Monitor all insertion sites, i e  indwelling lines, tubes, and drains  - Monitor endotracheal if appropriate and nasal secretions for changes in amount and color  - [place] appropriate cooling/warming therapies per order  - Administer medications as ordered  - Instruct and encourage patient and family to use good hand hygiene technique  - Identify and instruct in appropriate isolation precautions for identified infection/condition  Outcome: Progressing  Goal: Absence of fever/infection during neutropenic period  Description: INTERVENTIONS:  - Monitor WBC    Outcome: Progressing     Problem: SAFETY ADULT  Goal: Patient will remain free of falls  Description: INTERVENTIONS:  - Assess patient frequently for physical needs  -  Identify cognitive and physical deficits and behaviors that affect risk of falls    -  [place] fall precautions as indicated by assessment   - Educate patient/family on patient safety including physical limitations  - Instruct patient to call for assistance with activity based on assessment  - Modify environment to reduce risk of injury  - Consider OT/PT consult to assist with strengthening/mobility  Outcome: Progressing  Goal: Maintain or return to baseline ADL function  Description: INTERVENTIONS:  -  Assess patient's ability to carry out ADLs; assess patient's baseline for ADL function and identify physical deficits which impact ability to perform ADLs (bathing, care of mouth/teeth, toileting, grooming, dressing, etc )  - Assess/evaluate cause of self-care deficits   - Assess range of motion  - Assess patient's mobility; develop plan if impaired  - Assess patient's need for assistive devices and provide as appropriate  - Encourage maximum independence but intervene and supervise when necessary  - Involve family in performance of ADLs  - Assess for home care needs following discharge   - Consider OT consult to assist with ADL evaluation and planning for discharge  - Provide patient education as appropriate  Outcome: Progressing  Goal: Maintain or return mobility status to optimal level  Description: INTERVENTIONS:  - Assess patient's baseline mobility status (ambulation, transfers, stairs, etc )    - Identify cognitive and physical deficits and behaviors that affect mobility  - Identify mobility aids required to assist with transfers and/or ambulation (gait belt, sit-to-stand, lift, walker, cane, etc )  - [place] fall precautions as indicated by assessment  - Record patient progress and toleration of activity level on Mobility SBAR; progress patient to next Phase/Stage  - Instruct patient to call for assistance with activity based on assessment  - Consider rehabilitation consult to assist with strengthening/weightbearing, etc   Outcome: Progressing     Problem: DISCHARGE PLANNING  Goal: Discharge to home or other facility with appropriate resources  Description: INTERVENTIONS:  - Identify barriers to discharge w/patient and caregiver  - Arrange for needed discharge resources and transportation as appropriate  - Identify discharge learning needs (meds, wound care, etc )  - Arrange for interpretive services to assist at discharge as needed  - Refer to Case Management Department for coordinating discharge planning if the patient needs post-hospital services based on physician/advanced practitioner order or complex needs related to functional status, cognitive ability, or social support system  Outcome: Progressing     Problem: Knowledge Deficit  Goal: Patient/family/caregiver demonstrates understanding of disease process, treatment plan, medications, and discharge instructions  Description: Complete learning assessment and assess knowledge base  Interventions:  - Provide teaching at level of understanding  - Provide teaching via preferred learning methods  Outcome: Progressing     Problem: Potential for Falls  Goal: Patient will remain free of falls  Description: INTERVENTIONS:  - Assess patient frequently for physical needs  -  Identify cognitive and physical deficits and behaviors that affect risk of falls    -  [place] fall precautions as indicated by assessment   - Educate patient/family on patient safety including physical limitations  - Instruct patient to call for assistance with activity based on assessment  - Modify environment to reduce risk of injury  - Consider OT/PT consult to assist with strengthening/mobility  Outcome: Progressing     Problem: Prexisting or High Potential for Compromised Skin Integrity  Goal: Skin integrity is maintained or improved  Description: INTERVENTIONS:  - Identify patients at risk for skin breakdown  - Assess and monitor skin integrity  - Assess and monitor nutrition and hydration status  - Monitor labs   - Assess for incontinence   - Turn and reposition patient  - Assist with mobility/ambulation  - Relieve pressure over bony prominences  - Avoid friction and shearing  - Provide appropriate hygiene as needed including keeping skin clean and dry  - Evaluate need for skin moisturizer/barrier cream  - Collaborate with interdisciplinary team   - Patient/family teaching  - Consider wound care consult   Outcome: Progressing

## 2020-09-16 NOTE — DISCHARGE SUMMARY
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  Continue Coumadin    Hyperlipidemia  Assessment & Plan  Chronic stable  Continue high-dose statin    Essential hypertension  Assessment & Plan  Chronic stable condition  Continue home medication            [address] [facility]  Al Buckley is a 62 y o  female who presents with left lower leg cellulitis  Patient was previously discharged from this facility for similar issues, which was diagnosed with a left lower leg cellulitis and discharged home on a course of Bactrim  Patient states since time of discharge, she has not seen any regression of her cellulitic picture  Over the past 24 hours she feels of the cellulitis has gotten worse, and as evidence by warmth, swelling, and severe tenderness to palpation  States she is unable to ambulate secondary to the pain in her left foot  Patient has extensive history of blood clots and bilateral cellulitis  Denies any other issues or concerns at time of exam, denies any recent travel, denies any sick contacts    Hospital Course  Patient was admitted to [facility] bed for evaluation/treatment of her left leg cellulitis  She was started on IV clindamycin, and had good symptomatic improvement over the course of her hospital stay  Her hospital course was uncomplicated with no unforeseen events, she is visually transition to p o   Clindamycin for continuation of her antibiotic therapy deemed stable for medical discharge  Given persistent pain of her ankle and CT scan was performed to rule out underlying abscess, there is no evidence of any abscesses and slight swelling consistent with cellulitis  On day of discharge tolerating p o  Diet well ambulating unassisted and pain-free, she will be discharged on the remainder of clindamycin, and close follow-up with her PCP      Physical Exam at Discharge  /57 (BP Location: Right arm)   Pulse 74   Temp 98 4 °F (36 9 °C) (Oral)   Resp 18   Ht 5' 4" (1 626 m)   Wt (!) 195 kg (429 lb 14 4 oz)   LMP  (LMP Unknown)   SpO2 95%   BMI 73 79 kg/m²     General Appearance:    Alert, cooperative, no distress, appears stated age   Head:    Normocephalic, without obvious abnormality, atraumatic   Eyes:    PERRL, conjunctiva/corneas clear, EOM's intact   Ears:    Normal TM's and external ear canals, both ears   Nose:   Nares normal, septum midline, mucosa normal, no drainage    or sinus tenderness   Throat:   Lips, mucosa, and tongue normal; teeth and gums normal   Neck:   Supple, symmetrical, trachea midline, no adenopathy;     thyroid:  no enlargement/tenderness/nodules; no carotid    bruit or JVD   Back:     Symmetric, no curvature, ROM normal, no CVA tenderness   Lungs:     Clear to auscultation bilaterally, respirations unlabored   Chest Wall:    No tenderness or deformity    Heart:    Regular rate and rhythm, S1 and S2 normal, no murmur, rub   or gallop       Abdomen:     Soft, non-tender, bowel sounds active all four quadrants,     no masses, no organomegaly           Extremities:   Extremities normal, atraumatic, no cyanosis or edema   Pulses:   2+ and symmetric all extremities   Skin:   Skin color, texture, turgor normal, no rashes or lesions   Lymph nodes:   Cervical, supraclavicular, and axillary nodes normal   Neurologic:   CNII-XII intact, normal strength, sensation and reflexes     throughout         Allergies  Allergies   Allergen Reactions    Azithromycin Diarrhea     Other reaction(s): Unknown    Ciprofloxacin Confusion, Dizziness and Other (See Comments)     Other reaction(s): Other (Please comment)  Dizziness, weepy  Other reaction(s): Other (See Comments)  "Dizziness"      Clindamycin Other (See Comments)     Acid Reflux    Ancef [Cefazolin] Rash     States tolerated Keflex       Diet restrictions: none  Activity restrictions: none  Code Status: Level 1 - Full Code  Advance Directive and Living Will: <no information>  Power of :    POLST:      Discharge Condition: stable      Discharge  Statement   I spent 30 minutes discharging the patient  This time was spent on the day of discharge  I had direct contact with the patient on the day of discharge  Additional documentation is required if more than 30 minutes were spent on discharge

## 2020-09-16 NOTE — ASSESSMENT & PLAN NOTE
Presents with several days of LLE erythema, warmth and tenderness previously on bactrim  IV Clinda in ED will continue  No WBC, will trend   Still having severe pain, will CT to rule out abscess  Pain management   DVT Studies negative

## 2020-09-17 VITALS
HEIGHT: 64 IN | SYSTOLIC BLOOD PRESSURE: 128 MMHG | TEMPERATURE: 97.7 F | BODY MASS INDEX: 50.02 KG/M2 | HEART RATE: 65 BPM | WEIGHT: 293 LBS | OXYGEN SATURATION: 94 % | DIASTOLIC BLOOD PRESSURE: 60 MMHG | RESPIRATION RATE: 20 BRPM

## 2020-09-17 LAB
ANION GAP SERPL CALCULATED.3IONS-SCNC: 9 MMOL/L (ref 4–13)
BASOPHILS # BLD AUTO: 0.08 THOUSANDS/ΜL (ref 0–0.1)
BASOPHILS NFR BLD AUTO: 1 % (ref 0–1)
BUN SERPL-MCNC: 14 MG/DL (ref 5–25)
CALCIUM SERPL-MCNC: 8.3 MG/DL (ref 8.3–10.1)
CHLORIDE SERPL-SCNC: 103 MMOL/L (ref 100–108)
CO2 SERPL-SCNC: 27 MMOL/L (ref 21–32)
CREAT SERPL-MCNC: 1.18 MG/DL (ref 0.6–1.3)
EOSINOPHIL # BLD AUTO: 0.34 THOUSAND/ΜL (ref 0–0.61)
EOSINOPHIL NFR BLD AUTO: 5 % (ref 0–6)
ERYTHROCYTE [DISTWIDTH] IN BLOOD BY AUTOMATED COUNT: 14.8 % (ref 11.6–15.1)
GFR SERPL CREATININE-BSD FRML MDRD: 49 ML/MIN/1.73SQ M
GLUCOSE SERPL-MCNC: 108 MG/DL (ref 65–140)
HCT VFR BLD AUTO: 34.9 % (ref 34.8–46.1)
HGB BLD-MCNC: 11.1 G/DL (ref 11.5–15.4)
IMM GRANULOCYTES # BLD AUTO: 0.04 THOUSAND/UL (ref 0–0.2)
IMM GRANULOCYTES NFR BLD AUTO: 1 % (ref 0–2)
LYMPHOCYTES # BLD AUTO: 2.36 THOUSANDS/ΜL (ref 0.6–4.47)
LYMPHOCYTES NFR BLD AUTO: 31 % (ref 14–44)
MAGNESIUM SERPL-MCNC: 2.1 MG/DL (ref 1.6–2.6)
MCH RBC QN AUTO: 28.5 PG (ref 26.8–34.3)
MCHC RBC AUTO-ENTMCNC: 31.8 G/DL (ref 31.4–37.4)
MCV RBC AUTO: 90 FL (ref 82–98)
MONOCYTES # BLD AUTO: 0.61 THOUSAND/ΜL (ref 0.17–1.22)
MONOCYTES NFR BLD AUTO: 8 % (ref 4–12)
NEUTROPHILS # BLD AUTO: 4.16 THOUSANDS/ΜL (ref 1.85–7.62)
NEUTS SEG NFR BLD AUTO: 54 % (ref 43–75)
NRBC BLD AUTO-RTO: 0 /100 WBCS
PLATELET # BLD AUTO: 360 THOUSANDS/UL (ref 149–390)
PMV BLD AUTO: 9.9 FL (ref 8.9–12.7)
POTASSIUM SERPL-SCNC: 4.1 MMOL/L (ref 3.5–5.3)
RBC # BLD AUTO: 3.89 MILLION/UL (ref 3.81–5.12)
SODIUM SERPL-SCNC: 139 MMOL/L (ref 136–145)
WBC # BLD AUTO: 7.59 THOUSAND/UL (ref 4.31–10.16)

## 2020-09-17 PROCEDURE — 80048 BASIC METABOLIC PNL TOTAL CA: CPT | Performed by: INTERNAL MEDICINE

## 2020-09-17 PROCEDURE — 85025 COMPLETE CBC W/AUTO DIFF WBC: CPT | Performed by: INTERNAL MEDICINE

## 2020-09-17 PROCEDURE — 99239 HOSP IP/OBS DSCHRG MGMT >30: CPT | Performed by: INTERNAL MEDICINE

## 2020-09-17 PROCEDURE — 83735 ASSAY OF MAGNESIUM: CPT | Performed by: INTERNAL MEDICINE

## 2020-09-17 RX ORDER — TRAMADOL HYDROCHLORIDE 50 MG/1
50 TABLET ORAL EVERY 6 HOURS PRN
Qty: 28 TABLET | Refills: 0 | Status: SHIPPED | OUTPATIENT
Start: 2020-09-17 | End: 2020-09-24

## 2020-09-17 RX ORDER — CLINDAMYCIN HYDROCHLORIDE 300 MG/1
300 CAPSULE ORAL 3 TIMES DAILY
Qty: 12 CAPSULE | Refills: 0 | Status: SHIPPED | OUTPATIENT
Start: 2020-09-17 | End: 2020-09-21

## 2020-09-17 RX ADMIN — CLINDAMYCIN PHOSPHATE 600 MG: 600 INJECTION, SOLUTION INTRAVENOUS at 09:55

## 2020-09-17 RX ADMIN — OXYCODONE HYDROCHLORIDE AND ACETAMINOPHEN 1 TABLET: 5; 325 TABLET ORAL at 04:15

## 2020-09-17 RX ADMIN — PRAVASTATIN SODIUM 40 MG: 40 TABLET ORAL at 09:54

## 2020-09-17 RX ADMIN — CLINDAMYCIN PHOSPHATE 600 MG: 600 INJECTION, SOLUTION INTRAVENOUS at 00:56

## 2020-09-17 RX ADMIN — BENAZEPRIL HYDROCHLORIDE: 20 TABLET, COATED ORAL at 09:54

## 2020-09-17 RX ADMIN — ACETAMINOPHEN 650 MG: 325 TABLET ORAL at 12:47

## 2020-09-17 NOTE — PLAN OF CARE
Problem: PAIN - ADULT  Goal: Verbalizes/displays adequate comfort level or baseline comfort level  Description: Interventions:  - Encourage patient to monitor pain and request assistance  - Assess pain using appropriate pain scale  - Administer analgesics based on type and severity of pain and evaluate response  - Implement non-pharmacological measures as appropriate and evaluate response  - Consider cultural and social influences on pain and pain management  - Notify physician/advanced practitioner if interventions unsuccessful or patient reports new pain  Outcome: Progressing     Problem: INFECTION - ADULT  Goal: Absence or prevention of progression during hospitalization  Description: INTERVENTIONS:  - Assess and monitor for signs and symptoms of infection  - Monitor lab/diagnostic results  - Monitor all insertion sites, i e  indwelling lines, tubes, and drains  - Monitor endotracheal if appropriate and nasal secretions for changes in amount and color  - [place] appropriate cooling/warming therapies per order  - Administer medications as ordered  - Instruct and encourage patient and family to use good hand hygiene technique  - Identify and instruct in appropriate isolation precautions for identified infection/condition  Outcome: Progressing  Goal: Absence of fever/infection during neutropenic period  Description: INTERVENTIONS:  - Monitor WBC    Outcome: Progressing     Problem: SAFETY ADULT  Goal: Patient will remain free of falls  Description: INTERVENTIONS:  - Assess patient frequently for physical needs  -  Identify cognitive and physical deficits and behaviors that affect risk of falls    -  [place] fall precautions as indicated by assessment   - Educate patient/family on patient safety including physical limitations  - Instruct patient to call for assistance with activity based on assessment  - Modify environment to reduce risk of injury  - Consider OT/PT consult to assist with strengthening/mobility  Outcome: Progressing  Goal: Maintain or return to baseline ADL function  Description: INTERVENTIONS:  -  Assess patient's ability to carry out ADLs; assess patient's baseline for ADL function and identify physical deficits which impact ability to perform ADLs (bathing, care of mouth/teeth, toileting, grooming, dressing, etc )  - Assess/evaluate cause of self-care deficits   - Assess range of motion  - Assess patient's mobility; develop plan if impaired  - Assess patient's need for assistive devices and provide as appropriate  - Encourage maximum independence but intervene and supervise when necessary  - Involve family in performance of ADLs  - Assess for home care needs following discharge   - Consider OT consult to assist with ADL evaluation and planning for discharge  - Provide patient education as appropriate  Outcome: Progressing  Goal: Maintain or return mobility status to optimal level  Description: INTERVENTIONS:  - Assess patient's baseline mobility status (ambulation, transfers, stairs, etc )    - Identify cognitive and physical deficits and behaviors that affect mobility  - Identify mobility aids required to assist with transfers and/or ambulation (gait belt, sit-to-stand, lift, walker, cane, etc )  - [place] fall precautions as indicated by assessment  - Record patient progress and toleration of activity level on Mobility SBAR; progress patient to next Phase/Stage  - Instruct patient to call for assistance with activity based on assessment  - Consider rehabilitation consult to assist with strengthening/weightbearing, etc   Outcome: Progressing     Problem: DISCHARGE PLANNING  Goal: Discharge to home or other facility with appropriate resources  Description: INTERVENTIONS:  - Identify barriers to discharge w/patient and caregiver  - Arrange for needed discharge resources and transportation as appropriate  - Identify discharge learning needs (meds, wound care, etc )  - Arrange for interpretive services to assist at discharge as needed  - Refer to Case Management Department for coordinating discharge planning if the patient needs post-hospital services based on physician/advanced practitioner order or complex needs related to functional status, cognitive ability, or social support system  Outcome: Progressing     Problem: Knowledge Deficit  Goal: Patient/family/caregiver demonstrates understanding of disease process, treatment plan, medications, and discharge instructions  Description: Complete learning assessment and assess knowledge base  Interventions:  - Provide teaching at level of understanding  - Provide teaching via preferred learning methods  Outcome: Progressing     Problem: Potential for Falls  Goal: Patient will remain free of falls  Description: INTERVENTIONS:  - Assess patient frequently for physical needs  -  Identify cognitive and physical deficits and behaviors that affect risk of falls    -  [place] fall precautions as indicated by assessment   - Educate patient/family on patient safety including physical limitations  - Instruct patient to call for assistance with activity based on assessment  - Modify environment to reduce risk of injury  - Consider OT/PT consult to assist with strengthening/mobility  Outcome: Progressing     Problem: Prexisting or High Potential for Compromised Skin Integrity  Goal: Skin integrity is maintained or improved  Description: INTERVENTIONS:  - Identify patients at risk for skin breakdown  - Assess and monitor skin integrity  - Assess and monitor nutrition and hydration status  - Monitor labs   - Assess for incontinence   - Turn and reposition patient  - Assist with mobility/ambulation  - Relieve pressure over bony prominences  - Avoid friction and shearing  - Provide appropriate hygiene as needed including keeping skin clean and dry  - Evaluate need for skin moisturizer/barrier cream  - Collaborate with interdisciplinary team   - Patient/family teaching  - Consider wound care consult   Outcome: Progressing

## 2020-09-17 NOTE — SOCIAL WORK
Pt is being discharged today  Pt already has a follow up appointment with PCP  AVS and discharge instructions reviewed with patient with a good understanding  Case Management reviewed discharge planning process including the following: identifying help that is needed at home, pt's preference for discharge needs and Meds at [facility]  Reviewed with Pt that any member of the healthcare team can answer questions regarding : medications, jmportance of recognizing  Signs and symptoms of any  medical problems  Case Management also encouraged pt to follow up with all recommended appointments after discharge

## 2020-09-17 NOTE — PLAN OF CARE
Problem: PAIN - ADULT  Goal: Verbalizes/displays adequate comfort level or baseline comfort level  Description: Interventions:  - Encourage patient to monitor pain and request assistance  - Assess pain using appropriate pain scale  - Administer analgesics based on type and severity of pain and evaluate response  - Implement non-pharmacological measures as appropriate and evaluate response  - Consider cultural and social influences on pain and pain management  - Notify physician/advanced practitioner if interventions unsuccessful or patient reports new pain  Outcome: Completed     Problem: INFECTION - ADULT  Goal: Absence or prevention of progression during hospitalization  Description: INTERVENTIONS:  - Assess and monitor for signs and symptoms of infection  - Monitor lab/diagnostic results  - Monitor all insertion sites, i e  indwelling lines, tubes, and drains  - Monitor endotracheal if appropriate and nasal secretions for changes in amount and color  - [place] appropriate cooling/warming therapies per order  - Administer medications as ordered  - Instruct and encourage patient and family to use good hand hygiene technique  - Identify and instruct in appropriate isolation precautions for identified infection/condition  Outcome: Completed  Goal: Absence of fever/infection during neutropenic period  Description: INTERVENTIONS:  - Monitor WBC    Outcome: Completed     Problem: SAFETY ADULT  Goal: Patient will remain free of falls  Description: INTERVENTIONS:  - Assess patient frequently for physical needs  -  Identify cognitive and physical deficits and behaviors that affect risk of falls    -  [place] fall precautions as indicated by assessment   - Educate patient/family on patient safety including physical limitations  - Instruct patient to call for assistance with activity based on assessment  - Modify environment to reduce risk of injury  - Consider OT/PT consult to assist with strengthening/mobility  Outcome: Completed  Goal: Maintain or return to baseline ADL function  Description: INTERVENTIONS:  -  Assess patient's ability to carry out ADLs; assess patient's baseline for ADL function and identify physical deficits which impact ability to perform ADLs (bathing, care of mouth/teeth, toileting, grooming, dressing, etc )  - Assess/evaluate cause of self-care deficits   - Assess range of motion  - Assess patient's mobility; develop plan if impaired  - Assess patient's need for assistive devices and provide as appropriate  - Encourage maximum independence but intervene and supervise when necessary  - Involve family in performance of ADLs  - Assess for home care needs following discharge   - Consider OT consult to assist with ADL evaluation and planning for discharge  - Provide patient education as appropriate  Outcome: Completed  Goal: Maintain or return mobility status to optimal level  Description: INTERVENTIONS:  - Assess patient's baseline mobility status (ambulation, transfers, stairs, etc )    - Identify cognitive and physical deficits and behaviors that affect mobility  - Identify mobility aids required to assist with transfers and/or ambulation (gait belt, sit-to-stand, lift, walker, cane, etc )  - [place] fall precautions as indicated by assessment  - Record patient progress and toleration of activity level on Mobility SBAR; progress patient to next Phase/Stage  - Instruct patient to call for assistance with activity based on assessment  - Consider rehabilitation consult to assist with strengthening/weightbearing, etc   Outcome: Completed     Problem: DISCHARGE PLANNING  Goal: Discharge to home or other facility with appropriate resources  Description: INTERVENTIONS:  - Identify barriers to discharge w/patient and caregiver  - Arrange for needed discharge resources and transportation as appropriate  - Identify discharge learning needs (meds, wound care, etc )  - Arrange for interpretive services to assist at discharge as needed  - Refer to Case Management Department for coordinating discharge planning if the patient needs post-hospital services based on physician/advanced practitioner order or complex needs related to functional status, cognitive ability, or social support system  Outcome: Completed     Problem: Knowledge Deficit  Goal: Patient/family/caregiver demonstrates understanding of disease process, treatment plan, medications, and discharge instructions  Description: Complete learning assessment and assess knowledge base  Interventions:  - Provide teaching at level of understanding  - Provide teaching via preferred learning methods  Outcome: Completed     Problem: Potential for Falls  Goal: Patient will remain free of falls  Description: INTERVENTIONS:  - Assess patient frequently for physical needs  -  Identify cognitive and physical deficits and behaviors that affect risk of falls    -  [place] fall precautions as indicated by assessment   - Educate patient/family on patient safety including physical limitations  - Instruct patient to call for assistance with activity based on assessment  - Modify environment to reduce risk of injury  - Consider OT/PT consult to assist with strengthening/mobility  Outcome: Completed     Problem: Prexisting or High Potential for Compromised Skin Integrity  Goal: Skin integrity is maintained or improved  Description: INTERVENTIONS:  - Identify patients at risk for skin breakdown  - Assess and monitor skin integrity  - Assess and monitor nutrition and hydration status  - Monitor labs   - Assess for incontinence   - Turn and reposition patient  - Assist with mobility/ambulation  - Relieve pressure over bony prominences  - Avoid friction and shearing  - Provide appropriate hygiene as needed including keeping skin clean and dry  - Evaluate need for skin moisturizer/barrier cream  - Collaborate with interdisciplinary team   - Patient/family teaching  - Consider wound care consult   Outcome: Completed

## 2020-09-18 ENCOUNTER — TRANSITIONAL CARE MANAGEMENT (OUTPATIENT)
Dept: FAMILY MEDICINE CLINIC | Facility: CLINIC | Age: 65
End: 2020-09-18

## 2020-09-18 ENCOUNTER — TELEPHONE (OUTPATIENT)
Dept: HEMATOLOGY ONCOLOGY | Facility: CLINIC | Age: 65
End: 2020-09-18

## 2020-09-18 NOTE — TELEPHONE ENCOUNTER
Patient called to report that she was admitted to [place] from 9/14-9/17/20 with Cellulitis of left lower leg  She is on Clindamycin  Patient INR on 9/16/20 was 3 62  She has not had any Coumadin yesterday or today  She just had her INR repeated today  Patient had labs drawn at [facility] lab  Please call [phone] for results  Please review results with Dr Nadege Guthrie and call patient with further Coumadin instructions  Call back # [phone] (H) OK to leave a message with instructions

## 2020-09-18 NOTE — TELEPHONE ENCOUNTER
Received lab report  PT 21 9 INR 3 19  Per Dr Reji Pinto is to take 7 5 MG of Coumadin on Tuesdays only and take 5 MG all other days  Recheck in 2 weeks  I LVM with this information for patient instructing her to call our office if she has any questions or concerns

## 2020-09-18 NOTE — TELEPHONE ENCOUNTER
Called Miguelito from [employer], he will fax results to our office and I will review with Dr Gian Prieto and contact patient with further instructions

## 2020-09-19 LAB
BACTERIA BLD CULT: NORMAL
BACTERIA BLD CULT: NORMAL

## 2020-09-23 ENCOUNTER — OFFICE VISIT (OUTPATIENT)
Dept: FAMILY MEDICINE CLINIC | Facility: CLINIC | Age: 65
End: 2020-09-23
Payer: COMMERCIAL

## 2020-09-23 VITALS — HEIGHT: 64 IN | BODY MASS INDEX: 73.79 KG/M2

## 2020-09-23 DIAGNOSIS — Z12.31 SCREENING MAMMOGRAM, ENCOUNTER FOR: ICD-10-CM

## 2020-09-23 DIAGNOSIS — Z76.89 ENCOUNTER FOR SUPPORT AND COORDINATION OF TRANSITION OF CARE: Primary | ICD-10-CM

## 2020-09-23 DIAGNOSIS — L03.116 CELLULITIS OF LEFT LOWER EXTREMITY: ICD-10-CM

## 2020-09-23 DIAGNOSIS — T88.7XXA MEDICATION SIDE EFFECT: ICD-10-CM

## 2020-09-23 DIAGNOSIS — E55.9 VITAMIN D DEFICIENCY: ICD-10-CM

## 2020-09-23 DIAGNOSIS — M81.0 OSTEOPOROSIS WITHOUT CURRENT PATHOLOGICAL FRACTURE, UNSPECIFIED OSTEOPOROSIS TYPE: ICD-10-CM

## 2020-09-23 PROCEDURE — 99496 TRANSJ CARE MGMT HIGH F2F 7D: CPT | Performed by: FAMILY MEDICINE

## 2020-09-23 RX ORDER — ALENDRONATE SODIUM 70 MG/1
70 TABLET ORAL
Qty: 12 TABLET | Refills: 3 | Status: SHIPPED | OUTPATIENT
Start: 2020-09-23 | End: 2021-11-22 | Stop reason: SDUPTHER

## 2020-09-23 RX ORDER — MELATONIN
1000 DAILY
Qty: 30 TABLET | Refills: 3 | Status: SHIPPED | OUTPATIENT
Start: 2020-09-23 | End: 2022-07-11

## 2020-09-23 RX ORDER — DOXYCYCLINE HYCLATE 100 MG/1
100 CAPSULE ORAL EVERY 12 HOURS SCHEDULED
Qty: 20 CAPSULE | Refills: 0 | Status: SHIPPED | OUTPATIENT
Start: 2020-09-23 | End: 2020-10-03

## 2020-09-23 RX ORDER — SACCHAROMYCES BOULARDII 250 MG
250 CAPSULE ORAL 2 TIMES DAILY
Qty: 42 CAPSULE | Refills: 0 | Status: SHIPPED | OUTPATIENT
Start: 2020-09-23 | End: 2020-10-14

## 2020-09-23 NOTE — UTILIZATION REVIEW
Notification of Discharge  This is a Notification of Discharge from our facility [address]  Please be advised that this patient has been discharge from our facility  Below you will find the admission and discharge date and time including the patients disposition  PRESENTATION DATE: 9/14/2020  2:32 PM  OBS ADMISSION DATE: 09/14/2020  IP ADMISSION DATE: 9/15/20 1014   DISCHARGE DATE: 9/17/2020  5:00 PM  DISPOSITION: Home/Self Care Home/Self Care   Admission Orders listed below:  Admission Orders (From admission, onward)     Ordered        09/15/20 1014  Inpatient Admission  Once         09/14/20 1709  Place in Observation (expected length of stay for this patient is less than two midnights)  Once                   Please contact the UR Department if additional information is required to close this patient's authorization/case  [address] [facility] Utilization Review Department  Main: [phone] x carefully listen to the prompts  All voicemails are confidential   [email or web address]  Send all requests for admission clinical reviews, approved or denied determinations and any other requests to dedicated fax number below belonging to the campus where the patient is receiving treatment   List of dedicated fax numbers:  [address] [place] [address] DENIALS (Administrative/Medical Necessity) [phone]   [address]  (Maternity/NICU/Pediatrics) [phone]   Julio Garcia [phone]   Knightsenjudi Guillaume [phone]   Shant Holm [phone]   Lisha Pb [facility] [address] [facility] [phone]   [facility]  [phone]   [address] [facility] [address]  [address] [facility] [address] [facility] [phone]

## 2020-09-23 NOTE — PROGRESS NOTES
Assessment/Plan:    Cellulitis of left lower extremity  Was placed on IV clindamycin and p o  clindamycin on discharge from hospital   Due to concern about C diff and allergies prescribed a 10 day course of doxy along with florastor to prevent C diff  She is to continue taking tramadol for pain as needed       Diagnoses and all orders for this visit:    Encounter for support and coordination of transition of care    Cellulitis of left lower extremity  -     doxycycline hyclate (VIBRAMYCIN) 100 mg capsule; Take 1 capsule (100 mg total) by mouth every 12 (twelve) hours for 10 days    Vitamin D deficiency  -     cholecalciferol (VITAMIN D3) 1,000 units tablet; Take 1 tablet (1,000 Units total) by mouth daily    Osteoporosis without current pathological fracture, unspecified osteoporosis type  -     alendronate (FOSAMAX) 70 mg tablet; Take 1 tablet (70 mg total) by mouth every 7 days for 12 days    Medication side effect  -     saccharomyces boulardii (FLORASTOR) 250 mg capsule; Take 1 capsule (250 mg total) by mouth 2 (two) times a day for 21 days    Screening mammogram, encounter for  -     Mammo screening bilateral w 3d & cad; Future        TCM Call (since 8/24/2020)     Date and time call was made  9/18/2020  7:35 AM    Patient was hospitialized at  [address]    Date of Admission  09/15/20    Date of discharge  09/17/20    Diagnosis  left leg cellulitis    Disposition  Home      TCM Call (since 8/24/2020)     Scheduled for follow up? Yes        received her flu shot    Sarah Lauren is to [facility] in 1 month for annual physical and follow-up  She understood, acknowledged and agreed to the plan above  All her questions and concerns were answered and addressed  Case discussed with Dr Robertson Head    Subjective:      Patient ID: Lucretia Caceres is a 62 y o  female      Sarah Lauren is a 25-year-old woman that was seen and examined in the office today for transition of care management for an admission to the hospital for cellulitis  She was admitted on the 15 and discharged on the 17th  She was initially started on IV clindamycin and was discharged on p o  clindamycin  She states that her pain and swelling and erythema has decreased however she continues to have pain and tenderness in the left lower extremity around the ankle and heel  She denies any systemic symptoms on review of systems  Rash   This is a new problem  The current episode started 1 to 4 weeks ago  The problem has been gradually improving since onset  The affected locations include the left ankle and left foot (Initially bilaterally currently left a lower extremity)  The rash is characterized by redness, pain, dryness and swelling  She was exposed to nothing  Pertinent negatives include no anorexia, congestion, cough, diarrhea, eye pain, facial edema, fatigue, fever, joint pain, nail changes, rhinorrhea, shortness of breath, sore throat or vomiting  Past treatments include antibiotics  The treatment provided moderate relief  There is no history of allergies, asthma, eczema or varicella  The following portions of the patient's history were reviewed and updated as appropriate: allergies, current medications, past family history, past medical history, past social history, past surgical history and problem list     Review of Systems   Constitutional: Negative for fatigue and fever  HENT: Negative for congestion, rhinorrhea and sore throat  Eyes: Negative for pain  Respiratory: Negative for cough and shortness of breath  Gastrointestinal: Negative for anorexia, diarrhea and vomiting  Musculoskeletal: Negative for joint pain  Skin: Positive for rash  Negative for nail changes  All other systems reviewed and are negative  Objective:      Ht 5' 4" (1 626 m)   LMP  (LMP Unknown)   BMI 73 79 kg/m²          Physical Exam  Vitals signs and nursing note reviewed  Constitutional:       Appearance: Normal appearance  She is obese     HENT: Head: Normocephalic and atraumatic  Cardiovascular:      Rate and Rhythm: Normal rate and regular rhythm  Pulses: Normal pulses  Heart sounds: Normal heart sounds  Pulmonary:      Effort: Pulmonary effort is normal       Breath sounds: Normal breath sounds  Abdominal:      General: Abdomen is flat  Bowel sounds are normal       Palpations: Abdomen is soft  Musculoskeletal:         General: Tenderness present  Right lower leg: Edema present  Left lower leg: Edema present  Skin:     General: Skin is warm and dry  Findings: Erythema and rash present  Comments: Left lower extremity erythema not well demarcated tenderness to palpation no excessive warmth extending from the distal left lower extremity to around the ankle to the heel area  No ulcerations scars wounds or drainage was observed   Neurological:      General: No focal deficit present  Mental Status: She is alert and oriented to person, place, and time

## 2020-09-24 PROBLEM — L03.116 CELLULITIS OF LEFT LOWER EXTREMITY: Status: ACTIVE | Noted: 2018-03-30

## 2020-09-24 PROBLEM — M79.604 PAIN OF RIGHT LOWER EXTREMITY: Status: RESOLVED | Noted: 2018-03-30 | Resolved: 2020-09-24

## 2020-09-24 PROBLEM — D72.829 LEUKOCYTOSIS: Status: RESOLVED | Noted: 2020-09-09 | Resolved: 2020-09-24

## 2020-09-24 PROBLEM — Z11.59 ENCOUNTER FOR HEPATITIS C SCREENING TEST FOR LOW RISK PATIENT: Status: RESOLVED | Noted: 2018-04-17 | Resolved: 2020-09-24

## 2020-09-25 NOTE — ASSESSMENT & PLAN NOTE
Was placed on IV clindamycin and p o  clindamycin on discharge from hospital   Due to concern about C diff and allergies prescribed a 10 day course of doxy along with florastor to prevent C diff    She is to continue taking tramadol for pain as needed

## 2020-10-05 ENCOUNTER — TELEPHONE (OUTPATIENT)
Dept: HEMATOLOGY ONCOLOGY | Facility: CLINIC | Age: 65
End: 2020-10-05

## 2020-10-05 DIAGNOSIS — Z86.718 HISTORY OF DVT (DEEP VEIN THROMBOSIS): Primary | ICD-10-CM

## 2020-10-05 RX ORDER — WARFARIN SODIUM 5 MG/1
5 TABLET ORAL
COMMUNITY
End: 2020-10-05 | Stop reason: SDUPTHER

## 2020-10-05 RX ORDER — WARFARIN SODIUM 5 MG/1
TABLET ORAL
Qty: 150 TABLET | Refills: 2 | Status: SHIPPED | OUTPATIENT
Start: 2020-10-05 | End: 2021-06-30 | Stop reason: SDUPTHER

## 2020-10-28 ENCOUNTER — TELEPHONE (OUTPATIENT)
Dept: HEMATOLOGY ONCOLOGY | Facility: CLINIC | Age: 65
End: 2020-10-28

## 2020-11-03 ENCOUNTER — OFFICE VISIT (OUTPATIENT)
Dept: FAMILY MEDICINE CLINIC | Facility: CLINIC | Age: 65
End: 2020-11-03
Payer: COMMERCIAL

## 2020-11-03 VITALS
OXYGEN SATURATION: 90 % | HEART RATE: 91 BPM | SYSTOLIC BLOOD PRESSURE: 132 MMHG | DIASTOLIC BLOOD PRESSURE: 72 MMHG | TEMPERATURE: 97.8 F

## 2020-11-03 DIAGNOSIS — Z99.89 OBSTRUCTIVE SLEEP APNEA ON CPAP: ICD-10-CM

## 2020-11-03 DIAGNOSIS — G47.33 OBSTRUCTIVE SLEEP APNEA ON CPAP: ICD-10-CM

## 2020-11-03 DIAGNOSIS — R21 SKIN RASH: ICD-10-CM

## 2020-11-03 DIAGNOSIS — D68.59 HYPERCOAGULABLE STATE (HCC): ICD-10-CM

## 2020-11-03 DIAGNOSIS — E66.01 MORBID OBESITY WITH BMI OF 70 AND OVER, ADULT (HCC): ICD-10-CM

## 2020-11-03 DIAGNOSIS — E78.5 HYPERLIPIDEMIA, UNSPECIFIED HYPERLIPIDEMIA TYPE: ICD-10-CM

## 2020-11-03 DIAGNOSIS — L97.909 CHRONIC VENOUS HYPERTENSION W ULCERATION (HCC): ICD-10-CM

## 2020-11-03 DIAGNOSIS — I87.319 CHRONIC VENOUS HYPERTENSION W ULCERATION (HCC): ICD-10-CM

## 2020-11-03 DIAGNOSIS — I10 ESSENTIAL HYPERTENSION: Primary | ICD-10-CM

## 2020-11-03 DIAGNOSIS — R79.83 HOMOCYSTEINEMIA: ICD-10-CM

## 2020-11-03 DIAGNOSIS — D68.9 BLOOD CLOTTING DISORDER (HCC): ICD-10-CM

## 2020-11-03 PROCEDURE — 99214 OFFICE O/P EST MOD 30 MIN: CPT | Performed by: FAMILY MEDICINE

## 2020-11-03 PROCEDURE — 3078F DIAST BP <80 MM HG: CPT | Performed by: FAMILY MEDICINE

## 2020-11-03 PROCEDURE — 3075F SYST BP GE 130 - 139MM HG: CPT | Performed by: FAMILY MEDICINE

## 2020-11-03 PROCEDURE — 1036F TOBACCO NON-USER: CPT | Performed by: FAMILY MEDICINE

## 2020-11-03 RX ORDER — PRAVASTATIN SODIUM 40 MG
40 TABLET ORAL DAILY
Qty: 90 TABLET | Refills: 3
Start: 2020-11-03 | End: 2021-11-22 | Stop reason: SDUPTHER

## 2020-11-03 RX ORDER — FUROSEMIDE 20 MG/1
20 TABLET ORAL DAILY
Qty: 90 TABLET | Refills: 3 | Status: SHIPPED | OUTPATIENT
Start: 2020-11-03 | End: 2021-11-22 | Stop reason: SDUPTHER

## 2020-11-04 ENCOUNTER — TELEPHONE (OUTPATIENT)
Dept: HEMATOLOGY ONCOLOGY | Facility: CLINIC | Age: 65
End: 2020-11-04

## 2020-11-04 DIAGNOSIS — Z12.31 SCREENING MAMMOGRAM, ENCOUNTER FOR: ICD-10-CM

## 2020-11-11 ENCOUNTER — TELEPHONE (OUTPATIENT)
Dept: HEMATOLOGY ONCOLOGY | Facility: CLINIC | Age: 65
End: 2020-11-11

## 2020-12-01 ENCOUNTER — TELEPHONE (OUTPATIENT)
Dept: HEMATOLOGY ONCOLOGY | Facility: CLINIC | Age: 65
End: 2020-12-01

## 2020-12-28 LAB — INR PPP: 2.3 (ref 0.84–1.19)

## 2020-12-30 ENCOUNTER — TELEPHONE (OUTPATIENT)
Dept: HEMATOLOGY ONCOLOGY | Facility: CLINIC | Age: 65
End: 2020-12-30

## 2020-12-30 ENCOUNTER — ANTICOAG VISIT (OUTPATIENT)
Dept: HEMATOLOGY ONCOLOGY | Facility: CLINIC | Age: 65
End: 2020-12-30

## 2021-01-13 ENCOUNTER — TELEPHONE (OUTPATIENT)
Dept: HEMATOLOGY ONCOLOGY | Facility: CLINIC | Age: 66
End: 2021-01-13

## 2021-01-13 NOTE — TELEPHONE ENCOUNTER
Patient has INR done yesterday at [facility]  Patient is on coumadin 7 5 mg daily   Will send to Dr Alicea's MA for coumadin directions  Patient aware of plan

## 2021-01-13 NOTE — TELEPHONE ENCOUNTER
Per Dr Orlena Litten, patient is to stay on the same dose of Coumadin, 7 5 mg daily, and recheck her labs in 2-3 weeks  Spoke to patient and she voiced understanding on information and instructions

## 2021-02-05 ENCOUNTER — TELEPHONE (OUTPATIENT)
Dept: HEMATOLOGY ONCOLOGY | Facility: CLINIC | Age: 66
End: 2021-02-05

## 2021-02-05 NOTE — TELEPHONE ENCOUNTER
Patient advised of above  Patient verbalized understanding of above  Patient is questioning if she can come in in June instead of 4/22/21 appointment? She is worried about COVID  Please review above with Dr Medina Centers  If ok when patient is called with Coumadin instructions please reschedule her appt   At time

## 2021-02-05 NOTE — TELEPHONE ENCOUNTER
Patient is questioning if she can get the COVID vaccine  Is she at risk for blood clots from the COVID 19 vaccine?     Will forward to Dr Barrett Argueta RN

## 2021-02-05 NOTE — TELEPHONE ENCOUNTER
Per Dr Priscilla Cho, patient is to take 7 5 mg of Coumadin Monday-Friday and Saturday & Sunday 5 mg and recheck her labs in 2 weeks  Spoke to patient an informed her of this information, patient voiced understanding

## 2021-02-05 NOTE — TELEPHONE ENCOUNTER
Per Dr Jitendra Henry, from a hematologic standpoint, patient ok to receive vaccine  The decision us ultimately up to the patient   The vaccine does not cause a risk for blood clots

## 2021-02-05 NOTE — TELEPHONE ENCOUNTER
Patient is calling for her PT/INR results from yesterday  Patient went to [facility]  Please call and get results  Patient is on Coumadin 7 5 mg daily  Please review with Dr Kenya Carr for further Coumadin instructions

## 2021-02-08 ENCOUNTER — TELEPHONE (OUTPATIENT)
Dept: HEMATOLOGY ONCOLOGY | Facility: CLINIC | Age: 66
End: 2021-02-08

## 2021-02-08 DIAGNOSIS — Z86.718 HISTORY OF DVT (DEEP VEIN THROMBOSIS): ICD-10-CM

## 2021-02-17 LAB — INR PPP: 1.6 (ref 0.84–1.19)

## 2021-02-18 ENCOUNTER — TELEPHONE (OUTPATIENT)
Dept: HEMATOLOGY ONCOLOGY | Facility: CLINIC | Age: 66
End: 2021-02-18

## 2021-02-18 NOTE — TELEPHONE ENCOUNTER
Patient is calling requesting her INR level from 2/17/21 at [facility]  Patient is on Coumadin 5 MG Saturday and Sunday and Coumadin 7 5 mg Monday through Friday  Please call patient with further Coumadin instructions  Patient's call back # [phone] (H)

## 2021-02-19 ENCOUNTER — TELEPHONE (OUTPATIENT)
Dept: HEMATOLOGY ONCOLOGY | Facility: CLINIC | Age: 66
End: 2021-02-19

## 2021-02-19 ENCOUNTER — ANTICOAG VISIT (OUTPATIENT)
Dept: HEMATOLOGY ONCOLOGY | Facility: CLINIC | Age: 66
End: 2021-02-19

## 2021-02-19 NOTE — TELEPHONE ENCOUNTER
Spoke with patient  INR was 1 6, previously 3 0  She denies any changes, ie no abx, no diet changes  Increase coumadin to 7 5 mg every day  Recheck INR in 10 days

## 2021-02-19 NOTE — TELEPHONE ENCOUNTER
I spoke with patient  INR 1 6  She takes 7 5 mg Coumadin Mondays through Fridays and 5 mg on Saturdays and Sundays  She will take 7 5 mg Coumadin every day  INR in 2 weeks

## 2021-03-04 ENCOUNTER — TELEPHONE (OUTPATIENT)
Dept: HEMATOLOGY ONCOLOGY | Facility: CLINIC | Age: 66
End: 2021-03-04

## 2021-03-04 NOTE — TELEPHONE ENCOUNTER
Per Dr Marylene Bottom, patient is to continue on the same dose of Coumadin of 7 5 mg daily and recheck her labs in 2-3 weeks  Patient voiced understanding

## 2021-03-04 NOTE — TELEPHONE ENCOUNTER
Patient calling for coumadin instructions   She went to [facility] yesterday  Current coumadin dose = 7 5mg PO daily    Will send to MA to obtain results and review with MD    Call back number

## 2021-03-16 ENCOUNTER — TELEPHONE (OUTPATIENT)
Dept: FAMILY MEDICINE CLINIC | Facility: CLINIC | Age: 66
End: 2021-03-16

## 2021-03-16 NOTE — TELEPHONE ENCOUNTER
PATIENT IS ASKING FOR A LETTER SAYING THAT SHE NEEDS HER RAMP THAT SHE RECEIVED January 2020 AFTER HER SURGERY FOR HER TAXES

## 2021-03-19 ENCOUNTER — TELEPHONE (OUTPATIENT)
Dept: HEMATOLOGY ONCOLOGY | Facility: CLINIC | Age: 66
End: 2021-03-19

## 2021-03-19 NOTE — TELEPHONE ENCOUNTER
Patient is on coumadin 7 5 mg daily  Patient had INR drawn yesterday at [facility]  No results in chart  Will send to Dr Alicea's MA to retrieve result and call patient  Patient aware of plan

## 2021-03-19 NOTE — TELEPHONE ENCOUNTER
Obtained labs from [facility], PT 30 9, INR 3 1  Per Dr Dinora Cortez can continue 7 5 mg coumadin daily and recheck her labs in 2-3 weeks  I spoke with Genna Osborne and she voiced understanding

## 2021-03-30 DIAGNOSIS — J30.89 ENVIRONMENTAL AND SEASONAL ALLERGIES: ICD-10-CM

## 2021-03-31 RX ORDER — MONTELUKAST SODIUM 10 MG/1
10 TABLET ORAL
Qty: 90 TABLET | Refills: 3 | Status: SHIPPED | OUTPATIENT
Start: 2021-03-31 | End: 2022-03-30 | Stop reason: SDUPTHER

## 2021-04-09 ENCOUNTER — TELEPHONE (OUTPATIENT)
Dept: HEMATOLOGY ONCOLOGY | Facility: CLINIC | Age: 66
End: 2021-04-09

## 2021-04-09 NOTE — TELEPHONE ENCOUNTER
Patient went to [facility] yesterday for INR  Results not in system  Patient is on coumadin 7 5 mg daily  Will send to Dr Alicea's MA  Patient aware of plan

## 2021-04-09 NOTE — TELEPHONE ENCOUNTER
Obtained labs from [facility] PT 25 9 INR 2 5   Per Dr Gian Prieto patient is to stay on 7 5 MG Coumadin daily and to recheck her labs in 2-3 weeks  Patient voiced understanding

## 2021-04-19 ENCOUNTER — TELEPHONE (OUTPATIENT)
Dept: HEMATOLOGY ONCOLOGY | Facility: CLINIC | Age: 66
End: 2021-04-19

## 2021-04-19 NOTE — TELEPHONE ENCOUNTER
Appointment Cancellation Or Reschedule     Person calling in Patient    Provider Florencio Visit Date and Time  04/22/2021 @ 10am   Office Visit Location [facility] [place]   Did patient want to reschedule their office appointment? If so, when was it scheduled to? Patient requesting virtual  Patient stated she will not come into office due to covid    Is this patient calling to reschedule an infusion appointment? N/A   Is this patient a Chemo patient? N/A   When is their next infusion visit? N/A   Reason for Cancellation or Reschedule [phone]     If the patient is a treatment patient, please route this to the office nurse  If the patient is not on treatment, please route to the office MA

## 2021-04-20 NOTE — TELEPHONE ENCOUNTER
Patient stated that she does not want to come into the office  Her appt was R/S to 7/8/21 at 2:00 pm at the [facility] location with Dr Jose Antonio Brand

## 2021-04-30 ENCOUNTER — TELEPHONE (OUTPATIENT)
Dept: HEMATOLOGY ONCOLOGY | Facility: CLINIC | Age: 66
End: 2021-04-30

## 2021-04-30 NOTE — TELEPHONE ENCOUNTER
LVM for Holden Estrada:   Per Dr Kasie Henderson patient is to stay on 7 5 MG Coumadin daily and to recheck her labs in 2-3 weeks

## 2021-05-05 ENCOUNTER — TELEPHONE (OUTPATIENT)
Dept: FAMILY MEDICINE CLINIC | Facility: CLINIC | Age: 66
End: 2021-05-05

## 2021-05-11 ENCOUNTER — OFFICE VISIT (OUTPATIENT)
Dept: FAMILY MEDICINE CLINIC | Facility: CLINIC | Age: 66
End: 2021-05-11
Payer: COMMERCIAL

## 2021-05-11 VITALS
WEIGHT: 293 LBS | TEMPERATURE: 97.4 F | HEART RATE: 78 BPM | OXYGEN SATURATION: 98 % | DIASTOLIC BLOOD PRESSURE: 72 MMHG | SYSTOLIC BLOOD PRESSURE: 134 MMHG | BODY MASS INDEX: 50.02 KG/M2 | HEIGHT: 64 IN

## 2021-05-11 DIAGNOSIS — I10 ESSENTIAL HYPERTENSION: Primary | ICD-10-CM

## 2021-05-11 DIAGNOSIS — M79.5 FOREIGN BODY (FB) IN SOFT TISSUE: ICD-10-CM

## 2021-05-11 DIAGNOSIS — D68.9 BLOOD CLOTTING DISORDER (HCC): ICD-10-CM

## 2021-05-11 DIAGNOSIS — R21 SKIN RASH: ICD-10-CM

## 2021-05-11 DIAGNOSIS — I87.319 CHRONIC VENOUS HYPERTENSION W ULCERATION (HCC): ICD-10-CM

## 2021-05-11 DIAGNOSIS — L97.909 CHRONIC VENOUS HYPERTENSION W ULCERATION (HCC): ICD-10-CM

## 2021-05-11 DIAGNOSIS — R52 PAIN: ICD-10-CM

## 2021-05-11 DIAGNOSIS — E66.01 MORBID OBESITY WITH BMI OF 70 AND OVER, ADULT (HCC): ICD-10-CM

## 2021-05-11 DIAGNOSIS — I27.20 PULMONARY HTN (HCC): ICD-10-CM

## 2021-05-11 PROCEDURE — 99214 OFFICE O/P EST MOD 30 MIN: CPT | Performed by: FAMILY MEDICINE

## 2021-05-11 RX ORDER — HYDROCORTISONE AND IODOQUINOL 10; 10 MG/G; MG/G
CREAM TOPICAL 2 TIMES DAILY
Qty: 28.4 G | Refills: 5 | Status: SHIPPED | OUTPATIENT
Start: 2021-05-11 | End: 2021-05-18 | Stop reason: SDUPTHER

## 2021-05-11 NOTE — PROGRESS NOTES
Assessment/Plan:    No problem-specific Assessment & Plan notes found for this encounter  Diagnoses and all orders for this visit:    Essential hypertension    Chronic venous hypertension w ulceration ([address] )    Blood clotting disorder ([address] )    Morbid obesity with BMI of 70 and over, adult ([address] )    Pain  -     XR foot 3+ vw right; Future    Skin rash  -     Hydrocortisone-Iodoquinol (Dermazene) 1-1 % CREA; Apply topically 2 (two) times a day    Pulmonary HTN ([address] )  -     Echo complete with contrast if indicated; Future    Foreign body (FB) in soft tissue  -     Ambulatory referral to Podiatry; Future          Subjective:      Patient ID: Zonia Frye is a 60 y o  female  She hurt her right great toe while walking barefoot on carpet  It bled for a bit, but she is anticoagulated  Her Tdap is UTD  She has no F/C  Her BP is at goal on her current regimen  She has pulmonary HTN, likely due to JES  She has some mild BARNARD  She has severe obesity  She is not interested in weight loss surgery  She declines referral to weight management  She has a clotting disorder and is chronically anticoagulated with warfarin  The following portions of the patient's history were reviewed and updated as appropriate:   She  has a past medical history of Blood clotting disorder ([facility] [address] ), Homocysteinemia ([address] ), Hypercoagulable state ([address] ), Lyme disease, Renal disorder, and 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 ([address] )    She   Patient Active Problem List    Diagnosis Date Noted    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 ([facility] [address] )     Anemia 09/10/2020    Hyperphosphatemia 09/10/2020    S/P TKR (total knee replacement) 03/04/2020    Elida filter in place 02/17/2020    History of pulmonary embolism 02/17/2020    Rosacea 11/22/2019    Abnormality of coagulation ([address] ) 06/12/2019    Heme positive stool 06/21/2018    Cellulitis of left lower extremity 03/30/2018    Lyme disease     Blood clotting disorder ([place] [address] )     Essential hypertension     Homocysteinemia ([place] [address] )     Hypercoagulable state ([place] [address] )     Obstructive sleep apnea on CPAP     Secondary pulmonary hypertension 08/10/2016    History of DVT (deep vein thrombosis) 07/01/2016    Morbid obesity with BMI of 70 and over, adult ([place] [address] ) 07/01/2016    Acute embolism and thrombosis of unspecified deep veins of unspecified lower extremity ([place] [address] ) 04/25/2016    Skin rash 05/22/2014    Chronic venous hypertension w ulceration ([place] [address] ) 12/18/2013    Osteoporosis 12/02/2013    Edema 11/20/2013    Esophageal reflux 11/20/2013    Hyperlipidemia 11/20/2013    Polycystic ovarian syndrome 11/20/2013     She  has a past surgical history that includes Joint replacement (Bilateral); Appendectomy; Ankle surgery (Right); Hysterectomy; Hemorroidectomy; Breast biopsy; Foot surgery; LITHOTRIPSY; Breast biopsy; Closed reduction metatarsal fracture (Right); and Vena cava filter placement  Her family history includes Hypercoagulant Ability in her maternal grandmother and mother; No Known Problems in her father; Pulmonary embolism in her mother  She  reports that she has never smoked  She has never used smokeless tobacco  She reports that she does not drink alcohol or use drugs  Current Outpatient Medications   Medication Sig Dispense Refill    acetaminophen (TYLENOL) 325 mg tablet Take 2 tablets (650 mg total) by mouth every 6 (six) hours as needed for mild pain, moderate pain, headaches or fever  0    albuterol (PROVENTIL HFA,VENTOLIN HFA) 90 mcg/act inhaler Inhale 2 puffs every 6 (six) hours as needed for wheezing        amLODIPine-benazepril (LOTREL 5-20) 5-20 MG per capsule Take 1 capsule by mouth daily 90 capsule 3    cetirizine (ZYRTEC ALLERGY) 10 mg tablet Take 1 tablet by mouth daily as needed      folic acid-pyridoxine-cyanocobalamin (Folbic) 2 5-25-2 mg Take 1 tablet by mouth daily 90 tablet 3    furosemide (LASIX) 20 mg tablet Take 1 tablet (20 mg total) by mouth daily 90 tablet 3    Iodoquinol-HC-Aloe Polysacch (ALCORTIN A) 1-2-1 % GEL Apply topically 2 (two) times a day as needed       metFORMIN (GLUCOPHAGE-XR) 500 mg 24 hr tablet Take 1 tablet (500 mg total) by mouth 4 (four) times a day 360 tablet 3    mometasone (NASONEX) 50 mcg/act nasal spray 2 sprays into each nostril daily      montelukast (SINGULAIR) 10 mg tablet Take 1 tablet (10 mg total) by mouth daily at bedtime 90 tablet 3    multivitamin (THERAGRAN) TABS Take 1 tablet by mouth daily   pravastatin (PRAVACHOL) 40 mg tablet Take 1 tablet (40 mg total) by mouth daily 90 tablet 3    triamcinolone (KENALOG) 0 1 % cream 2 (two) times a day as needed       warfarin (COUMADIN) 5 mg tablet Take  strictly as advised 150 tablet 2    alendronate (FOSAMAX) 70 mg tablet Take 1 tablet (70 mg total) by mouth every 7 days for 12 days 12 tablet 3    cholecalciferol (VITAMIN D3) 1,000 units tablet Take 1 tablet (1,000 Units total) by mouth daily 30 tablet 3    Hydrocortisone-Iodoquinol (Dermazene) 1-1 % CREA Apply topically 2 (two) times a day 28 4 g 5     No current facility-administered medications for this visit  Current Outpatient Medications on File Prior to Visit   Medication Sig    acetaminophen (TYLENOL) 325 mg tablet Take 2 tablets (650 mg total) by mouth every 6 (six) hours as needed for mild pain, moderate pain, headaches or fever    albuterol (PROVENTIL HFA,VENTOLIN HFA) 90 mcg/act inhaler Inhale 2 puffs every 6 (six) hours as needed for wheezing      amLODIPine-benazepril (LOTREL 5-20) 5-20 MG per capsule Take 1 capsule by mouth daily    cetirizine (ZYRTEC ALLERGY) 10 mg tablet Take 1 tablet by mouth daily as needed    folic acid-pyridoxine-cyanocobalamin (Folbic) 2 5-25-2 mg Take 1 tablet by mouth daily    furosemide (LASIX) 20 mg tablet Take 1 tablet (20 mg total) by mouth daily    Iodoquinol-HC-Aloe Polysacch (ALCORTIN A) 1-2-1 % GEL Apply topically 2 (two) times a day as needed     metFORMIN (GLUCOPHAGE-XR) 500 mg 24 hr tablet Take 1 tablet (500 mg total) by mouth 4 (four) times a day    mometasone (NASONEX) 50 mcg/act nasal spray 2 sprays into each nostril daily    montelukast (SINGULAIR) 10 mg tablet Take 1 tablet (10 mg total) by mouth daily at bedtime    multivitamin (THERAGRAN) TABS Take 1 tablet by mouth daily   pravastatin (PRAVACHOL) 40 mg tablet Take 1 tablet (40 mg total) by mouth daily    triamcinolone (KENALOG) 0 1 % cream 2 (two) times a day as needed     warfarin (COUMADIN) 5 mg tablet Take  strictly as advised    [DISCONTINUED] nystatin (MYCOSTATIN) powder Apply topically 3 (three) times a day (Patient taking differently: Apply topically 3 (three) times a day as needed )    [DISCONTINUED] SSD 1 % cream APPLY CREAM TOPICALLY DAILY (Patient taking differently: daily as needed )    alendronate (FOSAMAX) 70 mg tablet Take 1 tablet (70 mg total) by mouth every 7 days for 12 days    cholecalciferol (VITAMIN D3) 1,000 units tablet Take 1 tablet (1,000 Units total) by mouth daily     No current facility-administered medications on file prior to visit  She is allergic to azithromycin; ciprofloxacin; clindamycin; and ancef [cefazolin]       Review of Systems   All other systems reviewed and are negative  Objective:      /72   Pulse 78   Temp (!) 97 4 °F (36 3 °C)   Ht 5' 4" (1 626 m)   Wt (!) 188 kg (414 lb)   LMP  (LMP Unknown)   SpO2 98%   BMI 71 06 kg/m²          Physical Exam  Vitals signs and nursing note reviewed  Constitutional:       Appearance: Normal appearance  She is obese  HENT:      Head: Normocephalic and atraumatic  Neck:      Musculoskeletal: Normal range of motion and neck supple  Cardiovascular:      Rate and Rhythm: Normal rate and regular rhythm  Pulses: Normal pulses  Heart sounds: Normal heart sounds     Pulmonary:      Effort: Pulmonary effort is normal       Breath sounds: Normal breath sounds  Abdominal:      General: Abdomen is flat  Bowel sounds are normal       Palpations: Abdomen is soft  Musculoskeletal: Normal range of motion  Skin:     General: Skin is warm and dry  Capillary Refill: Capillary refill takes less than 2 seconds  Neurological:      General: No focal deficit present  Mental Status: She is alert and oriented to person, place, and time  Mental status is at baseline  Psychiatric:         Mood and Affect: Mood normal          Behavior: Behavior normal          Thought Content: Thought content normal          Judgment: Judgment normal        BMI Counseling: Body mass index is 71 06 kg/m²  The BMI is above normal  Nutrition recommendations include reducing portion sizes, decreasing overall calorie intake, 3-5 servings of fruits/vegetables daily, reducing fast food intake, consuming healthier snacks, decreasing soda and/or juice intake, moderation in carbohydrate intake, increasing intake of lean protein, reducing intake of saturated fat and trans fat and reducing intake of cholesterol  Exercise recommendations include moderate aerobic physical activity for 150 minutes/week, vigorous aerobic physical activity for 75 minutes/week, exercising 3-5 times per week, joining a gym and strength training exercises

## 2021-05-13 ENCOUNTER — TELEPHONE (OUTPATIENT)
Dept: HEMATOLOGY ONCOLOGY | Facility: CLINIC | Age: 66
End: 2021-05-13

## 2021-05-13 NOTE — TELEPHONE ENCOUNTER
Spoke with Eleanor Martin patient is to stay on 7 5 MG Coumadin daily and to recheck her labs in 2-3 weeks  Nidhi Hidalgo voiced understanding

## 2021-05-18 DIAGNOSIS — R21 SKIN RASH: ICD-10-CM

## 2021-05-18 DIAGNOSIS — I10 ESSENTIAL HYPERTENSION: ICD-10-CM

## 2021-05-18 RX ORDER — AMLODIPINE BESYLATE AND BENAZEPRIL HYDROCHLORIDE 5; 20 MG/1; MG/1
1 CAPSULE ORAL DAILY
Qty: 90 CAPSULE | Refills: 3 | Status: SHIPPED | OUTPATIENT
Start: 2021-05-18 | End: 2022-02-16 | Stop reason: SDUPTHER

## 2021-05-18 RX ORDER — HYDROCORTISONE AND IODOQUINOL 10; 10 MG/G; MG/G
CREAM TOPICAL 2 TIMES DAILY
Qty: 28.4 G | Refills: 5 | Status: SHIPPED | OUTPATIENT
Start: 2021-05-18 | End: 2021-06-10 | Stop reason: SDUPTHER

## 2021-05-24 ENCOUNTER — APPOINTMENT (EMERGENCY)
Dept: RADIOLOGY | Facility: HOSPITAL | Age: 66
End: 2021-05-24
Payer: COMMERCIAL

## 2021-05-24 ENCOUNTER — HOSPITAL ENCOUNTER (EMERGENCY)
Facility: HOSPITAL | Age: 66
Discharge: HOME | End: 2021-05-24
Attending: EMERGENCY MEDICINE | Admitting: EMERGENCY MEDICINE
Payer: COMMERCIAL

## 2021-05-24 ENCOUNTER — HOSPITAL ENCOUNTER (OUTPATIENT)
Dept: NON INVASIVE DIAGNOSTICS | Facility: HOSPITAL | Age: 66
Discharge: HOME | End: 2021-05-24
Attending: FAMILY MEDICINE
Payer: COMMERCIAL

## 2021-05-24 VITALS
WEIGHT: 293 LBS | TEMPERATURE: 98.3 F | BODY MASS INDEX: 50.02 KG/M2 | HEIGHT: 64 IN | DIASTOLIC BLOOD PRESSURE: 78 MMHG | RESPIRATION RATE: 18 BRPM | HEART RATE: 71 BPM | OXYGEN SATURATION: 96 % | SYSTOLIC BLOOD PRESSURE: 131 MMHG

## 2021-05-24 DIAGNOSIS — I27.20 PULMONARY HTN (HCC): ICD-10-CM

## 2021-05-24 DIAGNOSIS — S90.30XA CONTUSION OF FOOT: Primary | ICD-10-CM

## 2021-05-24 DIAGNOSIS — L03.119 CELLULITIS OF FOOT: ICD-10-CM

## 2021-05-24 PROCEDURE — 73630 X-RAY EXAM OF FOOT: CPT

## 2021-05-24 PROCEDURE — 93306 TTE W/DOPPLER COMPLETE: CPT | Performed by: INTERNAL MEDICINE

## 2021-05-24 PROCEDURE — 99283 EMERGENCY DEPT VISIT LOW MDM: CPT

## 2021-05-24 PROCEDURE — 93306 TTE W/DOPPLER COMPLETE: CPT

## 2021-05-24 PROCEDURE — 99284 EMERGENCY DEPT VISIT MOD MDM: CPT | Performed by: EMERGENCY MEDICINE

## 2021-05-24 RX ORDER — CEPHALEXIN 250 MG/1
500 CAPSULE ORAL ONCE
Status: COMPLETED | OUTPATIENT
Start: 2021-05-24 | End: 2021-05-24

## 2021-05-24 RX ORDER — CEPHALEXIN 500 MG/1
500 CAPSULE ORAL EVERY 6 HOURS SCHEDULED
Qty: 28 CAPSULE | Refills: 0 | Status: SHIPPED | OUTPATIENT
Start: 2021-05-24 | End: 2021-05-31

## 2021-05-24 RX ADMIN — CEPHALEXIN 500 MG: 250 CAPSULE ORAL at 14:53

## 2021-05-24 NOTE — ED NOTES
Xray at bedside     Alex Isaac, [facility]  05/24/21 [id number]

## 2021-05-24 NOTE — ED PROVIDER NOTES
History  Chief Complaint   Patient presents with    Foot Injury     pt dropped a bottle on her right foot on wednesday, complains of sweling and pain in same     Patient is a 59-year-old female with history of diabetes presenting for evaluation of right foot pain  Patient says that she dropped a bottle a barbecue sauce on her foot on Wednesday of last week  She says she is having progressing pain and swelling over the top of her foot  She says that she has been having difficulty ambulating secondary to the pain  She admits to a history of cellulitis in the right foot as well  On exam, there is swelling to the top of the right foot  It is mildly red and somewhat warm to touch  She has ecchymosis to the toes  Her foot is neurovascularly intact          Prior to Admission Medications   Prescriptions Last Dose Informant Patient Reported? Taking? Hydrocortisone-Iodoquinol (Dermazene) 1-1 % CREA   No No   Sig: Apply topically 2 (two) times a day   Iodoquinol-HC-Aloe Polysacch (ALCORTIN A) 1-2-1 % GEL  Self Yes No   Sig: Apply topically 2 (two) times a day as needed    acetaminophen (TYLENOL) 325 mg tablet   No No   Sig: Take 2 tablets (650 mg total) by mouth every 6 (six) hours as needed for mild pain, moderate pain, headaches or fever   albuterol (PROVENTIL HFA,VENTOLIN HFA) 90 mcg/act inhaler  Self Yes No   Sig: Inhale 2 puffs every 6 (six) hours as needed for wheezing     alendronate (FOSAMAX) 70 mg tablet   No No   Sig: Take 1 tablet (70 mg total) by mouth every 7 days for 12 days   amLODIPine-benazepril (LOTREL 5-20) 5-20 MG per capsule   No No   Sig: Take 1 capsule by mouth daily   cetirizine (ZYRTEC ALLERGY) 10 mg tablet  Self Yes No   Sig: Take 1 tablet by mouth daily as needed   cholecalciferol (VITAMIN D3) 1,000 units tablet   No No   Sig: Take 1 tablet (1,000 Units total) by mouth daily   folic acid-pyridoxine-cyanocobalamin (Folbic) 2 5-25-2 mg   No No   Sig: Take 1 tablet by mouth daily   furosemide (LASIX) 20 mg tablet   No No   Sig: Take 1 tablet (20 mg total) by mouth daily   metFORMIN (GLUCOPHAGE-XR) 500 mg 24 hr tablet   No No   Sig: Take 1 tablet (500 mg total) by mouth 4 (four) times a day   mometasone (NASONEX) 50 mcg/act nasal spray  Self Yes No   Si sprays into each nostril daily   montelukast (SINGULAIR) 10 mg tablet   No No   Sig: Take 1 tablet (10 mg total) by mouth daily at bedtime   multivitamin (THERAGRAN) TABS  Self Yes No   Sig: Take 1 tablet by mouth daily  pravastatin (PRAVACHOL) 40 mg tablet   No No   Sig: Take 1 tablet (40 mg total) by mouth daily   triamcinolone (KENALOG) 0 1 % cream  Self Yes No   Si (two) times a day as needed    warfarin (COUMADIN) 5 mg tablet   No No   Sig: Take  strictly as advised      Facility-Administered Medications: None       Past Medical History:   Diagnosis Date    [facility]ting disorder ([facility] )     [facility]steinemia ([facility] )     Hypercoagulable state ([facility] )     Lyme disease     Renal disorder     kidney stones    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 ([facility] )        Past Surgical History:   Procedure Laterality Date    ANKLE SURGERY Right     APPENDECTOMY      BREAST BIOPSY      BREAST BIOPSY      CLOSED REDUCTION METATARSAL FRACTURE Right     FOOT SURGERY      HEMORROIDECTOMY      HYSTERECTOMY      JOINT REPLACEMENT Bilateral     knee    LITHOTRIPSY      VENA CAVA FILTER PLACEMENT      onset: 1/10/10       Family History   Problem Relation Age of Onset    Pulmonary embolism Mother     Hypercoagulant Ability Mother         primary state    Hypercoagulant Ability Maternal Grandmother         primary state    No Known Problems Father      I have reviewed and agree with the history as documented      E-Cigarette/Vaping    E-Cigarette Use Never User      E-Cigarette/Vaping Substances     Social History     Tobacco Use    Smoking status: Never Smoker    Smokeless tobacco: Never Used   Substance Use Topics    Alcohol use: Never     Frequency: Never     Binge frequency: Never    Drug use: No       Review of Systems   Constitutional: Negative for chills, diaphoresis and fever  HENT: Negative for congestion, sinus pressure, sore throat and trouble swallowing  Eyes: Negative for pain, discharge and itching  Respiratory: Negative for cough, chest tightness, shortness of breath and wheezing  Cardiovascular: Negative for chest pain, palpitations and leg swelling  Gastrointestinal: Negative for abdominal distention, abdominal pain, blood in stool, diarrhea, nausea and vomiting  Endocrine: Negative for polyphagia and polyuria  Genitourinary: Negative for difficulty urinating, dysuria, flank pain, hematuria, pelvic pain and vaginal bleeding  Musculoskeletal: Positive for arthralgias (R foot)  Negative for back pain  Skin: Positive for color change (redness (top of R foot))  Negative for rash  Neurological: Negative for dizziness, syncope, weakness, light-headedness and headaches  Physical Exam  Physical Exam  Vitals signs and nursing note reviewed  Constitutional:       General: She is not in acute distress  Appearance: She is well-developed  HENT:      Head: Normocephalic and atraumatic  Right Ear: External ear normal       Left Ear: External ear normal       Mouth/Throat:      Pharynx: No oropharyngeal exudate  Eyes:      Conjunctiva/sclera: Conjunctivae normal       Pupils: Pupils are equal, round, and reactive to light  Neck:      Musculoskeletal: Normal range of motion and neck supple  Cardiovascular:      Rate and Rhythm: Normal rate and regular rhythm  Heart sounds: Normal heart sounds  No murmur  No friction rub  No gallop  Pulmonary:      Effort: Pulmonary effort is normal  No respiratory distress  Breath sounds: Normal breath sounds  No wheezing or rales  Abdominal:      General: There is no distension  Palpations: Abdomen is soft  Tenderness: There is no abdominal tenderness  There is no guarding  Musculoskeletal: Normal range of motion  General: Swelling (top of R foot) and tenderness (Top of R foot) present  No deformity  Feet:    Lymphadenopathy:      Cervical: No cervical adenopathy  Skin:     General: Skin is warm and dry  Neurological:      Mental Status: She is alert and oriented to person, place, and time  Cranial Nerves: No cranial nerve deficit  Sensory: No sensory deficit  Motor: No abnormal muscle tone  Vital Signs  ED Triage Vitals [05/24/21 1428]   Temperature Pulse Respirations Blood Pressure SpO2   98 3 °F (36 8 °C) 89 22 149/70 93 %      Temp Source Heart Rate Source Patient Position - Orthostatic VS BP Location FiO2 (%)   Temporal Monitor Lying Right arm --      Pain Score       Worst Possible Pain           Vitals:    05/24/21 1500 05/24/21 1530 05/24/21 1600 05/24/21 1615   BP: 135/65 143/66 133/60 131/78   Pulse: 77 69 70 71   Patient Position - Orthostatic VS: Sitting Sitting Sitting Sitting         Visual Acuity      ED Medications  Medications   cephalexin (KEFLEX) capsule 500 mg (500 mg Oral Given 5/24/21 1453)       Diagnostic Studies  Results Reviewed     None                 XR foot 3+ views RIGHT   ED Interpretation by Peace Spangler DO (05/24 1610)   No acute osseous abnormality       Final Result by Carina Laura MD (05/25 1879)      No acute osseous abnormality  Postoperative change  Soft tissue swelling  Workstation performed: [id number]                    Procedures  Procedures         ED Course                             SBIRT 20yo+      Most Recent Value   SBIRT (22 yo +)   In order to provide better care to our patients, we are screening all of our patients for alcohol and drug use  Would it be okay to ask you these screening questions? Yes Filed at: 05/24/2021 6920   Initial Alcohol Screen: US AUDIT-C    1   How often do you have a drink containing alcohol?  0 Filed at: 05/24/2021 1440   2  How many drinks containing alcohol do you have on a typical day you are drinking? 0 Filed at: 05/24/2021 1440   3a  Male UNDER 65: How often do you have five or more drinks on one occasion? 0 Filed at: 05/24/2021 1440   3b  FEMALE Any Age, or MALE 65+: How often do you have 4 or more drinks on one occassion? 0 Filed at: 05/24/2021 1440   Audit-C Score  0 Filed at: 05/24/2021 1440   CAMACHO: How many times in the past year have you    Used an illegal drug or used a prescription medication for non-medical reasons? Never Filed at: 05/24/2021 1440                    MDM  Number of Diagnoses or Management Options  Diagnosis management comments: 59-year-old female presenting for right foot pain  Dropped a bottle and barbecue sauce on her foot about 5 days ago  Having increasing pain and swelling in the area  Foot is mildly red in appearance and mildly warm to touch  Foot is neurovascularly intact  Will obtain x-ray of right foot  Will start on Keflex for what I presume to be superficial cellulitis      Disposition  Final diagnoses:   Contusion of foot - right   Cellulitis of foot - right     Time reflects when diagnosis was documented in both MDM as applicable and the Disposition within this note     Time User Action Codes Description Comment    5/24/2021  4:10 PM Zainab Montieln Add [S90 30XA] Contusion of foot     5/24/2021  4:10 PM Sameer Todd [S90 30XA] Contusion of foot right    5/24/2021  4:10 PM Zainab Montieln Add [L27 566] Cellulitis of foot     5/24/2021  4:10 PM Zainab Montieln Modify [P78 708] Cellulitis of foot right      ED Disposition     ED Disposition Condition Date/Time Comment    Discharge Stable Mon May 24, 2021  4:10 PM Aruna Delong discharge to home/self care              Follow-up Information     Follow up With Specialties Details Why Contact Info Additional Information    Robert Manzano MD Family Medicine Schedule an appointment as soon as possible for a visit  For follow up [address] ([place])       [facility] Emergency Department Emergency Medicine Go to  If symptoms worsen [address] [id number] [facility] Emergency Department, [address], [address]          Discharge Medication List as of 5/24/2021  4:11 PM      START taking these medications    Details   cephalexin (KEFLEX) 500 mg capsule Take 1 capsule (500 mg total) by mouth every 6 (six) hours for 7 days, Starting Mon 5/24/2021, Until Mon 5/31/2021, Normal         CONTINUE these medications which have NOT CHANGED    Details   acetaminophen (TYLENOL) 325 mg tablet Take 2 tablets (650 mg total) by mouth every 6 (six) hours as needed for mild pain, moderate pain, headaches or fever, Starting Thu 9/10/2020, No Print      albuterol (PROVENTIL HFA,VENTOLIN HFA) 90 mcg/act inhaler Inhale 2 puffs every 6 (six) hours as needed for wheezing , Historical Med      alendronate (FOSAMAX) 70 mg tablet Take 1 tablet (70 mg total) by mouth every 7 days for 12 days, Starting Wed 9/23/2020, Until Mon 10/5/2020, Normal      amLODIPine-benazepril (LOTREL 5-20) 5-20 MG per capsule Take 1 capsule by mouth daily, Starting Tue 5/18/2021, Normal      cetirizine (ZYRTEC ALLERGY) 10 mg tablet Take 1 tablet by mouth daily as needed, Historical Med      cholecalciferol (VITAMIN D3) 1,000 units tablet Take 1 tablet (1,000 Units total) by mouth daily, Starting Wed 9/23/2020, Until Fri 08/03/8426, Normal      folic acid-pyridoxine-cyanocobalamin (Folbic) 2 5-25-2 mg Take 1 tablet by mouth daily, Starting Wed 5/27/2020, Normal      furosemide (LASIX) 20 mg tablet Take 1 tablet (20 mg total) by mouth daily, Starting Tue 11/3/2020, Normal      Hydrocortisone-Iodoquinol (Dermazene) 1-1 % CREA Apply topically 2 (two) times a day, Starting Tue 5/18/2021, Normal      Iodoquinol-HC-Aloe Polysacch (ALCORTIN A) 1-2-1 % GEL Apply topically 2 (two) times a day as needed , Historical Med      metFORMIN (GLUCOPHAGE-XR) 500 mg 24 hr tablet Take 1 tablet (500 mg total) by mouth 4 (four) times a day, Starting Mon 7/6/2020, Normal      mometasone (NASONEX) 50 mcg/act nasal spray 2 sprays into each nostril daily, Historical Med      montelukast (SINGULAIR) 10 mg tablet Take 1 tablet (10 mg total) by mouth daily at bedtime, Starting Wed 3/31/2021, Normal      multivitamin (THERAGRAN) TABS Take 1 tablet by mouth daily  , Historical Med      pravastatin (PRAVACHOL) 40 mg tablet Take 1 tablet (40 mg total) by mouth daily, Starting Tue 11/3/2020, No Print      triamcinolone (KENALOG) 0 1 % cream 2 (two) times a day as needed , Starting Sat 5/5/2018, Historical Med      warfarin (COUMADIN) 5 mg tablet Take  strictly as advised, Normal           No discharge procedures on file      PDMP Review       Value Time User    PDMP Reviewed  Yes 3/18/2020  6:01 PM Vasquez Sutton, 10 [facility] Provider  Electronically Signed by           Evi Perera DO  05/25/21 4413

## 2021-05-26 DIAGNOSIS — R21 SKIN RASH: Primary | ICD-10-CM

## 2021-05-26 RX ORDER — IODOQUINOL, HYDROCORTISONE ACETATE AND ALOE VERA LEAF 10; 20; 10 MG/G; MG/G; MG/G
1 GEL TOPICAL 2 TIMES DAILY PRN
Qty: 48 G | Refills: 6 | Status: SHIPPED | OUTPATIENT
Start: 2021-05-26 | End: 2021-06-10 | Stop reason: SDUPTHER

## 2021-06-04 ENCOUNTER — TELEPHONE (OUTPATIENT)
Dept: HEMATOLOGY ONCOLOGY | Facility: CLINIC | Age: 66
End: 2021-06-04

## 2021-06-04 NOTE — TELEPHONE ENCOUNTER
Patient calling for coumadin instructions  Patient had PT/INR drawn yesterday at [facility] - results not available in the chart    Current Coumadin dose 7 5mg PO daily    Will send to [place] to obtain results and review with Dr Cori Tapia  Patient requesting a call back with new instructions  Call back number

## 2021-06-04 NOTE — TELEPHONE ENCOUNTER
E-mail sent with results, please review with Dr Zurdo Mcdaniel and send back to me with instructions

## 2021-06-09 ENCOUNTER — RA CDI HCC (OUTPATIENT)
Dept: OTHER | Facility: HOSPITAL | Age: 66
End: 2021-06-09

## 2021-06-09 NOTE — PROGRESS NOTES
[facility]  coding opportunities             Chart reviewed, (number of) suggestions sent to provider: 3            Number of suggestions actually used: 0      Number of suggestions NOT actually used: 3     Patients insurance company: [employer] (Medicare Advantage and Commercial)     Visit status: Patient arrived for their scheduled appointment     Provider never responded to [facility]  coding request     [place] [id number]  coding opportunities             Chart reviewed, (number of) suggestions sent to provider: 3           Patients insurance company: [employer] ([employer])           Found on active problem list - please assess using MEAT for 2021  E72 11 Homocysteinemia ([facility] )    Found on active problem list - as per 9/15/2020 VAS Lower Limb Venous Duplex Study - no evidence of problem - Please assess to resolve these problem if appropriate  I82 409 Acute embolism and thrombosis of unspecified deep veins of unspecified lower extremity ([facility] )  I82 4Y3 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 ([facility] )

## 2021-06-10 DIAGNOSIS — R21 SKIN RASH: ICD-10-CM

## 2021-06-10 RX ORDER — HYDROCORTISONE AND IODOQUINOL 10; 10 MG/G; MG/G
CREAM TOPICAL 2 TIMES DAILY
Qty: 28.4 G | Refills: 5 | Status: SHIPPED | OUTPATIENT
Start: 2021-06-10

## 2021-06-10 RX ORDER — IODOQUINOL, HYDROCORTISONE ACETATE AND ALOE VERA LEAF 10; 20; 10 MG/G; MG/G; MG/G
1 GEL TOPICAL 2 TIMES DAILY PRN
Qty: 48 G | Refills: 6 | Status: SHIPPED | OUTPATIENT
Start: 2021-06-10 | End: 2022-02-16 | Stop reason: ALTCHOICE

## 2021-06-15 ENCOUNTER — OFFICE VISIT (OUTPATIENT)
Dept: FAMILY MEDICINE CLINIC | Facility: CLINIC | Age: 66
End: 2021-06-15
Payer: COMMERCIAL

## 2021-06-15 VITALS
SYSTOLIC BLOOD PRESSURE: 126 MMHG | HEART RATE: 97 BPM | TEMPERATURE: 97.3 F | BODY MASS INDEX: 50.02 KG/M2 | OXYGEN SATURATION: 96 % | DIASTOLIC BLOOD PRESSURE: 76 MMHG | WEIGHT: 293 LBS | HEIGHT: 64 IN

## 2021-06-15 DIAGNOSIS — G47.33 OBSTRUCTIVE SLEEP APNEA ON CPAP: Primary | ICD-10-CM

## 2021-06-15 DIAGNOSIS — E66.01 MORBID OBESITY WITH BMI OF 70 AND OVER, ADULT (HCC): ICD-10-CM

## 2021-06-15 DIAGNOSIS — Z13.31 DEPRESSION SCREENING: ICD-10-CM

## 2021-06-15 DIAGNOSIS — L03.115 CELLULITIS OF RIGHT LOWER EXTREMITY: ICD-10-CM

## 2021-06-15 DIAGNOSIS — I10 ESSENTIAL HYPERTENSION: ICD-10-CM

## 2021-06-15 DIAGNOSIS — Z99.89 OBSTRUCTIVE SLEEP APNEA ON CPAP: Primary | ICD-10-CM

## 2021-06-15 DIAGNOSIS — Z11.4 SCREENING FOR HIV (HUMAN IMMUNODEFICIENCY VIRUS): ICD-10-CM

## 2021-06-15 PROCEDURE — 3008F BODY MASS INDEX DOCD: CPT | Performed by: FAMILY MEDICINE

## 2021-06-15 PROCEDURE — 3078F DIAST BP <80 MM HG: CPT | Performed by: FAMILY MEDICINE

## 2021-06-15 PROCEDURE — 3074F SYST BP LT 130 MM HG: CPT | Performed by: FAMILY MEDICINE

## 2021-06-15 PROCEDURE — 99214 OFFICE O/P EST MOD 30 MIN: CPT | Performed by: FAMILY MEDICINE

## 2021-06-15 PROCEDURE — 3725F SCREEN DEPRESSION PERFORMED: CPT | Performed by: FAMILY MEDICINE

## 2021-06-15 PROCEDURE — 1036F TOBACCO NON-USER: CPT | Performed by: FAMILY MEDICINE

## 2021-06-15 NOTE — PROGRESS NOTES
Assessment/Plan:    Obstructive sleep apnea on CPAP  Compliant with Cpap machine    Essential hypertension  bp stable and at goal  C/w current medication regimen    Cellulitis of right lower extremity  Resolved  Finished course of Keflex       Diagnoses and all orders for this visit:    Obstructive sleep apnea on CPAP  -     TSH, 3rd generation with Free T4 reflex; Future  -     HEMOGLOBIN A1C W/ EAG ESTIMATION; Future    Essential hypertension  -     Comprehensive metabolic panel; Future  -     TSH, 3rd generation with Free T4 reflex; Future  -     Lipid Panel with Direct LDL reflex; Future    Cellulitis of right lower extremity    Depression screening    Morbid obesity with BMI of 70 and over, adult ([facility] )  -     TSH, 3rd generation with Free T4 reflex; Future  -     Lipid Panel with Direct LDL reflex; Future  -     HEMOGLOBIN A1C W/ EAG ESTIMATION; Future    Screening for HIV (human immunodeficiency virus)    Other orders  -     Cancel: HIV 1/2 Antigen/Antibody (4th Generation) w Reflex [facility]; Future        Betty Ballard is to RTC in 3 months for annual  She understood, acknowledged and agreed to the plan above  All of her questions and concerns were answered and addressed  Case discussed with Dr Dread Moura    Subjective:      Patient ID: Ke Reeves is a 60 y o  female  Betty Ballard is a 61 y/o woman that was seen and examined in the office today for f/u  She was most recently seen in the ED on 5/24/21 for cellulitis of her right foot for which she was given a 7 day course of Keflex  Has improved and no longer needs another dose  She has no other complaint or concerns  She has no sx of cellulitis  The following portions of the patient's history were reviewed and updated as appropriate: allergies, current medications, past family history, past medical history, past social history, past surgical history and problem list     Review of Systems   All other systems reviewed and are negative          Objective:      BP 126/76 (BP Location: Left arm, Patient Position: Sitting, Cuff Size: Large)   Pulse 97   Temp (!) 97 3 °F (36 3 °C) (Temporal)   Ht 5' 4" (1 626 m)   Wt (!) 189 kg (417 lb 3 2 oz)   LMP  (LMP Unknown)   SpO2 96%   BMI 71 61 kg/m²          Physical Exam  Vitals and nursing note reviewed  Constitutional:       Appearance: Normal appearance  She is obese  HENT:      Head: Normocephalic and atraumatic  Cardiovascular:      Rate and Rhythm: Normal rate and regular rhythm  Pulses: Normal pulses  Heart sounds: Normal heart sounds  Pulmonary:      Effort: Pulmonary effort is normal       Breath sounds: Normal breath sounds  Abdominal:      General: Abdomen is flat  Bowel sounds are normal       Palpations: Abdomen is soft  Tenderness: There is no right CVA tenderness or left CVA tenderness  Musculoskeletal:      Right lower leg: No edema  Left lower leg: No edema  Skin:     General: Skin is warm  Comments: Bilateral venous stasis dermatitis   Neurological:      General: No focal deficit present  Mental Status: She is alert and oriented to person, place, and time

## 2021-06-28 ENCOUNTER — TELEPHONE (OUTPATIENT)
Dept: HEMATOLOGY ONCOLOGY | Facility: CLINIC | Age: 66
End: 2021-06-28

## 2021-06-28 DIAGNOSIS — Z86.718 HISTORY OF DVT (DEEP VEIN THROMBOSIS): Primary | ICD-10-CM

## 2021-06-28 LAB — INR PPP: 2.8 (ref 0.84–1.19)

## 2021-06-28 NOTE — TELEPHONE ENCOUNTER
Patient calling to have new standing order for PT/INR faxed to [facility] [place]  Her order has   Will fax a new order to [phone]

## 2021-06-30 ENCOUNTER — ANTICOAG VISIT (OUTPATIENT)
Dept: HEMATOLOGY ONCOLOGY | Facility: CLINIC | Age: 66
End: 2021-06-30

## 2021-06-30 DIAGNOSIS — Z86.718 HISTORY OF DVT (DEEP VEIN THROMBOSIS): ICD-10-CM

## 2021-06-30 RX ORDER — WARFARIN SODIUM 5 MG/1
TABLET ORAL
Qty: 150 TABLET | Refills: 2 | Status: SHIPPED | OUTPATIENT
Start: 2021-06-30 | End: 2022-05-27 | Stop reason: SDUPTHER

## 2021-06-30 NOTE — TELEPHONE ENCOUNTER
Patient was advised to continue current dose of Coudamin 7 5 mg by mouth daily, then re-check in 2-3 weeks per Dr Josue Quinn  Patient verbalized understanding and agreement  Please send script to [place] [id number] mail order pharmacy per patient

## 2021-07-01 ENCOUNTER — APPOINTMENT (RX ONLY)
Dept: URBAN - NONMETROPOLITAN AREA CLINIC 4 | Facility: CLINIC | Age: 66
Setting detail: DERMATOLOGY
End: 2021-07-01

## 2021-07-01 DIAGNOSIS — L30.4 ERYTHEMA INTERTRIGO: ICD-10-CM | Status: INADEQUATELY CONTROLLED

## 2021-07-01 DIAGNOSIS — B35.3 TINEA PEDIS: ICD-10-CM

## 2021-07-01 DIAGNOSIS — L21.8 OTHER SEBORRHEIC DERMATITIS: ICD-10-CM

## 2021-07-01 PROCEDURE — ? COUNSELING

## 2021-07-01 PROCEDURE — ? PRESCRIPTION MEDICATION MANAGEMENT

## 2021-07-01 PROCEDURE — 99203 OFFICE O/P NEW LOW 30 MIN: CPT

## 2021-07-01 PROCEDURE — ? PRESCRIPTION

## 2021-07-01 RX ORDER — NYSTATIN 100000 [USP'U]/G
POWDER TOPICAL DAILY
Qty: 1 | Refills: 3 | Status: ERX | COMMUNITY
Start: 2021-07-01

## 2021-07-01 RX ORDER — FLUCONAZOLE 150 MG/1
TABLET ORAL
Qty: 10 | Refills: 0 | Status: ERX | COMMUNITY
Start: 2021-07-01

## 2021-07-01 RX ORDER — CLOTRIMAZOLE AND BETAMETHASONE DIPROPIONATE 10; .5 MG/G; MG/G
CREAM TOPICAL QDAY
Qty: 1 | Refills: 3 | Status: ERX | COMMUNITY
Start: 2021-07-01

## 2021-07-01 RX ADMIN — FLUCONAZOLE 150: 150 TABLET ORAL at 00:00

## 2021-07-01 RX ADMIN — NYSTATIN 100,000: 100000 POWDER TOPICAL at 00:00

## 2021-07-01 RX ADMIN — CLOTRIMAZOLE AND BETAMETHASONE DIPROPIONATE 1-0.05: 10; .5 CREAM TOPICAL at 00:00

## 2021-07-01 ASSESSMENT — LOCATION DETAILED DESCRIPTION DERM
LOCATION DETAILED: RIGHT CENTRAL MALAR CHEEK
LOCATION DETAILED: LEFT PLANTAR FOREFOOT OVERLYING 2ND METATARSAL
LOCATION DETAILED: LEFT CENTRAL MALAR CHEEK
LOCATION DETAILED: LEFT RIB CAGE
LOCATION DETAILED: RIGHT LATERAL BREAST 6-7:00 REGION
LOCATION DETAILED: RIGHT PLANTAR FOREFOOT OVERLYING 3RD METATARSAL
LOCATION DETAILED: LEFT PLANTAR FOREFOOT OVERLYING 3RD METATARSAL
LOCATION DETAILED: PERIUMBILICAL SKIN

## 2021-07-01 ASSESSMENT — LOCATION ZONE DERM
LOCATION ZONE: FACE
LOCATION ZONE: FEET
LOCATION ZONE: TRUNK

## 2021-07-01 ASSESSMENT — LOCATION SIMPLE DESCRIPTION DERM
LOCATION SIMPLE: RIGHT BREAST
LOCATION SIMPLE: LEFT CHEEK
LOCATION SIMPLE: LEFT PLANTAR SURFACE
LOCATION SIMPLE: RIGHT CHEEK
LOCATION SIMPLE: RIGHT PLANTAR SURFACE
LOCATION SIMPLE: ABDOMEN

## 2021-07-01 NOTE — HPI: RASH
What Type Of Note Output Would You Prefer (Optional)?: Standard Output
Is The Patient Presenting As Previously Scheduled?: Yes
How Severe Is Your Rash?: moderate
Is This A New Presentation, Or A Follow-Up?: Rash
Additional History: Patient has rash under breast and belly, tried multiple topicals with no success.

## 2021-07-01 NOTE — PROCEDURE: PRESCRIPTION MEDICATION MANAGEMENT
Initiate Treatment: Nystatin powder daily, lotrisone daily , diflucan
Render In Strict Bullet Format?: No
Detail Level: Zone
Initiate Treatment: Apply lotrisone to feet.
Plan: Patient has Vytone from previous Tx.
Initiate Treatment: Apply Vytone daily.

## 2021-07-22 ENCOUNTER — APPOINTMENT (RX ONLY)
Dept: URBAN - NONMETROPOLITAN AREA CLINIC 4 | Facility: CLINIC | Age: 66
Setting detail: DERMATOLOGY
End: 2021-07-22

## 2021-07-22 ENCOUNTER — TELEPHONE (OUTPATIENT)
Dept: HEMATOLOGY ONCOLOGY | Facility: CLINIC | Age: 66
End: 2021-07-22

## 2021-07-22 DIAGNOSIS — L30.4 ERYTHEMA INTERTRIGO: ICD-10-CM | Status: IMPROVED

## 2021-07-22 DIAGNOSIS — B35.3 TINEA PEDIS: ICD-10-CM | Status: RESOLVING

## 2021-07-22 DIAGNOSIS — L21.8 OTHER SEBORRHEIC DERMATITIS: ICD-10-CM

## 2021-07-22 PROCEDURE — ? TREATMENT REGIMEN

## 2021-07-22 PROCEDURE — ? COUNSELING

## 2021-07-22 PROCEDURE — 99213 OFFICE O/P EST LOW 20 MIN: CPT

## 2021-07-22 ASSESSMENT — LOCATION ZONE DERM
LOCATION ZONE: TRUNK
LOCATION ZONE: FEET
LOCATION ZONE: FACE
LOCATION ZONE: FEET

## 2021-07-22 ASSESSMENT — LOCATION DETAILED DESCRIPTION DERM
LOCATION DETAILED: LEFT MEDIAL BREAST 7-8:00 REGION
LOCATION DETAILED: SUBXIPHOID
LOCATION DETAILED: RIGHT DORSAL FOOT
LOCATION DETAILED: RIGHT INFERIOR CENTRAL MALAR CHEEK
LOCATION DETAILED: RIGHT DORSAL FOOT

## 2021-07-22 ASSESSMENT — LOCATION SIMPLE DESCRIPTION DERM
LOCATION SIMPLE: ABDOMEN
LOCATION SIMPLE: LEFT BREAST
LOCATION SIMPLE: RIGHT FOOT
LOCATION SIMPLE: RIGHT CHEEK
LOCATION SIMPLE: RIGHT FOOT

## 2021-07-22 ASSESSMENT — SEVERITY ASSESSMENT: SEVERITY: ALMOST CLEAR

## 2021-07-22 NOTE — PROCEDURE: TREATMENT REGIMEN
Detail Level: Zone
Continue Regimen: Clotrimazole-betamethasone 1%- 0.05% QD, and Nystatin powder QD.
Continue Regimen: Lotrisone cream QD.
Initiate Treatment: Eucerin cream to heels.
Samples Given: Eucerin

## 2021-07-22 NOTE — TELEPHONE ENCOUNTER
PT INR 4 19  Patient was recently started by her primary physician on Diflucan  Probably drug interaction  Patient was taking 7 5 mg Coumadin daily  She will not take Coumadin today and tomorrow and after that she will start taking Coumadin 5 mg daily  She has 1 more dose of Diflucan to take  She has appointment in the office on 07/26/ 21   Discussed with patient and explained

## 2021-07-23 DIAGNOSIS — D68.9 BLOOD CLOTTING DISORDER (HCC): ICD-10-CM

## 2021-07-23 RX ORDER — FOLIC ACID-PYRIDOXINE-CYANOCOBALAMIN TAB 2.5-25-2 MG 2.5-25-2 MG
1 TAB ORAL DAILY
Qty: 90 TABLET | Refills: 3 | Status: SHIPPED | OUTPATIENT
Start: 2021-07-23 | End: 2021-07-26 | Stop reason: SDUPTHER

## 2021-07-26 ENCOUNTER — OFFICE VISIT (OUTPATIENT)
Dept: HEMATOLOGY ONCOLOGY | Facility: CLINIC | Age: 66
End: 2021-07-26
Payer: COMMERCIAL

## 2021-07-26 VITALS
RESPIRATION RATE: 18 BRPM | DIASTOLIC BLOOD PRESSURE: 74 MMHG | TEMPERATURE: 97.9 F | OXYGEN SATURATION: 97 % | HEIGHT: 64 IN | SYSTOLIC BLOOD PRESSURE: 128 MMHG | BODY MASS INDEX: 50.02 KG/M2 | WEIGHT: 293 LBS | HEART RATE: 109 BPM

## 2021-07-26 DIAGNOSIS — R79.83 HOMOCYSTEINEMIA: ICD-10-CM

## 2021-07-26 DIAGNOSIS — Z86.718 HISTORY OF DVT (DEEP VEIN THROMBOSIS): ICD-10-CM

## 2021-07-26 DIAGNOSIS — R79.1 PROTHROMBIN TIME INCREASED: ICD-10-CM

## 2021-07-26 DIAGNOSIS — D68.9 BLOOD CLOTTING DISORDER (HCC): Primary | ICD-10-CM

## 2021-07-26 DIAGNOSIS — D68.59 HYPERCOAGULABLE STATE (HCC): ICD-10-CM

## 2021-07-26 PROCEDURE — 1036F TOBACCO NON-USER: CPT | Performed by: INTERNAL MEDICINE

## 2021-07-26 PROCEDURE — 99214 OFFICE O/P EST MOD 30 MIN: CPT | Performed by: INTERNAL MEDICINE

## 2021-07-26 PROCEDURE — 3074F SYST BP LT 130 MM HG: CPT | Performed by: INTERNAL MEDICINE

## 2021-07-26 PROCEDURE — 3008F BODY MASS INDEX DOCD: CPT | Performed by: INTERNAL MEDICINE

## 2021-07-26 PROCEDURE — 3078F DIAST BP <80 MM HG: CPT | Performed by: INTERNAL MEDICINE

## 2021-07-26 RX ORDER — CLOTRIMAZOLE AND BETAMETHASONE DIPROPIONATE 10; .64 MG/G; MG/G
CREAM TOPICAL
COMMUNITY
Start: 2021-07-08

## 2021-07-26 RX ORDER — FOLIC ACID-PYRIDOXINE-CYANOCOBALAMIN TAB 2.5-25-2 MG 2.5-25-2 MG
1 TAB ORAL DAILY
Qty: 90 TABLET | Refills: 3 | Status: SHIPPED | OUTPATIENT
Start: 2021-07-26 | End: 2022-08-03 | Stop reason: SDUPTHER

## 2021-07-26 NOTE — PROGRESS NOTES
HPI:    Patient has history recurrent DVTs and has family history blood clots and had high homocystine level  Patient has been on long-term anticoagulation and she has been on Coumadin and usually PT INR has been therapeutic but this time that went of because she was prescribed  Diflucan for  fungal skin rash  Diflucan  Interacts with Coumadin  Patient has been advised to always let prescribing physician know that patient has been on Coumadin  She is not taking Diflucan at present  Skin rash has improved  She is back on Coumadin and will have PT INR checked on 7/28/ 21  No history of bleeding or blood clot on Coumadin  Also she is on Folbic for high homocystine level  She has IVC filter  She has  history of lymphedema both legs and has compression machine  She uses nystatin powder off and on for redness in the groin areas in summer months  She will remain on anticoglation for life unless contraindicated  For bleeding and procedures Coumadin could be interrupted  under supervision and guidance  She is aware to contact us  if she were to receive any new medication from other providers, ie antibiotics and if she would be going for any procedure   Patient is overweight  She has tiredness  She has exertional dyspnea probably because of her weight     She is regular with her medical checkups and GYN checkups and mammography      Has arthritic symptoms                   Current Outpatient Medications:     acetaminophen (TYLENOL) 325 mg tablet, Take 2 tablets (650 mg total) by mouth every 6 (six) hours as needed for mild pain, moderate pain, headaches or fever, Disp: , Rfl: 0    albuterol (PROVENTIL HFA,VENTOLIN HFA) 90 mcg/act inhaler, Inhale 2 puffs every 6 (six) hours as needed for wheezing , Disp: , Rfl:     amLODIPine-benazepril (LOTREL 5-20) 5-20 MG per capsule, Take 1 capsule by mouth daily, Disp: 90 capsule, Rfl: 3    cetirizine (ZYRTEC ALLERGY) 10 mg tablet, Take 1 tablet by mouth daily as needed, Disp: , Rfl:     clotrimazole-betamethasone (LOTRISONE) 1-0 05 % cream, APPLY A THIN LAYER OF CREAM TOPICALLY TO AFFECTED AREAS ON THE TRUNK AND EXTREMITIES ONCE DAILY AS NEEDED FOR FLARES, Disp: , Rfl:     folic acid-pyridoxine-cyanocobalamin (Folbic) 2 5-25-2 mg, Take 1 tablet by mouth daily, Disp: 90 tablet, Rfl: 3    furosemide (LASIX) 20 mg tablet, Take 1 tablet (20 mg total) by mouth daily, Disp: 90 tablet, Rfl: 3    Hydrocortisone-Iodoquinol (Dermazene) 1-1 % CREA, Apply topically 2 (two) times a day, Disp: 28 4 g, Rfl: 5    Iodoquinol-HC-Aloe Polysacch (Alcortin A) 1-2-1 % GEL, Apply 1 actuation topically 2 (two) times a day as needed (pain), Disp: 48 g, Rfl: 6    metFORMIN (GLUCOPHAGE-XR) 500 mg 24 hr tablet, Take 1 tablet (500 mg total) by mouth 4 (four) times a day, Disp: 360 tablet, Rfl: 3    mometasone (NASONEX) 50 mcg/act nasal spray, 2 sprays into each nostril daily, Disp: , Rfl:     montelukast (SINGULAIR) 10 mg tablet, Take 1 tablet (10 mg total) by mouth daily at bedtime, Disp: 90 tablet, Rfl: 3    multivitamin (THERAGRAN) TABS, Take 1 tablet by mouth daily  , Disp: , Rfl:     pravastatin (PRAVACHOL) 40 mg tablet, Take 1 tablet (40 mg total) by mouth daily, Disp: 90 tablet, Rfl: 3    triamcinolone (KENALOG) 0 1 % cream, 2 (two) times a day as needed , Disp: , Rfl:     warfarin (COUMADIN) 5 mg tablet, Take  strictly as advised, Disp: 150 tablet, Rfl: 2    alendronate (FOSAMAX) 70 mg tablet, Take 1 tablet (70 mg total) by mouth every 7 days for 12 days, Disp: 12 tablet, Rfl: 3    cholecalciferol (VITAMIN D3) 1,000 units tablet, Take 1 tablet (1,000 Units total) by mouth daily, Disp: 30 tablet, Rfl: 3    Allergies   Allergen Reactions    Azithromycin Diarrhea     Other reaction(s): Unknown    Ciprofloxacin Confusion, Dizziness and Other (See Comments)     Other reaction(s): Other (Please comment)  Dizziness, weepy  Other reaction(s):  Other (See Comments)  "Dizziness"      Clindamycin Other (See Comments)     Acid Reflux    Ancef [Cefazolin] Rash     States tolerated Keflex       Oncology History    No history exists  ROS:  07/26/21 Reviewed 13 systems:  Presently no other neurological, cardiac, pulmonary, GI and  symptoms other than listed in HPI  No fever, chills, bleeding, bone pains, skin rash, weight loss,    weakness, numbness,  claudication and gait problem  No frequent infections  Not unusually sensitive to heat or cold  No swollen glands      Patient is anxious  /74 (BP Location: Right arm, Patient Position: Sitting, Cuff Size: Adult) Comment (BP Location): forearm  Pulse (!) 109   Temp 97 9 °F (36 6 °C)   Resp 18   Ht 5' 4" (1 626 m)   Wt (!) 188 kg (415 lb)   LMP  (LMP Unknown)   SpO2 97%   BMI 71 23 kg/m²     Physical Exam:  Alert, oriented, not in distress, no icterus, no oral thrush, no palpable neck mass, clear lung fields, regular heart rate and 96, abdomen  soft , large and non tender, difficult to palpate any mass in the abdomen, has  much improved nonpitting edema of both legs , has stasis dermatitis, no calf tenderness, no focal neurological deficit, has generalized weakness, no skin rash other than stasis dermatitis, no palpable lymphadenopathy in the neck and axillary areas,  no clubbing  Patient is anxious  Performance status 2 because of weight and lymphedema  IMAGING:       On 11/04/2020 negative mammography for malignancy done at [facility]:    Results for orders placed or performed in visit on 06/30/21   Protime-INR   Result Value Ref Range    INR 2 80 (A) 0 84 - 1 19     Labs, Imaging, & Other studies:   All pertinent labs and imaging studies were personally reviewed    Lab Results   Component Value Date    K 4 1 09/17/2020     09/17/2020    CO2 27 09/17/2020    BUN 14 09/17/2020    CREATININE 1 18 09/17/2020    CALCIUM 8 3 09/17/2020    AST 23 09/14/2020    ALT 18 09/14/2020    ALKPHOS 72 09/14/2020    EGFR 49 09/17/2020     Lab Results   Component Value Date    WBC 7 59 09/17/2020    HGB 11 1 (L) 09/17/2020    HCT 34 9 09/17/2020    MCV 90 09/17/2020     09/17/2020    On 07/20/ 21 PT INR was 4 9    Reviewed mammography, PT INR, blood counts and chemistry and discussed with patient  Assessment and plan:  Patient has history recurrent DVTs and has family history blood clots and had high homocystine level  Patient has been on long-term anticoagulation and she has been on Coumadin and usually PT INR has been therapeutic but this time that went of because she was prescribed  Diflucan for  fungal skin rash  Diflucan  Interacts with Coumadin  Patient has been advised to always let prescribing physician know that patient has been on Coumadin  She is not taking Diflucan at present  Skin rash has improved  She is back on Coumadin and will have PT INR checked on 7/28/ 21  No history of bleeding or blood clot on Coumadin  Also she is on Folbic for high homocystine level  She has IVC filter  She has  history of lymphedema both legs and has compression machine  She uses nystatin powder off and on for redness in the groin areas in summer months  She will remain on anticoglation for life unless contraindicated  For bleeding and procedures Coumadin could be interrupted  under supervision and guidance  She is aware to contact us  if she were to receive any new medication from other providers, ie antibiotics and if she would be going for any procedure   Patient is overweight  She has tiredness  She has exertional dyspnea probably because of her weight     She is regular with her medical checkups and GYN checkups and mammography          Physical examination and test results are as recorded and discussed  Plan is to continue her on Coumadin and Folbic as above   Condition discussed and explained  Questions answered    Discussed the importance of self-breast examination, eating healthy foods, staying active and health screening test   She is capable of self-care  She has instructions to report to the emergency room in case of blood clot or bleeding and also call our office  Cheli Camarena She would prefer to come in May  because of the weather           See diagnoses, orders and instructions  1  Blood clotting disorder (HCC)    - folic acid-pyridoxine-cyanocobalamin (Folbic) 2 5-25-2 mg; Take 1 tablet by mouth daily  Dispense: 90 tablet; Refill: 3  - CBC and differential; Standing  - Comprehensive metabolic panel; Standing  - CBC and differential  - Comprehensive metabolic panel  - Protime-INR; Standing  - Protime-INR    2  History of DVT (deep vein thrombosis)    - Protime-INR  - CBC and differential; Standing  - Comprehensive metabolic panel; Standing  - CBC and differential  - Comprehensive metabolic panel  - Protime-INR; Standing  - Protime-INR    3  Hypercoagulable state ([facility] )    - CBC and differential; Standing  - Comprehensive metabolic panel; Standing  - CBC and differential  - Comprehensive metabolic panel    4  Homocysteinemia ([facility] [address] )      5  Prothrombin time increased    - Protime-INR; Standing  - Protime-INR    PT INR every week or as directed  CMP and CBCD every 3 months  Visit in 10 months  Patient voiced understanding and agreed  Counseling / Coordination of Care   Greater than 50% of total time was spent with the patient and / or family counseling and / or coordination of care

## 2021-07-30 ENCOUNTER — TELEPHONE (OUTPATIENT)
Dept: HEMATOLOGY ONCOLOGY | Facility: CLINIC | Age: 66
End: 2021-07-30

## 2021-07-30 NOTE — TELEPHONE ENCOUNTER
Spoke with Dr Lashell Joaquin, he will giving patient a call to discuss  Coumadin dose was adjusted at patient's last appointment

## 2021-07-30 NOTE — TELEPHONE ENCOUNTER
Call from patient  Patient had INR done yesterday at \[facility]\""  Patient is on coumadin 7 5 mg daily  Will send to Dr Alicea's MA

## 2021-08-01 ENCOUNTER — TELEPHONE (OUTPATIENT)
Dept: HEMATOLOGY ONCOLOGY | Facility: CLINIC | Age: 66
End: 2021-08-01

## 2021-08-01 DIAGNOSIS — Z86.718 HISTORY OF DVT (DEEP VEIN THROMBOSIS): Primary | ICD-10-CM

## 2021-08-01 NOTE — TELEPHONE ENCOUNTER
PT INR 3 3  I spoke with patient on 07/30/2021 and she was going to skip Coumadin that day  Starting 07/31/2021 she was going to take Coumadin 5 mg alternating with 7 5 mg every other day  Prior to this she was taking 7 5 mg daily  She is not on Diflucan anymore

## 2021-08-06 ENCOUNTER — TELEPHONE (OUTPATIENT)
Dept: HEMATOLOGY ONCOLOGY | Facility: CLINIC | Age: 66
End: 2021-08-06

## 2021-08-06 NOTE — TELEPHONE ENCOUNTER
I spoke with patient about PT INR  No change in Coumadin 5 mg alternating with 7 5 mg   To check INR again in 2 weeks

## 2021-08-12 ENCOUNTER — OFFICE VISIT (OUTPATIENT)
Dept: FAMILY MEDICINE CLINIC | Facility: CLINIC | Age: 66
End: 2021-08-12
Payer: COMMERCIAL

## 2021-08-12 VITALS
OXYGEN SATURATION: 97 % | HEIGHT: 64 IN | HEART RATE: 101 BPM | BODY MASS INDEX: 50.02 KG/M2 | WEIGHT: 293 LBS | TEMPERATURE: 98 F | DIASTOLIC BLOOD PRESSURE: 74 MMHG | SYSTOLIC BLOOD PRESSURE: 126 MMHG

## 2021-08-12 DIAGNOSIS — Z00.00 WELL ADULT EXAM: Primary | ICD-10-CM

## 2021-08-12 DIAGNOSIS — E28.2 PCOS (POLYCYSTIC OVARIAN SYNDROME): ICD-10-CM

## 2021-08-12 DIAGNOSIS — Z12.31 ENCOUNTER FOR SCREENING MAMMOGRAM FOR BREAST CANCER: ICD-10-CM

## 2021-08-12 PROCEDURE — 3008F BODY MASS INDEX DOCD: CPT | Performed by: FAMILY MEDICINE

## 2021-08-12 PROCEDURE — 99396 PREV VISIT EST AGE 40-64: CPT | Performed by: FAMILY MEDICINE

## 2021-08-12 PROCEDURE — 1036F TOBACCO NON-USER: CPT | Performed by: FAMILY MEDICINE

## 2021-08-12 RX ORDER — METFORMIN HYDROCHLORIDE 500 MG/1
500 TABLET, EXTENDED RELEASE ORAL 4 TIMES DAILY
Qty: 360 TABLET | Refills: 3 | Status: SHIPPED | OUTPATIENT
Start: 2021-08-12 | End: 2021-11-22 | Stop reason: SDUPTHER

## 2021-08-12 NOTE — PROGRESS NOTES
Assessment/Plan     Healthy female exam      1  She is up to date with COVID-19 vaccines  2  Patient Counseling:  --Nutrition: Stressed importance of moderation in sodium/caffeine intake, saturated fat and cholesterol, caloric balance, sufficient intake of fresh fruits, vegetables, fiber, calcium, iron, and 1 mg of folate supplement per day (for females capable of pregnancy)  --  --Exercise: Stressed the importance of regular exercise  --Substance Abuse: Discussed cessation/primary prevention of tobacco, alcohol, or other drug use; driving or other dangerous activities under the influence; availability of treatment for abuse  --Sexuality: Discussed sexually transmitted diseases, partner selection, use of condoms, avoidance of unintended pregnancy  and contraceptive alternatives  --Injury prevention: Discussed safety belts, safety helmets, smoke detector, smoking near bedding or upholstery  --Dental health: Discussed importance of regular tooth brushing, flossing, and dental visits  --Immunizations reviewed  --Discussed benefits of screening colonoscopy  --After hours service discussed with patient    3  Discussed the patient's BMI with her  The BMI is above average; BMI management plan is completed  4  Follow up as needed for acute illness  Rogelio Buchanan is a 60 y o  female and is here for a comprehensive physical exam  The patient reports no problems  Do you take any herbs or supplements that were not prescribed by a doctor? no  Are you taking calcium supplements? no  Are you taking aspirin daily? no     History:  LMP: No LMP recorded (lmp unknown)  Patient has had a hysterectomy    Menopause at   years  Last pap date: n/a  Abnormal pap? no  : 2  Para: 2    The following portions of the patient's history were reviewed and updated as appropriate:   She  has a past medical history of Blood clotting disorder ([facility] ), Homocysteinemia ([facility] ), Hypercoagulable state ([facility] ), Lyme disease, Renal disorder, and 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 ([facility] [address] )  She   Patient Active Problem List    Diagnosis Date Noted    Prothrombin time increased 07/26/2021    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)     Anemia 09/10/2020    Hyperphosphatemia 09/10/2020    S/P TKR (total knee replacement) 03/04/2020    [place] filter in place 02/17/2020    History of pulmonary embolism 02/17/2020    Rosacea 11/22/2019    Abnormality of coagulation ([facility] ) 06/12/2019    Heme positive stool 06/21/2018    Cellulitis of right lower extremity 03/30/2018    Lyme disease     Blood clotting disorder ([facility] )     Essential hypertension     Homocysteinemia ([facility] )     Hypercoagulable state ([facility] )     Obstructive sleep apnea on CPAP     Secondary pulmonary hypertension 08/10/2016    History of DVT (deep vein thrombosis) 07/01/2016    Morbid obesity with BMI of 70 and over, adult ([facility] ) 07/01/2016    Acute embolism and thrombosis of unspecified deep veins of unspecified lower extremity ([facility] ) 04/25/2016    Skin rash 05/22/2014    Chronic venous hypertension w ulceration ([facility] ) 12/18/2013    Osteoporosis 12/02/2013    Edema 11/20/2013    Esophageal reflux 11/20/2013    Hyperlipidemia 11/20/2013    Polycystic ovarian syndrome 11/20/2013     She  has a past surgical history that includes Joint replacement (Bilateral); Appendectomy; Ankle surgery (Right); Hysterectomy; Hemorroidectomy; Breast biopsy; Foot surgery; LITHOTRIPSY; Breast biopsy; Closed reduction metatarsal fracture (Right); and Vena cava filter placement  Her family history includes Hypercoagulant Ability in her maternal grandmother and mother; No Known Problems in her father; Pulmonary embolism in her mother  She  reports that she has never smoked   She has never used smokeless tobacco  She reports that she does not drink alcohol and does not use drugs   Current Outpatient Medications   Medication Sig Dispense Refill    acetaminophen (TYLENOL) 325 mg tablet Take 2 tablets (650 mg total) by mouth every 6 (six) hours as needed for mild pain, moderate pain, headaches or fever  0    albuterol (PROVENTIL HFA,VENTOLIN HFA) 90 mcg/act inhaler Inhale 2 puffs every 6 (six) hours as needed for wheezing   amLODIPine-benazepril (LOTREL 5-20) 5-20 MG per capsule Take 1 capsule by mouth daily 90 capsule 3    cetirizine (ZYRTEC ALLERGY) 10 mg tablet Take 1 tablet by mouth daily as needed      clotrimazole-betamethasone (LOTRISONE) 1-0 05 % cream APPLY A THIN LAYER OF CREAM TOPICALLY TO AFFECTED AREAS ON THE TRUNK AND EXTREMITIES ONCE DAILY AS NEEDED FOR FLARES      folic acid-pyridoxine-cyanocobalamin (Folbic) 2 5-25-2 mg Take 1 tablet by mouth daily 90 tablet 3    furosemide (LASIX) 20 mg tablet Take 1 tablet (20 mg total) by mouth daily 90 tablet 3    Hydrocortisone-Iodoquinol (Dermazene) 1-1 % CREA Apply topically 2 (two) times a day 28 4 g 5    Iodoquinol-HC-Aloe Polysacch (Alcortin A) 1-2-1 % GEL Apply 1 actuation topically 2 (two) times a day as needed (pain) 48 g 6    metFORMIN (GLUCOPHAGE-XR) 500 mg 24 hr tablet Take 1 tablet (500 mg total) by mouth 4 (four) times a day 360 tablet 3    mometasone (NASONEX) 50 mcg/act nasal spray 2 sprays into each nostril daily      montelukast (SINGULAIR) 10 mg tablet Take 1 tablet (10 mg total) by mouth daily at bedtime 90 tablet 3    multivitamin (THERAGRAN) TABS Take 1 tablet by mouth daily        pravastatin (PRAVACHOL) 40 mg tablet Take 1 tablet (40 mg total) by mouth daily 90 tablet 3    triamcinolone (KENALOG) 0 1 % cream 2 (two) times a day as needed       warfarin (COUMADIN) 5 mg tablet Take  strictly as advised 150 tablet 2    alendronate (FOSAMAX) 70 mg tablet Take 1 tablet (70 mg total) by mouth every 7 days for 12 days 12 tablet 3    cholecalciferol (VITAMIN D3) 1,000 units tablet Take 1 tablet (1,000 Units total) by mouth daily 30 tablet 3     No current facility-administered medications for this visit  Current Outpatient Medications on File Prior to Visit   Medication Sig    acetaminophen (TYLENOL) 325 mg tablet Take 2 tablets (650 mg total) by mouth every 6 (six) hours as needed for mild pain, moderate pain, headaches or fever    albuterol (PROVENTIL HFA,VENTOLIN HFA) 90 mcg/act inhaler Inhale 2 puffs every 6 (six) hours as needed for wheezing   amLODIPine-benazepril (LOTREL 5-20) 5-20 MG per capsule Take 1 capsule by mouth daily    cetirizine (ZYRTEC ALLERGY) 10 mg tablet Take 1 tablet by mouth daily as needed    clotrimazole-betamethasone (LOTRISONE) 1-0 05 % cream APPLY A THIN LAYER OF CREAM TOPICALLY TO AFFECTED AREAS ON THE TRUNK AND EXTREMITIES ONCE DAILY AS NEEDED FOR FLARES    folic acid-pyridoxine-cyanocobalamin (Folbic) 2 5-25-2 mg Take 1 tablet by mouth daily    furosemide (LASIX) 20 mg tablet Take 1 tablet (20 mg total) by mouth daily    Hydrocortisone-Iodoquinol (Dermazene) 1-1 % CREA Apply topically 2 (two) times a day    Iodoquinol-HC-Aloe Polysacch (Alcortin A) 1-2-1 % GEL Apply 1 actuation topically 2 (two) times a day as needed (pain)    mometasone (NASONEX) 50 mcg/act nasal spray 2 sprays into each nostril daily    montelukast (SINGULAIR) 10 mg tablet Take 1 tablet (10 mg total) by mouth daily at bedtime    multivitamin (THERAGRAN) TABS Take 1 tablet by mouth daily      pravastatin (PRAVACHOL) 40 mg tablet Take 1 tablet (40 mg total) by mouth daily    triamcinolone (KENALOG) 0 1 % cream 2 (two) times a day as needed     warfarin (COUMADIN) 5 mg tablet Take  strictly as advised    [DISCONTINUED] metFORMIN (GLUCOPHAGE-XR) 500 mg 24 hr tablet Take 1 tablet (500 mg total) by mouth 4 (four) times a day    alendronate (FOSAMAX) 70 mg tablet Take 1 tablet (70 mg total) by mouth every 7 days for 12 days    cholecalciferol (VITAMIN D3) 1,000 units tablet Take 1 tablet (1,000 Units total) by mouth daily     No current facility-administered medications on file prior to visit  She is allergic to azithromycin, ciprofloxacin, clindamycin, and ancef [cefazolin]       Review of Systems  Do you have pain that bothers you in your daily life? no  Pertinent items are noted in HPI       Objective     /74   Pulse 101   Temp 98 °F (36 7 °C)   Ht 5' 4" (1 626 m)   Wt (!) 190 kg (418 lb)   LMP  (LMP Unknown)   SpO2 97%   BMI 71 75 kg/m²     General Appearance:    Alert, cooperative, no distress, appears stated age   Head:    Normocephalic, without obvious abnormality, atraumatic   Eyes:    PERRL, conjunctiva/corneas clear, EOM's intact, fundi     benign, both eyes   Ears:    Normal TM's and external ear canals, both ears   Nose:   Nares normal, septum midline, mucosa normal, no drainage    or sinus tenderness   Throat:   Lips, mucosa, and tongue normal; teeth and gums normal   Neck:   Supple, symmetrical, trachea midline, no adenopathy;     thyroid:  no enlargement/tenderness/nodules; no carotid    bruit or JVD   Back:     Symmetric, no curvature, ROM normal, no CVA tenderness   Lungs:     Clear to auscultation bilaterally, respirations unlabored   Chest Wall:    No tenderness or deformity    Heart:    Regular rate and rhythm, S1 and S2 normal, no murmur, rub   or gallop       Abdomen:     Soft, non-tender, bowel sounds active all four quadrants,     no masses, no organomegaly           Extremities:   Extremities normal, atraumatic, no cyanosis or edema   Pulses:   2+ and symmetric all extremities   Skin:   Skin color, texture, turgor normal, no rashes or lesions   Lymph nodes:   Cervical, supraclavicular, and axillary nodes normal   Neurologic:   CNII-XII intact, normal strength, sensation and reflexes     throughout

## 2021-08-20 ENCOUNTER — TELEPHONE (OUTPATIENT)
Dept: HEMATOLOGY ONCOLOGY | Facility: CLINIC | Age: 66
End: 2021-08-20

## 2021-08-20 NOTE — TELEPHONE ENCOUNTER
Reviewed results with Dr Cori Tapia, he states no change and recheck labs in 2 weeks  Patient voiced understanding

## 2021-08-20 NOTE — TELEPHONE ENCOUNTER
Patient is calling for her INR results from [facility] from yesterday  Results are not in Epic  Will forward to Dr Ryan Tucker MA to get results  Patient is on Coumadin 5 mg alternating with 7 5 mg     Please call patient with further Coumadin instructions      Umberto Forbes (Self) [phone] (H)

## 2021-09-03 ENCOUNTER — TELEPHONE (OUTPATIENT)
Dept: HEMATOLOGY ONCOLOGY | Facility: CLINIC | Age: 66
End: 2021-09-03

## 2021-09-03 NOTE — TELEPHONE ENCOUNTER
Call from patient  INR was drawn at [facility] on 9/2/2021  Patient is on coumadin 7 5mg alternating with 5 mg daily  Patient is due for 5mg tonight  Will send to Dr Alicea's MA to obtain results, discuss with MD and call patient for coumadin dose

## 2021-09-03 NOTE — TELEPHONE ENCOUNTER
Received verbal order from [facility] PT 24 6 INR 2 3  Phone call placed to Dr Nader Espino, per Dr Nader Espino he would like Mian Bo to skip her coumadin tonight and take 7 5 mg on Monday and Thursday, and 5 mg all other days  Iman Soriano voiced understanding  Will scan lab results in once fax is received

## 2021-09-06 ENCOUNTER — DOCUMENTATION (OUTPATIENT)
Dept: HEMATOLOGY ONCOLOGY | Facility: CLINIC | Age: 66
End: 2021-09-06

## 2021-09-06 NOTE — PROGRESS NOTES
On 09/03/21 I was given PT INR report of 3 2 and we made changes in the Coumadin dose but actually her report is 2 3  There was no need to make any changes  I called the patient and explained this to her and she will go back to Coumadin 7 5 mg alternating with 5 mg like before and she will start 7 5 mg tonight  Repeat PT INR in 2 weeks

## 2021-09-17 ENCOUNTER — TELEPHONE (OUTPATIENT)
Dept: HEMATOLOGY ONCOLOGY | Facility: CLINIC | Age: 66
End: 2021-09-17

## 2021-09-17 NOTE — TELEPHONE ENCOUNTER
Patient called to report her 9/16 INR 2  2  would like to confirm her coumadin instructions  Best call back [phone]

## 2021-09-17 NOTE — TELEPHONE ENCOUNTER
Obtained lab results from [facility] since they were not in our system  Reviewed with Dr Ish Conroy, there will be no change and patient will recheck her labs in 2 weeks  Sita Cleveland voiced understanding

## 2021-09-29 ENCOUNTER — HOSPITAL ENCOUNTER (EMERGENCY)
Facility: HOSPITAL | Age: 66
Discharge: HOME | End: 2021-09-29
Attending: EMERGENCY MEDICINE
Payer: MEDICARE

## 2021-09-29 VITALS
RESPIRATION RATE: 20 BRPM | HEART RATE: 84 BPM | OXYGEN SATURATION: 95 % | HEIGHT: 64 IN | TEMPERATURE: 98.1 F | BODY MASS INDEX: 50.02 KG/M2 | WEIGHT: 293 LBS | SYSTOLIC BLOOD PRESSURE: 134 MMHG | DIASTOLIC BLOOD PRESSURE: 63 MMHG

## 2021-09-29 DIAGNOSIS — L03.90 CELLULITIS: Primary | ICD-10-CM

## 2021-09-29 LAB
ALBUMIN SERPL BCP-MCNC: 2.9 G/DL (ref 3.5–5)
ALP SERPL-CCNC: 81 U/L (ref 46–116)
ALT SERPL W P-5'-P-CCNC: 18 U/L (ref 12–78)
ANION GAP SERPL CALCULATED.3IONS-SCNC: 9 MMOL/L (ref 4–13)
AST SERPL W P-5'-P-CCNC: 21 U/L (ref 5–45)
BASOPHILS # BLD AUTO: 0.09 THOUSANDS/ΜL (ref 0–0.1)
BASOPHILS NFR BLD AUTO: 1 % (ref 0–1)
BILIRUB SERPL-MCNC: 0.4 MG/DL (ref 0.2–1)
BUN SERPL-MCNC: 15 MG/DL (ref 5–25)
CALCIUM ALBUM COR SERPL-MCNC: 9.5 MG/DL (ref 8.3–10.1)
CALCIUM SERPL-MCNC: 8.6 MG/DL (ref 8.3–10.1)
CHLORIDE SERPL-SCNC: 106 MMOL/L (ref 100–108)
CO2 SERPL-SCNC: 27 MMOL/L (ref 21–32)
CREAT SERPL-MCNC: 1.14 MG/DL (ref 0.6–1.3)
EOSINOPHIL # BLD AUTO: 0.17 THOUSAND/ΜL (ref 0–0.61)
EOSINOPHIL NFR BLD AUTO: 2 % (ref 0–6)
ERYTHROCYTE [DISTWIDTH] IN BLOOD BY AUTOMATED COUNT: 15.6 % (ref 11.6–15.1)
GFR SERPL CREATININE-BSD FRML MDRD: 51 ML/MIN/1.73SQ M
GLUCOSE SERPL-MCNC: 102 MG/DL (ref 65–140)
HCT VFR BLD AUTO: 39.1 % (ref 34.8–46.1)
HGB BLD-MCNC: 12.5 G/DL (ref 11.5–15.4)
IMM GRANULOCYTES # BLD AUTO: 0.04 THOUSAND/UL (ref 0–0.2)
IMM GRANULOCYTES NFR BLD AUTO: 0 % (ref 0–2)
LACTATE SERPL-SCNC: 1.5 MMOL/L (ref 0.5–2)
LYMPHOCYTES # BLD AUTO: 1.92 THOUSANDS/ΜL (ref 0.6–4.47)
LYMPHOCYTES NFR BLD AUTO: 21 % (ref 14–44)
MCH RBC QN AUTO: 28.9 PG (ref 26.8–34.3)
MCHC RBC AUTO-ENTMCNC: 32 G/DL (ref 31.4–37.4)
MCV RBC AUTO: 90 FL (ref 82–98)
MONOCYTES # BLD AUTO: 0.81 THOUSAND/ΜL (ref 0.17–1.22)
MONOCYTES NFR BLD AUTO: 9 % (ref 4–12)
NEUTROPHILS # BLD AUTO: 6.07 THOUSANDS/ΜL (ref 1.85–7.62)
NEUTS SEG NFR BLD AUTO: 67 % (ref 43–75)
NRBC BLD AUTO-RTO: 0 /100 WBCS
PLATELET # BLD AUTO: 328 THOUSANDS/UL (ref 149–390)
PMV BLD AUTO: 10 FL (ref 8.9–12.7)
POTASSIUM SERPL-SCNC: 4.1 MMOL/L (ref 3.5–5.3)
PROT SERPL-MCNC: 7.8 G/DL (ref 6.4–8.2)
RBC # BLD AUTO: 4.33 MILLION/UL (ref 3.81–5.12)
SODIUM SERPL-SCNC: 142 MMOL/L (ref 136–145)
WBC # BLD AUTO: 9.1 THOUSAND/UL (ref 4.31–10.16)

## 2021-09-29 PROCEDURE — 36415 COLL VENOUS BLD VENIPUNCTURE: CPT | Performed by: PHYSICIAN ASSISTANT

## 2021-09-29 PROCEDURE — 83605 ASSAY OF LACTIC ACID: CPT | Performed by: PHYSICIAN ASSISTANT

## 2021-09-29 PROCEDURE — 99283 EMERGENCY DEPT VISIT LOW MDM: CPT

## 2021-09-29 PROCEDURE — 99284 EMERGENCY DEPT VISIT MOD MDM: CPT | Performed by: PHYSICIAN ASSISTANT

## 2021-09-29 PROCEDURE — 80053 COMPREHEN METABOLIC PANEL: CPT | Performed by: PHYSICIAN ASSISTANT

## 2021-09-29 PROCEDURE — 96365 THER/PROPH/DIAG IV INF INIT: CPT

## 2021-09-29 PROCEDURE — 85025 COMPLETE CBC W/AUTO DIFF WBC: CPT | Performed by: PHYSICIAN ASSISTANT

## 2021-09-29 RX ORDER — CLINDAMYCIN HYDROCHLORIDE 300 MG/1
300 CAPSULE ORAL 4 TIMES DAILY
Qty: 28 CAPSULE | Refills: 0 | Status: SHIPPED | OUTPATIENT
Start: 2021-09-29 | End: 2021-10-06

## 2021-09-29 RX ORDER — CLINDAMYCIN PHOSPHATE 600 MG/50ML
600 INJECTION INTRAVENOUS ONCE
Status: COMPLETED | OUTPATIENT
Start: 2021-09-29 | End: 2021-09-29

## 2021-09-29 RX ADMIN — CLINDAMYCIN PHOSPHATE 600 MG: 600 INJECTION, SOLUTION INTRAVENOUS at 14:23

## 2021-10-01 ENCOUNTER — TELEPHONE (OUTPATIENT)
Dept: HEMATOLOGY ONCOLOGY | Facility: CLINIC | Age: 66
End: 2021-10-01

## 2021-10-05 ENCOUNTER — TELEPHONE (OUTPATIENT)
Dept: HEMATOLOGY ONCOLOGY | Facility: CLINIC | Age: 66
End: 2021-10-05

## 2021-10-12 ENCOUNTER — TELEPHONE (OUTPATIENT)
Dept: HEMATOLOGY ONCOLOGY | Facility: CLINIC | Age: 66
End: 2021-10-12

## 2021-10-14 ENCOUNTER — OFFICE VISIT (OUTPATIENT)
Dept: FAMILY MEDICINE CLINIC | Facility: CLINIC | Age: 66
End: 2021-10-14
Payer: MEDICARE

## 2021-10-14 VITALS
HEART RATE: 80 BPM | RESPIRATION RATE: 18 BRPM | HEIGHT: 64 IN | DIASTOLIC BLOOD PRESSURE: 70 MMHG | SYSTOLIC BLOOD PRESSURE: 118 MMHG | OXYGEN SATURATION: 97 % | BODY MASS INDEX: 50.02 KG/M2 | TEMPERATURE: 98.1 F | WEIGHT: 293 LBS

## 2021-10-14 DIAGNOSIS — I87.319 CHRONIC VENOUS HYPERTENSION W ULCERATION (HCC): ICD-10-CM

## 2021-10-14 DIAGNOSIS — I10 ESSENTIAL HYPERTENSION: ICD-10-CM

## 2021-10-14 DIAGNOSIS — D68.9 ABNORMALITY OF COAGULATION (HCC): ICD-10-CM

## 2021-10-14 DIAGNOSIS — Z23 NEEDS FLU SHOT: Primary | ICD-10-CM

## 2021-10-14 DIAGNOSIS — Z23 NEED FOR SHINGLES VACCINE: ICD-10-CM

## 2021-10-14 DIAGNOSIS — E66.01 MORBID OBESITY WITH BMI OF 70 AND OVER, ADULT (HCC): ICD-10-CM

## 2021-10-14 DIAGNOSIS — Z23 NEED FOR PNEUMOCOCCAL VACCINATION: ICD-10-CM

## 2021-10-14 DIAGNOSIS — L97.909 CHRONIC VENOUS HYPERTENSION W ULCERATION (HCC): ICD-10-CM

## 2021-10-14 DIAGNOSIS — D68.9 BLOOD CLOTTING DISORDER (HCC): ICD-10-CM

## 2021-10-14 DIAGNOSIS — L03.115 CELLULITIS OF RIGHT LOWER EXTREMITY: ICD-10-CM

## 2021-10-14 DIAGNOSIS — D68.59 HYPERCOAGULABLE STATE (HCC): ICD-10-CM

## 2021-10-14 DIAGNOSIS — K21.9 GASTROESOPHAGEAL REFLUX DISEASE, UNSPECIFIED WHETHER ESOPHAGITIS PRESENT: ICD-10-CM

## 2021-10-14 PROCEDURE — G0009 ADMIN PNEUMOCOCCAL VACCINE: HCPCS | Performed by: FAMILY MEDICINE

## 2021-10-14 PROCEDURE — G0008 ADMIN INFLUENZA VIRUS VAC: HCPCS | Performed by: FAMILY MEDICINE

## 2021-10-14 PROCEDURE — 90662 IIV NO PRSV INCREASED AG IM: CPT | Performed by: FAMILY MEDICINE

## 2021-10-14 PROCEDURE — 90732 PPSV23 VACC 2 YRS+ SUBQ/IM: CPT | Performed by: FAMILY MEDICINE

## 2021-10-14 PROCEDURE — 99214 OFFICE O/P EST MOD 30 MIN: CPT | Performed by: FAMILY MEDICINE

## 2021-10-14 RX ORDER — CLINDAMYCIN HYDROCHLORIDE 300 MG/1
300 CAPSULE ORAL 4 TIMES DAILY
Qty: 28 CAPSULE | Refills: 5 | Status: SHIPPED | OUTPATIENT
Start: 2021-10-14 | End: 2021-10-21

## 2021-10-14 RX ORDER — ZOSTER VACCINE RECOMBINANT, ADJUVANTED 50 MCG/0.5
0.5 KIT INTRAMUSCULAR ONCE
Qty: 1 EACH | Refills: 1 | Status: SHIPPED | OUTPATIENT
Start: 2021-10-14 | End: 2021-10-14

## 2021-10-19 ENCOUNTER — TELEPHONE (OUTPATIENT)
Dept: SURGICAL ONCOLOGY | Facility: CLINIC | Age: 66
End: 2021-10-19

## 2021-10-28 ENCOUNTER — TELEPHONE (OUTPATIENT)
Dept: HEMATOLOGY ONCOLOGY | Facility: CLINIC | Age: 66
End: 2021-10-28

## 2021-11-04 ENCOUNTER — APPOINTMENT (RX ONLY)
Dept: URBAN - NONMETROPOLITAN AREA CLINIC 4 | Facility: CLINIC | Age: 66
Setting detail: DERMATOLOGY
End: 2021-11-04

## 2021-11-04 DIAGNOSIS — B35.3 TINEA PEDIS: ICD-10-CM | Status: RESOLVING

## 2021-11-04 DIAGNOSIS — L30.4 ERYTHEMA INTERTRIGO: ICD-10-CM | Status: IMPROVED

## 2021-11-04 DIAGNOSIS — L71.8 OTHER ROSACEA: ICD-10-CM | Status: INADEQUATELY CONTROLLED

## 2021-11-04 PROCEDURE — ? COUNSELING

## 2021-11-04 PROCEDURE — ? PRESCRIPTION

## 2021-11-04 PROCEDURE — ? TREATMENT REGIMEN

## 2021-11-04 PROCEDURE — 99213 OFFICE O/P EST LOW 20 MIN: CPT

## 2021-11-04 RX ORDER — DOXYCYCLINE HYCLATE 20 MG/1
TABLET, FILM COATED ORAL
Qty: 30 | Refills: 3 | Status: ERX | COMMUNITY
Start: 2021-11-04

## 2021-11-04 RX ORDER — METRONIDAZOLE 10 MG/G
GEL TOPICAL QDAY
Qty: 60 | Refills: 3 | Status: ERX | COMMUNITY
Start: 2021-11-04

## 2021-11-04 RX ADMIN — METRONIDAZOLE 1: 10 GEL TOPICAL at 00:00

## 2021-11-04 RX ADMIN — DOXYCYCLINE HYCLATE 20: 20 TABLET, FILM COATED ORAL at 00:00

## 2021-11-04 ASSESSMENT — LOCATION SIMPLE DESCRIPTION DERM
LOCATION SIMPLE: ABDOMEN
LOCATION SIMPLE: LEFT CHEEK
LOCATION SIMPLE: RIGHT CHEEK
LOCATION SIMPLE: LEFT FOOT
LOCATION SIMPLE: RIGHT FOOT

## 2021-11-04 ASSESSMENT — LOCATION DETAILED DESCRIPTION DERM
LOCATION DETAILED: SUBXIPHOID
LOCATION DETAILED: LEFT DORSAL FOOT
LOCATION DETAILED: RIGHT MEDIAL MALAR CHEEK
LOCATION DETAILED: LEFT MEDIAL MALAR CHEEK
LOCATION DETAILED: RIGHT DORSAL FOOT

## 2021-11-04 ASSESSMENT — LOCATION ZONE DERM
LOCATION ZONE: FEET
LOCATION ZONE: TRUNK
LOCATION ZONE: FACE

## 2021-11-04 ASSESSMENT — SEVERITY ASSESSMENT: SEVERITY: ALMOST CLEAR

## 2021-11-04 NOTE — PROCEDURE: TREATMENT REGIMEN
Initiate Treatment: Metrogel QD, Doxycycline 100mg QD
Detail Level: Zone
Continue Regimen: Nystatin powder, clotrimazole-Betamethasone QD
Continue Regimen: Lotrisone PRN

## 2021-11-11 ENCOUNTER — TELEPHONE (OUTPATIENT)
Dept: HEMATOLOGY ONCOLOGY | Facility: CLINIC | Age: 66
End: 2021-11-11

## 2021-11-22 DIAGNOSIS — E28.2 PCOS (POLYCYSTIC OVARIAN SYNDROME): ICD-10-CM

## 2021-11-22 DIAGNOSIS — E78.5 HYPERLIPIDEMIA, UNSPECIFIED HYPERLIPIDEMIA TYPE: ICD-10-CM

## 2021-11-22 DIAGNOSIS — I10 ESSENTIAL HYPERTENSION: ICD-10-CM

## 2021-11-22 DIAGNOSIS — M81.0 OSTEOPOROSIS WITHOUT CURRENT PATHOLOGICAL FRACTURE, UNSPECIFIED OSTEOPOROSIS TYPE: ICD-10-CM

## 2021-11-22 RX ORDER — FUROSEMIDE 20 MG/1
20 TABLET ORAL DAILY
Qty: 90 TABLET | Refills: 3 | Status: SHIPPED | OUTPATIENT
Start: 2021-11-22

## 2021-11-22 RX ORDER — PRAVASTATIN SODIUM 40 MG
40 TABLET ORAL DAILY
Qty: 90 TABLET | Refills: 3
Start: 2021-11-22 | End: 2021-11-29 | Stop reason: SDUPTHER

## 2021-11-22 RX ORDER — ALENDRONATE SODIUM 70 MG/1
70 TABLET ORAL
Qty: 12 TABLET | Refills: 3 | Status: SHIPPED | OUTPATIENT
Start: 2021-11-22 | End: 2022-07-11

## 2021-11-22 RX ORDER — METFORMIN HYDROCHLORIDE 500 MG/1
500 TABLET, EXTENDED RELEASE ORAL 4 TIMES DAILY
Qty: 360 TABLET | Refills: 3 | Status: SHIPPED | OUTPATIENT
Start: 2021-11-22

## 2021-11-29 DIAGNOSIS — E78.5 HYPERLIPIDEMIA, UNSPECIFIED HYPERLIPIDEMIA TYPE: ICD-10-CM

## 2021-11-29 RX ORDER — PRAVASTATIN SODIUM 40 MG
40 TABLET ORAL DAILY
Qty: 90 TABLET | Refills: 3
Start: 2021-11-29 | End: 2021-12-01 | Stop reason: SDUPTHER

## 2021-11-30 ENCOUNTER — TELEPHONE (OUTPATIENT)
Dept: GYNECOLOGIC ONCOLOGY | Facility: CLINIC | Age: 66
End: 2021-11-30

## 2021-12-01 ENCOUNTER — DOCUMENTATION (OUTPATIENT)
Dept: HEMATOLOGY ONCOLOGY | Facility: CLINIC | Age: 66
End: 2021-12-01

## 2021-12-01 DIAGNOSIS — E78.5 HYPERLIPIDEMIA, UNSPECIFIED HYPERLIPIDEMIA TYPE: ICD-10-CM

## 2021-12-01 RX ORDER — PRAVASTATIN SODIUM 40 MG
40 TABLET ORAL DAILY
Qty: 90 TABLET | Refills: 3
Start: 2021-12-01 | End: 2022-02-10 | Stop reason: SDUPTHER

## 2021-12-08 ENCOUNTER — TELEPHONE (OUTPATIENT)
Dept: CARDIAC SURGERY | Facility: CLINIC | Age: 66
End: 2021-12-08

## 2021-12-24 ENCOUNTER — TELEPHONE (OUTPATIENT)
Dept: OTHER | Facility: OTHER | Age: 66
End: 2021-12-24

## 2021-12-26 ENCOUNTER — TELEPHONE (OUTPATIENT)
Dept: HEMATOLOGY ONCOLOGY | Facility: CLINIC | Age: 66
End: 2021-12-26

## 2022-01-06 ENCOUNTER — TELEPHONE (OUTPATIENT)
Dept: HEMATOLOGY ONCOLOGY | Facility: MEDICAL CENTER | Age: 67
End: 2022-01-06

## 2022-01-06 ENCOUNTER — TELEPHONE (OUTPATIENT)
Dept: HEMATOLOGY ONCOLOGY | Facility: CLINIC | Age: 67
End: 2022-01-06

## 2022-01-06 NOTE — TELEPHONE ENCOUNTER
Patient called for Coumadin instructions  She had labs drawn at [facility]  Can we see if the results are available?     Thank you     Call back number for the patient

## 2022-01-06 NOTE — TELEPHONE ENCOUNTER
Received verbal from [facility] PT 25 5  INR 2 4, patient is taking 7 5 mg Coumadin M,W, F and 5 mg all other days  Per Dr Jose Antonio Brand patient should stay on that regimen and recheck labs in 2-3 weeks  Josseline Win voiced understanding

## 2022-01-27 ENCOUNTER — TELEPHONE (OUTPATIENT)
Dept: GYNECOLOGIC ONCOLOGY | Facility: CLINIC | Age: 67
End: 2022-01-27

## 2022-01-27 NOTE — TELEPHONE ENCOUNTER
Reviewed with Dr Bee Zambrano - no change in Coumadin dose at this time    Patient to check results again in 2-3 weeks  I left a msg for patient reviewing instructions and instructed her to call back with any questions or concerns

## 2022-01-27 NOTE — TELEPHONE ENCOUNTER
Patient had labs drawn yesterday 1/26/22 at \[facility]\"" in [place]   She said the office always calls to get the results sent over

## 2022-01-27 NOTE — TELEPHONE ENCOUNTER
Called [facility] spoke to JESSENIA, she will fax results to [place] office  Obtained verbal:      PT-26 4  INR-2 6    Please review with Dr Joon Chisholm for Coumadin instructions, ok to retask and I will call patient, thanks!

## 2022-01-28 ENCOUNTER — TELEPHONE (OUTPATIENT)
Dept: HEMATOLOGY ONCOLOGY | Facility: CLINIC | Age: 67
End: 2022-01-28

## 2022-01-28 NOTE — TELEPHONE ENCOUNTER
Pt called for Pt  Inr results and coumadin dosing because no one called her  Explained there is a note in the chart that RN called and left a voice message  She stated sometimes her phone messages do not work right  Reviewed Pt/Inr 26 4 and 2 6   Per Dr Daryl Guzman continue taking M W F 7 5 mg and all other days take 5 mg  Repeat lab in 2 to 3 weeks  Pt states she understands

## 2022-02-10 DIAGNOSIS — E78.5 HYPERLIPIDEMIA, UNSPECIFIED HYPERLIPIDEMIA TYPE: ICD-10-CM

## 2022-02-10 RX ORDER — PRAVASTATIN SODIUM 40 MG
40 TABLET ORAL DAILY
Qty: 90 TABLET | Refills: 3
Start: 2022-02-10 | End: 2022-02-16 | Stop reason: SDUPTHER

## 2022-02-16 ENCOUNTER — OFFICE VISIT (OUTPATIENT)
Dept: FAMILY MEDICINE CLINIC | Facility: CLINIC | Age: 67
End: 2022-02-16
Payer: MEDICARE

## 2022-02-16 VITALS
SYSTOLIC BLOOD PRESSURE: 122 MMHG | TEMPERATURE: 98.4 F | RESPIRATION RATE: 18 BRPM | DIASTOLIC BLOOD PRESSURE: 78 MMHG | HEIGHT: 64 IN | BODY MASS INDEX: 50.02 KG/M2 | OXYGEN SATURATION: 98 % | HEART RATE: 96 BPM | WEIGHT: 293 LBS

## 2022-02-16 DIAGNOSIS — Z00.00 ENCOUNTER FOR ANNUAL WELLNESS EXAM IN MEDICARE PATIENT: Primary | ICD-10-CM

## 2022-02-16 DIAGNOSIS — L03.119 CELLULITIS OF LOWER EXTREMITY, UNSPECIFIED LATERALITY: ICD-10-CM

## 2022-02-16 DIAGNOSIS — E78.5 HYPERLIPIDEMIA, UNSPECIFIED HYPERLIPIDEMIA TYPE: ICD-10-CM

## 2022-02-16 DIAGNOSIS — I10 ESSENTIAL HYPERTENSION: ICD-10-CM

## 2022-02-16 PROCEDURE — G0439 PPPS, SUBSEQ VISIT: HCPCS | Performed by: STUDENT IN AN ORGANIZED HEALTH CARE EDUCATION/TRAINING PROGRAM

## 2022-02-16 PROCEDURE — 99213 OFFICE O/P EST LOW 20 MIN: CPT | Performed by: STUDENT IN AN ORGANIZED HEALTH CARE EDUCATION/TRAINING PROGRAM

## 2022-02-16 RX ORDER — AMLODIPINE BESYLATE AND BENAZEPRIL HYDROCHLORIDE 5; 20 MG/1; MG/1
1 CAPSULE ORAL DAILY
Qty: 90 CAPSULE | Refills: 3 | Status: SHIPPED | OUTPATIENT
Start: 2022-02-16 | End: 2022-03-18 | Stop reason: SDUPTHER

## 2022-02-16 RX ORDER — PRAVASTATIN SODIUM 40 MG
40 TABLET ORAL DAILY
Qty: 90 TABLET | Refills: 3
Start: 2022-02-16 | End: 2022-03-02 | Stop reason: SDUPTHER

## 2022-02-16 NOTE — PATIENT INSTRUCTIONS
Medicare Preventive Visit Patient Instructions  Thank you for completing your Welcome to Medicare Visit or Medicare Annual Wellness Visit today  Your next wellness visit will be due in one year (2/17/2023)  The screening/preventive services that you may require over the next 5-10 years are detailed below  Some tests may not apply to you based off risk factors and/or age  Screening tests ordered at today's visit but not completed yet may show as past due  Also, please note that scanned in results may not display below  Preventive Screenings:  Service Recommendations Previous Testing/Comments   Colorectal Cancer Screening  * Colonoscopy    * Fecal Occult Blood Test (FOBT)/Fecal Immunochemical Test (FIT)  * Fecal DNA/Cologuard Test  * Flexible Sigmoidoscopy Age: 54-65 years old   Colonoscopy: every 10 years (may be performed more frequently if at higher risk)  OR  FOBT/FIT: every 1 year  OR  Cologuard: every 3 years  OR  Sigmoidoscopy: every 5 years  Screening may be recommended earlier than age 48 if at higher risk for colorectal cancer  Also, an individualized decision between you and your healthcare provider will decide whether screening between the ages of 74-80 would be appropriate  Colonoscopy: 11/15/2018  FOBT/FIT: Not on file  Cologuard: Not on file  Sigmoidoscopy: Not on file    Screening Current     Breast Cancer Screening Age: 36 years old  Frequency: every 1-2 years  Not required if history of left and right mastectomy Mammogram: 12/06/2021    Screening Current   Cervical Cancer Screening Between the ages of 21-29, pap smear recommended once every 3 years  Between the ages of 33-67, can perform pap smear with HPV co-testing every 5 years     Recommendations may differ for women with a history of total hysterectomy, cervical cancer, or abnormal pap smears in past  Pap Smear: 05/23/2018    Screening Not Indicated   Hepatitis C Screening Once for adults born between 1945 and 1965  More frequently in patients at high risk for Hepatitis C Hep C Antibody: 05/09/2018    Screening Current   Diabetes Screening 1-2 times per year if you're at risk for diabetes or have pre-diabetes Fasting glucose: No results in last 5 years   A1C: 5 8 %    Screening Current   Cholesterol Screening Once every 5 years if you don't have a lipid disorder  May order more often based on risk factors  Lipid panel: Not on file    Screening Not Indicated  History Lipid Disorder     Other Preventive Screenings Covered by Medicare:  1  Abdominal Aortic Aneurysm (AAA) Screening: covered once if your at risk  You're considered to be at risk if you have a family history of AAA  2  Lung Cancer Screening: covers low dose CT scan once per year if you meet all of the following conditions: (1) Age 50-69; (2) No signs or symptoms of lung cancer; (3) Current smoker or have quit smoking within the last 15 years; (4) You have a tobacco smoking history of at least 30 pack years (packs per day multiplied by number of years you smoked); (5) You get a written order from a healthcare provider  3  Glaucoma Screening: covered annually if you're considered high risk: (1) You have diabetes OR (2) Family history of glaucoma OR (3)  aged 48 and older OR (3)  American aged 72 and older  3  Osteoporosis Screening: covered every 2 years if you meet one of the following conditions: (1) You're estrogen deficient and at risk for osteoporosis based off medical history and other findings; (2) Have a vertebral abnormality; (3) On glucocorticoid therapy for more than 3 months; (4) Have primary hyperparathyroidism; (5) On osteoporosis medications and need to assess response to drug therapy  · Last bone density test (DXA Scan): 06/24/2015  5  HIV Screening: covered annually if you're between the age of 12-76  Also covered annually if you are younger than 13 and older than 72 with risk factors for HIV infection   For pregnant patients, it is covered up to 3 times per pregnancy  Immunizations:  Immunization Recommendations   Influenza Vaccine Annual influenza vaccination during flu season is recommended for all persons aged >= 6 months who do not have contraindications   Pneumococcal Vaccine (Prevnar and Pneumovax)  * Prevnar = PCV13  * Pneumovax = PPSV23   Adults 25-60 years old: 1-3 doses may be recommended based on certain risk factors  Adults 72 years old: Prevnar (PCV13) vaccine recommended followed by Pneumovax (PPSV23) vaccine  If already received PPSV23 since turning 65, then PCV13 recommended at least one year after PPSV23 dose  Hepatitis B Vaccine 3 dose series if at intermediate or high risk (ex: diabetes, end stage renal disease, liver disease)   Tetanus (Td) Vaccine - COST NOT COVERED BY MEDICARE PART B Following completion of primary series, a booster dose should be given every 10 years to maintain immunity against tetanus  Td may also be given as tetanus wound prophylaxis  Tdap Vaccine - COST NOT COVERED BY MEDICARE PART B Recommended at least once for all adults  For pregnant patients, recommended with each pregnancy  Shingles Vaccine (Shingrix) - COST NOT COVERED BY MEDICARE PART B  2 shot series recommended in those aged 48 and above     Health Maintenance Due:      Topic Date Due    HIV Screening  Never done    Cervical Cancer Screening  01/01/2030 (Originally 5/23/2021)    Breast Cancer Screening: Mammogram  12/06/2022    Colorectal Cancer Screening  11/15/2028    Hepatitis C Screening  Completed     Immunizations Due:      Topic Date Due    COVID-19 Vaccine (3 - Booster for [employer] series) 09/28/2021     Advance Directives   What are advance directives? Advance directives are legal documents that state your wishes and plans for medical care  These plans are made ahead of time in case you lose your ability to make decisions for yourself   Advance directives can apply to any medical decision, such as the treatments you want, and if you want to donate organs  What are the types of advance directives? There are many types of advance directives, and each state has rules about how to use them  You may choose a combination of any of the following:  · Living will: This is a written record of the treatment you want  You can also choose which treatments you do not want, which to limit, and which to stop at a certain time  This includes surgery, medicine, IV fluid, and tube feedings  · Durable power of  for healthcare [facility]): This is a written record that states who you want to make healthcare choices for you when you are unable to make them for yourself  This person, called a proxy, is usually a family member or a friend  You may choose more than 1 proxy  · Do not resuscitate (DNR) order:  A DNR order is used in case your heart stops beating or you stop breathing  It is a request not to have certain forms of treatment, such as CPR  A DNR order may be included in other types of advance directives  · Medical directive: This covers the care that you want if you are in a coma, near death, or unable to make decisions for yourself  You can list the treatments you want for each condition  Treatment may include pain medicine, surgery, blood transfusions, dialysis, IV or tube feedings, and a ventilator (breathing machine)  · Values history: This document has questions about your views, beliefs, and how you feel and think about life  This information can help others choose the care that you would choose  Why are advance directives important? An advance directive helps you control your care  Although spoken wishes may be used, it is better to have your wishes written down  Spoken wishes can be misunderstood, or not followed  Treatments may be given even if you do not want them  An advance directive may make it easier for your family to make difficult choices about your care     Urinary Incontinence   Urinary incontinence (UI)  is when you lose control of your bladder  UI develops because your bladder cannot store or empty urine properly  The 3 most common types of UI are stress incontinence, urge incontinence, or both  Medicines:   · May be given to help strengthen your bladder control  Report any side effects of medication to your healthcare provider  Do pelvic muscle exercises often:  Your pelvic muscles help you stop urinating  Squeeze these muscles tight for 5 seconds, then relax for 5 seconds  Gradually work up to squeezing for 10 seconds  Do 3 sets of 15 repetitions a day, or as directed  This will help strengthen your pelvic muscles and improve bladder control  Train your bladder:  Go to the bathroom at set times, such as every 2 hours, even if you do not feel the urge to go  You can also try to hold your urine when you feel the urge to go  For example, hold your urine for 5 minutes when you feel the urge to go  As that becomes easier, hold your urine for 10 minutes  Self-care:   · Keep a UI record  Write down how often you leak urine and how much you leak  Make a note of what you were doing when you leaked urine  · Drink liquids as directed  You may need to limit the amount of liquid you drink to help control your urine leakage  Do not drink any liquid right before you go to bed  Limit or do not have drinks that contain caffeine or alcohol  · Prevent constipation  Eat a variety of high-fiber foods  Good examples are high-fiber cereals, beans, vegetables, and whole-grain breads  Walking is the best way to trigger your intestines to have a bowel movement  · Exercise regularly and maintain a healthy weight  Weight loss and exercise will decrease pressure on your bladder and help you control your leakage  · Use a catheter as directed  to help empty your bladder  A catheter is a tiny, plastic tube that is put into your bladder to drain your urine  · Go to behavior therapy as directed    Behavior therapy may be used to help you learn to control your urge to urinate  Weight Management   Why it is important to manage your weight:  Being overweight increases your risk of health conditions such as heart disease, high blood pressure, type 2 diabetes, and certain types of cancer  It can also increase your risk for osteoarthritis, sleep apnea, and other respiratory problems  Aim for a slow, steady weight loss  Even a small amount of weight loss can lower your risk of health problems  How to lose weight safely:  A safe and healthy way to lose weight is to eat fewer calories and get regular exercise  You can lose up about 1 pound a week by decreasing the number of calories you eat by 500 calories each day  Healthy meal plan for weight management:  A healthy meal plan includes a variety of foods, contains fewer calories, and helps you stay healthy  A healthy meal plan includes the following:  · Eat whole-grain foods more often  A healthy meal plan should contain fiber  Fiber is the part of grains, fruits, and vegetables that is not broken down by your body  Whole-grain foods are healthy and provide extra fiber in your diet  Some examples of whole-grain foods are whole-wheat breads and pastas, oatmeal, brown rice, and bulgur  · Eat a variety of vegetables every day  Include dark, leafy greens such as spinach, kale, gerard greens, and mustard greens  Eat yellow and orange vegetables such as carrots, sweet potatoes, and winter squash  · Eat a variety of fruits every day  Choose fresh or canned fruit (canned in its own juice or light syrup) instead of juice  Fruit juice has very little or no fiber  · Eat low-fat dairy foods  Drink fat-free (skim) milk or 1% milk  Eat fat-free yogurt and low-fat cottage cheese  Try low-fat cheeses such as mozzarella and other reduced-fat cheeses  · Choose meat and other protein foods that are low in fat  Choose beans or other legumes such as split peas or lentils   Choose fish, skinless poultry (chicken or turkey), or lean cuts of red meat (beef or pork)  Before you cook meat or poultry, cut off any visible fat  · Use less fat and oil  Try baking foods instead of frying them  Add less fat, such as margarine, sour cream, regular salad dressing and mayonnaise to foods  Eat fewer high-fat foods  Some examples of high-fat foods include french fries, doughnuts, ice cream, and cakes  · Eat fewer sweets  Limit foods and drinks that are high in sugar  This includes candy, cookies, regular soda, and sweetened drinks  Exercise:  Exercise at least 30 minutes per day on most days of the week  Some examples of exercise include walking, biking, dancing, and swimming  You can also fit in more physical activity by taking the stairs instead of the elevator or parking farther away from stores  Ask your healthcare provider about the best exercise plan for you  © Copyright [employer] 2018 Information is for End User's use only and may not be sold, redistributed or otherwise used for commercial purposes   All illustrations and images included in CareNotes® are the copyrighted property of [employer]  or [address] [place] [address] DWNLDpape

## 2022-02-16 NOTE — PROGRESS NOTES
[facility] - Med Assessment/Plan:    No problem-specific Assessment & Plan notes found for this encounter  Diagnoses and all orders for this visit:    Type 2 diabetes mellitus with other circulatory complication, without long-term current use of insulin (HCC)  -     Lipid panel; Future  -     Comprehensive metabolic panel; Future  -     TSH, 3rd generation; Future  -     Microalbumin / creatinine urine ratio    Essential hypertension    Chronic venous hypertension with ulcer involving both sides (HCC)    Blood clotting disorder (HCC)    Cellulitis of right lower extremity    Other orders  -     cephalexin (KEFLEX) 500 mg capsule; TAKE ONE CAPSULE BY MOUTH EVERY 6 HOURS FOR 7 DAYS  -      Mammography  -      Colonoscopy  -      Dexa Scan  -     triamcinolone (KENALOG) 0 5 % cream; APPLY TOPICALLY TO RIGHT ANKLE EVERY 12 HOURS  -     metFORMIN (GLUCOPHAGE-XR) 500 mg 24 hr tablet; 500 mg 4 (four) times a day    -     predniSONE 10 mg tablet; Take 10 mg by mouth daily          Subjective:      Patient ID: Marquis Rosa is a 65 y o  female  She developed cellulitis several weeks ago  She was seen in the UC at [facility]  She was started on abx and had culture done  Her abx were changed based on her culture results  She was seen by wound care  She eventually was admitted for outpatient treatment failure and required IV abx  She developed an allergic reaction to Ancef  She had rash and itch  Her leg has improved  She has chronic venous stasis  She has lymphedema  This is 1 of multiple episodes of significant cellulitis that she has had  She would benefit greatly from lymphedema pumps  She has no fevers or chills  She denies any constitutional symptoms currently  To further complicate  Her clinical picture,  She is hypercoagulable          The following portions of the patient's history were reviewed and updated as appropriate:   She  has a past medical history of Blood clotting disorder ([address] ); "Homocysteinemia ([facility] ); Hypercoagulable state ([place] [address] ); Hypertension; Lyme disease; Obesity; PCOS (polycystic ovarian syndrome); Renal disorder; Sleep apnea; and 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 ([place] [address] )  She   Patient Active Problem List    Diagnosis Date Noted    Leg pain 03/30/2018    Cellulitis of right lower extremity 03/30/2018    Renal disorder     PCOS (polycystic ovarian syndrome)     Obesity     Lyme disease     Blood clotting disorder ([place] [address] )     Essential hypertension     Homocysteinemia ([place] [address] )     Hypercoagulable state ([place] [address] )     Obstructive sleep apnea on CPAP     DVT (deep venous thrombosis) ([place] [address] ) 08/29/2016    History of DVT (deep vein thrombosis) 07/01/2016    Morbid obesity with BMI of 60 0-69 9, adult ([place] [address] ) 07/01/2016    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    Skin rash 05/22/2014    Chronic venous hypertension w ulceration ([place] [address] ) 12/18/2013    Edema 11/20/2013    Esophageal reflux 11/20/2013    Hyperlipidemia 11/20/2013    Hypertension 11/20/2013    Polycystic ovarian syndrome 11/20/2013    Type 2 diabetes mellitus ([place] [address] ) 11/20/2013     She  has a past surgical history that includes Joint replacement (Bilateral); Appendectomy; Ankle surgery (Right); Hysterectomy; Hemorroidectomy; Breast biopsy; Foot surgery; LITHOTRIPSY; Breast biopsy; Closed reduction metatarsal fracture (Right); and Vena cava filter placement  Her family history includes Hypercoagulant Ability in her maternal grandmother and mother; Pulmonary embolism in her mother  She  reports that she has never smoked  She has never used smokeless tobacco  She reports that she does not drink alcohol or use drugs  Current Outpatient Prescriptions   Medication Sig Dispense Refill    albuterol (PROVENTIL HFA,VENTOLIN HFA) 90 mcg/act inhaler Inhale 2 puffs every 6 (six) hours as needed for wheezing        alendronate (FOSAMAX) 70 mg tablet Take 70 mg by mouth every 7 days        amLODIPine-benazepril (LOTREL 5-20) 5-20 MG per capsule Take 1 capsule by mouth daily   cetirizine (ZyrTEC) 10 mg tablet Take 10 mg by mouth daily   Cholecalciferol (VITAMIN D3) 34340 units TABS Take 1 tablet by mouth every 14 (fourteen) days      FA-Pyridoxine-Cyanocobalamin (FOLBIC PO) Take 1 tablet by mouth daily   folic acid-pyridoxine-cyanocobalamin (FOLBIC) 2 5-25-2 mg Take 1 tablet by mouth daily 30 tablet 5    furosemide (LASIX) 20 mg tablet Take 20 mg by mouth daily   metFORMIN (GLUCOPHAGE-XR) 500 mg 24 hr tablet 500 mg 4 (four) times a day        METFORMIN HCL PO Take 2,000 mg by mouth daily with dinner   mometasone (NASONEX) 50 mcg/act nasal spray 2 sprays into each nostril daily      montelukast (SINGULAIR) 10 mg tablet Take 10 mg by mouth daily at bedtime   multivitamin (THERAGRAN) TABS Take 1 tablet by mouth daily   nystatin (MYCOSTATIN) powder Apply topically 3 (three) times a day      pravastatin (PRAVACHOL) 40 mg tablet Take 40 mg by mouth daily   predniSONE 10 mg tablet Take 10 mg by mouth daily      triamcinolone (KENALOG) 0 5 % cream APPLY TOPICALLY TO RIGHT ANKLE EVERY 12 HOURS      warfarin (COUMADIN) 5 mg tablet Take by mouth daily      cephalexin (KEFLEX) 500 mg capsule TAKE ONE CAPSULE BY MOUTH EVERY 6 HOURS FOR 7 DAYS  0    triamcinolone (KENALOG) 0 1 % cream Apply topically 2 (two) times a day for 7 days 80 g 0     No current facility-administered medications for this visit  Current Outpatient Prescriptions on File Prior to Visit   Medication Sig    albuterol (PROVENTIL HFA,VENTOLIN HFA) 90 mcg/act inhaler Inhale 2 puffs every 6 (six) hours as needed for wheezing   alendronate (FOSAMAX) 70 mg tablet Take 70 mg by mouth every 7 days    amLODIPine-benazepril (LOTREL 5-20) 5-20 MG per capsule Take 1 capsule by mouth daily   cetirizine (ZyrTEC) 10 mg tablet Take 10 mg by mouth daily      Cholecalciferol (VITAMIN D3) 30167 units TABS Take 1 tablet by mouth every 14 (fourteen) days    FA-Pyridoxine-Cyanocobalamin (FOLBIC PO) Take 1 tablet by mouth daily   folic acid-pyridoxine-cyanocobalamin (FOLBIC) 2 5-25-2 mg Take 1 tablet by mouth daily    furosemide (LASIX) 20 mg tablet Take 20 mg by mouth daily   METFORMIN HCL PO Take 2,000 mg by mouth daily with dinner   mometasone (NASONEX) 50 mcg/act nasal spray 2 sprays into each nostril daily    montelukast (SINGULAIR) 10 mg tablet Take 10 mg by mouth daily at bedtime   multivitamin (THERAGRAN) TABS Take 1 tablet by mouth daily   nystatin (MYCOSTATIN) powder Apply topically 3 (three) times a day    pravastatin (PRAVACHOL) 40 mg tablet Take 40 mg by mouth daily   warfarin (COUMADIN) 5 mg tablet Take by mouth daily    triamcinolone (KENALOG) 0 1 % cream Apply topically 2 (two) times a day for 7 days     No current facility-administered medications on file prior to visit  She is allergic to azithromycin; ciprofloxacin; and ancef [cefazolin]       Review of Systems   All other systems reviewed and are negative  Objective:      /76 (BP Location: Right arm, Patient Position: Sitting, Cuff Size: Large)   Pulse 75   Resp 18   Ht 5' 3" (1 6 m)   Wt (!) 172 kg (378 lb 9 6 oz)   SpO2 98%   BMI 67 07 kg/m²          Physical Exam   Constitutional: She is oriented to person, place, and time  She appears well-developed and well-nourished  Neck: Normal range of motion  Neck supple  Cardiovascular: Normal rate, regular rhythm, normal heart sounds and intact distal pulses  Pulmonary/Chest: Effort normal and breath sounds normal    Abdominal: Soft  Bowel sounds are normal    Musculoskeletal: Normal range of motion  Neurological: She is alert and oriented to person, place, and time  She has normal reflexes  Skin: Skin is warm and dry  Psychiatric: She has a normal mood and affect   Her behavior is normal  Judgment and thought content normal    Nursing note and Nessa Murrell, MS, RD, LDN     vitals reviewed

## 2022-02-16 NOTE — PROGRESS NOTES
Assessment/Plan/Follow up information       Diagnosis ICD-10-CM Associated Orders   1  Encounter for annual wellness exam in Medicare patient  Z00 00    2  Cellulitis of lower extremity, unspecified laterality  L03 119    3  Essential hypertension  I10 amLODIPine-benazepril (LOTREL 5-20) 5-20 MG per capsule   4  Hyperlipidemia, unspecified hyperlipidemia type  E78 5 pravastatin (PRAVACHOL) 40 mg tablet      Patient in agreement with the plan, all questions and concerns were answered/addressed  Advised to contact me or the office with any concerns or questions  In the event of an emergency, and unable to contact a provider they are to go to the emergency room  BMI Counseling: Body mass index is 73 67 kg/m²  The BMI is above normal  Nutrition recommendations include reducing portion sizes, decreasing overall calorie intake and 3-5 servings of fruits/vegetables daily  Exercise recommendations include moderate aerobic physical activity for 150 minutes/week, vigorous aerobic physical activity for 75 minutes/week and exercising 3-5 times per week  Subjective    HPI:  This is a 44-year-old female who presents today for her Medicare annual wellness visit also concerns of recurrent cellulitis  Patient has longstanding history of bilateral cellulitis of her lower leg secondary to body habitus, lymphedema, venous insufficiency  States approximately 3 weeks ago she started to notice some increased bilateral erythema, swelling and tenderness consistent previous flare-ups cellulitis  She was given a script by a colleague of mine to keep at home in the event that she has a no other cellulitis flare up and she initiate herself on a 2 week course of clindamycin  She presents to the office today for formal evaluation reassessment of her cellulitis      Review of Systems   Constitutional: Negative for activity change, appetite change, chills, fatigue and fever     HENT: Negative for congestion, dental problem, drooling, ear discharge, ear pain, facial swelling, postnasal drip, rhinorrhea and sinus pain  Eyes: Negative for photophobia, pain, discharge and itching  Respiratory: Negative for apnea, cough, chest tightness and shortness of breath  Cardiovascular: Negative for chest pain and leg swelling  Gastrointestinal: Negative for abdominal distention, abdominal pain, anal bleeding, constipation, diarrhea and nausea  Endocrine: Negative for cold intolerance, heat intolerance and polydipsia  Genitourinary: Negative for difficulty urinating  Musculoskeletal: Negative for arthralgias, gait problem, joint swelling and myalgias  Skin: Negative for color change and pallor  Allergic/Immunologic: Negative for immunocompromised state  Neurological: Negative for dizziness, seizures, facial asymmetry, weakness, light-headedness, numbness and headaches  Psychiatric/Behavioral: Negative for agitation, behavioral problems, confusion, decreased concentration and dysphoric mood  All other systems reviewed and are negative  Objective    Vitals:    02/16/22 1114   BP: 122/78   Pulse: 96   Resp: 18   Temp: 98 4 °F (36 9 °C)   SpO2: 98%         Physical Exam  Vitals and nursing note reviewed  Constitutional:       General: She is not in acute distress  Appearance: She is well-developed  She is obese  HENT:      Head: Normocephalic and atraumatic  Eyes:      Conjunctiva/sclera: Conjunctivae normal       Pupils: Pupils are equal, round, and reactive to light  Cardiovascular:      Rate and Rhythm: Normal rate and regular rhythm  Heart sounds: Normal heart sounds  No murmur heard  No friction rub  Pulmonary:      Effort: Pulmonary effort is normal       Breath sounds: Normal breath sounds  Abdominal:      General: Bowel sounds are normal       Palpations: Abdomen is soft  Musculoskeletal:         General: Normal range of motion  Cervical back: Normal range of motion and neck supple  Skin:     General: Skin is warm  Capillary Refill: Capillary refill takes less than 2 seconds  Neurological:      Mental Status: She is alert and oriented to person, place, and time  Motor: No abnormal muscle tone  Coordination: Coordination normal    Psychiatric:         Behavior: Behavior normal          Thought Content: Thought content normal             Portions of the record may have been created with voice recognition software  Occasional wrong word or "sound a like" substitutions may have occurred due to the inherent limitations of voice recognition software  Read the chart carefully and recognize, using context, where substitutions have occurred  Contact me with any questions         Meseret Menjivar MD 02/16/22

## 2022-02-16 NOTE — PROGRESS NOTES
Assessment and Plan:     Problem List Items Addressed This Visit     None      Visit Diagnoses     Encounter for annual wellness exam in Medicare patient    -  Primary    Cellulitis of lower extremity, unspecified laterality              Depression Screening and Follow-up Plan: Patient was screened for depression during today's encounter  They screened negative with a PHQ-2 score of 0  Preventive health issues were discussed with patient, and age appropriate screening tests were ordered as noted in patient's After Visit Summary  Personalized health advice and appropriate referrals for health education or preventive services given if needed, as noted in patient's After Visit Summary  History of Present Illness:     Patient presents for Welcome to Medicare visit       Patient Care Team:  Ronda Phoenix, MD as PCP - General (Family Medicine)  Will Pool MD (Hematology and Oncology)  Sarmad Moe MD (Cardiology)  [facility] (Orthopedic Surgery)  Camryn Lee RN as  (Care Coordination)     Review of Systems:     Review of Systems   Problem List:     Patient Active Problem List   Diagnosis    Morbid obesity with BMI of 70 and over, adult ([facility] [address] [facility]    Lyme disease    Blood clotting disorder ([facility] [address] [facility]    Essential hypertension    Homocysteinemia    Hypercoagulable state ([address]    Obstructive sleep apnea on CPAP    Cellulitis of right lower extremity    Chronic venous hypertension w ulceration ([address]    Edema    Esophageal reflux    Hyperlipidemia    Polycystic ovarian syndrome    Skin rash    Well adult exam    Heme positive stool    Osteoporosis    Secondary pulmonary hypertension    Abnormality of coagulation ([facility] [address] [facility]    Rosacea    Elida filter in place    History of pulmonary embolism    S/P TKR (total knee replacement)    Anemia    Hyperphosphatemia    Prothrombin time increased      Past Medical and Surgical History:     Past Medical History:   Diagnosis Date  Blood clotting disorder (HCC)     Homocysteinemia     [facility]coagulable state [facility] [address] [facility]     Lyme disease     Renal disorder     kidney stones    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)      Past Surgical History:   Procedure Laterality Date    ANKLE SURGERY Right     APPENDECTOMY      BREAST BIOPSY      BREAST BIOPSY      CLOSED REDUCTION METATARSAL FRACTURE Right     FOOT SURGERY      HEMORROIDECTOMY      HYSTERECTOMY      JOINT REPLACEMENT Bilateral     knee    LITHOTRIPSY      VENA CAVA FILTER PLACEMENT      onset: 1/10/10      Family History:     Family History   Problem Relation Age of Onset    Pulmonary embolism Mother     Hypercoagulant Ability Mother         primary state    Hypercoagulant Ability Maternal Grandmother         primary state    No Known Problems Father       Social History:     Social History     Socioeconomic History    Marital status: /Civil Union     Spouse name: None    Number of children: None    Years of education: None    Highest education level: None   Occupational History    Occupation: unemployed   Tobacco Use    Smoking status: Never Smoker    Smokeless tobacco: Never Used   Vaping Use    Vaping Use: Never used   Substance and Sexual Activity    Alcohol use: Never    Drug use: No    Sexual activity: None   Other Topics Concern    None   Social History Narrative    None     Social Determinants of Health     Financial Resource Strain: Not on file   Food Insecurity: Not on file   Transportation Needs: Not on file   Physical Activity: Not on file   Stress: Not on file   Social Connections: Not on file   Intimate Partner Violence: Not on file   Housing Stability: Not on file      Medications and Allergies:     Current Outpatient Medications   Medication Sig Dispense Refill    albuterol (PROVENTIL HFA,VENTOLIN HFA) 90 mcg/act inhaler Inhale 2 puffs every 6 (six) hours as needed for wheezing        alendronate (FOSAMAX) 70 mg tablet Take 1 tablet (70 mg total) by mouth every 7 days for 12 days 12 tablet 3    amLODIPine-benazepril (LOTREL 5-20) 5-20 MG per capsule Take 1 capsule by mouth daily 90 capsule 3    cetirizine (ZYRTEC ALLERGY) 10 mg tablet Take 1 tablet by mouth daily as needed      cholecalciferol (VITAMIN D3) 1,000 units tablet Take 1 tablet (1,000 Units total) by mouth daily 30 tablet 3    clindamycin (CLEOCIN) 300 MG capsule Take 300 mg by mouth in the morning x2weeks per pt       clotrimazole-betamethasone (LOTRISONE) 1-0 05 % cream APPLY A THIN LAYER OF CREAM TOPICALLY TO AFFECTED AREAS ON THE TRUNK AND EXTREMITIES ONCE DAILY AS NEEDED FOR FLARES      folic acid-pyridoxine-cyanocobalamin (Folbic) 2 5-25-2 mg Take 1 tablet by mouth daily 90 tablet 3    furosemide (LASIX) 20 mg tablet Take 1 tablet (20 mg total) by mouth daily 90 tablet 3    Hydrocortisone-Iodoquinol (Dermazene) 1-1 % CREA Apply topically 2 (two) times a day 28 4 g 5    metFORMIN (GLUCOPHAGE-XR) 500 mg 24 hr tablet Take 1 tablet (500 mg total) by mouth 4 (four) times a day 360 tablet 3    metroNIDAZOLE (METROGEL) 1 % gel Apply 1 application topically daily        mometasone (NASONEX) 50 mcg/act nasal spray 2 sprays into each nostril daily as needed        montelukast (SINGULAIR) 10 mg tablet Take 1 tablet (10 mg total) by mouth daily at bedtime 90 tablet 3    multivitamin (THERAGRAN) TABS Take 1 tablet by mouth daily   nystatin powder Apply topically 2 (two) times a day Apply to affected area      pravastatin (PRAVACHOL) 40 mg tablet Take 1 tablet (40 mg total) by mouth daily 90 tablet 3    triamcinolone (KENALOG) 0 1 % cream 2 (two) times a day as needed       warfarin (COUMADIN) 5 mg tablet Take  strictly as advised 150 tablet 2    doxycycline (PERIOSTAT) 20 MG tablet  (Patient not taking: Reported on 2/16/2022 )       No current facility-administered medications for this visit       Allergies   Allergen Reactions    Azithromycin Diarrhea    Ciprofloxacin Other (See Comments), Dizziness and Confusion      weepy        Clindamycin Other (See Comments)     Acid Reflux    Ancef [Cefazolin] Rash     States tolerated Keflex      Immunizations:     Immunization History   Administered Date(s) Administered    COVID-19 MODERNA VACC 0 5 ML IM 03/31/2021, 04/28/2021    INFLUENZA 11/02/2011, 11/23/2012, 10/21/2013, 11/12/2014, 10/29/2015, 10/17/2016, 10/27/2017, 09/04/2018    Influenza Quadrivalent, 6-35 Months IM 10/27/2017    Influenza, high dose seasonal 0 7 mL 10/14/2021    Influenza, recombinant, quadrivalent,injectable, preservative free 11/21/2019    Influenza, seasonal, injectable, preservative free 09/04/2018    Pneumococcal Conjugate 13-Valent 09/10/2020    Pneumococcal Polysaccharide PPV23 10/14/2021    Tdap 06/20/2015      Health Maintenance:         Topic Date Due    HIV Screening  Never done    Cervical Cancer Screening  01/01/2030 (Originally 5/23/2021)    Breast Cancer Screening: Mammogram  12/06/2022    Colorectal Cancer Screening  11/15/2028    Hepatitis C Screening  Completed         Topic Date Due    COVID-19 Vaccine (3 - Booster for Ligia  series) 09/28/2021      Medicare Screening Tests and Risk Assessments:         Health Risk Assessment:   Patient rates overall health as good  Patient feels that their physical health rating is same  Patient is satisfied with their life  Eyesight was rated as same  Hearing was rated as same  Patient feels that their emotional and mental health rating is same  Patients states they are never, rarely angry  Patient states they are sometimes unusually tired/fatigued  Pain experienced in the last 7 days has been none  Patient states that she has experienced weight loss or gain in last 6 months  Depression Screening:   PHQ-2 Score: 0      Fall Risk Screening:    In the past year, patient has experienced: no history of falling in past year      Urinary Incontinence Screening:   Patient has leaked urine accidently in the last six months  Home Safety:  Patient has trouble with stairs inside or outside of their home  Patient has working smoke alarms and has working carbon monoxide detector  Home safety hazards include: none  Nutrition:   Current diet is Regular  Medications:   Patient is currently taking over-the-counter supplements  OTC medications include: see medication list  Patient is able to manage medications  Activities of Daily Living (ADLs)/Instrumental Activities of Daily Living (IADLs):   Walk and transfer into and out of bed and chair?: Yes  Dress and groom yourself?: Yes    Bathe or shower yourself?: Yes    Feed yourself?  Yes  Do your laundry/housekeeping?: Yes  Manage your money, pay your bills and track your expenses?: Yes  Make your own meals?: Yes    Do your own shopping?: Yes    PREVENTIVE SCREENINGS      Cardiovascular Screening:    General: Screening Not Indicated and History Lipid Disorder      Diabetes Screening:     General: Screening Current      Colorectal Cancer Screening:     General: Screening Current      Breast Cancer Screening:     General: Screening Current      Cervical Cancer Screening:    General: Screening Not Indicated      Osteoporosis Screening:    General: Screening Not Indicated and History Osteoporosis      Lung Cancer Screening:     General: Screening Not Indicated      Hepatitis C Screening:    General: Screening Current    Screening, Brief Intervention, and Referral to Treatment (SBIRT)    Screening  Typical number of drinks in a day: 0    Single Item Drug Screening:  How often have you used an illegal drug (including marijuana) or a prescription medication for non-medical reasons in the past year? never    Single Item Drug Screen Score: 0  Interpretation: Negative screen for possible drug use disorder    No exam data present     Physical Exam:     /78   Pulse 96   Temp 98 4 °F (36 9 °C)   Resp 18 Ht 5' 4" (1 626 m)   Wt (!) 195 kg (429 lb 3 2 oz)   LMP  (LMP Unknown)   SpO2 98%   BMI 73 67 kg/m²     Physical Exam     Oscar Licea MD

## 2022-02-17 ENCOUNTER — TELEPHONE (OUTPATIENT)
Dept: HEMATOLOGY ONCOLOGY | Facility: CLINIC | Age: 67
End: 2022-02-17

## 2022-02-17 NOTE — TELEPHONE ENCOUNTER
Telephone call spoke with pt  Per Mae TEMPLE , take coumadin 7 5mg M thru F and 5 mg S and Sunday  Repeat labs in 2 weeks  Pt states she understands  She does report that she finished antibx a few days ago for her cellulitis

## 2022-02-18 ENCOUNTER — TELEPHONE (OUTPATIENT)
Dept: HEMATOLOGY ONCOLOGY | Facility: CLINIC | Age: 67
End: 2022-02-18

## 2022-02-18 NOTE — TELEPHONE ENCOUNTER
I spoke with patient  PT INR 1 5  She is taking Coumadin 7 5 mg Mondays, Wednesdays and Fridays and the other days she takes 5 mg  She will change that to Coumadin 7 5 mg Mondays, Wednesdays, Fridays and also Saturdays and Sundays  The other 2 days Tuesdays and Thursdays she will take 5 mg  Repeat PT INR in 2 weeks

## 2022-03-02 DIAGNOSIS — E78.5 HYPERLIPIDEMIA, UNSPECIFIED HYPERLIPIDEMIA TYPE: ICD-10-CM

## 2022-03-02 RX ORDER — PRAVASTATIN SODIUM 40 MG
40 TABLET ORAL DAILY
Qty: 90 TABLET | Refills: 3
Start: 2022-03-02 | End: 2022-03-14 | Stop reason: SDUPTHER

## 2022-03-03 ENCOUNTER — TELEPHONE (OUTPATIENT)
Dept: HEMATOLOGY ONCOLOGY | Facility: CLINIC | Age: 67
End: 2022-03-03

## 2022-03-03 NOTE — TELEPHONE ENCOUNTER
INR = 1 8  Patient currently on 5mg Tuesday, Thursday and 7 5mg other days  Per Dr Carina Medrano patient to change to 7 5mg PO daily and re check PT/INR in one week  Reviewed with patient - She verbalized understanding

## 2022-03-07 ENCOUNTER — RX ONLY (OUTPATIENT)
Age: 67
Setting detail: RX ONLY
End: 2022-03-07

## 2022-03-07 RX ORDER — CLOTRIMAZOLE AND BETAMETHASONE DIPROPIONATE 10; .5 MG/G; MG/G
1-0.05% CREAM TOPICAL QD
Qty: 1 | Refills: 3 | Status: ERX | COMMUNITY
Start: 2022-03-07

## 2022-03-09 ENCOUNTER — TELEPHONE (OUTPATIENT)
Dept: HEMATOLOGY ONCOLOGY | Facility: CLINIC | Age: 67
End: 2022-03-09

## 2022-03-09 NOTE — TELEPHONE ENCOUNTER
Telephone call spoke with Pt  [facility] ID  INR 3/8/22 27 3 and 2 5  Per [employer] [facility], continue taking 7 5 mg daily and repeat lab in 2 weeks  Pt stated she understands

## 2022-03-14 DIAGNOSIS — E78.5 HYPERLIPIDEMIA, UNSPECIFIED HYPERLIPIDEMIA TYPE: ICD-10-CM

## 2022-03-15 RX ORDER — PRAVASTATIN SODIUM 40 MG
40 TABLET ORAL DAILY
Qty: 90 TABLET | Refills: 3
Start: 2022-03-15 | End: 2022-03-18 | Stop reason: SDUPTHER

## 2022-03-18 DIAGNOSIS — I10 ESSENTIAL HYPERTENSION: ICD-10-CM

## 2022-03-18 DIAGNOSIS — E78.5 HYPERLIPIDEMIA, UNSPECIFIED HYPERLIPIDEMIA TYPE: ICD-10-CM

## 2022-03-20 RX ORDER — PRAVASTATIN SODIUM 40 MG
40 TABLET ORAL DAILY
Qty: 90 TABLET | Refills: 3
Start: 2022-03-20 | End: 2022-03-21 | Stop reason: SDUPTHER

## 2022-03-20 RX ORDER — AMLODIPINE BESYLATE AND BENAZEPRIL HYDROCHLORIDE 5; 20 MG/1; MG/1
1 CAPSULE ORAL DAILY
Qty: 90 CAPSULE | Refills: 3 | Status: SHIPPED | OUTPATIENT
Start: 2022-03-20

## 2022-03-21 DIAGNOSIS — E78.5 HYPERLIPIDEMIA, UNSPECIFIED HYPERLIPIDEMIA TYPE: ICD-10-CM

## 2022-03-22 RX ORDER — PRAVASTATIN SODIUM 40 MG
40 TABLET ORAL DAILY
Qty: 90 TABLET | Refills: 3
Start: 2022-03-22 | End: 2022-04-04 | Stop reason: SDUPTHER

## 2022-03-25 ENCOUNTER — TELEPHONE (OUTPATIENT)
Dept: HEMATOLOGY ONCOLOGY | Facility: CLINIC | Age: 67
End: 2022-03-25

## 2022-03-25 NOTE — TELEPHONE ENCOUNTER
Patients INR = 3 1  Current Coumadin dose is 7 5mg PO daily  Per Dr Marylene Bottom patient to take 7 5mg daily and 5mg on Saturday and Sunday  She will re check PT/INR in 2 weeks  Patient verbalized understanding

## 2022-03-30 DIAGNOSIS — J30.89 ENVIRONMENTAL AND SEASONAL ALLERGIES: ICD-10-CM

## 2022-03-30 RX ORDER — MONTELUKAST SODIUM 10 MG/1
10 TABLET ORAL
Qty: 90 TABLET | Refills: 3 | Status: SHIPPED | OUTPATIENT
Start: 2022-03-30 | End: 2022-07-14 | Stop reason: ALTCHOICE

## 2022-04-04 DIAGNOSIS — E78.5 HYPERLIPIDEMIA, UNSPECIFIED HYPERLIPIDEMIA TYPE: ICD-10-CM

## 2022-04-04 RX ORDER — PRAVASTATIN SODIUM 40 MG
40 TABLET ORAL DAILY
Qty: 90 TABLET | Refills: 3
Start: 2022-04-04 | End: 2022-06-13 | Stop reason: SDUPTHER

## 2022-04-06 ENCOUNTER — TELEPHONE (OUTPATIENT)
Dept: HEMATOLOGY ONCOLOGY | Facility: CLINIC | Age: 67
End: 2022-04-06

## 2022-04-06 NOTE — TELEPHONE ENCOUNTER
Appointment Cancellation Or Reschedule     Person calling in Patient    Provider Dr Ivy Means   Office Visit Date and Time 5/3/22 at 2:40   Office Visit Location [place]   Did patient want to reschedule their office appointment? If so, when was it scheduled to? Yes     6/3/22 at 2:20   Is this patient calling to reschedule an infusion appointment? No   When is their next infusion appointment? N/a   Is this patient a Chemo patient? No   Reason for Cancellation or Reschedule Dentist     If the patient is a treatment patient, please route this to the office nurse  If the patient is not on treatment, please route to the office MA

## 2022-04-06 NOTE — TELEPHONE ENCOUNTER
Left message for Daily Tian stating we had to r/s her appointment with Dr Magali Daugherty from 5/27/22 to 5/23/22 at 2:40

## 2022-04-07 ENCOUNTER — RX ONLY (OUTPATIENT)
Age: 67
Setting detail: RX ONLY
End: 2022-04-07

## 2022-04-07 ENCOUNTER — APPOINTMENT (RX ONLY)
Dept: URBAN - NONMETROPOLITAN AREA CLINIC 4 | Facility: CLINIC | Age: 67
Setting detail: DERMATOLOGY
End: 2022-04-07

## 2022-04-07 DIAGNOSIS — L71.8 OTHER ROSACEA: ICD-10-CM | Status: INADEQUATELY CONTROLLED

## 2022-04-07 DIAGNOSIS — L30.4 ERYTHEMA INTERTRIGO: ICD-10-CM | Status: WELL CONTROLLED

## 2022-04-07 DIAGNOSIS — B35.3 TINEA PEDIS: ICD-10-CM | Status: INADEQUATELY CONTROLLED

## 2022-04-07 PROCEDURE — 99214 OFFICE O/P EST MOD 30 MIN: CPT

## 2022-04-07 PROCEDURE — ? COUNSELING

## 2022-04-07 PROCEDURE — ? PRESCRIPTION MEDICATION MANAGEMENT

## 2022-04-07 PROCEDURE — ? PRESCRIPTION

## 2022-04-07 RX ORDER — NYSTATIN 100000 [USP'U]/G
POWDER TOPICAL DAILY
Qty: 60 | Refills: 6 | Status: ERX | COMMUNITY
Start: 2022-04-07

## 2022-04-07 RX ORDER — CLOTRIMAZOLE AND BETAMETHASONE DIPROPIONATE 10; .5 MG/G; MG/G
1-0.05% CREAM TOPICAL QD
Qty: 45 | Refills: 3 | Status: ERX

## 2022-04-07 RX ORDER — CLOTRIMAZOLE AND BETAMETHASONE DIPROPIONATE 10; .5 MG/G; MG/G
CREAM TOPICAL BID
Qty: 45 | Refills: 1 | Status: CANCELLED

## 2022-04-07 RX ADMIN — NYSTATIN 1000,000: 100000 POWDER TOPICAL at 00:00

## 2022-04-07 ASSESSMENT — LOCATION DETAILED DESCRIPTION DERM
LOCATION DETAILED: RIGHT CENTRAL MALAR CHEEK
LOCATION DETAILED: LEFT INFERIOR MEDIAL MALAR CHEEK
LOCATION DETAILED: RIGHT SUPRAPUBIC SKIN
LOCATION DETAILED: RIGHT DORSAL FOOT
LOCATION DETAILED: SUBXIPHOID
LOCATION DETAILED: LEFT DORSAL FOOT
LOCATION DETAILED: RIGHT MEDIAL BREAST 5-6:00 REGION

## 2022-04-07 ASSESSMENT — LOCATION SIMPLE DESCRIPTION DERM
LOCATION SIMPLE: ABDOMEN
LOCATION SIMPLE: RIGHT FOOT
LOCATION SIMPLE: LEFT CHEEK
LOCATION SIMPLE: RIGHT CHEEK
LOCATION SIMPLE: RIGHT BREAST
LOCATION SIMPLE: GROIN
LOCATION SIMPLE: LEFT FOOT

## 2022-04-07 ASSESSMENT — LOCATION ZONE DERM
LOCATION ZONE: TRUNK
LOCATION ZONE: FEET
LOCATION ZONE: FACE

## 2022-04-07 ASSESSMENT — SEVERITY ASSESSMENT
SEVERITY: ALMOST CLEAR
SEVERITY: MILD TO MODERATE

## 2022-04-07 ASSESSMENT — SEVERITY ASSESSMENT OVERALL AMONG ALL PATIENTS
IN YOUR EXPERIENCE, AMONG ALL PATIENTS YOU HAVE SEEN WITH THIS CONDITION, HOW SEVERE IS THIS PATIENT'S CONDITION?: MILD TO MODERATE

## 2022-04-07 NOTE — PROCEDURE: PRESCRIPTION MEDICATION MANAGEMENT
Continue Regimen: Metrogel QD
Detail Level: Zone
Render In Strict Bullet Format?: No
Samples Given: Cerave facial moisturizer PM
Discontinue Regimen: Doxycycline (headaches)
Continue Regimen: Clotrimazole Betamethasone BID
Samples Given: Eucerin roughness relief

## 2022-04-08 ENCOUNTER — TELEPHONE (OUTPATIENT)
Dept: HEMATOLOGY ONCOLOGY | Facility: CLINIC | Age: 67
End: 2022-04-08

## 2022-04-08 NOTE — TELEPHONE ENCOUNTER
Reviewed with patient that there are no changes to her current Coumadin instructions    She can repeat her PT/INR in 3 weeks    INR from 4/6/22 was 2 6

## 2022-04-13 ENCOUNTER — RX ONLY (OUTPATIENT)
Age: 67
Setting detail: RX ONLY
End: 2022-04-13

## 2022-04-13 RX ORDER — CLOTRIMAZOLE AND BETAMETHASONE DIPROPIONATE 10; .5 MG/G; MG/G
1-0.05% CREAM TOPICAL QD
Qty: 135 | Refills: 3 | Status: ERX

## 2022-04-25 ENCOUNTER — TELEPHONE (OUTPATIENT)
Dept: OTHER | Facility: OTHER | Age: 67
End: 2022-04-25

## 2022-04-25 NOTE — TELEPHONE ENCOUNTER
Patient called stating that she is on her second dosage of antibiotic for cellulitis and was told by Dr Bao Kohler to call the office for an appointment  Please call her back to schedule

## 2022-04-26 ENCOUNTER — OFFICE VISIT (OUTPATIENT)
Dept: FAMILY MEDICINE CLINIC | Facility: CLINIC | Age: 67
End: 2022-04-26
Payer: MEDICARE

## 2022-04-26 VITALS
DIASTOLIC BLOOD PRESSURE: 80 MMHG | HEIGHT: 64 IN | HEART RATE: 91 BPM | WEIGHT: 293 LBS | OXYGEN SATURATION: 98 % | SYSTOLIC BLOOD PRESSURE: 128 MMHG | TEMPERATURE: 98 F | BODY MASS INDEX: 50.02 KG/M2

## 2022-04-26 DIAGNOSIS — E66.01 MORBID OBESITY WITH BMI OF 70 AND OVER, ADULT (HCC): ICD-10-CM

## 2022-04-26 DIAGNOSIS — E66.9 LYMPHEDEMA ASSOCIATED WITH OBESITY: Primary | ICD-10-CM

## 2022-04-26 DIAGNOSIS — D68.9 BLOOD CLOTTING DISORDER (HCC): ICD-10-CM

## 2022-04-26 DIAGNOSIS — L03.115 CELLULITIS OF RIGHT LOWER EXTREMITY: ICD-10-CM

## 2022-04-26 DIAGNOSIS — N18.31 STAGE 3A CHRONIC KIDNEY DISEASE (HCC): ICD-10-CM

## 2022-04-26 DIAGNOSIS — L03.116 CELLULITIS OF LEFT LOWER EXTREMITY: ICD-10-CM

## 2022-04-26 DIAGNOSIS — Z96.653 STATUS POST BILATERAL KNEE REPLACEMENTS: ICD-10-CM

## 2022-04-26 DIAGNOSIS — I89.0 LYMPHEDEMA ASSOCIATED WITH OBESITY: Primary | ICD-10-CM

## 2022-04-26 PROCEDURE — 99214 OFFICE O/P EST MOD 30 MIN: CPT | Performed by: FAMILY MEDICINE

## 2022-04-26 RX ORDER — CHLORHEXIDINE GLUCONATE 4 G/100ML
1 SOLUTION TOPICAL 3 TIMES WEEKLY
Qty: 473 ML | Refills: 1 | Status: SHIPPED | OUTPATIENT
Start: 2022-04-27 | End: 2022-07-14 | Stop reason: ALTCHOICE

## 2022-04-26 RX ORDER — AMOXICILLIN 500 MG/1
2000 CAPSULE ORAL AS NEEDED
Qty: 4 CAPSULE | Refills: 3 | Status: SHIPPED | OUTPATIENT
Start: 2022-04-26 | End: 2022-04-27

## 2022-04-26 RX ORDER — CEPHALEXIN 500 MG/1
500 CAPSULE ORAL EVERY 6 HOURS SCHEDULED
Qty: 40 CAPSULE | Refills: 1 | Status: SHIPPED | OUTPATIENT
Start: 2022-04-26 | End: 2022-05-06

## 2022-04-26 NOTE — PROGRESS NOTES
Assessment/Plan:    1  Lymphedema associated with obesity  - likely the cause of her recurrent cellulitis  We will send her back to PT to see if we can tx this  She tells me she has pumps but does not know how to use them  She is agreeable to this if her insurance will cover it  - Ambulatory Referral to Physical Therapy; Future    2  Cellulitis of right lower extremity/LLE   - we will stop clinda - she is using this for 2w on average about every 2 months  I see no e/o MRSA by our reports/labs  We will trial keflex when this flares again  She is agreeable  She will let me know if there is no improvement after a few days on Keflex and we can trial Bactrim DS or other alternative at that time  I do feel if we can prevent this, it would be in her best interest   Referral for Lymphedema tx given  We will have her wash with antibacterial solution three times/week  I worry about recurrent abx use and resistance and potential for abx associated diarrhea  She and I discussed this  She is on probiotics  - Ambulatory Referral to Physical Therapy; Future  - cephalexin (KEFLEX) 500 mg capsule; Take 1 capsule (500 mg total) by mouth every 6 (six) hours for 10 days  Dispense: 40 capsule; Refill: 1  - chlorhexidine (HIBICLENS) 4 % external liquid; Apply 1 application topically 3 (three) times a week  Dispense: 473 mL; Refill: 1    4  Morbid obesity with BMI of 70 and over, adult [facility])  - she is gaining weight  Discussed the importance of maintaining a healthy lifestyle through heart healthy diet and exercise  - exercise is tough due to her size  - chlorhexidine (HIBICLENS) 4 % external liquid; Apply 1 application topically 3 (three) times a week  Dispense: 473 mL; Refill: 1    5  Blood clotting disorder (HCC)  - INR is followed by hem/inc       6  Stage 3a chronic kidney disease ([facility] [address] )  - has been greater than 60 for the most part dipped a bit when last checked  Can cont to monitor        7  Status post bilateral knee replacements  - she needs Amoxil - this was provided for prior to dental appts  - amoxicillin (AMOXIL) 500 mg capsule; Take 4 capsules (2,000 mg total) by mouth if needed (1 hour before dental appointments) for up to 1 day  Dispense: 4 capsule; Refill: 3      She has f/u appt with Dr Siva Ohara in August; can see us sooner prn    Patient/Caretaker verbalized understanding and were in agreement with today's assessment and plan  Time was taken to address any questions patient/caretaker had  Indication/Risks/Benefits of medication(s) as prescribed were discussed with the patient/caretaker  The patient verbalized understanding and agreement and elects to take medications as prescribed  Time was taken to answer any questions the patient/caretaker may have had  Chief Complaint   Patient presents with    Cellulitis     both legs, started clinadmycin      Subjective:      Patient ID: Lexi Taylor is a 73 y o  female  Patient with current h/o recurrent cellulitis -- initially started on Saturday prior to this last Saturday  She is on clinda every 6 hours - she started this on the Saturday, prior  She is telling me that it is getting better but still there  They are still red but they are not swollen - however does admit the redness is getting better  She is taking this on average about every 2 months for this duration  She has seen wound care and tells me this made it worse  She is also telling me she did lymphedema tx in the past as well  She tells me she has pumps at home but does not know how to use them  She is denying f/c/s or other systemic illness  She has h/o DVT/Recurrent blood clots -- seeing hem/onc for this  They follow her INR  pHTN - has seen Cardiology has not been back - she is encouraged to reconnect with them as well        The following portions of the patient's history were reviewed and updated as appropriate: allergies, current medications, past family history, past medical history, past social history, past surgical history and problem list     Review of Systems   All other systems reviewed and are negative  Objective:      /80   Pulse 91   Temp 98 °F (36 7 °C)   Ht 5' 4" (1 626 m)   Wt (!) 196 kg (433 lb)   LMP  (LMP Unknown)   SpO2 98%   BMI 74 32 kg/m²          Physical Exam  Vitals and nursing note reviewed  Constitutional:       General: She is not in acute distress  Appearance: Normal appearance  She is obese  She is not ill-appearing  HENT:      Head: Normocephalic and atraumatic  Eyes:      Conjunctiva/sclera: Conjunctivae normal    Cardiovascular:      Rate and Rhythm: Normal rate and regular rhythm  Heart sounds: Normal heart sounds  Pulmonary:      Effort: Pulmonary effort is normal       Breath sounds: Normal breath sounds  No wheezing, rhonchi or rales  Musculoskeletal:      Cervical back: Neck supple  Comments: Waddling, antalgic gait    Skin:     Comments: B/l distal LE erythema -- improving per patient since on clinda for almost 2w (as of this coming Saturday)  There is no drainage but skin is tight with 1+ pitting edema, > RLE  Cap refill is < 3 sec to b/l LEs  There is e/o lymphedema as well  No open wounds/ulcerations noted  Neurological:      General: No focal deficit present  Mental Status: She is alert and oriented to person, place, and time  Psychiatric:         Mood and Affect: Mood normal          Behavior: Behavior normal          Thought Content:  Thought content normal          Judgment: Judgment normal

## 2022-04-26 NOTE — PATIENT INSTRUCTIONS
Cellulitis   WHAT YOU NEED TO KNOW:   Cellulitis is a skin infection caused by bacteria  Cellulitis is common and can become severe  Cellulitis usually appears on the lower legs  It can also appear on the arms, face, and other areas  Cellulitis develops when bacteria enter a crack or break in your skin, such as a scratch, bite, or cut  DISCHARGE INSTRUCTIONS:   Return to the emergency department if:   · Your wound gets larger and more painful  · You feel a crackling under your skin when you touch it  · You have purple dots or bumps on your skin, or you see bleeding under your skin  · You see red streaks coming from the infected area  Call your doctor if:   · The red, warm, swollen area gets larger  · Your fever or pain does not go away or gets worse  · The area does not get smaller after 3 days of antibiotics  · You have questions or concerns about your condition or care  Medicines: You should start to see improvement in 3 days  If cellulitis is not treated, the infection can spread through your body and become life-threatening  You may need any of the following medicines:  · Antibiotics  help treat the bacterial infection  · Acetaminophen  decreases pain and fever  It is available without a doctor's order  Ask how much to take and how often to take it  Follow directions  Read the labels of all other medicines you are using to see if they also contain acetaminophen, or ask your doctor or pharmacist  Acetaminophen can cause liver damage if not taken correctly  Do not use more than 4 grams (4,000 milligrams) total of acetaminophen in one day  · NSAIDs , such as ibuprofen, help decrease swelling, pain, and fever  This medicine is available with or without a doctor's order  NSAIDs can cause stomach bleeding or kidney problems in certain people  If you take blood thinner medicine, always ask your healthcare provider if NSAIDs are safe for you   Always read the medicine label and follow directions  · Take your medicine as directed  Contact your healthcare provider if you think your medicine is not helping or if you have side effects  Tell him or her if you are allergic to any medicine  Keep a list of the medicines, vitamins, and herbs you take  Include the amounts, and when and why you take them  Bring the list or the pill bottles to follow-up visits  Carry your medicine list with you in case of an emergency  Self-care:   · Wash the area with soap and water every day  Gently pat dry  Use bandages if directed by your healthcare provider  · Elevate the area above the level of your heart  as often as you can  This will help decrease swelling and pain  Prop the area on pillows or blankets to keep it elevated comfortably  · Place a cool, damp cloth on the area  Use clean cloths and clean water  You can do this as often as you need to  Cool, damp cloths may help decrease pain  · Apply cream or ointment as directed  These help protect the area  Most over-the-counter products, such as petroleum jelly, are good to use  Ask your healthcare provider about specific creams or ointments you should use  Prevent cellulitis:   · Do not scratch bug bites or areas of injury  You increase your risk for cellulitis by scratching these areas  · Do not share personal items, such as towels, clothing, and razors  · Clean exercise equipment  with germ-killing  before and after you use it  · Treat athlete's foot  This can help prevent the spread of a bacterial skin infection  · Wash your hands often  Use soap and water  Wash your hands after you use the bathroom, change a child's diapers, or sneeze  Wash your hands before you prepare or eat food  Use lotion to prevent dry, cracked skin  Follow up with your doctor within 3 days, or as directed:  He or she will check if your cellulitis is getting better   Write down your questions so you remember to ask them during your visits  © Copyright [employer] 2022 Information is for End User's use only and may not be sold, redistributed or otherwise used for commercial purposes  All illustrations and images included in CareNotes® are the copyrighted property of A D A M [employer] Inc  or Manny Albarado  The above information is an  only  It is not intended as medical advice for individual conditions or treatments  Talk to your doctor, nurse or pharmacist before following any medical regimen to see if it is safe and effective for you

## 2022-04-28 ENCOUNTER — TELEPHONE (OUTPATIENT)
Dept: HEMATOLOGY ONCOLOGY | Facility: CLINIC | Age: 67
End: 2022-04-28

## 2022-04-28 NOTE — TELEPHONE ENCOUNTER
Telephone call spoke with Pt  Per Dr Adele Johansen, continue taking your current dose - 7 5 mg daily except 5 mg Sat and Sunday  Repeat labs in 2 to 3 weeks  Pt states she understands

## 2022-04-29 ENCOUNTER — TELEPHONE (OUTPATIENT)
Dept: OTHER | Facility: OTHER | Age: 67
End: 2022-04-29

## 2022-04-29 NOTE — TELEPHONE ENCOUNTER
Called pt  Pt states she has called around to a few PT departments and believes [facility] might be able to help her  Phone number provided

## 2022-04-29 NOTE — TELEPHONE ENCOUNTER
Patient requested Rx for Physical Therapy to be faxed to PT office in [place], [place]  Patient hasn't provided a fax number

## 2022-04-29 NOTE — TELEPHONE ENCOUNTER
Pt called in stating the place she goes to for physical therapy no longer has the machine to squeeze the legs  She is requesting a call back from [facility]

## 2022-05-18 ENCOUNTER — TELEPHONE (OUTPATIENT)
Dept: HEMATOLOGY ONCOLOGY | Facility: CLINIC | Age: 67
End: 2022-05-18

## 2022-05-18 NOTE — TELEPHONE ENCOUNTER
Can you please get patients PT/INR results from [facility]  Patient needs her Coumadin instructions    Thank you

## 2022-05-18 NOTE — TELEPHONE ENCOUNTER
Called [facility] they are to fax over patient's lab results 5/18 3:15PM  Labs have been received and given to Dr Alicea to review

## 2022-05-19 ENCOUNTER — TELEPHONE (OUTPATIENT)
Dept: HEMATOLOGY ONCOLOGY | Facility: CLINIC | Age: 67
End: 2022-05-19

## 2022-05-19 NOTE — TELEPHONE ENCOUNTER
Dr Biju Kuo spoke with patient today  She is to resume on her current Coumadin dose and re check labs in 2-3 weeks

## 2022-05-27 DIAGNOSIS — Z86.718 HISTORY OF DVT (DEEP VEIN THROMBOSIS): ICD-10-CM

## 2022-05-27 RX ORDER — WARFARIN SODIUM 5 MG/1
TABLET ORAL
Qty: 150 TABLET | Refills: 2 | Status: SHIPPED | OUTPATIENT
Start: 2022-05-27

## 2022-06-02 ENCOUNTER — TELEPHONE (OUTPATIENT)
Dept: OTHER | Facility: OTHER | Age: 67
End: 2022-06-02

## 2022-06-02 ENCOUNTER — TELEPHONE (OUTPATIENT)
Dept: HEMATOLOGY ONCOLOGY | Facility: CLINIC | Age: 67
End: 2022-06-02

## 2022-06-02 NOTE — TELEPHONE ENCOUNTER
PT  Called in requesting a call back regarding her appointment tomorrow and possibly having a virtual appointment

## 2022-06-02 NOTE — TELEPHONE ENCOUNTER
Pharmacy called asking for clarification for the coumadin dose and qty  5 mg tablets, take 7 5 mg tablet daily or as directed and 150 qty if for 90 day supply with 2 refills

## 2022-06-03 ENCOUNTER — TELEPHONE (OUTPATIENT)
Dept: HEMATOLOGY ONCOLOGY | Facility: CLINIC | Age: 67
End: 2022-06-03

## 2022-06-03 NOTE — TELEPHONE ENCOUNTER
Appointment Cancellation Or Reschedule     Person calling in Patient    Provider Dr Brent Portillo   Office Visit Date and Time 6/3 2:20PM   Office Visit Location [address]   Did patient want to reschedule their office appointment? If so, when was it scheduled to? Yes, 6/10 11AM   Is this patient calling to reschedule an infusion appointment? No   When is their next infusion appointment? No   Is this patient a Chemo patient? No   Reason for Cancellation or Reschedule Car broke down     If the patient is a treatment patient, please route this to the office nurse  If the patient is not on treatment, please route to the office MA

## 2022-06-10 ENCOUNTER — OFFICE VISIT (OUTPATIENT)
Dept: HEMATOLOGY ONCOLOGY | Facility: CLINIC | Age: 67
End: 2022-06-10
Payer: MEDICARE

## 2022-06-10 VITALS
RESPIRATION RATE: 17 BRPM | TEMPERATURE: 97.5 F | HEIGHT: 64 IN | SYSTOLIC BLOOD PRESSURE: 140 MMHG | OXYGEN SATURATION: 93 % | BODY MASS INDEX: 50.02 KG/M2 | WEIGHT: 293 LBS | DIASTOLIC BLOOD PRESSURE: 82 MMHG | HEART RATE: 85 BPM

## 2022-06-10 DIAGNOSIS — Z86.711 HISTORY OF PULMONARY EMBOLISM: ICD-10-CM

## 2022-06-10 DIAGNOSIS — D68.9 ABNORMALITY OF COAGULATION (HCC): ICD-10-CM

## 2022-06-10 DIAGNOSIS — Z95.828 GREENFIELD FILTER IN PLACE: ICD-10-CM

## 2022-06-10 DIAGNOSIS — R79.83 HOMOCYSTEINEMIA: ICD-10-CM

## 2022-06-10 DIAGNOSIS — D68.59 HYPERCOAGULABLE STATE (HCC): Primary | ICD-10-CM

## 2022-06-10 PROCEDURE — 99214 OFFICE O/P EST MOD 30 MIN: CPT | Performed by: INTERNAL MEDICINE

## 2022-06-10 NOTE — PROGRESS NOTES
HPI:  Follow-up visit for personal and family history of blood clots, recurrent DVTs and in her case  Screening hypercoagulation workup showed high homocystine level  Patient has been on long-term anticoagulation, Coumadin and Folbic  She goes for PT INR test on regular basis and gets instructions about Coumadin dose, either she calls or weak call for instructions     She has been aware of drug and drug interaction with Coumadin and food and drug interaction with Coumadin  Patient has been advised to always let prescribing physician know that patient has been on Coumadin  She has IVC filter  She has  history of lymphedema both legs and has compression machine  She uses nystatin powder off and on for redness in the groin areas in summer months  She wants to remain on anticoglation for life unless contraindicated  For bleeding and procedures Coumadin could be interrupted  under physicians supervision and guidance  She is aware to contact us  if she were to receive any new medication from other providers, ie antibiotics and if she would be going for any procedure   Patient is overweight  She has tiredness  She has exertional dyspnea probably because of her weight     She is regular with her medical checkups and GYN checkups and mammography      Has arthritic symptoms                   Current Outpatient Medications:     albuterol (PROVENTIL HFA,VENTOLIN HFA) 90 mcg/act inhaler, Inhale 2 puffs every 6 (six) hours as needed for wheezing , Disp: , Rfl:     amLODIPine-benazepril (LOTREL 5-20) 5-20 MG per capsule, Take 1 capsule by mouth daily, Disp: 90 capsule, Rfl: 3    cetirizine (ZyrTEC) 10 mg tablet, Take 1 tablet by mouth daily as needed, Disp: , Rfl:     chlorhexidine (HIBICLENS) 4 % external liquid, Apply 1 application topically 3 (three) times a week, Disp: 473 mL, Rfl: 1    clindamycin (CLEOCIN) 300 MG capsule, Take 300 mg by mouth in the morning x2weeks per pt , Disp: , Rfl:    clotrimazole-betamethasone (LOTRISONE) 1-0 05 % cream, APPLY A THIN LAYER OF CREAM TOPICALLY TO AFFECTED AREAS ON THE TRUNK AND EXTREMITIES ONCE DAILY AS NEEDED FOR FLARES, Disp: , Rfl:     doxycycline (PERIOSTAT) 20 MG tablet, , Disp: , Rfl:     folic acid-pyridoxine-cyanocobalamin (Folbic) 2 5-25-2 mg, Take 1 tablet by mouth daily, Disp: 90 tablet, Rfl: 3    furosemide (LASIX) 20 mg tablet, Take 1 tablet (20 mg total) by mouth daily, Disp: 90 tablet, Rfl: 3    Hydrocortisone-Iodoquinol (Dermazene) 1-1 % CREA, Apply topically 2 (two) times a day, Disp: 28 4 g, Rfl: 5    metFORMIN (GLUCOPHAGE-XR) 500 mg 24 hr tablet, Take 1 tablet (500 mg total) by mouth 4 (four) times a day, Disp: 360 tablet, Rfl: 3    metroNIDAZOLE (METROGEL) 1 % gel, Apply 1 application topically daily  , Disp: , Rfl:     mometasone (NASONEX) 50 mcg/act nasal spray, 2 sprays into each nostril daily as needed  , Disp: , Rfl:     montelukast (SINGULAIR) 10 mg tablet, Take 1 tablet (10 mg total) by mouth daily at bedtime, Disp: 90 tablet, Rfl: 3    multivitamin (THERAGRAN) TABS, Take 1 tablet by mouth daily  , Disp: , Rfl:     nystatin powder, Apply topically 2 (two) times a day Apply to affected area, Disp: , Rfl:     pravastatin (PRAVACHOL) 40 mg tablet, Take 1 tablet (40 mg total) by mouth daily, Disp: 90 tablet, Rfl: 3    triamcinolone (KENALOG) 0 1 % cream, 2 (two) times a day as needed , Disp: , Rfl:     warfarin (COUMADIN) 5 mg tablet, Take  strictly as advised, Disp: 150 tablet, Rfl: 2    alendronate (FOSAMAX) 70 mg tablet, Take 1 tablet (70 mg total) by mouth every 7 days for 12 days, Disp: 12 tablet, Rfl: 3    cholecalciferol (VITAMIN D3) 1,000 units tablet, Take 1 tablet (1,000 Units total) by mouth daily, Disp: 30 tablet, Rfl: 3    Allergies   Allergen Reactions    Azithromycin Diarrhea    Ciprofloxacin Other (See Comments), Dizziness and Confusion      weepy        Clindamycin Other (See Comments)     Acid Reflux    Ancef [Cefazolin] Rash     States tolerated Keflex       Oncology History    No history exists  ROS:  06/10/22 Reviewed 13 systems:  Presently no other neurological, cardiac, pulmonary, GI and  symptoms other than listed in HPI     Other symptoms are in HPI  No symptoms like fever, chills, bleeding, bone pains, skin rash, weight loss,    weakness, numbness,  claudication and gait problem  No frequent infections  Not unusually sensitive to heat or cold  No swollen glands      Patient is anxious  /82 (BP Location: Left arm, Patient Position: Sitting, Cuff Size: Adult)   Pulse 85   Temp 97 5 °F (36 4 °C)   Resp 17   Ht 5' 4" (1 626 m)   Wt (!) 192 kg (422 lb 8 oz)   LMP  (LMP Unknown)   SpO2 93%   BMI 72 52 kg/m²     Physical Exam:    Patient is alert and oriented  Patient is not in distress  Vital signs are above  There is no icterus , no oral thrush, no palpable neck mass,  lung fields clear to P&A, regular heart rate, soft and nontender and large abdomen difficult to palpate any mass in the abdomen, has nonpitting edema of both legs , has stasis dermatitis, no calf tenderness, no focal neurological deficit, has generalized weakness, no skin rash other than stasis dermatitis, no palpable lymphadenopathy in the neck and axillary areas,  no clubbing  Patient is anxious  Performance status 2 because of weight and lymphedema      IMAGING:      LABS:    Results for orders placed or performed during the hospital encounter of 09/29/21   CBC and differential   Result Value Ref Range    WBC 9 10 4 31 - 10 16 Thousand/uL    RBC 4 33 3 81 - 5 12 Million/uL    Hemoglobin 12 5 11 5 - 15 4 g/dL    Hematocrit 39 1 34 8 - 46 1 %    MCV 90 82 - 98 fL    MCH 28 9 26 8 - 34 3 pg    MCHC 32 0 31 4 - 37 4 g/dL    RDW 15 6 (H) 11 6 - 15 1 %    MPV 10 0 8 9 - 12 7 fL    Platelets 923 671 - 172 Thousands/uL    nRBC 0 /100 WBCs    Neutrophils Relative 67 43 - 75 %    Immat GRANS % 0 0 - 2 % Lymphocytes Relative 21 14 - 44 %    Monocytes Relative 9 4 - 12 %    Eosinophils Relative 2 0 - 6 %    Basophils Relative 1 0 - 1 %    Neutrophils Absolute 6 07 1 85 - 7 62 Thousands/µL    Immature Grans Absolute 0 04 0 00 - 0 20 Thousand/uL    Lymphocytes Absolute 1 92 0 60 - 4 47 Thousands/µL    Monocytes Absolute 0 81 0 17 - 1 22 Thousand/µL    Eosinophils Absolute 0 17 0 00 - 0 61 Thousand/µL    Basophils Absolute 0 09 0 00 - 0 10 Thousands/µL   Comprehensive metabolic panel   Result Value Ref Range    Sodium 142 136 - 145 mmol/L    Potassium 4 1 3 5 - 5 3 mmol/L    Chloride 106 100 - 108 mmol/L    CO2 27 21 - 32 mmol/L    ANION GAP 9 4 - 13 mmol/L    BUN 15 5 - 25 mg/dL    Creatinine 1 14 0 60 - 1 30 mg/dL    Glucose 102 65 - 140 mg/dL    Calcium 8 6 8 3 - 10 1 mg/dL    Corrected Calcium 9 5 8 3 - 10 1 mg/dL    AST 21 5 - 45 U/L    ALT 18 12 - 78 U/L    Alkaline Phosphatase 81 46 - 116 U/L    Total Protein 7 8 6 4 - 8 2 g/dL    Albumin 2 9 (L) 3 5 - 5 0 g/dL    Total Bilirubin 0 40 0 20 - 1 00 mg/dL    eGFR 51 ml/min/1 73sq m   Lactic acid   Result Value Ref Range    LACTIC ACID 1 5 0 5 - 2 0 mmol/L     PT INR 2 9 on 06/08/2022  Labs, Imaging, & Other studies:   All pertinent labs and imaging studies were personally reviewed      Reviewed test results and discussed with patient  Assessment and plan: Follow-up visit for personal and family history of blood clots, recurrent DVTs and in her case  Screening hypercoagulation workup showed high homocystine level  Patient has been on long-term anticoagulation, Coumadin and Folbic  She goes for PT INR test on regular basis and gets instructions about Coumadin dose, either she calls or weak call for instructions     She has been aware of drug and drug interaction with Coumadin and food and drug interaction with Coumadin  Patient has been advised to always let prescribing physician know that patient has been on Coumadin  She has IVC filter   She has  history of lymphedema both legs and has compression machine  She uses nystatin powder off and on for redness in the groin areas in summer months  She wants to remain on anticoglation for life unless contraindicated  For bleeding and procedures Coumadin could be interrupted  under physicians supervision and guidance  She is aware to contact us  if she were to receive any new medication from other providers, ie antibiotics and if she would be going for any procedure   Patient is overweight  She has tiredness  She has exertional dyspnea probably because of her weight     She is regular with her medical checkups and GYN checkups and mammography     Has arthritic symptoms    Maycol Barrera Physical examination and test results are as recorded and discussed  Plan is to continue her on Coumadin and Folbic as above   Condition discussed and explained  Questions answered  Discussed the importance of self-breast examination, eating healthy foods, staying active and health screening test   She is capable of self-care  She has instructions to report to the emergency room in case of blood clot or bleeding and also call our office           See diagnoses, orders and instructions  1  Hypercoagulable state ([address] )    - CBC and differential; Standing  - Comprehensive metabolic panel; Standing  - Protime-INR; Standing  - CBC and differential  - Comprehensive metabolic panel  - Protime-INR    2  History of pulmonary embolism  Recurrent DVTs    - CBC and differential; Standing  - Comprehensive metabolic panel; Standing  - Protime-INR; Standing  - CBC and differential  - Comprehensive metabolic panel  - Protime-INR    3  Homocysteinemia-ordered homocystine level but patient had to sign a waiver for that  I discussed that with patient and she preferred to cancel the test       4  [employer] filter in place      5  Abnormality of coagulation ([address] )      Blood work for Coumadin every week or as ordered  Other blood work every 6 months    Visit in 1 year  Patient to call our office every time after blood test for Coumadin for the instructions  For bleeding, blood clot or other medical emergencies please report to the emergency      Patient voiced understanding and agreed  Counseling / Coordination of Care   Greater than 50% of total time was spent with the patient and / or family counseling and / or coordination of care

## 2022-06-10 NOTE — PATIENT INSTRUCTIONS
Blood work for Coumadin every week or as ordered  Other blood work every 6 months  Visit in 1 year  Patient to call our office every time after blood test for Coumadin for the instructions    For bleeding, blood clot or other medical emergencies please report to the emergency

## 2022-06-13 DIAGNOSIS — E78.5 HYPERLIPIDEMIA, UNSPECIFIED HYPERLIPIDEMIA TYPE: ICD-10-CM

## 2022-06-13 RX ORDER — PRAVASTATIN SODIUM 40 MG
40 TABLET ORAL DAILY
Qty: 90 TABLET | Refills: 3
Start: 2022-06-13 | End: 2022-06-20 | Stop reason: SDUPTHER

## 2022-06-20 DIAGNOSIS — E78.5 HYPERLIPIDEMIA, UNSPECIFIED HYPERLIPIDEMIA TYPE: ICD-10-CM

## 2022-06-20 RX ORDER — PRAVASTATIN SODIUM 40 MG
40 TABLET ORAL DAILY
Qty: 90 TABLET | Refills: 3
Start: 2022-06-20 | End: 2022-06-23 | Stop reason: SDUPTHER

## 2022-06-23 DIAGNOSIS — E78.5 HYPERLIPIDEMIA, UNSPECIFIED HYPERLIPIDEMIA TYPE: ICD-10-CM

## 2022-06-23 RX ORDER — PRAVASTATIN SODIUM 40 MG
40 TABLET ORAL DAILY
Qty: 90 TABLET | Refills: 3
Start: 2022-06-23

## 2022-07-01 ENCOUNTER — TELEPHONE (OUTPATIENT)
Dept: HEMATOLOGY ONCOLOGY | Facility: CLINIC | Age: 67
End: 2022-07-01

## 2022-07-01 NOTE — TELEPHONE ENCOUNTER
Patient calling for Coumadin instrcutions  INR yesterday = 2 3  No change to Coumadin - current dose 7 5mg Mon - Fri and 5mg on Sat and Sun  Patient will re check labs in 2-3 weeks  Patient verbalized understanding

## 2022-07-11 ENCOUNTER — OFFICE VISIT (OUTPATIENT)
Dept: URGENT CARE | Facility: CLINIC | Age: 67
End: 2022-07-11
Payer: MEDICARE

## 2022-07-11 ENCOUNTER — APPOINTMENT (OUTPATIENT)
Dept: RADIOLOGY | Facility: CLINIC | Age: 67
End: 2022-07-11
Payer: MEDICARE

## 2022-07-11 VITALS
SYSTOLIC BLOOD PRESSURE: 132 MMHG | RESPIRATION RATE: 24 BRPM | TEMPERATURE: 99.3 F | OXYGEN SATURATION: 94 % | HEART RATE: 83 BPM | DIASTOLIC BLOOD PRESSURE: 82 MMHG

## 2022-07-11 DIAGNOSIS — R05.9 COUGH: ICD-10-CM

## 2022-07-11 DIAGNOSIS — J40 BRONCHITIS: Primary | ICD-10-CM

## 2022-07-11 LAB
SARS-COV-2 AG UPPER RESP QL IA: NEGATIVE
VALID CONTROL: NORMAL

## 2022-07-11 PROCEDURE — 87811 SARS-COV-2 COVID19 W/OPTIC: CPT

## 2022-07-11 PROCEDURE — 71046 X-RAY EXAM CHEST 2 VIEWS: CPT

## 2022-07-11 PROCEDURE — 99203 OFFICE O/P NEW LOW 30 MIN: CPT

## 2022-07-11 PROCEDURE — G0463 HOSPITAL OUTPT CLINIC VISIT: HCPCS

## 2022-07-11 RX ORDER — ALBUTEROL SULFATE 90 UG/1
2 AEROSOL, METERED RESPIRATORY (INHALATION) EVERY 6 HOURS PRN
Qty: 8.5 G | Refills: 0 | Status: SHIPPED | OUTPATIENT
Start: 2022-07-11

## 2022-07-11 RX ORDER — AMOXICILLIN AND CLAVULANATE POTASSIUM 875; 125 MG/1; MG/1
1 TABLET, FILM COATED ORAL EVERY 12 HOURS SCHEDULED
Qty: 14 TABLET | Refills: 0 | Status: SHIPPED | OUTPATIENT
Start: 2022-07-11 | End: 2022-07-18

## 2022-07-11 NOTE — PATIENT INSTRUCTIONS
Rapid COVID was negative  No pneumonia noted on your x-ray  Please take the antibiotics as prescribed  Please use the albuterol inhaler as prescribed  You may take Mucinex over-the-counter for cough and congestion  You may use hot tea with honey for natural cough suppressant or Delsym over-the-counter  Follow with your primary care provider in 3 days  If her symptoms worsen proceed to the ER

## 2022-07-11 NOTE — PROGRESS NOTES
[facility]        NAME: Lucretia Caceres is a 73 y o  female  : 1955    MRN: [id number]  DATE: 2022  TIME: 7:30 PM    Assessment and Plan   Bronchitis [J40]  1  Bronchitis  XR chest pa & lateral    Poct Covid 19 Rapid Antigen Test    albuterol (ProAir HFA) 90 mcg/act inhaler    amoxicillin-clavulanate (AUGMENTIN) 875-125 mg per tablet     Rapid COVID negative  She states that she cannot take doxycycline as it gives her headache  She states that she is allergic to ciprofloxacin as well as clindamycin, azithromycin, Ancef  Through chart review she does take amoxicillin prior to dentist surgeries and can tolerate that well  Given that the patient is on Coumadin is not had an INR checked in over 1 month and the risk of bleeding will use Augmentin as solo therapy and have patient follow with primary care provider  Patient to use her albuterol inhaler as well as Mucinex  Chest x-ray- no acute disease, chronic low lung volumes    Patient Instructions       Follow up with PCP in 3-5 days  Proceed to  ER if symptoms worsen  Chief Complaint     Chief Complaint   Patient presents with    Cold Like Symptoms     Productive cough clear to yellow  Decreased appetite and shortness of breath  Fatigue  History of Present Illness       Patient is a 43-year-old female who presents to the office today for 5 days of cold-like symptoms  She states that she has nasal congestion and chest congestion and is having a mucus production of yellow sputum  She states that the cough is so bad it causes her to vomit up the sputum  She did not eat today or yesterday  She states that she has not taken anything for these symptoms she believes that she has bronchitis  She does have a history of pulmonary hypertension is on Coumadin  She is also diabetic  Review of Systems   Review of Systems   Constitutional: Positive for chills and fatigue  Negative for fever     HENT: Positive for congestion, sinus pressure, sinus pain and sore throat  Negative for ear pain  Respiratory: Positive for cough  Negative for shortness of breath and wheezing  Cardiovascular: Negative for chest pain and palpitations  Gastrointestinal: Positive for vomiting  Musculoskeletal: Negative for myalgias  Neurological: Negative for headaches  All other systems reviewed and are negative          Current Medications       Current Outpatient Medications:     albuterol (ProAir HFA) 90 mcg/act inhaler, Inhale 2 puffs every 6 (six) hours as needed for wheezing or shortness of breath, Disp: 8 5 g, Rfl: 0    amLODIPine-benazepril (LOTREL 5-20) 5-20 MG per capsule, Take 1 capsule by mouth daily, Disp: 90 capsule, Rfl: 3    amoxicillin-clavulanate (AUGMENTIN) 875-125 mg per tablet, Take 1 tablet by mouth every 12 (twelve) hours for 7 days, Disp: 14 tablet, Rfl: 0    cetirizine (ZyrTEC) 10 mg tablet, Take 1 tablet by mouth daily as needed, Disp: , Rfl:     chlorhexidine (HIBICLENS) 4 % external liquid, Apply 1 application topically 3 (three) times a week, Disp: 473 mL, Rfl: 1    clotrimazole-betamethasone (LOTRISONE) 1-0 05 % cream, APPLY A THIN LAYER OF CREAM TOPICALLY TO AFFECTED AREAS ON THE TRUNK AND EXTREMITIES ONCE DAILY AS NEEDED FOR FLARES, Disp: , Rfl:     folic acid-pyridoxine-cyanocobalamin (Folbic) 2 5-25-2 mg, Take 1 tablet by mouth daily, Disp: 90 tablet, Rfl: 3    furosemide (LASIX) 20 mg tablet, Take 1 tablet (20 mg total) by mouth daily, Disp: 90 tablet, Rfl: 3    Hydrocortisone-Iodoquinol (Dermazene) 1-1 % CREA, Apply topically 2 (two) times a day, Disp: 28 4 g, Rfl: 5    metFORMIN (GLUCOPHAGE-XR) 500 mg 24 hr tablet, Take 1 tablet (500 mg total) by mouth 4 (four) times a day, Disp: 360 tablet, Rfl: 3    metroNIDAZOLE (METROGEL) 1 % gel, Apply 1 application topically daily  , Disp: , Rfl:     mometasone (NASONEX) 50 mcg/act nasal spray, 2 sprays into each nostril daily as needed  , Disp: , Rfl:     montelukast (SINGULAIR) 10 mg tablet, Take 1 tablet (10 mg total) by mouth daily at bedtime, Disp: 90 tablet, Rfl: 3    multivitamin (THERAGRAN) TABS, Take 1 tablet by mouth daily  , Disp: , Rfl:     nystatin powder, Apply topically 2 (two) times a day Apply to affected area, Disp: , Rfl:     pravastatin (PRAVACHOL) 40 mg tablet, Take 1 tablet (40 mg total) by mouth daily, Disp: 90 tablet, Rfl: 3    triamcinolone (KENALOG) 0 1 % cream, 2 (two) times a day as needed , Disp: , Rfl:     warfarin (COUMADIN) 5 mg tablet, Take  strictly as advised, Disp: 150 tablet, Rfl: 2    Current Allergies     Allergies as of 07/11/2022 - Reviewed 07/11/2022   Allergen Reaction Noted    Azithromycin Diarrhea 05/26/2017    Ciprofloxacin Other (See Comments), Dizziness, and Confusion 05/14/2008    Clindamycin Other (See Comments) 12/31/2018    Ancef [cefazolin] Rash 04/03/2018            The following portions of the patient's history were reviewed and updated as appropriate: allergies, current medications, past family history, past medical history, past social history, past surgical history and problem list      Past Medical History:   Diagnosis Date    Blood clotting disorder ([address] )     Homocysteinemia     Hypercoagulable state ([address] )     Lyme disease     Renal disorder     kidney stones    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 ([address] )        Past Surgical History:   Procedure Laterality Date    ANKLE SURGERY Right     APPENDECTOMY      BREAST BIOPSY      BREAST BIOPSY      CLOSED REDUCTION METATARSAL FRACTURE Right     FOOT SURGERY      HEMORROIDECTOMY      HYSTERECTOMY      JOINT REPLACEMENT Bilateral     knee    LITHOTRIPSY      VENA CAVA FILTER PLACEMENT      onset: 1/10/10       Family History   Problem Relation Age of Onset    Pulmonary embolism Mother     Hypercoagulant Ability Mother         primary state    Hypercoagulant Ability Maternal Grandmother         primary state    No Known Problems Father          Medications have been verified  Objective   /82   Pulse 83   Temp 99 3 °F (37 4 °C)   Resp (!) 24   LMP  (LMP Unknown)   SpO2 94%        Physical Exam     Physical Exam  Vitals and nursing note reviewed  Constitutional:       General: She is not in acute distress  Appearance: She is obese  She is ill-appearing  She is not toxic-appearing  HENT:      Right Ear: Tympanic membrane normal       Left Ear: Tympanic membrane normal       Nose: Congestion present  Mouth/Throat:      Mouth: Mucous membranes are moist    Cardiovascular:      Rate and Rhythm: Normal rate and regular rhythm  Pulses: Normal pulses  Heart sounds: Normal heart sounds  No murmur heard  No friction rub  No gallop  Pulmonary:      Effort: Pulmonary effort is normal  No respiratory distress  Breath sounds: No stridor  No wheezing, rhonchi or rales  Comments: Tachypnea  Chest:      Chest wall: No tenderness  Musculoskeletal:      Cervical back: No tenderness  Lymphadenopathy:      Cervical: No cervical adenopathy  Skin:     General: Skin is warm and dry  Capillary Refill: Capillary refill takes less than 2 seconds  Neurological:      General: No focal deficit present  Mental Status: She is alert and oriented to person, place, and time  Psychiatric:         Mood and Affect: Mood normal          Behavior: Behavior normal          Thought Content:  Thought content normal          Judgment: Judgment normal

## 2022-07-12 RX ORDER — AMOXICILLIN 500 MG/1
CAPSULE ORAL
COMMUNITY
Start: 2022-05-23 | End: 2022-07-14 | Stop reason: ALTCHOICE

## 2022-07-12 RX ORDER — SULFAMETHOXAZOLE AND TRIMETHOPRIM 800; 160 MG/1; MG/1
TABLET ORAL
COMMUNITY
End: 2022-07-14 | Stop reason: ALTCHOICE

## 2022-07-12 RX ORDER — CEPHALEXIN 500 MG/1
CAPSULE ORAL
COMMUNITY
End: 2022-07-14 | Stop reason: ALTCHOICE

## 2022-07-14 ENCOUNTER — TELEPHONE (OUTPATIENT)
Dept: HEMATOLOGY ONCOLOGY | Facility: CLINIC | Age: 67
End: 2022-07-14

## 2022-07-14 ENCOUNTER — OFFICE VISIT (OUTPATIENT)
Dept: FAMILY MEDICINE CLINIC | Facility: CLINIC | Age: 67
End: 2022-07-14
Payer: MEDICARE

## 2022-07-14 VITALS
WEIGHT: 293 LBS | SYSTOLIC BLOOD PRESSURE: 138 MMHG | HEIGHT: 64 IN | DIASTOLIC BLOOD PRESSURE: 82 MMHG | BODY MASS INDEX: 50.02 KG/M2 | TEMPERATURE: 97.9 F | HEART RATE: 83 BPM | OXYGEN SATURATION: 95 % | RESPIRATION RATE: 18 BRPM

## 2022-07-14 DIAGNOSIS — J40 BRONCHITIS: Primary | ICD-10-CM

## 2022-07-14 DIAGNOSIS — E66.9 LYMPHEDEMA ASSOCIATED WITH OBESITY: ICD-10-CM

## 2022-07-14 DIAGNOSIS — I89.0 LYMPHEDEMA ASSOCIATED WITH OBESITY: ICD-10-CM

## 2022-07-14 DIAGNOSIS — Z99.89 OBSTRUCTIVE SLEEP APNEA ON CPAP: ICD-10-CM

## 2022-07-14 DIAGNOSIS — G47.33 OBSTRUCTIVE SLEEP APNEA ON CPAP: ICD-10-CM

## 2022-07-14 DIAGNOSIS — I10 ESSENTIAL HYPERTENSION: ICD-10-CM

## 2022-07-14 PROCEDURE — 99214 OFFICE O/P EST MOD 30 MIN: CPT | Performed by: FAMILY MEDICINE

## 2022-07-14 RX ORDER — PREDNISONE 20 MG/1
40 TABLET ORAL DAILY
Qty: 10 TABLET | Refills: 0 | Status: SHIPPED | OUTPATIENT
Start: 2022-07-14 | End: 2022-07-19

## 2022-07-14 NOTE — TELEPHONE ENCOUNTER
Pt calling to let Dr Josue Quinn know that she went to [address] [place] [address] Urgent care and she is now taking antibx, Mucinex, and an inhaler  She does not have COVID but she is feeling too tired to go for blood work on Friday so she will go on Monday  Dr Josue Quinn updated

## 2022-07-14 NOTE — PROGRESS NOTES
Assessment/Plan:    1  Bronchitis  - will cont current regimen  Add prednisone  She is improving but slowly  Rest/Hydration   - predniSONE 20 mg tablet; Take 2 tablets (40 mg total) by mouth daily for 5 days  Dispense: 10 tablet; Refill: 0    2  Obstructive sleep apnea on CPAP  - she is asking for new supplies for the machine -- this came at the end of her visit  She has a supplier, will take Rx  Rx provided  - Durable Medical Equipment    3  Essential hypertension  - stable on recheck - needed to use around her wrist as large cuff is not big enough  Discussed the importance of maintaining a healthy lifestyle through heart healthy diet and exercise  4  Lymphedema associated with obesity  - she is asking for new sleeves  Rx provided  - Durable Medical Equipment    RTC as scheduled with her PCP    Patient/Caretaker verbalized understanding and were in agreement with today's assessment and plan  Time was taken to address any questions patient/caretaker had  Indication/Risks/Benefits of medication(s) as prescribed were discussed with the patient/caretaker  The patient verbalized understanding and agreement and elects to take medications as prescribed  Time was taken to answer any questions the patient/caretaker may have had  Chief Complaint   Patient presents with    Follow-up     Follow up from urgent care  Subjective:      Patient ID: Aruna Delong is a 73 y o  female  On 7/11 went to  with cough, sob, fatigue and was dx'd with Bronchitis  She was put on Augmentin and also albuterol inhaler  She is also on Mucinex  She feels some better but slow  She is with persistent cough  At times she can cough it up and at other times, it is tough  She also has sinus congestion  Feels her chest is tight  No frand sob or cp  Was having post tussis emesis, this is improved  The patient is with PCOS and is on metformin for this      Lab Results   Component Value Date HGBA1C 5 8 (H) 08/05/2021     She has h/o PE/Clotting Disorder and is on blood thinners  Hematology monitors her INR  She is with lymphedema and has not been using her pumps due to not having sleeves  She is also needing new supplies for her JES  The following portions of the patient's history were reviewed and updated as appropriate: allergies, current medications, past family history, past medical history, past social history, past surgical history and problem list     Review of Systems   All other systems reviewed and are negative  Objective:      /82 (BP Location: Left arm, Patient Position: Sitting, Cuff Size: Large)   Pulse 83   Temp 97 9 °F (36 6 °C) (Temporal)   Resp 18   Ht 5' 4" (1 626 m)   Wt (!) 188 kg (413 lb 12 8 oz)   LMP  (LMP Unknown)   SpO2 95%   BMI 71 03 kg/m²          Physical Exam  Vitals and nursing note reviewed  Constitutional:       General: She is not in acute distress  Appearance: Normal appearance  She is obese  HENT:      Head: Normocephalic  Cardiovascular:      Rate and Rhythm: Normal rate and regular rhythm  Pulmonary:      Effort: Pulmonary effort is normal       Comments: Scattered rales and rhonci  She is with tight cough and not able to take deep breaths   Musculoskeletal:      Cervical back: Neck supple  Comments: Antalgic gait due to pain and obesity    Skin:     Comments: Chronic skin changes to her distal LEs, lymphedema    Neurological:      General: No focal deficit present  Mental Status: She is alert and oriented to person, place, and time  Psychiatric:         Mood and Affect: Mood normal          Behavior: Behavior normal          Thought Content:  Thought content normal          Judgment: Judgment normal

## 2022-07-20 ENCOUNTER — TELEPHONE (OUTPATIENT)
Dept: HEMATOLOGY ONCOLOGY | Facility: CLINIC | Age: 67
End: 2022-07-20

## 2022-07-20 NOTE — TELEPHONE ENCOUNTER
Returned telephone call  Left voice messsage  Per Dr Fraga Anibal continue taking Coumadin 7 5 mg M through Friday and 5 mg Sat and Sunday  Repeat lab in 2 to 3 weeks  Any questions call our office

## 2022-07-21 ENCOUNTER — RX ONLY (OUTPATIENT)
Age: 67
Setting detail: RX ONLY
End: 2022-07-21

## 2022-07-21 ENCOUNTER — APPOINTMENT (RX ONLY)
Dept: URBAN - NONMETROPOLITAN AREA CLINIC 4 | Facility: CLINIC | Age: 67
Setting detail: DERMATOLOGY
End: 2022-07-21

## 2022-07-21 DIAGNOSIS — L30.4 ERYTHEMA INTERTRIGO: ICD-10-CM

## 2022-07-21 DIAGNOSIS — L21.8 OTHER SEBORRHEIC DERMATITIS: ICD-10-CM

## 2022-07-21 DIAGNOSIS — L71.8 OTHER ROSACEA: ICD-10-CM

## 2022-07-21 PROCEDURE — ? OTHER

## 2022-07-21 PROCEDURE — 99214 OFFICE O/P EST MOD 30 MIN: CPT

## 2022-07-21 PROCEDURE — ? COUNSELING

## 2022-07-21 PROCEDURE — ? PRESCRIPTION

## 2022-07-21 PROCEDURE — ? RECOMMENDATIONS

## 2022-07-21 PROCEDURE — ? PRESCRIPTION MEDICATION MANAGEMENT

## 2022-07-21 RX ORDER — TRIAMCINOLONE ACETONIDE 1 MG/G
CREAM TOPICAL QDAY
Qty: 80 | Refills: 6 | Status: ERX

## 2022-07-21 RX ORDER — AMOXICILLIN 500 MG/1
CAPSULE ORAL
Qty: 30 | Refills: 3 | Status: ERX

## 2022-07-21 RX ORDER — CLOTRIMAZOLE AND BETAMETHASONE DIPROPIONATE 10; .5 MG/G; MG/G
CREAM TOPICAL
Qty: 45 | Refills: 3 | Status: ERX

## 2022-07-21 RX ORDER — CLOTRIMAZOLE AND BETAMETHASONE DIPROPIONATE 10; .5 MG/G; MG/G
CREAM TOPICAL BID
Qty: 45 | Refills: 3 | Status: ERX

## 2022-07-21 RX ORDER — TRIAMCINOLONE ACETONIDE 1 MG/G
CREAM TOPICAL
Qty: 453.6 | Refills: 3 | Status: ACTIVE

## 2022-07-21 RX ORDER — IVERMECTIN 10 MG/G
CREAM TOPICAL
Qty: 45 | Refills: 3 | Status: ERX | COMMUNITY
Start: 2022-07-21

## 2022-07-21 RX ORDER — TRIAMCINOLONE ACETONIDE 1 MG/G
CREAM TOPICAL
Qty: 453.6 | Refills: 3 | Status: ERX | COMMUNITY
Start: 2022-07-21

## 2022-07-21 RX ORDER — AMOXICILLIN 500 MG/1
CAPSULE ORAL
Qty: 30 | Refills: 3 | Status: ACTIVE

## 2022-07-21 RX ORDER — NYSTATIN 100000 [USP'U]/G
POWDER TOPICAL DAILY
Qty: 1 | Refills: 3 | Status: ERX

## 2022-07-21 RX ORDER — CLOTRIMAZOLE AND BETAMETHASONE DIPROPIONATE 10; .5 MG/G; MG/G
CREAM TOPICAL
Qty: 45 | Refills: 3 | Status: ACTIVE

## 2022-07-21 RX ORDER — AMOXICILLIN 500 MG/1
CAPSULE ORAL
Qty: 30 | Refills: 3 | Status: ERX | COMMUNITY
Start: 2022-07-21

## 2022-07-21 RX ORDER — IVERMECTIN 10 MG/G
CREAM TOPICAL DAILY
Qty: 45 | Refills: 3 | Status: ERX

## 2022-07-21 RX ORDER — NYSTATIN 100000 [USP'U]/G
POWDER TOPICAL
Qty: 60 | Refills: 3 | Status: ERX

## 2022-07-21 RX ORDER — IVERMECTIN 10 MG/G
CREAM TOPICAL
Qty: 45 | Refills: 3 | Status: ACTIVE

## 2022-07-21 ASSESSMENT — LOCATION DETAILED DESCRIPTION DERM
LOCATION DETAILED: RIGHT CENTRAL MALAR CHEEK
LOCATION DETAILED: RIGHT INFERIOR CENTRAL MALAR CHEEK
LOCATION DETAILED: LEFT CENTRAL MALAR CHEEK

## 2022-07-21 ASSESSMENT — LOCATION ZONE DERM: LOCATION ZONE: FACE

## 2022-07-21 ASSESSMENT — LOCATION SIMPLE DESCRIPTION DERM
LOCATION SIMPLE: RIGHT CHEEK
LOCATION SIMPLE: LEFT CHEEK

## 2022-07-21 NOTE — PROCEDURE: PRESCRIPTION MEDICATION MANAGEMENT
Detail Level: Zone
Render In Strict Bullet Format?: No
Initiate Treatment: Soolantra
Discontinue Regimen: Metrogel

## 2022-07-21 NOTE — PROCEDURE: RECOMMENDATIONS
Recommendations (Free Text): Patient states she thought rosacea worsened with mask “mites” . Patient on Augmetin for Bronchitis and face cleared. Doctor told her she does not need Augmentin, too strong. Face clear at present.
Recommendation Preamble: The following recommendations were made during the visit:
Render Risk Assessment In Note?: no
Detail Level: Zone

## 2022-07-21 NOTE — PROCEDURE: OTHER
Render Risk Assessment In Note?: no
Note Text (......Xxx Chief Complaint.): This diagnosis correlates with the
Other (Free Text): ALL PRESCRIPTIONS SENT.
Detail Level: Zone
Other (Free Text): ALL PRESCRIPTIONS SENT

## 2022-07-25 ENCOUNTER — RA CDI HCC (OUTPATIENT)
Dept: OTHER | Facility: HOSPITAL | Age: 67
End: 2022-07-25

## 2022-07-25 NOTE — PROGRESS NOTES
E11 9  [facility]  coding opportunities          Chart Reviewed number of suggestions sent to Provider: 1     Patients Insurance     Medicare Insurance: [employer]

## 2022-08-03 DIAGNOSIS — D68.9 BLOOD CLOTTING DISORDER (HCC): ICD-10-CM

## 2022-08-03 RX ORDER — FOLIC ACID-PYRIDOXINE-CYANOCOBALAMIN TAB 2.5-25-2 MG 2.5-25-2 MG
TAB ORAL
Qty: 90 TABLET | Refills: 1 | Status: SHIPPED | OUTPATIENT
Start: 2022-08-03

## 2022-08-03 RX ORDER — FOLIC ACID-PYRIDOXINE-CYANOCOBALAMIN TAB 2.5-25-2 MG 2.5-25-2 MG
1 TAB ORAL DAILY
Qty: 90 TABLET | Refills: 3 | Status: SHIPPED | OUTPATIENT
Start: 2022-08-03

## 2022-08-16 ENCOUNTER — TELEPHONE (OUTPATIENT)
Dept: HEMATOLOGY ONCOLOGY | Facility: CLINIC | Age: 67
End: 2022-08-16

## 2022-08-16 NOTE — TELEPHONE ENCOUNTER
Most recent INR = 3 4  Per Dr Bee Zambrano patient will hold Coumadin tonight and then take 5mg Mon-Friday and 7 5mg On Saturday and Sunday    Reviewed with patient - she verbalized understanding  She did report that she had a nose bleed

## 2022-08-25 ENCOUNTER — TELEPHONE (OUTPATIENT)
Dept: HEMATOLOGY ONCOLOGY | Facility: CLINIC | Age: 67
End: 2022-08-25

## 2022-08-25 NOTE — TELEPHONE ENCOUNTER
Reviewed INR with Dr Augustine Jose  2 26  No change to coumadin dose - patient to re check in 2-3 weeks  Reviewed with patient    She verbalized understanding    She is currently taking 7 5mg Mon - Friday and 5mg on Saturday and Sunday

## 2022-09-02 ENCOUNTER — OFFICE VISIT (OUTPATIENT)
Dept: FAMILY MEDICINE CLINIC | Facility: CLINIC | Age: 67
End: 2022-09-02
Payer: MEDICARE

## 2022-09-02 VITALS
BODY MASS INDEX: 50.02 KG/M2 | TEMPERATURE: 97.8 F | HEART RATE: 92 BPM | HEIGHT: 64 IN | SYSTOLIC BLOOD PRESSURE: 148 MMHG | WEIGHT: 293 LBS | DIASTOLIC BLOOD PRESSURE: 84 MMHG | OXYGEN SATURATION: 97 % | RESPIRATION RATE: 18 BRPM

## 2022-09-02 DIAGNOSIS — J45.40 MODERATE PERSISTENT ASTHMA WITHOUT COMPLICATION: ICD-10-CM

## 2022-09-02 DIAGNOSIS — Z11.4 SCREENING FOR HIV (HUMAN IMMUNODEFICIENCY VIRUS): Primary | ICD-10-CM

## 2022-09-02 DIAGNOSIS — L97.909 CHRONIC VENOUS HYPERTENSION W ULCERATION (HCC): ICD-10-CM

## 2022-09-02 DIAGNOSIS — I27.20 PULMONARY HTN (HCC): ICD-10-CM

## 2022-09-02 DIAGNOSIS — D68.9 ABNORMALITY OF COAGULATION (HCC): ICD-10-CM

## 2022-09-02 DIAGNOSIS — E78.5 HYPERLIPIDEMIA, UNSPECIFIED HYPERLIPIDEMIA TYPE: ICD-10-CM

## 2022-09-02 DIAGNOSIS — D68.9 BLOOD CLOTTING DISORDER (HCC): ICD-10-CM

## 2022-09-02 DIAGNOSIS — Z99.89 OBSTRUCTIVE SLEEP APNEA ON CPAP: ICD-10-CM

## 2022-09-02 DIAGNOSIS — L03.115 CELLULITIS OF RIGHT LOWER EXTREMITY: ICD-10-CM

## 2022-09-02 DIAGNOSIS — E66.01 MORBID OBESITY WITH BMI OF 70 AND OVER, ADULT (HCC): ICD-10-CM

## 2022-09-02 DIAGNOSIS — E72.11 HOMOCYSTINURIA (HCC): ICD-10-CM

## 2022-09-02 DIAGNOSIS — I89.0 LYMPHEDEMA ASSOCIATED WITH OBESITY: ICD-10-CM

## 2022-09-02 DIAGNOSIS — G47.33 OBSTRUCTIVE SLEEP APNEA ON CPAP: ICD-10-CM

## 2022-09-02 DIAGNOSIS — I10 ESSENTIAL HYPERTENSION: ICD-10-CM

## 2022-09-02 DIAGNOSIS — Z23 NEED FOR SHINGLES VACCINE: ICD-10-CM

## 2022-09-02 DIAGNOSIS — I87.319 CHRONIC VENOUS HYPERTENSION W ULCERATION (HCC): ICD-10-CM

## 2022-09-02 DIAGNOSIS — R79.83 HOMOCYSTEINEMIA: ICD-10-CM

## 2022-09-02 DIAGNOSIS — D68.59 HYPERCOAGULABLE STATE (HCC): ICD-10-CM

## 2022-09-02 DIAGNOSIS — R73.01 IFG (IMPAIRED FASTING GLUCOSE): ICD-10-CM

## 2022-09-02 DIAGNOSIS — E66.9 LYMPHEDEMA ASSOCIATED WITH OBESITY: ICD-10-CM

## 2022-09-02 PROCEDURE — 99214 OFFICE O/P EST MOD 30 MIN: CPT | Performed by: FAMILY MEDICINE

## 2022-09-02 RX ORDER — FLUTICASONE PROPIONATE AND SALMETEROL 250; 50 UG/1; UG/1
1 POWDER RESPIRATORY (INHALATION) 2 TIMES DAILY
Qty: 60 BLISTER | Refills: 5 | Status: SHIPPED | OUTPATIENT
Start: 2022-09-02

## 2022-09-02 RX ORDER — ZOSTER VACCINE RECOMBINANT, ADJUVANTED 50 MCG/0.5
0.5 KIT INTRAMUSCULAR ONCE
Qty: 1 EACH | Refills: 1 | Status: SHIPPED | OUTPATIENT
Start: 2022-09-02 | End: 2022-09-02

## 2022-09-02 RX ORDER — CLINDAMYCIN HYDROCHLORIDE 300 MG/1
300 CAPSULE ORAL 3 TIMES DAILY
Qty: 21 CAPSULE | Refills: 5 | Status: SHIPPED | OUTPATIENT
Start: 2022-09-02 | End: 2022-09-09

## 2022-09-02 NOTE — PROGRESS NOTES
Assessment/Plan:    Hyperlipidemia  Stable  Continue current  Hypercoagulable state ([place] [address] )  Plan per heme-onc  Abnormality of coagulation ([place] [address] )  Plan per hem-onc  Homocystinuria ([place] [address] )  Stable  Continue current  Diagnoses and all orders for this visit:    Screening for HIV (human immunodeficiency virus)    Blood clotting disorder ([place] [address] )  -     Lipid panel; Future  -     Comprehensive metabolic panel; Future  -     TSH, 3rd generation; Future    Morbid obesity with BMI of 70 and over, adult ([place] [address] )  -     Lipid panel; Future  -     Comprehensive metabolic panel; Future  -     TSH, 3rd generation; Future    Hypercoagulable state ([place] [address] )  -     Lipid panel; Future  -     Comprehensive metabolic panel; Future  -     TSH, 3rd generation; Future    Abnormality of coagulation ([place] [address] )  -     Lipid panel; Future  -     Comprehensive metabolic panel; Future  -     TSH, 3rd generation; Future    Cellulitis of right lower extremity  -     clindamycin (CLEOCIN) 300 MG capsule; Take 1 capsule (300 mg total) by mouth 3 (three) times a day for 7 days  -     Lipid panel; Future  -     Comprehensive metabolic panel; Future  -     TSH, 3rd generation; Future    Homocystinuria ([place] [address] )  -     Lipid panel; Future  -     Comprehensive metabolic panel; Future  -     TSH, 3rd generation; Future    Chronic venous hypertension w ulceration (HCC)  -     Lipid panel; Future  -     Comprehensive metabolic panel; Future  -     TSH, 3rd generation; Future    Pulmonary HTN ([place] [address] )  -     Lipid panel; Future  -     Comprehensive metabolic panel; Future  -     TSH, 3rd generation; Future    IFG (impaired fasting glucose)  -     TSH, 3rd generation; Future  -     HEMOGLOBIN A1C W/ EAG ESTIMATION; Future    Need for shingles vaccine  -     Zoster Vac Recomb Adjuvanted (Shingrix) 50 MCG/0 5ML SUSR;  Inject 0 5 mL into a muscle once for 1 dose Repeat dose in 2 to 6 months    Lymphedema associated with obesity  -     TSH, 3rd generation; Future    Hyperlipidemia, unspecified hyperlipidemia type  -     TSH, 3rd generation; Future    Obstructive sleep apnea on CPAP    Essential hypertension    Homocysteinemia    Moderate persistent asthma without complication  -     Fluticasone-Salmeterol (Wixela Inhub) 250-50 mcg/dose inhaler; Inhale 1 puff 2 (two) times a day Rinse mouth after use  Subjective:      Patient ID: Cole Moss is a 73 y o  female  Her BP is at goal today in the office  She has no CP or SOB  She has no HA or vision changes  She has no PND or orthopnea  She has asthma  She wheezes daily if she is in a hurry or when it is cold  She has never been on a controller medication  She has chronic venous stasis  She developed an ulcer on her right shin  There are no signs or symptoms of infection, although she frequently gets cellulitis  She has hypocoaguable state  She is anticogulated with warfarin  Heme-onc manages her INR  She has JES and uses CPAP nightly  The following portions of the patient's history were reviewed and updated as appropriate:   She  has a past medical history of Blood clotting disorder ([address] ), Homocysteinemia, Hypercoagulable state ([address] ), Lyme disease, Renal disorder, and 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 ([address] )    She   Patient Active Problem List    Diagnosis Date Noted    Homocystinuria ([address] ) 09/02/2022    Prothrombin time increased 07/26/2021    Anemia 09/10/2020    Hyperphosphatemia 09/10/2020    S/P TKR (total knee replacement) 03/04/2020    [place] filter in place 02/17/2020    History of pulmonary embolism 02/17/2020    Rosacea 11/22/2019    Abnormality of coagulation ([address] ) 06/12/2019    Pulmonary HTN ([address] )     Heme positive stool 06/21/2018    Well adult exam 04/17/2018    Cellulitis of right lower extremity 03/30/2018    Lyme disease     Blood clotting disorder ([address] )     Essential hypertension     Homocysteinemia     Hypercoagulable state ([facility] )     Obstructive sleep apnea on CPAP     Morbid obesity with BMI of 70 and over, adult ([place] [address] ) 07/01/2016    Skin rash 05/22/2014    Chronic venous hypertension w ulceration ([place] [address] ) 12/18/2013    Osteoporosis 12/02/2013    Edema 11/20/2013    Esophageal reflux 11/20/2013    Hyperlipidemia 11/20/2013    Polycystic ovarian syndrome 11/20/2013     She  has a past surgical history that includes Joint replacement (Bilateral); Appendectomy; Ankle surgery (Right); Hysterectomy; Hemorroidectomy; Breast biopsy; Foot surgery; LITHOTRIPSY; Breast biopsy; Closed reduction metatarsal fracture (Right); and Vena cava filter placement  Her family history includes Hypercoagulant Ability in her maternal grandmother and mother; No Known Problems in her father; Pulmonary embolism in her mother  She  reports that she has never smoked  She has never used smokeless tobacco  She reports that she does not drink alcohol and does not use drugs  Current Outpatient Medications   Medication Sig Dispense Refill    albuterol (ProAir HFA) 90 mcg/act inhaler Inhale 2 puffs every 6 (six) hours as needed for wheezing or shortness of breath 8 5 g 0    amLODIPine-benazepril (LOTREL 5-20) 5-20 MG per capsule Take 1 capsule by mouth daily 90 capsule 3    cetirizine (ZyrTEC) 10 mg tablet Take 1 tablet by mouth daily as needed      clindamycin (CLEOCIN) 300 MG capsule Take 1 capsule (300 mg total) by mouth 3 (three) times a day for 7 days 21 capsule 5    clotrimazole-betamethasone (LOTRISONE) 1-0 05 % cream APPLY A THIN LAYER OF CREAM TOPICALLY TO AFFECTED AREAS ON THE TRUNK AND EXTREMITIES ONCE DAILY AS NEEDED FOR FLARES      Fluticasone-Salmeterol (Wixela Inhub) 250-50 mcg/dose inhaler Inhale 1 puff 2 (two) times a day Rinse mouth after use   60 blister 5    Folbic 2 5-25-2 MG TAKE ONE TABLET BY MOUTH EVERY DAY 90 tablet 1    folic acid-pyridoxine-cyanocobalamin (Folbic) 2 5-25-2 mg Take 1 tablet by mouth daily 90 tablet 3    furosemide (LASIX) 20 mg tablet Take 1 tablet (20 mg total) by mouth daily 90 tablet 3    Hydrocortisone-Iodoquinol (Dermazene) 1-1 % CREA Apply topically 2 (two) times a day 28 4 g 5    metFORMIN (GLUCOPHAGE-XR) 500 mg 24 hr tablet Take 1 tablet (500 mg total) by mouth 4 (four) times a day 360 tablet 3    metroNIDAZOLE (METROGEL) 1 % gel Apply 1 application topically daily        multivitamin (THERAGRAN) TABS Take 1 tablet by mouth daily   nystatin powder Apply topically 2 (two) times a day Apply to affected area      pravastatin (PRAVACHOL) 40 mg tablet Take 1 tablet (40 mg total) by mouth daily 90 tablet 3    triamcinolone (KENALOG) 0 1 % cream 2 (two) times a day as needed       warfarin (COUMADIN) 5 mg tablet Take  strictly as advised 150 tablet 2    Zoster Vac Recomb Adjuvanted (Shingrix) 50 MCG/0 5ML SUSR Inject 0 5 mL into a muscle once for 1 dose Repeat dose in 2 to 6 months 1 each 1     No current facility-administered medications for this visit       Current Outpatient Medications on File Prior to Visit   Medication Sig    albuterol (ProAir HFA) 90 mcg/act inhaler Inhale 2 puffs every 6 (six) hours as needed for wheezing or shortness of breath    amLODIPine-benazepril (LOTREL 5-20) 5-20 MG per capsule Take 1 capsule by mouth daily    cetirizine (ZyrTEC) 10 mg tablet Take 1 tablet by mouth daily as needed    clotrimazole-betamethasone (LOTRISONE) 1-0 05 % cream APPLY A THIN LAYER OF CREAM TOPICALLY TO AFFECTED AREAS ON THE TRUNK AND EXTREMITIES ONCE DAILY AS NEEDED FOR FLARES    Folbic 2 5-25-2 MG TAKE ONE TABLET BY MOUTH EVERY DAY    folic acid-pyridoxine-cyanocobalamin (Folbic) 2 5-25-2 mg Take 1 tablet by mouth daily    furosemide (LASIX) 20 mg tablet Take 1 tablet (20 mg total) by mouth daily    Hydrocortisone-Iodoquinol (Dermazene) 1-1 % CREA Apply topically 2 (two) times a day    metFORMIN (GLUCOPHAGE-XR) 500 mg 24 hr tablet Take 1 tablet (500 mg total) by mouth 4 (four) times a day    metroNIDAZOLE (METROGEL) 1 % gel Apply 1 application topically daily      multivitamin (THERAGRAN) TABS Take 1 tablet by mouth daily   nystatin powder Apply topically 2 (two) times a day Apply to affected area    pravastatin (PRAVACHOL) 40 mg tablet Take 1 tablet (40 mg total) by mouth daily    triamcinolone (KENALOG) 0 1 % cream 2 (two) times a day as needed     warfarin (COUMADIN) 5 mg tablet Take  strictly as advised     No current facility-administered medications on file prior to visit  She is allergic to azithromycin, ciprofloxacin, clindamycin, doxycycline, and ancef [cefazolin]       Review of Systems   All other systems reviewed and are negative  Objective:      /84 (BP Location: Right arm, Patient Position: Sitting, Cuff Size: Large)   Pulse 92   Temp 97 8 °F (36 6 °C) (Temporal)   Resp 18   Ht 5' 4" (1 626 m)   Wt (!) 193 kg (425 lb)   LMP  (LMP Unknown)   SpO2 97%   BMI 72 95 kg/m²          Physical Exam  Vitals and nursing note reviewed  Constitutional:       Appearance: Normal appearance  She is obese  HENT:      Head: Normocephalic and atraumatic  Cardiovascular:      Rate and Rhythm: Normal rate and regular rhythm  Pulses: Normal pulses  Heart sounds: Normal heart sounds  Pulmonary:      Effort: Pulmonary effort is normal       Breath sounds: Normal breath sounds  Abdominal:      General: Abdomen is flat  Bowel sounds are normal       Palpations: Abdomen is soft  Musculoskeletal:         General: Normal range of motion  Cervical back: Normal range of motion and neck supple  Skin:     General: Skin is warm and dry  Capillary Refill: Capillary refill takes less than 2 seconds  Neurological:      General: No focal deficit present  Mental Status: She is alert and oriented to person, place, and time  Mental status is at baseline     Psychiatric:         Mood and Affect: Mood normal  Behavior: Behavior normal          Thought Content:  Thought content normal          Judgment: Judgment normal

## 2022-09-07 ENCOUNTER — RX ONLY (OUTPATIENT)
Age: 67
Setting detail: RX ONLY
End: 2022-09-07

## 2022-09-07 RX ORDER — AMOXICILLIN 500 MG/1
500 CAPSULE ORAL QD
Qty: 90 | Refills: 1 | Status: ERX

## 2022-09-07 RX ORDER — NYSTATIN 100000 [USP'U]/G
100,000 POWDER TOPICAL DAILY
Qty: 180 | Refills: 1 | Status: ERX

## 2022-09-12 ENCOUNTER — TELEPHONE (OUTPATIENT)
Dept: FAMILY MEDICINE CLINIC | Facility: CLINIC | Age: 67
End: 2022-09-12

## 2022-09-12 DIAGNOSIS — I10 ESSENTIAL HYPERTENSION: Primary | ICD-10-CM

## 2022-09-12 DIAGNOSIS — T78.40XA ALLERGY, INITIAL ENCOUNTER: ICD-10-CM

## 2022-09-12 RX ORDER — MONTELUKAST SODIUM 10 MG/1
10 TABLET ORAL
Qty: 90 TABLET | Refills: 3 | Status: SHIPPED | OUTPATIENT
Start: 2022-09-12

## 2022-09-21 ENCOUNTER — TELEPHONE (OUTPATIENT)
Dept: HEMATOLOGY ONCOLOGY | Facility: CLINIC | Age: 67
End: 2022-09-21

## 2022-09-21 NOTE — TELEPHONE ENCOUNTER
Called and spoke with patient directly  Her INR is 2 6  Reviewed with Liya TEMPLE  No changes in Coumadin dose at this time  Re check blood work in 3-4 weeks  Pt verbalizes understanding       Pt currently taking 7 5mg M-F and 5mg Sat and  Sun

## 2022-09-21 NOTE — TELEPHONE ENCOUNTER
Pt called asking for PT INR results that she had drawn yesterday  I called [facility] and they will fax the results over  Pt aware that someone will call her back at [phone] when we receive the fax

## 2022-10-07 DIAGNOSIS — E28.2 PCOS (POLYCYSTIC OVARIAN SYNDROME): ICD-10-CM

## 2022-10-09 RX ORDER — METFORMIN HYDROCHLORIDE 500 MG/1
500 TABLET, EXTENDED RELEASE ORAL 4 TIMES DAILY
Qty: 360 TABLET | Refills: 3 | Status: SHIPPED | OUTPATIENT
Start: 2022-10-09

## 2022-10-12 PROBLEM — Z00.00 WELL ADULT EXAM: Status: RESOLVED | Noted: 2018-04-17 | Resolved: 2022-10-12

## 2022-10-13 ENCOUNTER — TELEPHONE (OUTPATIENT)
Dept: FAMILY MEDICINE CLINIC | Facility: CLINIC | Age: 67
End: 2022-10-13

## 2022-10-13 DIAGNOSIS — Z12.31 BREAST CANCER SCREENING BY MAMMOGRAM: Primary | ICD-10-CM

## 2022-10-13 NOTE — TELEPHONE ENCOUNTER
[facility] called and said they need an referral for mammo bilateral screening and it can be faxed to [phone]

## 2022-10-14 ENCOUNTER — TELEPHONE (OUTPATIENT)
Dept: HEMATOLOGY ONCOLOGY | Facility: CLINIC | Age: 67
End: 2022-10-14

## 2022-10-14 NOTE — TELEPHONE ENCOUNTER
Pt called to report that she has been taking coumadin 5 mg M thru F and then Sat and Sun 7 5 mg since august   She writes it down every time she takes it  Instructed her to continue taking the same as she has been and then she will repeat labs in 2 weeks  She states she understands

## 2022-10-24 ENCOUNTER — CLINICAL SUPPORT (OUTPATIENT)
Dept: FAMILY MEDICINE CLINIC | Facility: CLINIC | Age: 67
End: 2022-10-24
Payer: MEDICARE

## 2022-10-24 DIAGNOSIS — Z23 NEEDS FLU SHOT: Primary | ICD-10-CM

## 2022-10-24 PROCEDURE — G0008 ADMIN INFLUENZA VIRUS VAC: HCPCS

## 2022-10-24 PROCEDURE — 90662 IIV NO PRSV INCREASED AG IM: CPT

## 2022-10-28 ENCOUNTER — TELEPHONE (OUTPATIENT)
Dept: HEMATOLOGY ONCOLOGY | Facility: CLINIC | Age: 67
End: 2022-10-28

## 2022-10-28 NOTE — TELEPHONE ENCOUNTER
Telephone call spoke with Pt  Inr 2 1  Pt states she has been taking Coumadin 5 mg M thru F and then Sat and Sun 7 5mg  Per Anthony TEMPLE, no change to coumadin dosing and repeat labs in 2 weeks

## 2022-11-02 ENCOUNTER — OFFICE VISIT (OUTPATIENT)
Dept: FAMILY MEDICINE CLINIC | Facility: CLINIC | Age: 67
End: 2022-11-02

## 2022-11-02 VITALS
HEIGHT: 64 IN | RESPIRATION RATE: 18 BRPM | WEIGHT: 293 LBS | OXYGEN SATURATION: 96 % | BODY MASS INDEX: 50.02 KG/M2 | SYSTOLIC BLOOD PRESSURE: 118 MMHG | DIASTOLIC BLOOD PRESSURE: 60 MMHG | TEMPERATURE: 98.5 F | HEART RATE: 89 BPM

## 2022-11-02 DIAGNOSIS — L03.115 CELLULITIS OF RIGHT LOWER EXTREMITY: Primary | ICD-10-CM

## 2022-11-02 DIAGNOSIS — D68.59 HYPERCOAGULABLE STATE (HCC): ICD-10-CM

## 2022-11-02 DIAGNOSIS — R79.83 HOMOCYSTEINEMIA: ICD-10-CM

## 2022-11-02 DIAGNOSIS — E66.01 MORBID OBESITY (HCC): ICD-10-CM

## 2022-11-02 RX ORDER — CLINDAMYCIN HYDROCHLORIDE 300 MG/1
300 CAPSULE ORAL
COMMUNITY
Start: 2022-11-01 | End: 2022-11-07 | Stop reason: SDUPTHER

## 2022-11-02 RX ORDER — AMOXICILLIN 500 MG/1
500 CAPSULE ORAL AS NEEDED
COMMUNITY
Start: 2022-10-05

## 2022-11-02 NOTE — PROGRESS NOTES
Name: Janny Ashby      : 1955      MRN: [id number]  Encounter Provider: Chavez Trinidad DO  Encounter Date: 2022   Encounter department: [address] [place] [address]     1  Cellulitis of right lower extremity     2  Morbid obesity ([facility] )     3  Hypercoagulable state ([facility] )     4  Homocysteinemia       Patient with h/o clotting disorder  On coumadin and this is monitored by hematology  INR is most recently 2 1  She is with RLE cellulitis  She has started to take clindamycin yesterday  This is most appropriate  She is otherwise, hemodynamically stable   s    She will RTC to see Dr Shakira Pickard as scheduled or sooner prn       BMI Counseling: Body mass index is 72 09 kg/m²  The BMI is above normal  Nutrition recommendations include decreasing portion sizes, encouraging healthy choices of fruits and vegetables, decreasing fast food intake, consuming healthier snacks, limiting drinks that contain sugar, reducing intake of saturated and trans fat and reducing intake of cholesterol  Exercise recommendations include exercising 3-5 times per week and strength training exercises  Rationale for BMI follow-up plan is due to patient being overweight or obese  Patient/Caretaker verbalized understanding and were in agreement with today's assessment and plan  Time was taken to address any questions patient/caretaker had  Chief Complaint   Patient presents with   • Cellulitis     Pt ha cellulitis in right leg       Subjective      Patient with h/o lymphedema and cellulitis with R sided leg redness since yesterday  There are no f/c/s   "Dr Shakira Pickard told me I need to come get seen when I start taking clindaymcin "  She admits to worsening swelling as well  She started yesterday morning  She takes tid X 7 days  She does not need a refill on this, she just gets seen to be sure this is validated  If she does not take the medicine, she lands in the ED        Review of Systems All other systems reviewed and are negative  Current Outpatient Medications on File Prior to Visit   Medication Sig   • albuterol (ProAir HFA) 90 mcg/act inhaler Inhale 2 puffs every 6 (six) hours as needed for wheezing or shortness of breath   • amLODIPine-benazepril (LOTREL 5-20) 5-20 MG per capsule Take 1 capsule by mouth daily   • amoxicillin (AMOXIL) 500 mg capsule Take 500 mg by mouth if needed   • cetirizine (ZyrTEC) 10 mg tablet Take 1 tablet by mouth daily as needed   • clindamycin (CLEOCIN) 300 MG capsule Take 300 mg by mouth   • clotrimazole-betamethasone (LOTRISONE) 1-0 05 % cream APPLY A THIN LAYER OF CREAM TOPICALLY TO AFFECTED AREAS ON THE TRUNK AND EXTREMITIES ONCE DAILY AS NEEDED FOR FLARES   • Fluticasone-Salmeterol (Wixela Inhub) 250-50 mcg/dose inhaler Inhale 1 puff 2 (two) times a day Rinse mouth after use  • Folbic 2 5-25-2 MG TAKE ONE TABLET BY MOUTH EVERY DAY   • furosemide (LASIX) 20 mg tablet Take 1 tablet (20 mg total) by mouth daily   • Hydrocortisone-Iodoquinol (Dermazene) 1-1 % CREA Apply topically 2 (two) times a day   • metFORMIN (GLUCOPHAGE-XR) 500 mg 24 hr tablet Take 1 tablet (500 mg total) by mouth 4 (four) times a day   • metroNIDAZOLE (METROGEL) 1 % gel Apply 1 application topically daily     • montelukast (SINGULAIR) 10 mg tablet Take 1 tablet (10 mg total) by mouth daily at bedtime   • multivitamin (THERAGRAN) TABS Take 1 tablet by mouth daily     • nystatin powder Apply topically 2 (two) times a day Apply to affected area   • pravastatin (PRAVACHOL) 40 mg tablet Take 1 tablet (40 mg total) by mouth daily   • triamcinolone (KENALOG) 0 1 % cream 2 (two) times a day as needed    • warfarin (COUMADIN) 5 mg tablet Take  strictly as advised   • folic acid-pyridoxine-cyanocobalamin (Folbic) 2 5-25-2 mg Take 1 tablet by mouth daily       Objective     /60 (BP Location: Left arm, Patient Position: Sitting, Cuff Size: Large)   Pulse 89   Temp 98 5 °F (36 9 °C) (Temporal)   Resp 18   Ht 5' 4" (1 626 m)   Wt (!) 191 kg (420 lb)   LMP  (LMP Unknown)   SpO2 96%   BMI 72 09 kg/m²     Physical Exam  Vitals and nursing note reviewed  Constitutional:       Appearance: Normal appearance  She is obese  Cardiovascular:      Rate and Rhythm: Normal rate and regular rhythm  Pulmonary:      Effort: Pulmonary effort is normal    Musculoskeletal:      Cervical back: Neck supple  Lymphadenopathy:      Cervical: No cervical adenopathy  Skin:     Comments: There is redness to the R ant shin/erythematous in nature  There is trace pitting RLE edema  There is no weeping drainage  Neurological:      General: No focal deficit present  Mental Status: She is alert and oriented to person, place, and time  Psychiatric:         Mood and Affect: Mood normal          Behavior: Behavior normal          Thought Content:  Thought content normal          Judgment: Judgment normal        Jenny Ortiz, DO

## 2022-11-07 DIAGNOSIS — E78.5 HYPERLIPIDEMIA, UNSPECIFIED HYPERLIPIDEMIA TYPE: ICD-10-CM

## 2022-11-07 DIAGNOSIS — I10 ESSENTIAL HYPERTENSION: ICD-10-CM

## 2022-11-07 RX ORDER — FUROSEMIDE 20 MG/1
20 TABLET ORAL DAILY
Qty: 90 TABLET | Refills: 3 | Status: SHIPPED | OUTPATIENT
Start: 2022-11-07

## 2022-11-07 RX ORDER — CLINDAMYCIN HYDROCHLORIDE 300 MG/1
300 CAPSULE ORAL 3 TIMES DAILY
Qty: 21 CAPSULE | Refills: 5 | Status: SHIPPED | OUTPATIENT
Start: 2022-11-07 | End: 2022-11-14

## 2022-11-10 ENCOUNTER — TELEPHONE (OUTPATIENT)
Dept: HEMATOLOGY ONCOLOGY | Facility: CLINIC | Age: 67
End: 2022-11-10

## 2022-11-10 NOTE — TELEPHONE ENCOUNTER
Spoke with patient and told her that her INR is 2 2  I informed patient that per Ebenezer Sanchez PA-C she can continue taking the same dose, 5mg M-F and 7 5mg Sat and Sun and to re-check her INR in 3-4 weeks  Patient verbalized understanding

## 2022-11-10 NOTE — TELEPHONE ENCOUNTER
CALL RETURN FORM   Reason for patient call? Patient calling to speak with Dr Raul Ram she had coumiden checked yesterday at [facility] lab and  would like to know her protocol     Patient's primary oncologist? Dr Raul Ram    Name of person the patient was calling for? Dr Raul Ram   Any additional information to add, if applicable? [phone]   Informed patient that the message will be forwarded to the team and someone will get back to them as soon as possible    Did you relay this information to the patient?  Yes

## 2022-11-30 NOTE — TELEPHONE ENCOUNTER
[facility] calling to get more specific directions for Coumadin Rx  Current Rx states "take strictly as advised"  They can not accept that as a direction  The directions need to be more specific    Will send to RN to review with MD    This was transferred to them from [address] can not use these directions even if [facility] has    Call back number [phone] - pharmacist
Spoke with pharmacist at [facility]  Pharmacy will annotate to leave patient sig as is since dosing changes every 2 weeks based on patient INR 
none

## 2022-12-02 ENCOUNTER — TELEPHONE (OUTPATIENT)
Dept: HEMATOLOGY ONCOLOGY | Facility: CLINIC | Age: 67
End: 2022-12-02

## 2022-12-02 NOTE — TELEPHONE ENCOUNTER
Patient is looking for PT/INR results she had on Wednesday at [facility] labs and coumadin instructions

## 2022-12-02 NOTE — TELEPHONE ENCOUNTER
Received verbal from [facility] PT 24 5 PT INR 2 2  Per PA Jereimah patient to stay on same regimen of 5mg Coumadin M-F and 7 5 mg Sat and Sun and recheck labs in 3-4 weeks  Inés Lopez verbalized understanding

## 2022-12-12 DIAGNOSIS — Z12.31 BREAST CANCER SCREENING BY MAMMOGRAM: ICD-10-CM

## 2022-12-30 ENCOUNTER — TELEPHONE (OUTPATIENT)
Dept: HEMATOLOGY ONCOLOGY | Facility: CLINIC | Age: 67
End: 2022-12-30

## 2022-12-30 NOTE — TELEPHONE ENCOUNTER
Returned telephone call spoke with Pt  Per Dr Ravi Kern, inr was 2 0, no change to coumadin dose and repeat lab in 3 to 4 weeks  Pt states she has been taking coumadin 5 mg M thru Friday and 7 5mg on S and Sunday

## 2022-12-30 NOTE — TELEPHONE ENCOUNTER
Patient requesting lab results  ROUTE TO RN's   Person calling in/  Name if not patient  Patient   Lab Draw Date  12/29/22   Lab Draw Place [facility]    Call Back Number  [phone]   Name of Doctor who ordered blood work Dr Chuy Sosa

## 2023-01-25 ENCOUNTER — TELEPHONE (OUTPATIENT)
Dept: HEMATOLOGY ONCOLOGY | Facility: CLINIC | Age: 68
End: 2023-01-25

## 2023-01-25 DIAGNOSIS — Z86.718 HISTORY OF DVT (DEEP VEIN THROMBOSIS): ICD-10-CM

## 2023-01-25 RX ORDER — WARFARIN SODIUM 5 MG/1
TABLET ORAL
Qty: 150 TABLET | Refills: 2 | Status: SHIPPED | OUTPATIENT
Start: 2023-01-25

## 2023-01-25 NOTE — TELEPHONE ENCOUNTER
Reviewed with patient that INR = 2 3  No changes to Coumadin at this time    Current dose 5mg Monday - Friday and 7 5mg on Saturday and Sunday   Patient will re check labs in 4 weeks    New Rx for Coumadin will be sent to patients mail order pharmacy

## 2023-01-31 DIAGNOSIS — Z86.718 HISTORY OF DVT (DEEP VEIN THROMBOSIS): ICD-10-CM

## 2023-01-31 DIAGNOSIS — D68.59 HYPERCOAGULABLE STATE (HCC): ICD-10-CM

## 2023-01-31 DIAGNOSIS — R79.83 HOMOCYSTEINEMIA: Primary | ICD-10-CM

## 2023-02-01 ENCOUNTER — DOCUMENTATION (OUTPATIENT)
Dept: HEMATOLOGY ONCOLOGY | Facility: CLINIC | Age: 68
End: 2023-02-01

## 2023-02-01 ENCOUNTER — TELEPHONE (OUTPATIENT)
Dept: HEMATOLOGY ONCOLOGY | Facility: CLINIC | Age: 68
End: 2023-02-01

## 2023-02-01 RX ORDER — FOLIC ACID-PYRIDOXINE-CYANOCOBALAMIN TAB 2.5-25-2 MG 2.5-25-2 MG
TAB ORAL
Qty: 90 TABLET | Refills: 1 | Status: SHIPPED | OUTPATIENT
Start: 2023-02-01

## 2023-02-01 NOTE — TELEPHONE ENCOUNTER
Returned call to patient  She explained that [facility] needed clarification on Coumadin Rx we sent  Joanna Alex faxed back necessary paperwork with Rx clarification    I confirmed with [facility] that they did receive the fax

## 2023-02-20 ENCOUNTER — RX ONLY (OUTPATIENT)
Age: 68
Setting detail: RX ONLY
End: 2023-02-20

## 2023-02-20 RX ORDER — NYSTATIN 100000 [USP'U]/G
100,000 POWDER TOPICAL DAILY
Qty: 180 | Refills: 1 | Status: ERX

## 2023-02-24 ENCOUNTER — TELEPHONE (OUTPATIENT)
Dept: HEMATOLOGY ONCOLOGY | Facility: CLINIC | Age: 68
End: 2023-02-24

## 2023-02-24 ENCOUNTER — RA CDI HCC (OUTPATIENT)
Dept: OTHER | Facility: HOSPITAL | Age: 68
End: 2023-02-24

## 2023-02-24 NOTE — TELEPHONE ENCOUNTER
Returned telephone call spoke with Pt  INR 2 6   Per Dr Bailey Amaral, No change in coumadin  Pt currently taking coumadin 5 mg Monday thru Friday and then 7 5 mg on Saturday and Sunday

## 2023-02-24 NOTE — PROGRESS NOTES
[id number]  [facility]  coding opportunities          Chart Reviewed number of suggestions sent to Provider: 1     Patients Insurance     Medicare Insurance: [employer]

## 2023-03-13 DIAGNOSIS — E78.5 HYPERLIPIDEMIA, UNSPECIFIED HYPERLIPIDEMIA TYPE: ICD-10-CM

## 2023-03-13 DIAGNOSIS — I10 ESSENTIAL HYPERTENSION: ICD-10-CM

## 2023-03-13 RX ORDER — PRAVASTATIN SODIUM 40 MG
40 TABLET ORAL DAILY
Qty: 90 TABLET | Refills: 3 | Status: SHIPPED | OUTPATIENT
Start: 2023-03-13 | End: 2023-03-14 | Stop reason: SDUPTHER

## 2023-03-13 RX ORDER — AMLODIPINE BESYLATE AND BENAZEPRIL HYDROCHLORIDE 5; 20 MG/1; MG/1
1 CAPSULE ORAL DAILY
Qty: 90 CAPSULE | Refills: 3 | Status: SHIPPED | OUTPATIENT
Start: 2023-03-13 | End: 2023-03-14 | Stop reason: SDUPTHER

## 2023-03-14 DIAGNOSIS — J45.40 MODERATE PERSISTENT ASTHMA WITHOUT COMPLICATION: ICD-10-CM

## 2023-03-14 DIAGNOSIS — E78.5 HYPERLIPIDEMIA, UNSPECIFIED HYPERLIPIDEMIA TYPE: ICD-10-CM

## 2023-03-14 DIAGNOSIS — I10 ESSENTIAL HYPERTENSION: ICD-10-CM

## 2023-03-15 RX ORDER — PRAVASTATIN SODIUM 40 MG
40 TABLET ORAL DAILY
Qty: 90 TABLET | Refills: 3 | Status: SHIPPED | OUTPATIENT
Start: 2023-03-15

## 2023-03-15 RX ORDER — FLUTICASONE PROPIONATE AND SALMETEROL 250; 50 UG/1; UG/1
1 POWDER RESPIRATORY (INHALATION) 2 TIMES DAILY
Qty: 60 BLISTER | Refills: 5 | Status: SHIPPED | OUTPATIENT
Start: 2023-03-15

## 2023-03-15 RX ORDER — AMLODIPINE BESYLATE AND BENAZEPRIL HYDROCHLORIDE 5; 20 MG/1; MG/1
1 CAPSULE ORAL DAILY
Qty: 90 CAPSULE | Refills: 3 | Status: SHIPPED | OUTPATIENT
Start: 2023-03-15

## 2023-03-20 ENCOUNTER — TELEPHONE (OUTPATIENT)
Dept: HEMATOLOGY ONCOLOGY | Facility: CLINIC | Age: 68
End: 2023-03-20

## 2023-03-20 NOTE — TELEPHONE ENCOUNTER
INR - 2 3    No change to Coumadin dose  Pt currently taking coumadin 5 mg Monday thru Friday and then 7 5 mg on Saturday and Sunday    She will re check labs in 3-4 weeks     Patient verbalized understanding

## 2023-04-03 ENCOUNTER — OFFICE VISIT (OUTPATIENT)
Dept: FAMILY MEDICINE CLINIC | Facility: CLINIC | Age: 68
End: 2023-04-03

## 2023-04-03 VITALS
SYSTOLIC BLOOD PRESSURE: 124 MMHG | DIASTOLIC BLOOD PRESSURE: 60 MMHG | WEIGHT: 293 LBS | RESPIRATION RATE: 20 BRPM | TEMPERATURE: 98.4 F | BODY MASS INDEX: 72.85 KG/M2 | OXYGEN SATURATION: 96 % | HEART RATE: 77 BPM

## 2023-04-03 DIAGNOSIS — I27.20 PULMONARY HTN (HCC): ICD-10-CM

## 2023-04-03 DIAGNOSIS — Z99.89 OBSTRUCTIVE SLEEP APNEA ON CPAP: ICD-10-CM

## 2023-04-03 DIAGNOSIS — Z00.00 MEDICARE ANNUAL WELLNESS VISIT, SUBSEQUENT: Primary | ICD-10-CM

## 2023-04-03 DIAGNOSIS — D68.9 BLOOD CLOTTING DISORDER (HCC): ICD-10-CM

## 2023-04-03 DIAGNOSIS — E78.5 HYPERLIPIDEMIA, UNSPECIFIED HYPERLIPIDEMIA TYPE: ICD-10-CM

## 2023-04-03 DIAGNOSIS — L97.909 CHRONIC VENOUS HYPERTENSION W ULCERATION (HCC): ICD-10-CM

## 2023-04-03 DIAGNOSIS — N18.31 STAGE 3A CHRONIC KIDNEY DISEASE (HCC): ICD-10-CM

## 2023-04-03 DIAGNOSIS — J45.40 MODERATE PERSISTENT ASTHMA WITHOUT COMPLICATION: ICD-10-CM

## 2023-04-03 DIAGNOSIS — I10 ESSENTIAL HYPERTENSION: ICD-10-CM

## 2023-04-03 DIAGNOSIS — D68.59 HYPERCOAGULABLE STATE (HCC): ICD-10-CM

## 2023-04-03 DIAGNOSIS — E72.11 HOMOCYSTINURIA (HCC): ICD-10-CM

## 2023-04-03 DIAGNOSIS — K21.9 GASTROESOPHAGEAL REFLUX DISEASE, UNSPECIFIED WHETHER ESOPHAGITIS PRESENT: ICD-10-CM

## 2023-04-03 DIAGNOSIS — Z23 NEED FOR SHINGLES VACCINE: ICD-10-CM

## 2023-04-03 DIAGNOSIS — E66.01 MORBID OBESITY (HCC): ICD-10-CM

## 2023-04-03 DIAGNOSIS — I87.319 CHRONIC VENOUS HYPERTENSION W ULCERATION (HCC): ICD-10-CM

## 2023-04-03 DIAGNOSIS — E28.2 POLYCYSTIC OVARIAN SYNDROME: ICD-10-CM

## 2023-04-03 DIAGNOSIS — G47.33 OBSTRUCTIVE SLEEP APNEA ON CPAP: ICD-10-CM

## 2023-04-03 DIAGNOSIS — D68.9 ABNORMALITY OF COAGULATION (HCC): ICD-10-CM

## 2023-04-03 RX ORDER — ZOSTER VACCINE RECOMBINANT, ADJUVANTED 50 MCG/0.5
0.5 KIT INTRAMUSCULAR ONCE
Qty: 1 EACH | Refills: 1 | Status: SHIPPED | OUTPATIENT
Start: 2023-04-03 | End: 2023-04-03

## 2023-04-03 RX ORDER — BUDESONIDE AND FORMOTEROL FUMARATE DIHYDRATE 160; 4.5 UG/1; UG/1
2 AEROSOL RESPIRATORY (INHALATION) 2 TIMES DAILY
Qty: 18 G | Refills: 3 | Status: SHIPPED | OUTPATIENT
Start: 2023-04-03

## 2023-04-03 RX ORDER — TRIAMCINOLONE ACETONIDE 1 MG/G
CREAM TOPICAL 2 TIMES DAILY
Qty: 453.6 G | Refills: 3 | Status: SHIPPED | OUTPATIENT
Start: 2023-04-03

## 2023-04-03 NOTE — PATIENT INSTRUCTIONS
Medicare Preventive Visit Patient Instructions  Thank you for completing your Welcome to Medicare Visit or Medicare Annual Wellness Visit today  Your next wellness visit will be due in one year (4/3/2024)  The screening/preventive services that you may require over the next 5-10 years are detailed below  Some tests may not apply to you based off risk factors and/or age  Screening tests ordered at today's visit but not completed yet may show as past due  Also, please note that scanned in results may not display below  Preventive Screenings:  Service Recommendations Previous Testing/Comments   Colorectal Cancer Screening  * Colonoscopy    * Fecal Occult Blood Test (FOBT)/Fecal Immunochemical Test (FIT)  * Fecal DNA/Cologuard Test  * Flexible Sigmoidoscopy Age: 39-70 years old   Colonoscopy: every 10 years (may be performed more frequently if at higher risk)  OR  FOBT/FIT: every 1 year  OR  Cologuard: every 3 years  OR  Sigmoidoscopy: every 5 years  Screening may be recommended earlier than age 39 if at higher risk for colorectal cancer  Also, an individualized decision between you and your healthcare provider will decide whether screening between the ages of 74-80 would be appropriate  Colonoscopy: 11/15/2018  FOBT/FIT: Not on file  Cologuard: Not on file  Sigmoidoscopy: Not on file    Screening Current     Breast Cancer Screening Age: 36 years old  Frequency: every 1-2 years  Not required if history of left and right mastectomy Mammogram: 12/07/2022    Screening Current   Cervical Cancer Screening Between the ages of 21-29, pap smear recommended once every 3 years  Between the ages of 33-67, can perform pap smear with HPV co-testing every 5 years     Recommendations may differ for women with a history of total hysterectomy, cervical cancer, or abnormal pap smears in past  Pap Smear: 05/23/2018    Screening Not Indicated   Hepatitis C Screening Once for adults born between 1945 and 1965  More frequently in patients at high risk for Hepatitis C Hep C Antibody: 05/09/2018    Screening Current   Diabetes Screening 1-2 times per year if you're at risk for diabetes or have pre-diabetes Fasting glucose: No results in last 5 years (No results in last 5 years)  A1C: 5 8 % (8/5/2021)      Cholesterol Screening Once every 5 years if you don't have a lipid disorder  May order more often based on risk factors  Lipid panel: Not on file    Screening Not Indicated  History Lipid Disorder     Other Preventive Screenings Covered by Medicare:  1  Abdominal Aortic Aneurysm (AAA) Screening: covered once if your at risk  You're considered to be at risk if you have a family history of AAA  2  Lung Cancer Screening: covers low dose CT scan once per year if you meet all of the following conditions: (1) Age 50-69; (2) No signs or symptoms of lung cancer; (3) Current smoker or have quit smoking within the last 15 years; (4) You have a tobacco smoking history of at least 20 pack years (packs per day multiplied by number of years you smoked); (5) You get a written order from a healthcare provider  3  Glaucoma Screening: covered annually if you're considered high risk: (1) You have diabetes OR (2) Family history of glaucoma OR (3)  aged 48 and older OR (3)  American aged 72 and older  3  Osteoporosis Screening: covered every 2 years if you meet one of the following conditions: (1) You're estrogen deficient and at risk for osteoporosis based off medical history and other findings; (2) Have a vertebral abnormality; (3) On glucocorticoid therapy for more than 3 months; (4) Have primary hyperparathyroidism; (5) On osteoporosis medications and need to assess response to drug therapy  · Last bone density test (DXA Scan): 06/24/2015  5  HIV Screening: covered annually if you're between the age of 12-76  Also covered annually if you are younger than 13 and older than 72 with risk factors for HIV infection   For pregnant patients, it is covered up to 3 times per pregnancy  Immunizations:  Immunization Recommendations   Influenza Vaccine Annual influenza vaccination during flu season is recommended for all persons aged >= 6 months who do not have contraindications   Pneumococcal Vaccine   * Pneumococcal conjugate vaccine = PCV13 (Prevnar 13), PCV15 (Vaxneuvance), PCV20 (Prevnar 20)  * Pneumococcal polysaccharide vaccine = PPSV23 (Pneumovax) Adults 25-60 years old: 1-3 doses may be recommended based on certain risk factors  Adults 72 years old: 1-2 doses may be recommended based off what pneumonia vaccine you previously received   Hepatitis B Vaccine 3 dose series if at intermediate or high risk (ex: diabetes, end stage renal disease, liver disease)   Tetanus (Td) Vaccine - COST NOT COVERED BY MEDICARE PART B Following completion of primary series, a booster dose should be given every 10 years to maintain immunity against tetanus  Td may also be given as tetanus wound prophylaxis  Tdap Vaccine - COST NOT COVERED BY MEDICARE PART B Recommended at least once for all adults  For pregnant patients, recommended with each pregnancy  Shingles Vaccine (Shingrix) - COST NOT COVERED BY MEDICARE PART B  2 shot series recommended in those aged 48 and above     Health Maintenance Due:      Topic Date Due   • Cervical Cancer Screening  01/01/2030 (Originally 5/23/2021)   • Breast Cancer Screening: Mammogram  12/07/2023   • Colorectal Cancer Screening  11/15/2028   • Hepatitis C Screening  Completed     Immunizations Due:      Topic Date Due   • COVID-19 Vaccine (4 - Booster for Tanya Schneiders series) 01/02/2022     Advance Directives   What are advance directives? Advance directives are legal documents that state your wishes and plans for medical care  These plans are made ahead of time in case you lose your ability to make decisions for yourself   Advance directives can apply to any medical decision, such as the treatments you want, and if you want to donate organs  What are the types of advance directives? There are many types of advance directives, and each state has rules about how to use them  You may choose a combination of any of the following:  · Living will: This is a written record of the treatment you want  You can also choose which treatments you do not want, which to limit, and which to stop at a certain time  This includes surgery, medicine, IV fluid, and tube feedings  · Durable power of  for healthcare [facility]): This is a written record that states who you want to make healthcare choices for you when you are unable to make them for yourself  This person, called a proxy, is usually a family member or a friend  You may choose more than 1 proxy  · Do not resuscitate (DNR) order:  A DNR order is used in case your heart stops beating or you stop breathing  It is a request not to have certain forms of treatment, such as CPR  A DNR order may be included in other types of advance directives  · Medical directive: This covers the care that you want if you are in a coma, near death, or unable to make decisions for yourself  You can list the treatments you want for each condition  Treatment may include pain medicine, surgery, blood transfusions, dialysis, IV or tube feedings, and a ventilator (breathing machine)  · Values history: This document has questions about your views, beliefs, and how you feel and think about life  This information can help others choose the care that you would choose  Why are advance directives important? An advance directive helps you control your care  Although spoken wishes may be used, it is better to have your wishes written down  Spoken wishes can be misunderstood, or not followed  Treatments may be given even if you do not want them  An advance directive may make it easier for your family to make difficult choices about your care     Urinary Incontinence   Urinary incontinence (UI)  is when you lose control of your bladder  UI develops because your bladder cannot store or empty urine properly  The 3 most common types of UI are stress incontinence, urge incontinence, or both  Medicines:   · May be given to help strengthen your bladder control  Report any side effects of medication to your healthcare provider  Do pelvic muscle exercises often:  Your pelvic muscles help you stop urinating  Squeeze these muscles tight for 5 seconds, then relax for 5 seconds  Gradually work up to squeezing for 10 seconds  Do 3 sets of 15 repetitions a day, or as directed  This will help strengthen your pelvic muscles and improve bladder control  Train your bladder:  Go to the bathroom at set times, such as every 2 hours, even if you do not feel the urge to go  You can also try to hold your urine when you feel the urge to go  For example, hold your urine for 5 minutes when you feel the urge to go  As that becomes easier, hold your urine for 10 minutes  Self-care:   · Keep a UI record  Write down how often you leak urine and how much you leak  Make a note of what you were doing when you leaked urine  · Drink liquids as directed  You may need to limit the amount of liquid you drink to help control your urine leakage  Do not drink any liquid right before you go to bed  Limit or do not have drinks that contain caffeine or alcohol  · Prevent constipation  Eat a variety of high-fiber foods  Good examples are high-fiber cereals, beans, vegetables, and whole-grain breads  Walking is the best way to trigger your intestines to have a bowel movement  · Exercise regularly and maintain a healthy weight  Weight loss and exercise will decrease pressure on your bladder and help you control your leakage  · Use a catheter as directed  to help empty your bladder  A catheter is a tiny, plastic tube that is put into your bladder to drain your urine  · Go to behavior therapy as directed    Behavior therapy may be used to help you learn to control your urge to urinate  Weight Management   Why it is important to manage your weight:  Being overweight increases your risk of health conditions such as heart disease, high blood pressure, type 2 diabetes, and certain types of cancer  It can also increase your risk for osteoarthritis, sleep apnea, and other respiratory problems  Aim for a slow, steady weight loss  Even a small amount of weight loss can lower your risk of health problems  How to lose weight safely:  A safe and healthy way to lose weight is to eat fewer calories and get regular exercise  You can lose up about 1 pound a week by decreasing the number of calories you eat by 500 calories each day  Healthy meal plan for weight management:  A healthy meal plan includes a variety of foods, contains fewer calories, and helps you stay healthy  A healthy meal plan includes the following:  · Eat whole-grain foods more often  A healthy meal plan should contain fiber  Fiber is the part of grains, fruits, and vegetables that is not broken down by your body  Whole-grain foods are healthy and provide extra fiber in your diet  Some examples of whole-grain foods are whole-wheat breads and pastas, oatmeal, brown rice, and bulgur  · Eat a variety of vegetables every day  Include dark, leafy greens such as spinach, kale, gerard greens, and mustard greens  Eat yellow and orange vegetables such as carrots, sweet potatoes, and winter squash  · Eat a variety of fruits every day  Choose fresh or canned fruit (canned in its own juice or light syrup) instead of juice  Fruit juice has very little or no fiber  · Eat low-fat dairy foods  Drink fat-free (skim) milk or 1% milk  Eat fat-free yogurt and low-fat cottage cheese  Try low-fat cheeses such as mozzarella and other reduced-fat cheeses  · Choose meat and other protein foods that are low in fat  Choose beans or other legumes such as split peas or lentils   Choose fish, skinless poultry (chicken or turkey), or lean cuts of red meat (beef or pork)  Before you cook meat or poultry, cut off any visible fat  · Use less fat and oil  Try baking foods instead of frying them  Add less fat, such as margarine, sour cream, regular salad dressing and mayonnaise to foods  Eat fewer high-fat foods  Some examples of high-fat foods include french fries, doughnuts, ice cream, and cakes  · Eat fewer sweets  Limit foods and drinks that are high in sugar  This includes candy, cookies, regular soda, and sweetened drinks  Exercise:  Exercise at least 30 minutes per day on most days of the week  Some examples of exercise include walking, biking, dancing, and swimming  You can also fit in more physical activity by taking the stairs instead of the elevator or parking farther away from stores  Ask your healthcare provider about the best exercise plan for you  © Copyright [employer] 2018 Information is for End User's use only and may not be sold, redistributed or otherwise used for commercial purposes   All illustrations and images included in CareNotes® are the copyrighted property of [employer]  or [address] [place] [address]

## 2023-04-03 NOTE — PROGRESS NOTES
Assessment and Plan:     Problem List Items Addressed This Visit        Digestive    Esophageal reflux     Stable  Continue current  Endocrine    Polycystic ovarian syndrome     Stable  Continue current  Respiratory    Obstructive sleep apnea on CPAP     Stable  Continue current  Cardiovascular and Mediastinum    Essential hypertension     Stable  Continue current  Chronic venous hypertension w ulceration (HCC)     Stable  Continue current  Pulmonary HTN (HCC)     Stable  Continue current  Relevant Orders    Ambulatory Referral to Pulmonology       Hematopoietic and Hemostatic    Blood clotting disorder (HCC)     Stable  Continue current  Genitourinary    Stage 3a chronic kidney disease [facility])     Lab Results   Component Value Date    EGFR 51 09/29/2021    EGFR 49 09/17/2020    EGFR 49 09/16/2020    CREATININE 1 14 09/29/2021    CREATININE 1 18 09/17/2020    CREATININE 1 18 09/16/2020   Stable  Continue current  Other    Hypercoagulable state ([facility] )     Stable  Continue current  Hyperlipidemia - Primary    Relevant Orders    Lipid panel    Comprehensive metabolic panel    Abnormality of coagulation (HCC)     Stable  Continue current  Homocystinuria ([facility] )     Stable  Continue current  Other Visit Diagnoses     Morbid obesity ([facility] [address] )        Moderate persistent asthma without complication        Relevant Medications    budesonide-formoterol (Symbicort) 160-4 5 mcg/act inhaler    Other Relevant Orders    Ambulatory referral to clinical pharmacy    Ambulatory Referral to Pulmonology    Need for shingles vaccine        Relevant Medications    Zoster Vac Recomb Adjuvanted (Shingrix) 50 MCG/0 5ML SUSR           Preventive health issues were discussed with patient, and age appropriate screening tests were ordered as noted in patient's After Visit Summary    Personalized health advice and appropriate referrals for health education or preventive services given if needed, as noted in patient's After Visit Summary       History of Present Illness:     Patient presents for a Medicare Wellness Visit    HPI   Patient Care Team:  Derian Corneoj MD as PCP - General (Family Medicine)  Joseph Cárdenas MD (Hematology and Oncology)  Ashish Cárdenas MD (Cardiology)  [facility] (Orthopedic Surgery)  Yovany Hannah, RN as  (Care Coordination)     Review of Systems:     Review of Systems     Problem List:     Patient Active Problem List   Diagnosis   • Morbid obesity with BMI of 70 and over, adult [facility])   • Lyme disease   • Blood clotting disorder ([facility] [address] )   • Essential hypertension   • Homocysteinemia   • Hypercoagulable state ([facility] [address] )   • Obstructive sleep apnea on CPAP   • Cellulitis of right lower extremity   • Chronic venous hypertension w ulceration (HCC)   • Edema   • Esophageal reflux   • Hyperlipidemia   • Polycystic ovarian syndrome   • Skin rash   • Heme positive stool   • Osteoporosis   • Pulmonary HTN (HCC)   • Abnormality of coagulation (HCC)   • Rosacea   • Vinton filter in place   • History of pulmonary embolism   • S/P TKR (total knee replacement)   • Anemia   • Hyperphosphatemia   • Prothrombin time increased   • Homocystinuria ([facility] [address] )   • Stage 3a chronic kidney disease ([facility] )      Past Medical and Surgical History:     Past Medical History:   Diagnosis Date   • Blood clotting disorder ([facility] [address] )    • Homocysteinemia    • Hypercoagulable state ([facility] )    • Lyme disease    • Renal disorder     kidney stones   • 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 ([facility] [address] )      Past Surgical History:   Procedure Laterality Date   • ANKLE SURGERY Right    • APPENDECTOMY     • BREAST BIOPSY     • BREAST BIOPSY     • CLOSED REDUCTION METATARSAL FRACTURE Right    • FOOT SURGERY     • HEMORROIDECTOMY     • HYSTERECTOMY     • JOINT REPLACEMENT Bilateral     knee   • LITHOTRIPSY     • VENA CAVA FILTER PLACEMENT      onset: 1/10/10      Family History:     Family History   Problem Relation Age of Onset   • Pulmonary embolism Mother    • Hypercoagulant Ability Mother         primary state   • Hypercoagulant Ability Maternal Grandmother         primary state   • No Known Problems Father       Social History:     Social History     Socioeconomic History   • Marital status:      Spouse name: None   • Number of children: None   • Years of education: None   • Highest education level: None   Occupational History   • Occupation: unemployed   Tobacco Use   • Smoking status: Never   • Smokeless tobacco: Never   Vaping Use   • Vaping Use: Never used   Substance and Sexual Activity   • Alcohol use: Never   • Drug use: No   • Sexual activity: Not Currently   Other Topics Concern   • None   Social History Narrative   • None     Social Determinants of Health     Financial Resource Strain: Not on file   Food Insecurity: Not on file   Transportation Needs: Not on file   Physical Activity: Not on file   Stress: Not on file   Social Connections: Not on file   Intimate Partner Violence: Not on file   Housing Stability: Not on file      Medications and Allergies:     Current Outpatient Medications   Medication Sig Dispense Refill   • albuterol (ProAir HFA) 90 mcg/act inhaler Inhale 2 puffs every 6 (six) hours as needed for wheezing or shortness of breath 8 5 g 0   • amLODIPine-benazepril (LOTREL 5-20) 5-20 MG per capsule Take 1 capsule by mouth daily 90 capsule 3   • amoxicillin (AMOXIL) 500 mg capsule Take 500 mg by mouth if needed     • budesonide-formoterol (Symbicort) 160-4 5 mcg/act inhaler Inhale 2 puffs 2 (two) times a day Rinse mouth after use   18 g 3   • cetirizine (ZyrTEC) 10 mg tablet Take 1 tablet by mouth daily as needed     • clotrimazole-betamethasone (LOTRISONE) 1-0 05 % cream APPLY A THIN LAYER OF CREAM TOPICALLY TO AFFECTED AREAS ON THE TRUNK AND EXTREMITIES ONCE DAILY AS NEEDED FOR FLARES     • [employer] 2 5-25-2 MG TAKE ONE TABLET BY MOUTH EVERY DAY 90 tablet 1   • furosemide (LASIX) 20 mg tablet Take 1 tablet (20 mg total) by mouth daily 90 tablet 3   • Hydrocortisone-Iodoquinol (Dermazene) 1-1 % CREA Apply topically 2 (two) times a day 28 4 g 5   • metFORMIN (GLUCOPHAGE-XR) 500 mg 24 hr tablet Take 1 tablet (500 mg total) by mouth 4 (four) times a day 360 tablet 3   • metroNIDAZOLE (METROGEL) 1 % gel Apply 1 application topically daily       • montelukast (SINGULAIR) 10 mg tablet Take 1 tablet (10 mg total) by mouth daily at bedtime 90 tablet 3   • multivitamin (THERAGRAN) TABS Take 1 tablet by mouth daily  • nystatin powder Apply topically 2 (two) times a day Apply to affected area     • pravastatin (PRAVACHOL) 40 mg tablet Take 1 tablet (40 mg total) by mouth daily 90 tablet 3   • triamcinolone (KENALOG) 0 1 % cream 2 (two) times a day as needed      • warfarin (COUMADIN) 5 mg tablet Take  strictly as advised 150 tablet 2   • Zoster Vac Recomb Adjuvanted (Shingrix) 50 MCG/0 5ML SUSR Inject 0 5 mL into a muscle once for 1 dose Repeat dose in 2 to 6 months 1 each 1     No current facility-administered medications for this visit       Allergies   Allergen Reactions   • Azithromycin Diarrhea   • Ciprofloxacin Other (See Comments), Dizziness and Confusion      weepy       • Clindamycin Other (See Comments)     Acid Reflux   • Doxycycline Headache   • Ancef [Cefazolin] Rash     States tolerated Keflex      Immunizations:     Immunization History   Administered Date(s) Administered   • COVID-19 MODERNA VACC 0 5 ML IM 03/31/2021, 04/28/2021, 11/07/2021   • INFLUENZA 11/02/2011, 11/23/2012, 10/21/2013, 11/12/2014, 10/29/2015, 10/17/2016, 10/27/2017, 09/04/2018   • Influenza Quadrivalent, 6-35 Months IM 10/27/2017   • Influenza, high dose seasonal 0 7 mL 10/14/2021, 10/24/2022   • Influenza, recombinant, quadrivalent,injectable, preservative free 11/21/2019   • Influenza, seasonal, injectable, preservative free 09/04/2018   • Pneumococcal Conjugate 13-Valent 09/10/2020   • Pneumococcal Polysaccharide PPV23 10/14/2021   • Tdap 06/20/2015      Health Maintenance:         Topic Date Due   • Cervical Cancer Screening  01/01/2030 (Originally 5/23/2021)   • Breast Cancer Screening: Mammogram  12/07/2023   • Colorectal Cancer Screening  11/15/2028   • Hepatitis C Screening  Completed         Topic Date Due   • COVID-19 Vaccine (4 - Booster for Moderna series) 01/02/2022      Medicare Screening Tests and Risk Assessments:     Mirna Wagoner is here for her Subsequent Wellness visit  Last Medicare Wellness visit information reviewed, patient interviewed and updates made to the record today  Health Risk Assessment:   Patient rates overall health as good  Patient feels that their physical health rating is same  Patient is satisfied with their life  Eyesight was rated as same  Hearing was rated as same  Patient feels that their emotional and mental health rating is same  Patients states they are never, rarely angry  Patient states they are often unusually tired/fatigued  Patient states that she has experienced weight loss or gain in last 6 months  Depression Screening:   PHQ-2 Score: 0      Fall Risk Screening: In the past year, patient has experienced: no history of falling in past year      Urinary Incontinence Screening:   Patient has leaked urine accidently in the last six months  Home Safety:  Patient has trouble with stairs inside or outside of their home  Patient has no working smoke alarms and has no working carbon monoxide detector  Home safety hazards include: none  Nutrition:   Current diet is Regular  Medications:   Patient is currently taking over-the-counter supplements  OTC medications include: see medication list  Patient is able to manage medications       Activities of Daily Living (ADLs)/Instrumental Activities of Daily Living (IADLs):   Walk and transfer into and out of bed and chair?: Yes  Dress and groom yourself?: Yes    Bathe or shower yourself?: Yes    Feed yourself? Yes  Do your laundry/housekeeping?: Yes  Manage your money, pay your bills and track your expenses?: Yes  Make your own meals?: Yes    Do your own shopping?: Yes    Previous Hospitalizations:   Any hospitalizations or ED visits within the last 12 months?: No      PREVENTIVE SCREENINGS      Cardiovascular Screening:    General: Screening Not Indicated and History Lipid Disorder      Colorectal Cancer Screening:     General: Screening Current      Breast Cancer Screening:     General: Screening Current      Cervical Cancer Screening:    General: Screening Not Indicated      Osteoporosis Screening:    General: Screening Not Indicated and History Osteoporosis      Lung Cancer Screening:     General: Screening Not Indicated      Hepatitis C Screening:    General: Screening Current    Screening, Brief Intervention, and Referral to Treatment (SBIRT)    Screening  Typical number of drinks in a day: 0  Typical number of drinks in a week: 0  Interpretation: Low risk drinking behavior  Single Item Drug Screening:  How often have you used an illegal drug (including marijuana) or a prescription medication for non-medical reasons in the past year? never    Single Item Drug Screen Score: 0  Interpretation: Negative screen for possible drug use disorder    No results found       Physical Exam:     /60 (BP Location: Right arm, Patient Position: Sitting, Cuff Size: Large)   Pulse 77   Temp 98 4 °F (36 9 °C) (Tympanic)   Resp 20   Wt (!) 193 kg (424 lb 6 4 oz)   LMP  (LMP Unknown)   SpO2 96%   BMI 72 85 kg/m²     Physical Exam     Preethi Castanon MD

## 2023-04-03 NOTE — PROGRESS NOTES
Name: Jaye Hastings      : 1955      MRN: [id number]  Encounter Provider: Derian Cornejo MD  Encounter Date: 4/3/2023   Encounter department: [address] [place] [address]     1  Hyperlipidemia, unspecified hyperlipidemia type  -     Lipid panel; Future  -     Comprehensive metabolic panel; Future; Expected date: 2023    2  Stage 3a chronic kidney disease [facility])  Assessment & Plan:  Lab Results   Component Value Date    EGFR 51 2021    EGFR 49 2020    EGFR 49 2020    CREATININE 1 14 2021    CREATININE 1 18 2020    CREATININE 1 18 2020   Stable  Continue current  3  Chronic venous hypertension w ulceration (HCC)  Assessment & Plan:  Stable  Continue current  4  Pulmonary HTN ([facility] [address] )  Assessment & Plan:  Stable  Continue current  5  Morbid obesity ([facility] )    6  Homocystinuria ([facility] [address] )  Assessment & Plan:  Stable  Continue current  7  Hypercoagulable state ([facility] )  Assessment & Plan:  Stable  Continue current  8  Moderate persistent asthma without complication  -     budesonide-formoterol (Symbicort) 160-4 5 mcg/act inhaler; Inhale 2 puffs 2 (two) times a day Rinse mouth after use  -     Ambulatory referral to clinical pharmacy; Future    9  Gastroesophageal reflux disease, unspecified whether esophagitis present  Assessment & Plan:  Stable  Continue current  10  Polycystic ovarian syndrome  Assessment & Plan:  Stable  Continue current  11  Obstructive sleep apnea on CPAP  Assessment & Plan:  Stable  Continue current  12  Essential hypertension  Assessment & Plan:  Stable  Continue current  13  Blood clotting disorder ([facility] [address] )  Assessment & Plan:  Stable  Continue current  14  Abnormality of coagulation ([facility] )  Assessment & Plan:  Stable  Continue current  Subjective      She has asthma, JES, and likely obesity-related hypoventilation syndrome    She was using Advair with good "results  She states that she was on \"a program\" that helped her afford the medication, but she no longer qualifies  Advair would be $300+ a month  It appears that her insurance does not cover Rodrigo Lopez  She has a hypercoagulable state  Her INR is managed by heme onc  She has no bleeding issues  Her lipids are at goal on her current regimen  She has normal LFTs and no myalgia or muscle weakness  She is doing well with CPAP  She has stage 3a CKD  She is avoiding nephrotoxins  Review of Systems   All other systems reviewed and are negative  Current Outpatient Medications on File Prior to Visit   Medication Sig   • albuterol (ProAir HFA) 90 mcg/act inhaler Inhale 2 puffs every 6 (six) hours as needed for wheezing or shortness of breath   • amLODIPine-benazepril (LOTREL 5-20) 5-20 MG per capsule Take 1 capsule by mouth daily   • amoxicillin (AMOXIL) 500 mg capsule Take 500 mg by mouth if needed   • cetirizine (ZyrTEC) 10 mg tablet Take 1 tablet by mouth daily as needed   • clotrimazole-betamethasone (LOTRISONE) 1-0 05 % cream APPLY A THIN LAYER OF CREAM TOPICALLY TO AFFECTED AREAS ON THE TRUNK AND EXTREMITIES ONCE DAILY AS NEEDED FOR FLARES   • Folbic 2 5-25-2 MG TAKE ONE TABLET BY MOUTH EVERY DAY   • furosemide (LASIX) 20 mg tablet Take 1 tablet (20 mg total) by mouth daily   • Hydrocortisone-Iodoquinol (Dermazene) 1-1 % CREA Apply topically 2 (two) times a day   • metFORMIN (GLUCOPHAGE-XR) 500 mg 24 hr tablet Take 1 tablet (500 mg total) by mouth 4 (four) times a day   • metroNIDAZOLE (METROGEL) 1 % gel Apply 1 application topically daily     • montelukast (SINGULAIR) 10 mg tablet Take 1 tablet (10 mg total) by mouth daily at bedtime   • multivitamin (THERAGRAN) TABS Take 1 tablet by mouth daily     • nystatin powder Apply topically 2 (two) times a day Apply to affected area   • pravastatin (PRAVACHOL) 40 mg tablet Take 1 tablet (40 mg total) by mouth daily   • triamcinolone (KENALOG) 0 1 % " cream 2 (two) times a day as needed    • warfarin (COUMADIN) 5 mg tablet Take  strictly as advised   • [DISCONTINUED] Fluticasone-Salmeterol (Wixela Inhub) 250-50 mcg/dose inhaler Inhale 1 puff 2 (two) times a day Rinse mouth after use  (Patient not taking: Reported on 4/3/2023)   • [DISCONTINUED] Folbic 2 5-25-2 MG TAKE ONE TABLET BY MOUTH EVERY DAY (Patient not taking: Reported on 4/3/2023)   • [DISCONTINUED] folic acid-pyridoxine-cyanocobalamin (Folbic) 2 5-25-2 mg Take 1 tablet by mouth daily (Patient not taking: Reported on 4/3/2023)       Objective     /60 (BP Location: Right arm, Patient Position: Sitting, Cuff Size: Large)   Pulse 77   Temp 98 4 °F (36 9 °C) (Tympanic)   Resp 20   Wt (!) 193 kg (424 lb 6 4 oz)   LMP  (LMP Unknown)   SpO2 96%   BMI 72 85 kg/m²     Physical Exam  Vitals and nursing note reviewed  Constitutional:       Appearance: Normal appearance  She is obese  Cardiovascular:      Rate and Rhythm: Normal rate and regular rhythm  Pulses: Normal pulses  Heart sounds: Normal heart sounds  Pulmonary:      Effort: Pulmonary effort is normal       Breath sounds: Normal breath sounds  Abdominal:      General: Abdomen is flat  Bowel sounds are normal       Palpations: Abdomen is soft  Musculoskeletal:         General: Normal range of motion  Cervical back: Normal range of motion and neck supple  Skin:     General: Skin is warm and dry  Capillary Refill: Capillary refill takes less than 2 seconds  Neurological:      General: No focal deficit present  Mental Status: She is alert and oriented to person, place, and time  Mental status is at baseline  Psychiatric:         Mood and Affect: Mood normal          Behavior: Behavior normal          Thought Content:  Thought content normal          Judgment: Judgment normal        eMly Crooks MD

## 2023-04-03 NOTE — ASSESSMENT & PLAN NOTE
Lab Results   Component Value Date    EGFR 51 09/29/2021    EGFR 49 09/17/2020    EGFR 49 09/16/2020    CREATININE 1 14 09/29/2021    CREATININE 1 18 09/17/2020    CREATININE 1 18 09/16/2020   Stable  Continue current

## 2023-04-05 ENCOUNTER — TELEPHONE (OUTPATIENT)
Dept: FAMILY MEDICINE CLINIC | Facility: CLINIC | Age: 68
End: 2023-04-05

## 2023-04-05 DIAGNOSIS — I87.319 CHRONIC VENOUS HYPERTENSION W ULCERATION (HCC): ICD-10-CM

## 2023-04-05 DIAGNOSIS — I10 ESSENTIAL HYPERTENSION: ICD-10-CM

## 2023-04-05 DIAGNOSIS — Z99.89 OBSTRUCTIVE SLEEP APNEA ON CPAP: ICD-10-CM

## 2023-04-05 DIAGNOSIS — E72.11 HOMOCYSTINURIA (HCC): ICD-10-CM

## 2023-04-05 DIAGNOSIS — E78.5 HYPERLIPIDEMIA, UNSPECIFIED HYPERLIPIDEMIA TYPE: ICD-10-CM

## 2023-04-05 DIAGNOSIS — N18.31 STAGE 3A CHRONIC KIDNEY DISEASE (HCC): ICD-10-CM

## 2023-04-05 DIAGNOSIS — G47.33 OBSTRUCTIVE SLEEP APNEA ON CPAP: ICD-10-CM

## 2023-04-05 DIAGNOSIS — K21.9 GASTROESOPHAGEAL REFLUX DISEASE, UNSPECIFIED WHETHER ESOPHAGITIS PRESENT: ICD-10-CM

## 2023-04-05 DIAGNOSIS — L97.909 CHRONIC VENOUS HYPERTENSION W ULCERATION (HCC): ICD-10-CM

## 2023-04-05 DIAGNOSIS — D68.9 ABNORMALITY OF COAGULATION (HCC): ICD-10-CM

## 2023-04-05 DIAGNOSIS — Z23 NEED FOR SHINGLES VACCINE: ICD-10-CM

## 2023-04-05 DIAGNOSIS — D68.9 BLOOD CLOTTING DISORDER (HCC): ICD-10-CM

## 2023-04-05 DIAGNOSIS — E28.2 POLYCYSTIC OVARIAN SYNDROME: ICD-10-CM

## 2023-04-05 DIAGNOSIS — I27.20 PULMONARY HTN (HCC): ICD-10-CM

## 2023-04-05 DIAGNOSIS — E66.01 MORBID OBESITY (HCC): ICD-10-CM

## 2023-04-05 DIAGNOSIS — J45.40 MODERATE PERSISTENT ASTHMA WITHOUT COMPLICATION: ICD-10-CM

## 2023-04-05 DIAGNOSIS — D68.59 HYPERCOAGULABLE STATE (HCC): ICD-10-CM

## 2023-04-05 NOTE — TELEPHONE ENCOUNTER
Ending in may Advair help  Supplement doesn't pay anything for it  Medicare extra help  [facility]  Monna Olszewski, Pharmacist    Shraddha Coronado is a 78 y o  female who was referred to the pharmacist for inhaler education and management, referred by Roselyn Casas MD    Plan       1  High cost Medication Plan  · Currently has [employer] for secondary insurance but she will not qualify any longer starting in May  She is concerned about inhaler cost once [employer] ends  · She does meet income criteria for PACENET  She is going to apply for PACENET over the phone  Follow-up: as needed with clinical pharmacist        Assessment     1  Soledad Kristal / Emily Moose  a  Age >70 yo  b  Resident of [place] for > 90 days   c  PACE Criteria (): For a single person, total income must be $14,500 or less  For a  couple, combined total income must be $17,700 or less  d  PACENET Criteria (): For a single person, total income can be between $14,500 and $33,500  For a  couple, combined total income can be between $17,700 and $41,500  a  Patient DOES meet the above criteria    2  Medicare Extra Help  a  Criteria:   b  Full extra help:  i  Income limit: Up to $1,549 ($2,080 for couples) per month  ii  Asset limit: Up to $9,900 ($15,600 for couples)  c  Partial Extra help  i  Income limit: Below $1,719 ($2,309 for couples) per month  ii  Asset limit: Up to $15,510 ($30,950 for couples)  d  Patient DOES NOT meet the above criteria    3  Patient assistance program   a  Program Criteria:   b  Patient would meet income criteria for Symbicort however will apply for PACENET first      Subjective     1  Medication cost-   Right now she has [employer] and she paying $4 for inhaler but that is set to  in May  States she no longer qualifies because the expanded criteria during COVID is ending  She is concerned about the cost of her inhaler with medicare and the donut hole  Pharmacist Tracking Tool     Pharmacist Tracking Tool  Reason For Outreach: Embedded Pharmacist  Demographics:  Intervention Method: Phone  Type of Intervention: New  Topics Addressed: COPD  Pharmacologic Interventions: N/A  Non-Pharmacologic Interventions: Cost  Time:  Direct Patient Care: 40 mins  Care Coordination: 5 mins  Recommendation Recipient: Patient/Caregiver  Outcome: Accepted

## 2023-04-05 NOTE — TELEPHONE ENCOUNTER
[facility] calling they need an updated rx on the silver sulfadiazine   They need you to put on the rx how much she is to be applying so they dispense the correct amount

## 2023-04-06 ENCOUNTER — TELEPHONE (OUTPATIENT)
Dept: FAMILY MEDICINE CLINIC | Facility: CLINIC | Age: 68
End: 2023-04-06

## 2023-04-06 NOTE — TELEPHONE ENCOUNTER
Pharmacy calling because silvadene cream is only available in 85, 50, or 400 for quantity   Needs changed

## 2023-04-08 DIAGNOSIS — E78.5 HYPERLIPIDEMIA, UNSPECIFIED HYPERLIPIDEMIA TYPE: ICD-10-CM

## 2023-04-08 DIAGNOSIS — D68.9 ABNORMALITY OF COAGULATION (HCC): ICD-10-CM

## 2023-04-08 DIAGNOSIS — I10 ESSENTIAL HYPERTENSION: ICD-10-CM

## 2023-04-08 DIAGNOSIS — E72.11 HOMOCYSTINURIA (HCC): ICD-10-CM

## 2023-04-08 DIAGNOSIS — J45.40 MODERATE PERSISTENT ASTHMA WITHOUT COMPLICATION: ICD-10-CM

## 2023-04-08 DIAGNOSIS — Z23 NEED FOR SHINGLES VACCINE: ICD-10-CM

## 2023-04-08 DIAGNOSIS — E28.2 POLYCYSTIC OVARIAN SYNDROME: ICD-10-CM

## 2023-04-08 DIAGNOSIS — I87.319 CHRONIC VENOUS HYPERTENSION W ULCERATION (HCC): ICD-10-CM

## 2023-04-08 DIAGNOSIS — N18.31 STAGE 3A CHRONIC KIDNEY DISEASE (HCC): ICD-10-CM

## 2023-04-08 DIAGNOSIS — G47.33 OBSTRUCTIVE SLEEP APNEA ON CPAP: ICD-10-CM

## 2023-04-08 DIAGNOSIS — D68.59 HYPERCOAGULABLE STATE (HCC): ICD-10-CM

## 2023-04-08 DIAGNOSIS — Z99.89 OBSTRUCTIVE SLEEP APNEA ON CPAP: ICD-10-CM

## 2023-04-08 DIAGNOSIS — I27.20 PULMONARY HTN (HCC): ICD-10-CM

## 2023-04-08 DIAGNOSIS — K21.9 GASTROESOPHAGEAL REFLUX DISEASE, UNSPECIFIED WHETHER ESOPHAGITIS PRESENT: ICD-10-CM

## 2023-04-08 DIAGNOSIS — D68.9 BLOOD CLOTTING DISORDER (HCC): ICD-10-CM

## 2023-04-08 DIAGNOSIS — E66.01 MORBID OBESITY (HCC): ICD-10-CM

## 2023-04-08 DIAGNOSIS — L97.909 CHRONIC VENOUS HYPERTENSION W ULCERATION (HCC): ICD-10-CM

## 2023-05-01 ENCOUNTER — TELEPHONE (OUTPATIENT)
Dept: HEMATOLOGY ONCOLOGY | Facility: CLINIC | Age: 68
End: 2023-05-01

## 2023-05-01 NOTE — TELEPHONE ENCOUNTER
Returned telephone call spoke with Pt  INR 2 0 4/28/23  Per Dr Jae Mart, continue taking coumadin same, Monday thru Thursday 5mg and Friday, Saturday and Sunday 7 5mg  Repeat lab in 2 to 3 weeks  Pt states she understands

## 2023-05-01 NOTE — TELEPHONE ENCOUNTER
Patient Call    Who are you speaking with? self   If it is not the patient, are they listed on an active communication consent form? self   What is the reason for this call? Calling about coumadin   Does this require a call back? yes   If a call back is required, please list best call back number [phone]   If a call back is required, advise that a message will be forwarded to their care team and someone will return their call as soon as possible  Did you relay this information to the patient?  yes

## 2023-05-08 ENCOUNTER — OFFICE VISIT (OUTPATIENT)
Dept: FAMILY MEDICINE CLINIC | Facility: CLINIC | Age: 68
End: 2023-05-08

## 2023-05-08 VITALS
DIASTOLIC BLOOD PRESSURE: 68 MMHG | OXYGEN SATURATION: 96 % | TEMPERATURE: 98.9 F | SYSTOLIC BLOOD PRESSURE: 129 MMHG | RESPIRATION RATE: 18 BRPM | HEART RATE: 100 BPM

## 2023-05-08 DIAGNOSIS — I87.319 CHRONIC VENOUS HYPERTENSION W ULCERATION (HCC): ICD-10-CM

## 2023-05-08 DIAGNOSIS — M79.604 PAIN OF RIGHT LOWER EXTREMITY: Primary | ICD-10-CM

## 2023-05-08 DIAGNOSIS — L97.909 CHRONIC VENOUS HYPERTENSION W ULCERATION (HCC): ICD-10-CM

## 2023-05-08 DIAGNOSIS — R79.83 HOMOCYSTEINEMIA: ICD-10-CM

## 2023-05-08 DIAGNOSIS — D68.59 HYPERCOAGULABLE STATE (HCC): ICD-10-CM

## 2023-05-08 DIAGNOSIS — E72.11 HOMOCYSTINURIA (HCC): ICD-10-CM

## 2023-05-08 DIAGNOSIS — L03.115 CELLULITIS OF RIGHT LOWER EXTREMITY: ICD-10-CM

## 2023-05-08 NOTE — PROGRESS NOTES
Name: Mari Parnell      : 1955      MRN: [id number]  Encounter Provider: Mago Eaton MD  Encounter Date: 2023   Encounter department: [address] [place] [address]     1  Pain of right lower extremity  -     VAS lower limb venous duplex study, unilateral/limited; Future; Expected date: 2023    2  Chronic venous hypertension w ulceration ([facility] )    3  Homocysteinemia    4  Hypercoagulable state ([facility] )    5  Homocystinuria ([facility] )    6  Cellulitis of right lower extremity  Assessment & Plan:  Continue oral abx  Given her hypercoagulable state, will order u/s to r/o DVT  Subjective      She has chronic venous stasis  Her right leg became more red 2 days ago  She started on clindamycin  The redness is up to her knee  It does not hurts  It is warm  She has no F/C  She has no trauma  She is on long-term anticoagulation  Review of Systems   Skin: Positive for color change  All other systems reviewed and are negative  Current Outpatient Medications on File Prior to Visit   Medication Sig   • albuterol (ProAir HFA) 90 mcg/act inhaler Inhale 2 puffs every 6 (six) hours as needed for wheezing or shortness of breath   • amLODIPine-benazepril (LOTREL 5-20) 5-20 MG per capsule Take 1 capsule by mouth daily   • budesonide-formoterol (Symbicort) 160-4 5 mcg/act inhaler Inhale 2 puffs 2 (two) times a day Rinse mouth after use     • cetirizine (ZyrTEC) 10 mg tablet Take 1 tablet by mouth daily as needed   • clotrimazole-betamethasone (LOTRISONE) 1-0 05 % cream APPLY A THIN LAYER OF CREAM TOPICALLY TO AFFECTED AREAS ON THE TRUNK AND EXTREMITIES ONCE DAILY AS NEEDED FOR FLARES   • furosemide (LASIX) 20 mg tablet Take 1 tablet (20 mg total) by mouth daily   • Hydrocortisone-Iodoquinol (Dermazene) 1-1 % CREA Apply topically 2 (two) times a day   • metFORMIN (GLUCOPHAGE-XR) 500 mg 24 hr tablet Take 1 tablet (500 mg total) by mouth 4 (four) times a day   • metroNIDAZOLE (METROGEL) 1 % gel Apply 1 application topically daily     • montelukast (SINGULAIR) 10 mg tablet Take 1 tablet (10 mg total) by mouth daily at bedtime   • multivitamin (THERAGRAN) TABS Take 1 tablet by mouth daily  • nystatin powder Apply topically 2 (two) times a day Apply to affected area   • pravastatin (PRAVACHOL) 40 mg tablet Take 1 tablet (40 mg total) by mouth daily   • silver sulfadiazine (SILVADENE,SSD) 1 % cream Apply 1/16 of an inch to the affected area twice daily  • triamcinolone (KENALOG) 0 1 % cream Apply topically 2 (two) times a day   • warfarin (COUMADIN) 5 mg tablet Take  strictly as advised   • Folbic 2 5-25-2 MG TAKE ONE TABLET BY MOUTH EVERY DAY       Objective     /68 (BP Location: Left arm, Patient Position: Sitting, Cuff Size: Large)   Pulse 100   Temp 98 9 °F (37 2 °C) (Tympanic)   Resp 18   LMP  (LMP Unknown)   SpO2 96%     Physical Exam  Vitals and nursing note reviewed  Constitutional:       Appearance: Normal appearance  She is obese  Cardiovascular:      Rate and Rhythm: Normal rate and regular rhythm  Pulses: Normal pulses  Heart sounds: Normal heart sounds  Pulmonary:      Effort: Pulmonary effort is normal       Breath sounds: Normal breath sounds  Abdominal:      General: Abdomen is flat  Bowel sounds are normal       Palpations: Abdomen is soft  Musculoskeletal:         General: Normal range of motion  Cervical back: Normal range of motion and neck supple  Skin:     General: Skin is warm  Comments: Red and warm to knee (right)   Neurological:      Mental Status: She is alert         Blaine Escobar MD

## 2023-05-11 ENCOUNTER — TELEPHONE (OUTPATIENT)
Dept: HEMATOLOGY ONCOLOGY | Facility: CLINIC | Age: 68
End: 2023-05-11

## 2023-05-11 NOTE — TELEPHONE ENCOUNTER
Patient started on Clindamycin 5/7/23 for cellulitis  She wants to make sure this does not interfere with her Coumadin dose/lab schedule  No interaction found between two medications    Discussed this with Dr Chloé Davis - no change to Coumadin plan  Patient verbalized understanding

## 2023-05-23 ENCOUNTER — TELEPHONE (OUTPATIENT)
Dept: HEMATOLOGY ONCOLOGY | Facility: CLINIC | Age: 68
End: 2023-05-23

## 2023-05-23 DIAGNOSIS — D68.9 BLOOD CLOTTING DISORDER (HCC): Primary | ICD-10-CM

## 2023-05-23 DIAGNOSIS — D68.9 ABNORMALITY OF COAGULATION (HCC): ICD-10-CM

## 2023-05-23 DIAGNOSIS — D68.59 HYPERCOAGULABLE STATE (HCC): ICD-10-CM

## 2023-05-23 NOTE — TELEPHONE ENCOUNTER
Returned telephone call spoke with Pt  INR 2 1, per Dr Conrad Officer continue taking the 5 mg coumadin M thru Thursday, 7 5 mg Friday, Saturday and Sunday  Pt also requesting lab orders be sent to [place] [id number] [facility] in [place]  She will also need a CBCD and CMP for her fu appt with dr Leland South

## 2023-05-24 ENCOUNTER — TELEPHONE (OUTPATIENT)
Dept: HEMATOLOGY ONCOLOGY | Facility: CLINIC | Age: 68
End: 2023-05-24

## 2023-06-05 DIAGNOSIS — E78.5 HYPERLIPIDEMIA, UNSPECIFIED HYPERLIPIDEMIA TYPE: ICD-10-CM

## 2023-06-05 RX ORDER — PRAVASTATIN SODIUM 40 MG
TABLET ORAL
Qty: 90 TABLET | Refills: 3 | Status: SHIPPED | OUTPATIENT
Start: 2023-06-05

## 2023-06-09 ENCOUNTER — TELEPHONE (OUTPATIENT)
Dept: HEMATOLOGY ONCOLOGY | Facility: CLINIC | Age: 68
End: 2023-06-09

## 2023-06-09 NOTE — TELEPHONE ENCOUNTER
Returned telephone call spoke with Pt  [facility] sent results for cbcd and cmp but not Pt  Inr   Pt stated she would go again next week to have the Pt inr drawn

## 2023-06-13 ENCOUNTER — TELEPHONE (OUTPATIENT)
Dept: HEMATOLOGY ONCOLOGY | Facility: CLINIC | Age: 68
End: 2023-06-13

## 2023-06-13 NOTE — TELEPHONE ENCOUNTER
Returned telephone call spoke with Pt  Based on INR 2 4 pt to continue taking Coumadin 5 mg Monday thru Thursday and 7 5mg Friday thru Sunday  She will repeat labs in 2 to 3 weeks

## 2023-06-16 ENCOUNTER — OFFICE VISIT (OUTPATIENT)
Dept: HEMATOLOGY ONCOLOGY | Facility: CLINIC | Age: 68
End: 2023-06-16
Payer: MEDICARE

## 2023-06-16 VITALS
HEIGHT: 64 IN | DIASTOLIC BLOOD PRESSURE: 74 MMHG | OXYGEN SATURATION: 96 % | RESPIRATION RATE: 17 BRPM | TEMPERATURE: 97.4 F | HEART RATE: 83 BPM | BODY MASS INDEX: 50.02 KG/M2 | WEIGHT: 293 LBS | SYSTOLIC BLOOD PRESSURE: 120 MMHG

## 2023-06-16 DIAGNOSIS — R79.83 HOMOCYSTEINEMIA: ICD-10-CM

## 2023-06-16 DIAGNOSIS — Z79.01 ANTICOAGULATED ON COUMADIN: ICD-10-CM

## 2023-06-16 DIAGNOSIS — D68.9 BLOOD CLOTTING DISORDER (HCC): ICD-10-CM

## 2023-06-16 DIAGNOSIS — D68.9 ABNORMALITY OF COAGULATION (HCC): ICD-10-CM

## 2023-06-16 DIAGNOSIS — Z86.711 HISTORY OF PULMONARY EMBOLISM: ICD-10-CM

## 2023-06-16 DIAGNOSIS — Z95.828 GREENFIELD FILTER IN PLACE: ICD-10-CM

## 2023-06-16 DIAGNOSIS — D68.59 HYPERCOAGULABLE STATE (HCC): Primary | ICD-10-CM

## 2023-06-16 DIAGNOSIS — Z86.718 HISTORY OF DVT (DEEP VEIN THROMBOSIS): ICD-10-CM

## 2023-06-16 PROCEDURE — 99214 OFFICE O/P EST MOD 30 MIN: CPT | Performed by: INTERNAL MEDICINE

## 2023-06-16 NOTE — PROGRESS NOTES
HPI: Patient has history of recurrent DVTs and has family history of blood clots  Screening hypercoagulation workup showed high homocystine level  Patient has been on long-term anticoagulation, Coumadin and Folbic  She goes for PT INR test on regular basis and gets instructions about Coumadin dose, either she calls or we call with instructions     No history of bleeding or blood clots on Coumadin     She has been aware of drug and drug interaction with Coumadin and food and drug interaction with Coumadin  Patient has been advised to always let prescribing physician know that patient has been on Coumadin  She has IVC filter  She has  history of lymphedema both legs and has compression machine  She uses nystatin powder off and on for redness in the groin areas in summer months  She wants to remain on anticoglation for life unless contraindicated  For bleeding and procedures Coumadin could be interrupted  under physicians supervision and guidance  She is aware to contact us  if she were to receive any new medication from other providers and if she would be going for any procedure  Patient knows not to take aspirin, NSAIDs and other blood thinners  Patient is overweight  She has tiredness  She has exertional dyspnea probably because of her weight     She is regular with her medical checkups and GYN checkups and mammography      Has arthritic symptoms                   Current Outpatient Medications:   •  amLODIPine-benazepril (LOTREL 5-20) 5-20 MG per capsule, Take 1 capsule by mouth daily, Disp: 90 capsule, Rfl: 3  •  budesonide-formoterol (Symbicort) 160-4 5 mcg/act inhaler, Inhale 2 puffs 2 (two) times a day Rinse mouth after use , Disp: 18 g, Rfl: 3  •  cetirizine (ZyrTEC) 10 mg tablet, Take 1 tablet by mouth daily as needed, Disp: , Rfl:   •  clotrimazole-betamethasone (LOTRISONE) 1-0 05 % cream, APPLY A THIN LAYER OF CREAM TOPICALLY TO AFFECTED AREAS ON THE TRUNK AND EXTREMITIES ONCE DAILY AS NEEDED FOR FLARES, Disp: , Rfl:   •  Folbic 2 5-25-2 MG, TAKE ONE TABLET BY MOUTH EVERY DAY, Disp: 90 tablet, Rfl: 1  •  furosemide (LASIX) 20 mg tablet, Take 1 tablet (20 mg total) by mouth daily, Disp: 90 tablet, Rfl: 3  •  Hydrocortisone-Iodoquinol (Dermazene) 1-1 % CREA, Apply topically 2 (two) times a day, Disp: 28 4 g, Rfl: 5  •  metFORMIN (GLUCOPHAGE-XR) 500 mg 24 hr tablet, Take 1 tablet (500 mg total) by mouth 4 (four) times a day, Disp: 360 tablet, Rfl: 3  •  metroNIDAZOLE (METROGEL) 1 % gel, Apply 1 application topically daily  , Disp: , Rfl:   •  montelukast (SINGULAIR) 10 mg tablet, Take 1 tablet (10 mg total) by mouth daily at bedtime, Disp: 90 tablet, Rfl: 3  •  multivitamin (THERAGRAN) TABS, Take 1 tablet by mouth daily  , Disp: , Rfl:   •  nystatin powder, Apply topically 2 (two) times a day Apply to affected area, Disp: , Rfl:   •  pravastatin (PRAVACHOL) 40 mg tablet, TAKE 1 TABLET DAILY, Disp: 90 tablet, Rfl: 3  •  silver sulfadiazine (SILVADENE,SSD) 1 % cream, Apply 1/16 of an inch to the affected area twice daily  , Disp: 400 g, Rfl: 3  •  triamcinolone (KENALOG) 0 1 % cream, Apply topically 2 (two) times a day, Disp: 453 6 g, Rfl: 3  •  warfarin (COUMADIN) 5 mg tablet, Take  strictly as advised, Disp: 150 tablet, Rfl: 2  •  albuterol (ProAir HFA) 90 mcg/act inhaler, Inhale 2 puffs every 6 (six) hours as needed for wheezing or shortness of breath, Disp: 8 5 g, Rfl: 0    Allergies   Allergen Reactions   • Azithromycin Diarrhea   • Ciprofloxacin Other (See Comments), Dizziness and Confusion      weepy       • Clindamycin Other (See Comments)     Acid Reflux   • Doxycycline Headache   • Ancef [Cefazolin] Rash     States tolerated Keflex       Oncology History    No history exists  ROS:  06/16/23 Reviewed 13 systems:  Presently no other neurological, cardiac, pulmonary, GI and  symptoms other than listed in HPI     Other symptoms are in HPI      No  fever, chills, bleeding, bone pains, skin rash other "than stasis dermatitis, no weight loss,    weakness, numbness,  claudication and gait problem  No frequent infections  Not unusually sensitive to heat or cold  No swollen glands      Patient is anxious  /74 (BP Location: Left arm, Patient Position: Sitting, Cuff Size: Adult)   Pulse 83   Temp (!) 97 4 °F (36 3 °C)   Resp 17   Ht 5' 4\" (1 626 m)   Wt (!) 191 kg (420 lb)   LMP  (LMP Unknown)   SpO2 96%   BMI 72 09 kg/m²     Physical Exam:  Alert and oriented and not in distress  Vitals are above  No icterus  There is no oral thrush  There is no palpable neck mass  Clear lung fields  Heart rate is regular  No palpable abdominal mass  Abdomen is soft and nontender  No ascites  She has nonpitting lymphedema both legs and has stasis dermatitis  No calf tenderness  There is no focal neurological deficit, has generalized weakness, no skin rash other than stasis dermatitis, no palpable lymphadenopathy in the neck and axillary areas,  no clubbing  Patient is anxious  Performance status 2 because of weight and lymphedema      IMAGING:      LABS:    Results for orders placed or performed in visit on 07/11/22   Poct Covid 19 Rapid Antigen Test   Result Value Ref Range    POCT SARS-CoV-2 Ag Negative Negative    VALID CONTROL Valid      Order: [id number]   Ref Range & Units 6/6/23  2:21 PM   Hemoglobin 11 5 - 14 5 g/dL 13 0    Hematocrit 35 0 - 43 0 % 39 0    WBC 4 0 - 10 0 thou/cmm 7 7    RBC 3 70 - 4 70 mill/cmm 4 53    Platelet Count 816 - 350 thou/cmm 337    MPV 7 5 - 11 3 fL 9 5    MCV 80 - 100 fL 86    MCH 26 0 - 34 0 pg 28 7    MCHC 32 0 - 37 0 g/dL 33 3    RDW 12 0 - 16 0 % 15 5    Differential Type  AUTO    Absolute Neutrophils 1 8 - 7 8 thou/cmm 4 7    Absolute Lymphocytes 1 0 - 3 0 thou/cmm 2 0    Absolute Monocytes 0 3 - 1 0 thou/cmm 0 6    Absolute Eosinophils 0 0 - 0 5 thou/cmm 0 3    Absolute Basophils 0 0 - 0 1 thou/cmm 0 1    Neutrophils % 60    Lymphocytes % 27    Monocytes % " 8    Eosinophils % 4    Basophils % 1       6/6/23  2:21 PM    Glucose 65 - 99 mg/dL 99    BUN 7 - 25 mg/dL 17    Creatinine 0 40 - 1 10 mg/dL 1 07    Sodium 135 - 145 mmol/L 138    Potassium 3 5 - 5 2 mmol/L 4 1    Chloride 100 - 109 mmol/L 103    Carbon Dioxide 21 - 31 mmol/L 24    Calcium 8 5 - 10 1 mg/dL 9 1    Alkaline Phosphatase 35 - 120 U/L 56    Albumin 3 5 - 5 7 g/dL 3 9    Bilirubin, Total 0 2 - 1 0 mg/dL 0 6    Comment: Eltrombopag and its metabolites may interfere with this assay causing erroneously high patient results  Protein, Total 6 3 - 8 3 g/dL 7 9    AST <41 U/L 20    ALT <56 U/L 13    Anion Gap 3 - 11 11    eGFRcr >59 57 Low     eGFRcr Comment  Interpretive information: calculated GFR            Contains abnormal data PROTIME-INR  Order: [id number]   Ref Range & Units 6/6/23  2:21 PM   PT 12 0 - 14 6 sec 25 9 High     PT INR  2 4             Labs, Imaging, & Other studies:   All pertinent labs and imaging studies were personally reviewed      Reviewed test results and discussed with patient  Assessment and plan:  Patient has history of recurrent DVTs and has family history of blood clots  Screening hypercoagulation workup showed high homocystine level  Patient has been on long-term anticoagulation, Coumadin and Folbic  She goes for PT INR test on regular basis and gets instructions about Coumadin dose, either she calls or we call with instructions     No history of bleeding or blood clots on Coumadin     She has been aware of drug and drug interaction with Coumadin and food and drug interaction with Coumadin  Patient has been advised to always let prescribing physician know that patient has been on Coumadin  She has IVC filter  She has  history of lymphedema both legs and has compression machine  She uses nystatin powder off and on for redness in the groin areas in summer months  She wants to remain on anticoglation for life unless contraindicated    For bleeding and procedures Coumadin could be interrupted  under physicians supervision and guidance  She is aware to contact us  if she were to receive any new medication from other providers and if she would be going for any procedure  Patient knows not to take aspirin, NSAIDs and other blood thinners  Patient is overweight  She has tiredness  She has exertional dyspnea probably because of her weight     She is regular with her medical checkups and GYN checkups and mammography     Has arthritic symptoms    Physical examination and test results are as recorded and discussed  Plan is to continue her on Coumadin and Folbic as above   Condition discussed and explained  Questions answered  Discussed the importance of self-breast examination, eating healthy foods, staying active and health screening test   She is capable of self-care  She has instructions to report to the emergency room in case of blood clot or bleeding or other medical emergencies and also call our office           See diagnoses, orders and instructions  1  Hypercoagulable state ([facility] [address] )    - CBC and differential; Future  - Comprehensive metabolic panel; Future  - Protime-INR; Standing  - Protime-INR    2  Blood clotting disorder ([facility] [address] )    - Protime-INR; Standing  - Protime-INR    3  Abnormality of coagulation ([facility] )      4  Homocysteinemia      5  History of pulmonary embolism    - CBC and differential; Future  - Comprehensive metabolic panel; Future  - Protime-INR; Standing  - Protime-INR    6  History of DVT (deep vein thrombosis)    - CBC and differential; Future  - Comprehensive metabolic panel; Future  - Protime-INR; Standing  - Protime-INR    7  [place] filter in place      8  Anticoagulated on Coumadin    - Protime-INR; Standing  - Protime-INR    Prothrombin time once a week or as needed  Other blood work prior to next visit in 1 year  She will continue taking Folbic  She gets Coumadin instructions  Patient voiced understanding and agreed     I used the dictation device to dictate this note and there could be mistakes in my note and patient may contact our office for that    Counseling / Coordination of Care  Provided counseling and support

## 2023-06-16 NOTE — PATIENT INSTRUCTIONS
Prothrombin time once a week or as needed  Other blood work prior to next visit in 1 year  She will continue taking Folbic  She gets Coumadin instructions

## 2023-06-19 ENCOUNTER — HOSPITAL ENCOUNTER (OUTPATIENT)
Dept: NON INVASIVE DIAGNOSTICS | Facility: HOSPITAL | Age: 68
Discharge: HOME | End: 2023-06-19
Attending: FAMILY MEDICINE
Payer: MEDICARE

## 2023-06-19 DIAGNOSIS — M79.604 PAIN OF RIGHT LOWER EXTREMITY: ICD-10-CM

## 2023-06-19 PROCEDURE — 93971 EXTREMITY STUDY: CPT

## 2023-06-19 PROCEDURE — 93971 EXTREMITY STUDY: CPT | Performed by: SURGERY

## 2023-07-05 ENCOUNTER — TELEPHONE (OUTPATIENT)
Dept: HEMATOLOGY ONCOLOGY | Facility: CLINIC | Age: 68
End: 2023-07-05

## 2023-07-05 NOTE — TELEPHONE ENCOUNTER
Returned call to patient  INR - 2.0  NO change to Coumadin instructions - 5mg Monday - Thursday and 7.5mg Fri,Sat,Sun  Patient will re check labs in 3 weeks

## 2023-07-10 DIAGNOSIS — I10 ESSENTIAL HYPERTENSION: ICD-10-CM

## 2023-07-10 RX ORDER — AMLODIPINE BESYLATE AND BENAZEPRIL HYDROCHLORIDE 5; 20 MG/1; MG/1
CAPSULE ORAL
Qty: 90 CAPSULE | Refills: 3 | Status: SHIPPED | OUTPATIENT
Start: 2023-07-10

## 2023-07-12 NOTE — TELEPHONE ENCOUNTER
Spoke to patient and informed her of the information below per Dr Nader Espino, patient voiced understanding  Libtayo Pregnancy And Lactation Text: This medication is contraindicated in pregnancy and when breast feeding.

## 2023-07-21 ENCOUNTER — RA CDI HCC (OUTPATIENT)
Dept: OTHER | Facility: HOSPITAL | Age: 68
End: 2023-07-21

## 2023-07-21 NOTE — PROGRESS NOTES
[facility] [address] [facility] coding opportunities          Chart Reviewed number of suggestions sent to Provider: 1     Patients Insurance     Medicare Insurance: [employer]

## 2023-07-24 ENCOUNTER — TELEPHONE (OUTPATIENT)
Dept: HEMATOLOGY ONCOLOGY | Facility: CLINIC | Age: 68
End: 2023-07-24

## 2023-07-24 NOTE — TELEPHONE ENCOUNTER
Most recent INR - 2.1  No change to Coumadin dose.   Patient will re check labs in 3 weeks  She verbalized understanding

## 2023-08-03 DIAGNOSIS — R79.83 HOMOCYSTEINEMIA: ICD-10-CM

## 2023-08-03 DIAGNOSIS — Z86.718 HISTORY OF DVT (DEEP VEIN THROMBOSIS): ICD-10-CM

## 2023-08-03 DIAGNOSIS — D68.59 HYPERCOAGULABLE STATE (HCC): ICD-10-CM

## 2023-08-04 ENCOUNTER — OFFICE VISIT (OUTPATIENT)
Dept: FAMILY MEDICINE CLINIC | Facility: CLINIC | Age: 68
End: 2023-08-04
Payer: MEDICARE

## 2023-08-04 VITALS
TEMPERATURE: 98.4 F | DIASTOLIC BLOOD PRESSURE: 80 MMHG | OXYGEN SATURATION: 95 % | RESPIRATION RATE: 20 BRPM | BODY MASS INDEX: 72.85 KG/M2 | WEIGHT: 293 LBS | HEART RATE: 61 BPM | SYSTOLIC BLOOD PRESSURE: 116 MMHG

## 2023-08-04 DIAGNOSIS — Z23 NEED FOR SHINGLES VACCINE: ICD-10-CM

## 2023-08-04 DIAGNOSIS — E66.01 MORBID OBESITY WITH BMI OF 70 AND OVER, ADULT (HCC): ICD-10-CM

## 2023-08-04 DIAGNOSIS — G47.33 OBSTRUCTIVE SLEEP APNEA ON CPAP: ICD-10-CM

## 2023-08-04 DIAGNOSIS — E28.2 PCOS (POLYCYSTIC OVARIAN SYNDROME): ICD-10-CM

## 2023-08-04 DIAGNOSIS — I87.319 CHRONIC VENOUS HYPERTENSION W ULCERATION (HCC): ICD-10-CM

## 2023-08-04 DIAGNOSIS — Z86.718 HISTORY OF DVT (DEEP VEIN THROMBOSIS): ICD-10-CM

## 2023-08-04 DIAGNOSIS — E72.11 HOMOCYSTINURIA (HCC): ICD-10-CM

## 2023-08-04 DIAGNOSIS — R79.83 HOMOCYSTEINEMIA: ICD-10-CM

## 2023-08-04 DIAGNOSIS — K21.9 GASTROESOPHAGEAL REFLUX DISEASE, UNSPECIFIED WHETHER ESOPHAGITIS PRESENT: ICD-10-CM

## 2023-08-04 DIAGNOSIS — E28.2 POLYCYSTIC OVARIAN SYNDROME: ICD-10-CM

## 2023-08-04 DIAGNOSIS — D68.59 HYPERCOAGULABLE STATE (HCC): ICD-10-CM

## 2023-08-04 DIAGNOSIS — Z99.89 OBSTRUCTIVE SLEEP APNEA ON CPAP: ICD-10-CM

## 2023-08-04 DIAGNOSIS — N18.31 STAGE 3A CHRONIC KIDNEY DISEASE (HCC): Primary | ICD-10-CM

## 2023-08-04 DIAGNOSIS — I10 ESSENTIAL HYPERTENSION: ICD-10-CM

## 2023-08-04 DIAGNOSIS — L97.909 CHRONIC VENOUS HYPERTENSION W ULCERATION (HCC): ICD-10-CM

## 2023-08-04 DIAGNOSIS — D68.9 BLOOD CLOTTING DISORDER (HCC): ICD-10-CM

## 2023-08-04 DIAGNOSIS — I10 HYPERTENSION, ESSENTIAL: ICD-10-CM

## 2023-08-04 DIAGNOSIS — R73.01 IFG (IMPAIRED FASTING GLUCOSE): ICD-10-CM

## 2023-08-04 DIAGNOSIS — I27.20 PULMONARY HTN (HCC): ICD-10-CM

## 2023-08-04 PROCEDURE — 99214 OFFICE O/P EST MOD 30 MIN: CPT | Performed by: FAMILY MEDICINE

## 2023-08-04 RX ORDER — FOLIC ACID-PYRIDOXINE-CYANOCOBALAMIN TAB 2.5-25-2 MG 2.5-25-2 MG
TAB ORAL
Qty: 90 TABLET | Refills: 1 | Status: SHIPPED | OUTPATIENT
Start: 2023-08-04 | End: 2023-08-04 | Stop reason: SDUPTHER

## 2023-08-04 RX ORDER — METFORMIN HYDROCHLORIDE 500 MG/1
2000 TABLET, EXTENDED RELEASE ORAL
Qty: 360 TABLET | Refills: 3 | Status: SHIPPED | OUTPATIENT
Start: 2023-08-04

## 2023-08-04 RX ORDER — FOLIC ACID-PYRIDOXINE-CYANOCOBALAMIN TAB 2.5-25-2 MG 2.5-25-2 MG
1 TAB ORAL DAILY
Qty: 90 TABLET | Refills: 3 | Status: SHIPPED | OUTPATIENT
Start: 2023-08-04

## 2023-08-04 RX ORDER — ZOSTER VACCINE RECOMBINANT, ADJUVANTED 50 MCG/0.5
0.5 KIT INTRAMUSCULAR ONCE
Qty: 1 EACH | Refills: 1 | Status: SHIPPED | OUTPATIENT
Start: 2023-08-04 | End: 2023-08-04

## 2023-08-04 NOTE — ASSESSMENT & PLAN NOTE
Lab Results   Component Value Date    EGFR 51 09/29/2021    EGFR 49 09/17/2020    EGFR 49 09/16/2020    CREATININE 1.14 09/29/2021    CREATININE 1.18 09/17/2020    CREATININE 1.18 09/16/2020   Creatinine stable.

## 2023-08-04 NOTE — PROGRESS NOTES
Name: Guillermo Serrano      : 1955      MRN: [id number]  Encounter Provider: Betsy Diaz MD  Encounter Date: 2023   Encounter department: [address]     1. Stage 3a chronic kidney disease [facility])  Assessment & Plan:  Lab Results   Component Value Date    EGFR 51 2021    EGFR 49 2020    EGFR 49 2020    CREATININE 1.14 2021    CREATININE 1.18 2020    CREATININE 1.18 2020   Creatinine stable. 2. Blood clotting disorder [facility])  Assessment & Plan:  INR managed by hematology. 3. Chronic venous hypertension w ulceration (HCC)  Assessment & Plan:  Chronic venous stasis without ulceration today. 4. Gastroesophageal reflux disease, unspecified whether esophagitis present  Assessment & Plan:  No alarm sings. Continue current. 5. Polycystic ovarian syndrome  Assessment & Plan:  Stable. Continue current. 6. Obstructive sleep apnea on CPAP  Assessment & Plan:  She is sleeping in a chair since hurting her back. 7. Essential hypertension  Assessment & Plan:  Stable. Continue current. 8. Pulmonary HTN ([address])  Assessment & Plan:  Encourage regular use of prescribed medications. 9. Morbid obesity with BMI of 70 and over, adult ([address])    10. Homocysteinemia    11. Hypercoagulable state [facility])  Assessment & Plan:  Plan per heme-onc. 12. Homocystinuria ([address])    13. Need for shingles vaccine  -     Zoster Vac Recomb Adjuvanted (Shingrix) 50 MCG/0.5ML SUSR; Inject 0.5 mL into a muscle once for 1 dose Repeat dose in 2 to 6 months           Subjective      She has CKD stage 3a. Her GFR is stable, however. She is avoiding NSAIDs. Her lipids are at goal on her current regimen. She has no myalgia or muscle weakness. She has normal LFTs. She is hypercoagulable. Her warfarin is managed by hematology. She has GERD. She denies alarm signs. Her BP is at goal.  She has no CP or SOB.   She has no HA or vision changes. Review of Systems   All other systems reviewed and are negative. Current Outpatient Medications on File Prior to Visit   Medication Sig   • amLODIPine-benazepril (LOTREL 5-20) 5-20 MG per capsule TAKE 1 CAPSULE DAILY   • budesonide-formoterol (Symbicort) 160-4.5 mcg/act inhaler Inhale 2 puffs 2 (two) times a day Rinse mouth after use. • clotrimazole-betamethasone (LOTRISONE) 1-0.05 % cream APPLY A THIN LAYER OF CREAM TOPICALLY TO AFFECTED AREAS ON THE TRUNK AND EXTREMITIES ONCE DAILY AS NEEDED FOR FLARES   • Folbic 2.5-25-2 MG TAKE ONE TABLET BY MOUTH EVERY DAY   • furosemide (LASIX) 20 mg tablet Take 1 tablet (20 mg total) by mouth daily   • Hydrocortisone-Iodoquinol (Dermazene) 1-1 % CREA Apply topically 2 (two) times a day   • metFORMIN (GLUCOPHAGE-XR) 500 mg 24 hr tablet Take 1 tablet (500 mg total) by mouth 4 (four) times a day   • metroNIDAZOLE (METROGEL) 1 % gel Apply 1 application topically daily     • montelukast (SINGULAIR) 10 mg tablet Take 1 tablet (10 mg total) by mouth daily at bedtime   • multivitamin (THERAGRAN) TABS Take 1 tablet by mouth daily. • nystatin powder Apply topically 2 (two) times a day Apply to affected area   • pravastatin (PRAVACHOL) 40 mg tablet TAKE 1 TABLET DAILY   • silver sulfadiazine (SILVADENE,SSD) 1 % cream Apply 1/16 of an inch to the affected area twice daily.    • triamcinolone (KENALOG) 0.1 % cream Apply topically 2 (two) times a day   • warfarin (COUMADIN) 5 mg tablet Take  strictly as advised   • [DISCONTINUED] cetirizine (ZyrTEC) 10 mg tablet Take 1 tablet by mouth daily as needed   • [DISCONTINUED] albuterol (ProAir HFA) 90 mcg/act inhaler Inhale 2 puffs every 6 (six) hours as needed for wheezing or shortness of breath   • [DISCONTINUED] Folbic 2.5-25-2 MG TAKE ONE TABLET BY MOUTH EVERY DAY       Objective     /80   Pulse 61   Temp 98.4 °F (36.9 °C) (Temporal)   Resp 20   Wt (!) 193 kg (424 lb 6.4 oz)   LMP  (LMP Unknown) SpO2 95%   BMI 72.85 kg/m²     Physical Exam  Constitutional:       Appearance: Normal appearance. She is obese. Cardiovascular:      Rate and Rhythm: Normal rate and regular rhythm. Pulses: Normal pulses. Heart sounds: Normal heart sounds. Pulmonary:      Effort: Pulmonary effort is normal.      Breath sounds: Normal breath sounds. Abdominal:      General: Abdomen is flat. Bowel sounds are normal.      Palpations: Abdomen is soft. Musculoskeletal:         General: Normal range of motion. Cervical back: Normal range of motion and neck supple. Skin:     General: Skin is warm and dry. Capillary Refill: Capillary refill takes less than 2 seconds. Neurological:      General: No focal deficit present. Mental Status: She is alert and oriented to person, place, and time. Mental status is at baseline. Psychiatric:         Mood and Affect: Mood normal.         Behavior: Behavior normal.         Thought Content:  Thought content normal.         Judgment: Judgment normal.       Henrry Vick MD

## 2023-08-18 ENCOUNTER — TELEPHONE (OUTPATIENT)
Dept: HEMATOLOGY ONCOLOGY | Facility: CLINIC | Age: 68
End: 2023-08-18

## 2023-08-18 NOTE — TELEPHONE ENCOUNTER
Telephone call spoke with Pt.  inr 2.4,  Per Dr Brenda Daniels, continue taking coumadin 5 mg M - TH and 7.6 mg F S Sunday. Repeat inr in 2 to 3 weeks. Pt states she understands.

## 2023-09-11 ENCOUNTER — TELEPHONE (OUTPATIENT)
Dept: HEMATOLOGY ONCOLOGY | Facility: CLINIC | Age: 68
End: 2023-09-11

## 2023-09-11 NOTE — TELEPHONE ENCOUNTER
INR = 2.8  No changes to Coumadin dose at this time  Patient will re check in 3-4 weeks     Patient verbalized understanding

## 2023-10-05 DIAGNOSIS — T78.40XA ALLERGY, INITIAL ENCOUNTER: ICD-10-CM

## 2023-10-06 RX ORDER — MONTELUKAST SODIUM 10 MG/1
10 TABLET ORAL
Qty: 90 TABLET | Refills: 3 | Status: SHIPPED | OUTPATIENT
Start: 2023-10-06

## 2023-10-09 ENCOUNTER — TELEPHONE (OUTPATIENT)
Dept: HEMATOLOGY ONCOLOGY | Facility: CLINIC | Age: 68
End: 2023-10-09

## 2023-10-09 NOTE — TELEPHONE ENCOUNTER
Returned call to patient  Explained INR - 2.6  No change to coumadin at this time.   She will re check in 3-4 weeks    Current dose: 5mg Monday - Thursday and 7.5mg on Friday, Saturday and Sunday     Patient verbalized understanding

## 2023-11-10 ENCOUNTER — TELEPHONE (OUTPATIENT)
Dept: HEMATOLOGY ONCOLOGY | Facility: CLINIC | Age: 68
End: 2023-11-10

## 2023-11-10 NOTE — TELEPHONE ENCOUNTER
Telephone call spoke with Pt. Dr Lara Posada reviewed lab results INR 1.8.  pt to continue taking Coumadin 5 mg M thru Thursday and 7.5 mg all other days. Repeat lab in 3 to 4 weeks. Pt states she understands.

## 2023-11-13 ENCOUNTER — VBI (OUTPATIENT)
Dept: ADMINISTRATIVE | Facility: OTHER | Age: 68
End: 2023-11-13

## 2023-11-16 ENCOUNTER — TELEPHONE (OUTPATIENT)
Dept: FAMILY MEDICINE CLINIC | Facility: CLINIC | Age: 68
End: 2023-11-16

## 2023-11-16 DIAGNOSIS — Z12.31 SCREENING MAMMOGRAM FOR BREAST CANCER: Primary | ICD-10-CM

## 2023-12-12 ENCOUNTER — TELEPHONE (OUTPATIENT)
Dept: HEMATOLOGY ONCOLOGY | Facility: CLINIC | Age: 68
End: 2023-12-12

## 2023-12-12 DIAGNOSIS — Z86.718 HISTORY OF DVT (DEEP VEIN THROMBOSIS): ICD-10-CM

## 2023-12-12 RX ORDER — WARFARIN SODIUM 5 MG/1
TABLET ORAL
Qty: 150 TABLET | Refills: 0 | Status: SHIPPED | OUTPATIENT
Start: 2023-12-12

## 2023-12-12 NOTE — TELEPHONE ENCOUNTER
Patient had lab work done on 11/9  PT 20.7  PT INR 1.8    She is currently taking coumadin 5mg Monday through Thursday. She is taking 7.5mg on all other days. She called in today to follow up on if she is to continue same dosage. She also might need to have some teeth pulled soon and was wondering about lovenox shots.

## 2023-12-12 NOTE — TELEPHONE ENCOUNTER
Spoke with patient to let her know to continue on the same dose of coumadin with repeat labs in 3-4 weeks. She has an appointment with her dentist on 12/18 to discuss her teeth. [facility] in [facility] does not pull teeth, so she would need to go elsewhere if this is the plan. She might possibly need 4 teeth pulled. Once she has an exact date, will give her instructions on lovenox. She verbalized understanding and agreed to plan.

## 2023-12-26 ENCOUNTER — HOSPITAL ENCOUNTER (OUTPATIENT)
Dept: MAMMOGRAPHY | Facility: HOSPITAL | Age: 68
Discharge: HOME | End: 2023-12-26
Attending: FAMILY MEDICINE
Payer: MEDICARE

## 2023-12-26 VITALS — BODY MASS INDEX: 50.02 KG/M2 | HEIGHT: 64 IN | WEIGHT: 293 LBS

## 2023-12-26 DIAGNOSIS — Z12.31 ENCOUNTER FOR SCREENING MAMMOGRAM FOR MALIGNANT NEOPLASM OF BREAST: ICD-10-CM

## 2023-12-26 DIAGNOSIS — Z12.31 SCREENING MAMMOGRAM FOR BREAST CANCER: ICD-10-CM

## 2023-12-26 PROCEDURE — 77067 SCR MAMMO BI INCL CAD: CPT

## 2023-12-26 PROCEDURE — 77063 BREAST TOMOSYNTHESIS BI: CPT

## 2023-12-29 ENCOUNTER — TELEPHONE (OUTPATIENT)
Dept: HEMATOLOGY ONCOLOGY | Facility: CLINIC | Age: 68
End: 2023-12-29

## 2023-12-29 NOTE — TELEPHONE ENCOUNTER
Spoke with patient to make her aware of the following. She verbalized understanding and agreed to plan.

## 2024-01-16 DIAGNOSIS — Z00.6 ENCOUNTER FOR EXAMINATION FOR NORMAL COMPARISON OR CONTROL IN CLINICAL RESEARCH PROGRAM: ICD-10-CM

## 2024-01-29 ENCOUNTER — TELEPHONE (OUTPATIENT)
Dept: HEMATOLOGY ONCOLOGY | Facility: CLINIC | Age: 69
End: 2024-01-29

## 2024-01-29 NOTE — TELEPHONE ENCOUNTER
Patient called in and said that she had her labs drawn at [facility] on Friday in [place]. I do not see them in the system, can you obtain please?

## 2024-01-29 NOTE — TELEPHONE ENCOUNTER
Spoke with patient regarding INR. INR good, no changes, repeat in one month, patient verbalized understanding.

## 2024-02-05 ENCOUNTER — OFFICE VISIT (OUTPATIENT)
Dept: FAMILY MEDICINE CLINIC | Facility: CLINIC | Age: 69
End: 2024-02-05
Payer: MEDICARE

## 2024-02-05 VITALS — BODY MASS INDEX: 75.7 KG/M2 | OXYGEN SATURATION: 97 % | TEMPERATURE: 96.4 F | WEIGHT: 293 LBS | HEART RATE: 80 BPM

## 2024-02-05 DIAGNOSIS — J45.40 MODERATE PERSISTENT ASTHMA WITHOUT COMPLICATION: ICD-10-CM

## 2024-02-05 DIAGNOSIS — R79.1 PROTHROMBIN TIME INCREASED: ICD-10-CM

## 2024-02-05 DIAGNOSIS — E66.01 MORBID OBESITY WITH BMI OF 70 AND OVER, ADULT (HCC): ICD-10-CM

## 2024-02-05 DIAGNOSIS — T78.40XA ALLERGY, INITIAL ENCOUNTER: ICD-10-CM

## 2024-02-05 DIAGNOSIS — D68.9 ABNORMALITY OF COAGULATION (HCC): ICD-10-CM

## 2024-02-05 DIAGNOSIS — D68.59 HYPERCOAGULABLE STATE (HCC): ICD-10-CM

## 2024-02-05 DIAGNOSIS — L03.116 CELLULITIS OF LEFT LOWER EXTREMITY: Primary | ICD-10-CM

## 2024-02-05 DIAGNOSIS — E66.01 MORBID OBESITY (HCC): ICD-10-CM

## 2024-02-05 DIAGNOSIS — D68.9 BLOOD CLOTTING DISORDER (HCC): ICD-10-CM

## 2024-02-05 DIAGNOSIS — E28.2 POLYCYSTIC OVARIAN SYNDROME: ICD-10-CM

## 2024-02-05 DIAGNOSIS — R73.01 IFG (IMPAIRED FASTING GLUCOSE): ICD-10-CM

## 2024-02-05 DIAGNOSIS — R79.83 HOMOCYSTEINEMIA: ICD-10-CM

## 2024-02-05 DIAGNOSIS — Z23 NEED FOR SHINGLES VACCINE: ICD-10-CM

## 2024-02-05 DIAGNOSIS — E11.9 TYPE 2 DIABETES MELLITUS WITHOUT COMPLICATION, WITHOUT LONG-TERM CURRENT USE OF INSULIN (HCC): ICD-10-CM

## 2024-02-05 DIAGNOSIS — K21.9 GASTROESOPHAGEAL REFLUX DISEASE, UNSPECIFIED WHETHER ESOPHAGITIS PRESENT: ICD-10-CM

## 2024-02-05 DIAGNOSIS — G47.33 OBSTRUCTIVE SLEEP APNEA ON CPAP: ICD-10-CM

## 2024-02-05 DIAGNOSIS — N18.31 STAGE 3A CHRONIC KIDNEY DISEASE (HCC): ICD-10-CM

## 2024-02-05 DIAGNOSIS — E78.5 HYPERLIPIDEMIA, UNSPECIFIED HYPERLIPIDEMIA TYPE: ICD-10-CM

## 2024-02-05 DIAGNOSIS — I10 ESSENTIAL HYPERTENSION: ICD-10-CM

## 2024-02-05 DIAGNOSIS — E72.11 HOMOCYSTINURIA (HCC): ICD-10-CM

## 2024-02-05 DIAGNOSIS — I27.20 PULMONARY HTN (HCC): ICD-10-CM

## 2024-02-05 DIAGNOSIS — I87.319 CHRONIC VENOUS HYPERTENSION W ULCERATION (HCC): ICD-10-CM

## 2024-02-05 DIAGNOSIS — R21 SKIN RASH: ICD-10-CM

## 2024-02-05 DIAGNOSIS — L97.909 CHRONIC VENOUS HYPERTENSION W ULCERATION (HCC): ICD-10-CM

## 2024-02-05 DIAGNOSIS — Z86.718 HISTORY OF DVT (DEEP VEIN THROMBOSIS): ICD-10-CM

## 2024-02-05 DIAGNOSIS — Z23 ENCOUNTER FOR ADMINISTRATION OF VACCINE: ICD-10-CM

## 2024-02-05 DIAGNOSIS — E28.2 PCOS (POLYCYSTIC OVARIAN SYNDROME): ICD-10-CM

## 2024-02-05 PROCEDURE — 99214 OFFICE O/P EST MOD 30 MIN: CPT | Performed by: FAMILY MEDICINE

## 2024-02-05 RX ORDER — NYSTATIN 100000 [USP'U]/G
POWDER TOPICAL 2 TIMES DAILY
Qty: 60 G | Refills: 5 | Status: SHIPPED | OUTPATIENT
Start: 2024-02-05

## 2024-02-05 RX ORDER — CLINDAMYCIN HYDROCHLORIDE 300 MG/1
300 CAPSULE ORAL 3 TIMES DAILY
Qty: 21 CAPSULE | Refills: 0 | Status: SHIPPED | OUTPATIENT
Start: 2024-02-05 | End: 2024-02-12

## 2024-02-05 RX ORDER — TRIAMCINOLONE ACETONIDE 1 MG/G
CREAM TOPICAL 2 TIMES DAILY
Qty: 453.6 G | Refills: 3 | Status: SHIPPED | OUTPATIENT
Start: 2024-02-05

## 2024-02-05 RX ORDER — AMLODIPINE BESYLATE AND BENAZEPRIL HYDROCHLORIDE 5; 20 MG/1; MG/1
1 CAPSULE ORAL DAILY
Qty: 90 CAPSULE | Refills: 3 | Status: SHIPPED | OUTPATIENT
Start: 2024-02-05

## 2024-02-05 RX ORDER — NON-ADHERENT BANDAGE 5"X9"
1 BANDAGE TOPICAL 2 TIMES DAILY
Qty: 48 EACH | Refills: 3 | Status: SHIPPED | OUTPATIENT
Start: 2024-02-05

## 2024-02-05 RX ORDER — FUROSEMIDE 20 MG/1
20 TABLET ORAL DAILY
Qty: 90 TABLET | Refills: 3 | Status: SHIPPED | OUTPATIENT
Start: 2024-02-05

## 2024-02-05 RX ORDER — FOLIC ACID-PYRIDOXINE-CYANOCOBALAMIN TAB 2.5-25-2 MG 2.5-25-2 MG
1 TAB ORAL DAILY
Qty: 90 TABLET | Refills: 3 | Status: SHIPPED | OUTPATIENT
Start: 2024-02-05

## 2024-02-05 RX ORDER — PRAVASTATIN SODIUM 40 MG
40 TABLET ORAL DAILY
Qty: 90 TABLET | Refills: 3 | Status: SHIPPED | OUTPATIENT
Start: 2024-02-05

## 2024-02-05 RX ORDER — WARFARIN SODIUM 5 MG/1
TABLET ORAL
Qty: 150 TABLET | Refills: 0 | Status: SHIPPED | OUTPATIENT
Start: 2024-02-05

## 2024-02-05 RX ORDER — MONTELUKAST SODIUM 10 MG/1
10 TABLET ORAL
Qty: 90 TABLET | Refills: 3 | Status: SHIPPED | OUTPATIENT
Start: 2024-02-05

## 2024-02-05 RX ORDER — METRONIDAZOLE 10 MG/G
GEL TOPICAL DAILY
Qty: 60 G | Refills: 5 | Status: SHIPPED | OUTPATIENT
Start: 2024-02-05

## 2024-02-05 RX ORDER — HYDROCORTISONE AND IODOQUINOL 10; 10 MG/G; MG/G
CREAM TOPICAL 2 TIMES DAILY
Qty: 28.4 G | Refills: 5 | Status: SHIPPED | OUTPATIENT
Start: 2024-02-05

## 2024-02-05 RX ORDER — BUDESONIDE AND FORMOTEROL FUMARATE DIHYDRATE 160; 4.5 UG/1; UG/1
2 AEROSOL RESPIRATORY (INHALATION) 2 TIMES DAILY
Qty: 18 G | Refills: 3 | Status: SHIPPED | OUTPATIENT
Start: 2024-02-05

## 2024-02-05 RX ORDER — METFORMIN HYDROCHLORIDE 500 MG/1
2000 TABLET, EXTENDED RELEASE ORAL
Qty: 360 TABLET | Refills: 3 | Status: SHIPPED | OUTPATIENT
Start: 2024-02-05

## 2024-02-05 RX ORDER — CLOTRIMAZOLE AND BETAMETHASONE DIPROPIONATE 10; .64 MG/G; MG/G
CREAM TOPICAL 2 TIMES DAILY
Qty: 45 G | Refills: 5 | Status: SHIPPED | OUTPATIENT
Start: 2024-02-05

## 2024-02-05 NOTE — ASSESSMENT & PLAN NOTE
Lab Results   Component Value Date    HGBA1C 5.8 (H) 08/05/2021   A1c at goal.  Continue current.

## 2024-02-05 NOTE — ASSESSMENT & PLAN NOTE
Lab Results   Component Value Date    EGFR 57 (L) 06/06/2023    EGFR 50 (L) 12/30/2022    EGFR 60 07/06/2022    CREATININE 1.07 06/06/2023    CREATININE 1.19 (H) 12/30/2022    CREATININE 1.04 07/06/2022   Stable.  Continue current.

## 2024-02-05 NOTE — ASSESSMENT & PLAN NOTE
History of Present Illness:  Jame Crane is a 22 year old male patient not known to have chronic medical problems presented with witnessed seizure. The patient states that this morning he was not feeling well and then was on the floor. He sustained trauma to his head and then he was witnessed to have jerking of his extremities and his eyes rolled back. He was unresponsive for about 1 minute. There was no associated tongue biting or urinary incontinence. Initially he was confused but later his mental state is back to normal. He drinks alcohol on almost every day. His last drink was last night. He denies illicit drug use. Recently he had 2-3 episodes of syncope over the last 1-2 years. The exact history is unknown about these events as they were not witnessed but the patient remembers passing out. He has no history of febrile convulsions. He has no prior history of meningitis or major trauma. He was not worked up in the past.   In ED vitals were stable with blood pressure 128/82. Sodium 143, glucose 98, BUN/creatinine 13/1.06, AST 38, ALT 23, alkaline phosphatase 89, bili BC 8.1, hemoglobin 17.4. Alcohol level was not detected. Urine toxicology screen was negative. CT of the head showed a large frontal scalp hematoma but no intracranial bleed or subdural hematoma. CT scan of the cervical spine was negative for fracture or subluxation. There is no family history of epilepsy.    History reviewed. No pertinent past medical history.    History reviewed. No pertinent surgical history.    Social History     Social History   • Marital status: Single     Spouse name: N/A   • Number of children: N/A   • Years of education: N/A     Occupational History   • Not on file.     Social History Main Topics   • Smoking status: Not on file   • Smokeless tobacco: Not on file   • Alcohol use No   • Drug use: No   • Sexual activity: Not on file     Other Topics Concern   • Not on file     Social History Narrative   • No narrative on file  Continue warfarin.  Managed by heme onc.         History reviewed. No pertinent family history.      Medications/Infusions:   Scheduled:   • sodium chloride (PF)  2 mL Injection 2 times per day   • enoxaparin (LOVENOX) injection  40 mg Subcutaneous Q24H       Continuous Infusions:      ALLERGIES:  No Known Allergies    Review of Systems:  10 point review of systems was obtained and is negative except for what is mentioned in the history of present illness.    Physical Exam:   The patient is well-nourished and well developed, in no acute distress. There is large right frontal scalp hematoma and laceration over the forehead.    Visit Vitals  /59 (BP Location: [facility] [place], Patient Position: Semi-[facility])   Pulse 59   Temp 98.1 °F (36.7 °C) (Oral)   Resp 18   Ht 6' 2\" (1.88 m)   Wt 72.6 kg   SpO2 98%   BMI 20.55 kg/m²         Carotid exam showed no carotid bruit.  CVS: Regular S1 and S2. No murmur.    NEUROLOGICAL EXAMINATION:  The patient is awake, alert, and oriented x3. Recent and remote memory are normal. Attention span and fund of knowledge are normal. The patient's speech is fluent and language is intact.    Pupils are 3 mm round reactive to light. Visual fields are full to finger confrontation. Extraocular movements are normal. There is no nystagmus. Facial sensation and muscles of mastication are intact. Nasolabial folds are intact and symmetric with no evidence of facial weakness. Hearing is intact bilaterally. The uvula is midline, and palate moves symmetrically. Shoulder shrugging is normal and symmetric. Tongue is midline with no atrophy or fasciculation.   There is no pronator drift. Muscle bulk and tone are normal. Strength is normal in all extremities. Deep tendon reflexes are 2 in both upper and lower extremities. Plantars are downgoing.   Sensory examination to pinprick and vibration sensation are intact and symmetric.   Coordination did not reveal dysmetria. Gait examination is deferred.    Laboratory Results:      Lab Results   Component Value  Date    SODIUM 143 07/14/2017    POTASSIUM 4.2 07/14/2017    GLUCOSE 98 07/14/2017    BUN 13 07/14/2017    CREATININE 1.06 07/14/2017    CALCIUM 9.7 07/14/2017    BILIRUBIN 0.7 07/14/2017    AST 38 (H) 07/14/2017    GPT 23 07/14/2017    ALBUMIN 4.9 07/14/2017    RBC 5.45 07/14/2017    WBC 8.1 07/14/2017    HGB 17.4 (H) 07/14/2017    HCT 50.3 07/14/2017     07/14/2017           Recent Results (from the past 24 hour(s))   Comprehensive Metabolic Panel    Collection Time: 07/14/17 10:48 AM   Result Value Ref Range    Sodium 143 135 - 145 mmol/L    Potassium 4.2 3.4 - 5.1 mmol/L    Chloride 104 98 - 107 mmol/L    Carbon Dioxide 24 21 - 32 mmol/L    Anion Gap 19 10 - 20 mmol/L    Glucose 98 65 - 99 mg/dL    BUN 13 6 - 20 mg/dL    Creatinine 1.06 0.67 - 1.17 mg/dL    GFR Estimate,  >90     GFR Estimate, Non African American >90     BUN/Creatinine Ratio 12 7 - 25    CALCIUM 9.7 8.4 - 10.2 mg/dL    TOTAL BILIRUBIN 0.7 0.2 - 1.0 mg/dL    AST/SGOT 38 (H) <38 Units/L    ALT/SGPT 23 <79 Units/L    ALK PHOSPHATASE 89 45 - 117 Units/L    TOTAL PROTEIN 8.3 (H) 6.4 - 8.2 g/dL    Albumin 4.9 3.6 - 5.1 g/dL    GLOBULIN 3.4 2.0 - 4.0 g/dL    A/G Ratio, Serum 1.4 1.0 - 2.4   CBC & Auto Differential    Collection Time: 07/14/17 10:48 AM   Result Value Ref Range    WBC 8.1 4.2 - 11.0 K/mcL    RBC 5.45 4.50 - 5.90 mil/mcL    HGB 17.4 (H) 13.0 - 17.0 g/dL    HCT 50.3 39.0 - 51.0 %    MCV 92.3 78.0 - 100.0 fl    MCH 31.9 26.0 - 34.0 pg    MCHC 34.6 32.0 - 36.5 g/dL    RDW-CV 12.5 11.0 - 15.0 %     140 - 450 K/mcL    DIFF TYPE AUTOMATED DIFFERENTIAL     Neutrophil 68 %    LYMPH 25 %    MONO 6 %    EOSIN 1 %    BASO 0 %    Absolute Neutrophil 5.5 1.8 - 7.7 K/mcL    Absolute Lymph 2.1 1.0 - 4.8 K/mcL    Absolute Mono 0.5 0.3 - 0.9 K/mcL    Absolute Eos 0.1 0.1 - 0.5 K/mcL    Absolute Baso 0.0 0.0 - 0.3 K/mcL   Alcohol Level Serum    Collection Time: 07/14/17 10:48 AM   Result Value Ref Range    Alcohol Ethyl None  detected. None detected. mg/dL   Acetaminophen Level    Collection Time: 07/14/17 10:48 AM   Result Value Ref Range    ACETAMINOPHEN <2 (L) 10 - 30 mcg/mL   Salicylate Level    Collection Time: 07/14/17 10:48 AM   Result Value Ref Range    SALICYLATE <2.8 <30.1 mg/dL   Troponin I - Point of Care    Collection Time: 07/14/17 10:54 AM   Result Value Ref Range    Troponin I POC <0.10 <0.10 ng/mL   ISTAT CG8, Venous    Collection Time: 07/14/17 10:56 AM   Result Value Ref Range    PH Venous POC 7.19 (L) 7.35 - 7.45 Units    PCO2 Venous 59 (H) 41 - 54 mm Hg    PO2 Venous 28 (L) 35 - 42 mm Hg    Base Deficit Venous 5 (H) 0 - 2 mmol/L    HCO3 Venous 23 22 - 28 mmol/L    CO2 Total 24 19 - 24 mmol/L    O2 SAT Venous 39 (L) 60 - 80 %    Sodium  135 - 145 mmol/L    Potassium POC 4.0 3.4 - 5.1 mmol/L    CALCIUM IONIZED-POC 1.18 1.15 - 1.29 mmol/L    GLUCOSE POC 91 65 - 99 mg/dL    HEMATOCRIT POC 51.0 39.0 - 51.0 %    Hemoglobin POC 17.3 (H) 13.0 - 17.0 g/dL   Drug Abuse Screen Basic Urine    Collection Time: 07/14/17 12:15 PM   Result Value Ref Range    U-Amphetamines NEGATIVE NEGATIVE    U-Barbiturates NEGATIVE NEGATIVE    U-Benzodiazepines NEGATIVE NEGATIVE    Cannabinoids (THC) UA NEGATIVE NEGATIVE    U-Cocaine/Metabolite NEGATIVE NEGATIVE    Opiates UA NEGATIVE NEGATIVE    U-PHENCYCLIDINE NEGATIVE NEGATIVE       Assessment/Plan:  This is a 22-year-old male patient who is not known to have chronic medical problems but states that he drinks 2-3 drinks alcohol almost daily presented with witnessed generalized convulsion with loss of consciousness. He had 3 similar episodes of loss of consciousness which were unwitnessed and therefore it is not known if he had seizures with them or not. Family states that he was not drinking when he had those previous 3 episodes. The current seizure could be alcohol withdrawal seizure versus unprovoked seizure. Another possibility is that he might have recurrent syncopal episodes and had  syncopal seizure. I recommend obtaining MRI of the brain with and without contrast, EEG and echocardiogram. He is on telemetry. At this time I won't recommend antiepileptic medications until the EEG and MRI are performed. He is on seizure precautions and DVT prophylaxis. I instructed him about the importance of stopping drinking as it will lower seizure threshold. Also I instructed him about seizure precautions particularly no driving. 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 states that he should not be driving for at least 3 months without having a seizure.   He needs to be on [facility] protocol.  The patient needs to be followed with me on outpatient basis after hospital discharge.  I will ask Dr. Moore to see him on the weekend.    Case was discussed with the patient, MD and RN.  Thank you for the opportunity to participate in the care of this patient.      Lili Will M.D.

## 2024-02-06 NOTE — PROGRESS NOTES
Assessment/Plan:    Esophageal reflux  No alarm signs.  Continue current.    Type 2 diabetes mellitus without complication, without long-term current use of insulin ([facility])    Lab Results   Component Value Date    HGBA1C 5.8 (H) 08/05/2021   A1c at goal.  Continue current.    Obstructive sleep apnea on CPAP  Stable.  Continue current.    Essential hypertension  BP at goal.  Continue current.    Chronic venous hypertension w ulceration ([facility])  Offered wound care. She declines.    Pulmonary HTN ([facility])  Continue CPAP.    Blood clotting disorder ([facility])  Continue warfarin.  Managed by heme onc.    Stage 3a chronic kidney disease ([facility])  Lab Results   Component Value Date    EGFR 57 (L) 06/06/2023    EGFR 50 (L) 12/30/2022    EGFR 60 07/06/2022    CREATININE 1.07 06/06/2023    CREATININE 1.19 (H) 12/30/2022    CREATININE 1.04 07/06/2022   Stable.  Continue current.    Morbid obesity with BMI of 70 and over, adult ([facility])  Stable.  Continue current.    Hypercoagulable state ([facility])  Stable.  Continue current.    Homocystinuria ([facility])  Stable.  Continue current.     Diagnoses and all orders for this visit:    Cellulitis of left lower extremity  -     clotrimazole-betamethasone (LOTRISONE) 1-0.05 % cream; Apply topically 2 (two) times a day  -     metroNIDAZOLE (METROGEL) 1 % gel; Apply topically daily  -     nystatin powder; Apply topically 2 (two) times a day Apply to affected area  -     Wound Dressings (Medi-Jair Performance Plus ABD) PADS; Apply 1 each topically 2 (two) times a day  -     clindamycin (CLEOCIN) 300 MG capsule; Take 1 capsule (300 mg total) by mouth 3 (three) times a day for 7 days    Essential hypertension  -     amLODIPine-benazepril (LOTREL 5-20) 5-20 MG per capsule; Take 1 capsule by mouth daily  -     furosemide (LASIX) 20 mg tablet; Take 1 tablet (20 mg total) by mouth daily  -     silver sulfadiazine (SILVADENE,SSD) 1 % cream; Apply 1/16 of an inch to the affected area twice daily.  -     triamcinolone  (KENALOG) 0.1 % cream; Apply topically 2 (two) times a day  -     Lipid panel; Future  -     Comprehensive metabolic panel; Future  -     TSH, 3rd generation with Free T4 reflex; Future  -     Hemoglobin A1c (w/out EAG); Future    Moderate persistent asthma without complication  -     budesonide-formoterol (Symbicort) 160-4.5 mcg/act inhaler; Inhale 2 puffs 2 (two) times a day Rinse mouth after use.  -     silver sulfadiazine (SILVADENE,SSD) 1 % cream; Apply 1/16 of an inch to the affected area twice daily.  -     triamcinolone (KENALOG) 0.1 % cream; Apply topically 2 (two) times a day    Homocysteinemia  -     folic acid-pyridoxine-cyanocobalamin (Folbic) 2.5-25-2 mg; Take 1 tablet by mouth daily    Hypercoagulable state (HCC)  -     folic acid-pyridoxine-cyanocobalamin (Folbic) 2.5-25-2 mg; Take 1 tablet by mouth daily  -     silver sulfadiazine (SILVADENE,SSD) 1 % cream; Apply 1/16 of an inch to the affected area twice daily.  -     triamcinolone (KENALOG) 0.1 % cream; Apply topically 2 (two) times a day    History of DVT (deep vein thrombosis)  -     folic acid-pyridoxine-cyanocobalamin (Folbic) 2.5-25-2 mg; Take 1 tablet by mouth daily  -     warfarin (COUMADIN) 5 mg tablet; Take  strictly as advised    Hyperlipidemia, unspecified hyperlipidemia type  -     furosemide (LASIX) 20 mg tablet; Take 1 tablet (20 mg total) by mouth daily  -     pravastatin (PRAVACHOL) 40 mg tablet; Take 1 tablet (40 mg total) by mouth daily  -     silver sulfadiazine (SILVADENE,SSD) 1 % cream; Apply 1/16 of an inch to the affected area twice daily.  -     triamcinolone (KENALOG) 0.1 % cream; Apply topically 2 (two) times a day    Skin rash  -     Hydrocortisone-Iodoquinol (Dermazene) 1-1 % CREA; Apply topically 2 (two) times a day    PCOS (polycystic ovarian syndrome)  -     metFORMIN (GLUCOPHAGE-XR) 500 mg 24 hr tablet; Take 4 tablets (2,000 mg total) by mouth daily with breakfast    Allergy, initial encounter  -     montelukast  (SINGULAIR) 10 mg tablet; Take 1 tablet (10 mg total) by mouth daily at bedtime    Stage 3a chronic kidney disease (HCC)  -     silver sulfadiazine (SILVADENE,SSD) 1 % cream; Apply 1/16 of an inch to the affected area twice daily.  -     triamcinolone (KENALOG) 0.1 % cream; Apply topically 2 (two) times a day    Chronic venous hypertension w ulceration (HCC)  -     silver sulfadiazine (SILVADENE,SSD) 1 % cream; Apply 1/16 of an inch to the affected area twice daily.  -     triamcinolone (KENALOG) 0.1 % cream; Apply topically 2 (two) times a day    Pulmonary HTN (HCC)  -     silver sulfadiazine (SILVADENE,SSD) 1 % cream; Apply 1/16 of an inch to the affected area twice daily.  -     triamcinolone (KENALOG) 0.1 % cream; Apply topically 2 (two) times a day    Morbid obesity (HCC)  -     silver sulfadiazine (SILVADENE,SSD) 1 % cream; Apply 1/16 of an inch to the affected area twice daily.  -     triamcinolone (KENALOG) 0.1 % cream; Apply topically 2 (two) times a day  -     Lipid panel; Future  -     Comprehensive metabolic panel; Future  -     TSH, 3rd generation with Free T4 reflex; Future  -     Hemoglobin A1c (w/out EAG); Future    Homocystinuria (HCC)  -     silver sulfadiazine (SILVADENE,SSD) 1 % cream; Apply 1/16 of an inch to the affected area twice daily.  -     triamcinolone (KENALOG) 0.1 % cream; Apply topically 2 (two) times a day    Gastroesophageal reflux disease, unspecified whether esophagitis present  -     silver sulfadiazine (SILVADENE,SSD) 1 % cream; Apply 1/16 of an inch to the affected area twice daily.  -     triamcinolone (KENALOG) 0.1 % cream; Apply topically 2 (two) times a day    Polycystic ovarian syndrome  -     silver sulfadiazine (SILVADENE,SSD) 1 % cream; Apply 1/16 of an inch to the affected area twice daily.  -     triamcinolone (KENALOG) 0.1 % cream; Apply topically 2 (two) times a day    Obstructive sleep apnea on CPAP  -     silver sulfadiazine (SILVADENE,SSD) 1 % cream; Apply 1/16  of an inch to the affected area twice daily.  -     triamcinolone (KENALOG) 0.1 % cream; Apply topically 2 (two) times a day    Blood clotting disorder (HCC)  -     silver sulfadiazine (SILVADENE,SSD) 1 % cream; Apply 1/16 of an inch to the affected area twice daily.  -     triamcinolone (KENALOG) 0.1 % cream; Apply topically 2 (two) times a day    Abnormality of coagulation (HCC)  -     silver sulfadiazine (SILVADENE,SSD) 1 % cream; Apply 1/16 of an inch to the affected area twice daily.  -     triamcinolone (KENALOG) 0.1 % cream; Apply topically 2 (two) times a day    Need for shingles vaccine  -     silver sulfadiazine (SILVADENE,SSD) 1 % cream; Apply 1/16 of an inch to the affected area twice daily.  -     triamcinolone (KENALOG) 0.1 % cream; Apply topically 2 (two) times a day    Type 2 diabetes mellitus without complication, without long-term current use of insulin ([facility])  -     Lipid panel; Future  -     Comprehensive metabolic panel; Future  -     TSH, 3rd generation with Free T4 reflex; Future  -     Hemoglobin A1c (w/out EAG); Future    Encounter for administration of vaccine  -     RSV Pre-Fusion F A&B Vac Rcmb (Abrysvo) SOLR vaccine; Inject 0.5 mL into a muscle once for 1 dose    Prothrombin time increased    IFG (impaired fasting glucose)  -     Lipid panel; Future  -     Comprehensive metabolic panel; Future  -     TSH, 3rd generation with Free T4 reflex; Future  -     Hemoglobin A1c (w/out EAG); Future    Morbid obesity with BMI of 70 and over, adult ([facility])          Subjective:      Patient ID: Nikki Roach is a 68 y.o. female.    HPI    The following portions of the patient's history were reviewed and updated as appropriate: She  has a past medical history of Blood clotting disorder (HCC), Homocysteinemia, Hypercoagulable state (HCC), Lyme disease, Renal disorder, and 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).  She   Patient Active Problem List     Diagnosis Date Noted    Type 2 diabetes mellitus without complication, without long-term current use of insulin ([facility]) 02/05/2024    Stage 3a chronic kidney disease ([facility]) 04/03/2023    Homocystinuria ([facility]) 09/02/2022    Prothrombin time increased 07/26/2021    Anemia 09/10/2020    Hyperphosphatemia 09/10/2020    S/P TKR (total knee replacement) 03/04/2020    [place] filter in place 02/17/2020    History of pulmonary embolism 02/17/2020    Rosacea 11/22/2019    Abnormality of coagulation ([facility]) 06/12/2019    Hypertension, essential 06/10/2019    Pulmonary HTN ([facility])     Heme positive stool 06/21/2018    Cellulitis of right lower extremity 03/30/2018    Lyme disease     Blood clotting disorder ([facility])     Essential hypertension     Homocysteinemia     Hypercoagulable state ([facility])     Obstructive sleep apnea on CPAP     Morbid obesity with BMI of 70 and over, adult ([facility]) 07/01/2016    Skin rash 05/22/2014    Chronic venous hypertension w ulceration ([facility]) 12/18/2013    Osteoporosis 12/02/2013    Edema 11/20/2013    Esophageal reflux 11/20/2013    Hyperlipidemia 11/20/2013    Polycystic ovarian syndrome 11/20/2013     She  has a past surgical history that includes Joint replacement (Bilateral); Appendectomy; Ankle surgery (Right); Hysterectomy; Hemorroidectomy; Foot surgery; LITHOTRIPSY; Closed reduction metatarsal fracture (Right); Vena cava filter placement; and Breast excisional biopsy (Left).  Her family history includes Breast cancer in her paternal aunt; Hypercoagulant Ability in her maternal grandmother and mother; No Known Problems in her daughter, father, maternal grandfather, paternal aunt, paternal aunt, paternal aunt, paternal aunt, paternal aunt, paternal aunt, paternal grandfather, paternal grandmother, and sister; Pulmonary embolism in her mother.  She  reports that she has never smoked. She has never used smokeless tobacco. She reports that she does not drink alcohol and does not use drugs.  Current  Outpatient Medications   Medication Sig Dispense Refill    amLODIPine-benazepril (LOTREL 5-20) 5-20 MG per capsule Take 1 capsule by mouth daily 90 capsule 3    budesonide-formoterol (Symbicort) 160-4.5 mcg/act inhaler Inhale 2 puffs 2 (two) times a day Rinse mouth after use. 18 g 3    clindamycin (CLEOCIN) 300 MG capsule Take 1 capsule (300 mg total) by mouth 3 (three) times a day for 7 days 21 capsule 0    clotrimazole-betamethasone (LOTRISONE) 1-0.05 % cream Apply topically 2 (two) times a day 45 g 5    folic acid-pyridoxine-cyanocobalamin (Folbic) 2.5-25-2 mg Take 1 tablet by mouth daily 90 tablet 3    furosemide (LASIX) 20 mg tablet Take 1 tablet (20 mg total) by mouth daily 90 tablet 3    Hydrocortisone-Iodoquinol (Dermazene) 1-1 % CREA Apply topically 2 (two) times a day 28.4 g 5    metFORMIN (GLUCOPHAGE-XR) 500 mg 24 hr tablet Take 4 tablets (2,000 mg total) by mouth daily with breakfast 360 tablet 3    metroNIDAZOLE (METROGEL) 1 % gel Apply topically daily 60 g 5    montelukast (SINGULAIR) 10 mg tablet Take 1 tablet (10 mg total) by mouth daily at bedtime 90 tablet 3    multivitamin (THERAGRAN) TABS Take 1 tablet by mouth daily.      nystatin powder Apply topically 2 (two) times a day Apply to affected area 60 g 5    pravastatin (PRAVACHOL) 40 mg tablet Take 1 tablet (40 mg total) by mouth daily 90 tablet 3    RSV Pre-Fusion F A&B Vac Rcmb (Abrysvo) SOLR vaccine Inject 0.5 mL into a muscle once for 1 dose 1 each 0    silver sulfadiazine (SILVADENE,SSD) 1 % cream Apply 1/16 of an inch to the affected area twice daily. 400 g 3    triamcinolone (KENALOG) 0.1 % cream Apply topically 2 (two) times a day 453.6 g 3    warfarin (COUMADIN) 5 mg tablet Take  strictly as advised 150 tablet 0    Wound Dressings (Medi-Jair Performance Plus ABD) PADS Apply 1 each topically 2 (two) times a day 48 each 3     No current facility-administered medications for this visit.     Current Outpatient Medications on File Prior to  Visit   Medication Sig    multivitamin (THERAGRAN) TABS Take 1 tablet by mouth daily.    [DISCONTINUED] amLODIPine-benazepril (LOTREL 5-20) 5-20 MG per capsule TAKE 1 CAPSULE DAILY    [DISCONTINUED] budesonide-formoterol (Symbicort) 160-4.5 mcg/act inhaler Inhale 2 puffs 2 (two) times a day Rinse mouth after use.    [DISCONTINUED] clotrimazole-betamethasone (LOTRISONE) 1-0.05 % cream APPLY A THIN LAYER OF CREAM TOPICALLY TO AFFECTED AREAS ON THE TRUNK AND EXTREMITIES ONCE DAILY AS NEEDED FOR FLARES    [DISCONTINUED] folic acid-pyridoxine-cyanocobalamin (Folbic) 2.5-25-2 mg Take 1 tablet by mouth daily    [DISCONTINUED] furosemide (LASIX) 20 mg tablet Take 1 tablet (20 mg total) by mouth daily    [DISCONTINUED] Hydrocortisone-Iodoquinol (Dermazene) 1-1 % CREA Apply topically 2 (two) times a day    [DISCONTINUED] metFORMIN (GLUCOPHAGE-XR) 500 mg 24 hr tablet Take 4 tablets (2,000 mg total) by mouth daily with breakfast    [DISCONTINUED] metroNIDAZOLE (METROGEL) 1 % gel Apply 1 application topically daily      [DISCONTINUED] montelukast (SINGULAIR) 10 mg tablet Take 1 tablet (10 mg total) by mouth daily at bedtime    [DISCONTINUED] nystatin powder Apply topically 2 (two) times a day Apply to affected area    [DISCONTINUED] pravastatin (PRAVACHOL) 40 mg tablet TAKE 1 TABLET DAILY    [DISCONTINUED] silver sulfadiazine (SILVADENE,SSD) 1 % cream Apply 1/16 of an inch to the affected area twice daily.    [DISCONTINUED] triamcinolone (KENALOG) 0.1 % cream Apply topically 2 (two) times a day    [DISCONTINUED] warfarin (COUMADIN) 5 mg tablet Take  strictly as advised     No current facility-administered medications on file prior to visit.     She is allergic to azithromycin, ciprofloxacin, doxycycline, and ancef [cefazolin]..    Review of Systems   All other systems reviewed and are negative.        Objective:      Pulse 80   Temp (!) 96.4 °F (35.8 °C) (Tympanic)   Wt (!) 200 kg (441 lb)   LMP  (LMP Unknown)   SpO2 97%    BMI 75.70 kg/m²          Physical Exam  Vitals and nursing note reviewed.   Constitutional:       Appearance: Normal appearance. She is obese.   Cardiovascular:      Rate and Rhythm: Normal rate and regular rhythm.      Pulses: Normal pulses.      Heart sounds: Normal heart sounds.   Pulmonary:      Effort: Pulmonary effort is normal.      Breath sounds: Normal breath sounds.   Abdominal:      General: Abdomen is flat. Bowel sounds are normal.      Palpations: Abdomen is soft.   Musculoskeletal:         General: Normal range of motion.      Cervical back: Normal range of motion and neck supple.   Skin:     General: Skin is warm and dry.      Capillary Refill: Capillary refill takes less than 2 seconds.   Neurological:      General: No focal deficit present.      Mental Status: She is alert and oriented to person, place, and time. Mental status is at baseline.   Psychiatric:         Mood and Affect: Mood normal.         Behavior: Behavior normal.         Thought Content: Thought content normal.         Judgment: Judgment normal.

## 2024-02-07 ENCOUNTER — TELEPHONE (OUTPATIENT)
Dept: HEMATOLOGY ONCOLOGY | Facility: CLINIC | Age: 69
End: 2024-02-07

## 2024-02-07 NOTE — TELEPHONE ENCOUNTER
Returned telephone call spoke with Pt.  She was seen by PCP she has cellulitis Left Lower leg and was started on Clindamycin  on Monday for 7 days mg TID.  She has been taking Warfarin 5 mg Monday - Thursday and 7.5 mg Friday - Sunday.  Her last Pt/INR 1/26/24 2.6.  Per Dr Alicea the Clindamycin and warfarin is no interaction therefore no change in dosing.  Repeat Pt.INR in 2 weeks. Pt states she understands.

## 2024-02-07 NOTE — TELEPHONE ENCOUNTER
Patient Call    Who are you speaking with? Patient    If it is not the patient, are they listed on an active communication consent form? N/A   What is the reason for this call? Pt has cellulitis on her leg and had to go back on clindamycin on monday   Does this require a call back? Yes   If a call back is required, please list best call back number [phone]    If a call back is required, advise that a message will be forwarded to their care team and someone will return their call as soon as possible.   Did you relay this information to the patient? Yes

## 2024-02-15 NOTE — TELEPHONE ENCOUNTER
[facility] to inform us that she just scanned a PT INR into [facility] chart that was criticial 
Please address with Dr Quinn Route
Additional Notes: Improving \\nDirected to use hydrocortisone ointment for another week\\nFollow up in 1 month \\n\\n\\nLast visit:\\nAppropriate, albeit brisk, reaction to 5FU\\nInitiate use of Hydrocortisone 2.5% ointment to lips 3x daily \\nFollow up in 1 week
Render Risk Assessment In Note?: no
Detail Level: Simple

## 2024-02-22 ENCOUNTER — OFFICE VISIT (OUTPATIENT)
Dept: FAMILY MEDICINE CLINIC | Facility: CLINIC | Age: 69
End: 2024-02-22
Payer: MEDICARE

## 2024-02-22 ENCOUNTER — TELEPHONE (OUTPATIENT)
Age: 69
End: 2024-02-22

## 2024-02-22 VITALS — OXYGEN SATURATION: 97 % | TEMPERATURE: 97.3 F | HEART RATE: 85 BPM

## 2024-02-22 DIAGNOSIS — L03.116 CELLULITIS OF LEFT LOWER EXTREMITY: Primary | ICD-10-CM

## 2024-02-22 PROCEDURE — 99213 OFFICE O/P EST LOW 20 MIN: CPT

## 2024-02-22 NOTE — PROGRESS NOTES
Name: Nikki Roach      : 1955      MRN: [id number]  Encounter Provider: Mk Reyes PA-C  Encounter Date: 2024   Encounter department: [facility]    Assessment & Plan     1. Cellulitis of left lower extremity    Patient is sent to the emergency department.  Given that she has failed in the past on outpatient antibiotics other than clindamycin, and has failed on clindamycin at this time, then she should be evaluated by emergency department if she needs IV antibiotics or oral treatment.       Subjective      Patient is a 68-year-old female presenting with cellulitis for 2 weeks.  Patient was seen in the office 2 weeks ago with cellulitis and it was located on the medial part of her ankle.  It is now further up her shin and goes completely around her leg.  Patient states she took 7 days of clindamycin and has been using creams and pads on it.  It got worse despite this.  Patient denies fevers, pain.  There is erythema and swelling.  Patient historically fails on most oral antibiotics other than clindamycin, but is failing clindamycin this time.  She has an extensive history of cellulitis, and follow-up with wound care.      Review of Systems   Constitutional:  Negative for appetite change, chills, diaphoresis, fatigue and fever.   HENT:  Negative for congestion, ear discharge, ear pain, postnasal drip, rhinorrhea, sinus pressure, sinus pain, sneezing and sore throat.    Eyes:  Negative for pain, discharge, redness, itching and visual disturbance.   Respiratory:  Negative for apnea, cough, chest tightness, shortness of breath and wheezing.    Cardiovascular:  Negative for chest pain, palpitations and leg swelling.   Gastrointestinal:  Negative for abdominal pain, blood in stool, constipation, diarrhea, nausea and vomiting.   Endocrine: Negative for cold intolerance, heat intolerance, polydipsia and polyuria.   Genitourinary:  Negative for dysuria, flank pain, frequency,  hematuria and urgency.   Musculoskeletal:  Negative for arthralgias, back pain, myalgias, neck pain and neck stiffness.   Skin:  Positive for color change and rash. Negative for pallor.   Allergic/Immunologic: Negative for environmental allergies and food allergies.   Neurological:  Negative for dizziness, tremors, seizures, syncope, speech difficulty, weakness, light-headedness, numbness and headaches.   Hematological:  Negative for adenopathy. Does not bruise/bleed easily.   Psychiatric/Behavioral:  Negative for agitation, confusion, decreased concentration, dysphoric mood, hallucinations, self-injury, sleep disturbance and suicidal ideas. The patient is not nervous/anxious and is not hyperactive.    All other systems reviewed and are negative.      Current Outpatient Medications on File Prior to Visit   Medication Sig    amLODIPine-benazepril (LOTREL 5-20) 5-20 MG per capsule Take 1 capsule by mouth daily    budesonide-formoterol (Symbicort) 160-4.5 mcg/act inhaler Inhale 2 puffs 2 (two) times a day Rinse mouth after use.    clotrimazole-betamethasone (LOTRISONE) 1-0.05 % cream Apply topically 2 (two) times a day    folic acid-pyridoxine-cyanocobalamin (Folbic) 2.5-25-2 mg Take 1 tablet by mouth daily    furosemide (LASIX) 20 mg tablet Take 1 tablet (20 mg total) by mouth daily    Hydrocortisone-Iodoquinol (Dermazene) 1-1 % CREA Apply topically 2 (two) times a day    metFORMIN (GLUCOPHAGE-XR) 500 mg 24 hr tablet Take 4 tablets (2,000 mg total) by mouth daily with breakfast    metroNIDAZOLE (METROGEL) 1 % gel Apply topically daily    montelukast (SINGULAIR) 10 mg tablet Take 1 tablet (10 mg total) by mouth daily at bedtime    multivitamin (THERAGRAN) TABS Take 1 tablet by mouth daily.    nystatin powder Apply topically 2 (two) times a day Apply to affected area    pravastatin (PRAVACHOL) 40 mg tablet Take 1 tablet (40 mg total) by mouth daily    silver sulfadiazine (SILVADENE,SSD) 1 % cream Apply 1/16 of an inch  to the affected area twice daily.    triamcinolone (KENALOG) 0.1 % cream Apply topically 2 (two) times a day    warfarin (COUMADIN) 5 mg tablet Take  strictly as advised    Wound Dressings (Medi-Jair Performance Plus ABD) PADS Apply 1 each topically 2 (two) times a day       Objective     Pulse 85   Temp (!) 97.3 °F (36.3 °C) (Tympanic)   LMP  (LMP Unknown)   SpO2 97%     Physical Exam  Vitals and nursing note reviewed.   Constitutional:       Appearance: Normal appearance. She is normal weight. She is not ill-appearing or toxic-appearing.   HENT:      Head: Normocephalic and atraumatic.      Right Ear: Tympanic membrane normal.      Left Ear: Tympanic membrane normal.      Nose: Nose normal.      Mouth/Throat:      Mouth: Mucous membranes are moist.      Pharynx: Oropharynx is clear.   Eyes:      Extraocular Movements: Extraocular movements intact.      Conjunctiva/sclera: Conjunctivae normal.      Pupils: Pupils are equal, round, and reactive to light.   Cardiovascular:      Rate and Rhythm: Normal rate and regular rhythm.      Pulses: Normal pulses.      Heart sounds: Normal heart sounds. No murmur heard.  Pulmonary:      Effort: Pulmonary effort is normal. No respiratory distress.      Breath sounds: Normal breath sounds. No wheezing.   Chest:      Chest wall: No tenderness.   Abdominal:      General: Bowel sounds are normal.      Palpations: Abdomen is soft. There is no mass.      Tenderness: There is no abdominal tenderness.   Musculoskeletal:         General: No tenderness. Normal range of motion.      Cervical back: Normal range of motion and neck supple. No tenderness.      Right lower leg: No edema.      Left lower leg: No edema.        Legs:       Comments: Cellulitis completely around left ankle and lower leg.   Lymphadenopathy:      Cervical: No cervical adenopathy.   Skin:     General: Skin is warm.      Capillary Refill: Capillary refill takes less than 2 seconds.      Findings: Erythema and rash  present.   Neurological:      General: No focal deficit present.      Mental Status: She is alert and oriented to person, place, and time. Mental status is at baseline.      Motor: No weakness.      Gait: Gait normal.   Psychiatric:         Mood and Affect: Mood normal.         Behavior: Behavior normal.         Thought Content: Thought content normal.         Judgment: Judgment normal.       Mk Reyes PA-C

## 2024-02-22 NOTE — TELEPHONE ENCOUNTER
Pt called stated she just saw Mk Reyes who wanted her to go to the ER.    Calling stating she is not sure she can go to the ER tonight because it is sleeting, but will go tomorrow.  Encouraged pt to go to ER as soon as possible since Mk recommended it.

## 2024-02-23 ENCOUNTER — HOSPITAL ENCOUNTER (EMERGENCY)
Facility: HOSPITAL | Age: 69
Discharge: HOME | End: 2024-02-23
Attending: EMERGENCY MEDICINE
Payer: MEDICARE

## 2024-02-23 VITALS
RESPIRATION RATE: 20 BRPM | DIASTOLIC BLOOD PRESSURE: 65 MMHG | HEART RATE: 80 BPM | SYSTOLIC BLOOD PRESSURE: 145 MMHG | OXYGEN SATURATION: 99 % | TEMPERATURE: 97.6 F

## 2024-02-23 DIAGNOSIS — L03.116 CELLULITIS OF LEFT LEG: Primary | ICD-10-CM

## 2024-02-23 LAB
ANION GAP SERPL CALCULATED.3IONS-SCNC: 10 MMOL/L
BASOPHILS # BLD AUTO: 0.09 THOUSANDS/ÂΜL (ref 0–0.1)
BASOPHILS NFR BLD AUTO: 1 % (ref 0–1)
BUN SERPL-MCNC: 25 MG/DL (ref 5–25)
CALCIUM SERPL-MCNC: 9.4 MG/DL (ref 8.4–10.2)
CHLORIDE SERPL-SCNC: 104 MMOL/L (ref 96–108)
CO2 SERPL-SCNC: 25 MMOL/L (ref 21–32)
CREAT SERPL-MCNC: 1.07 MG/DL (ref 0.6–1.3)
EOSINOPHIL # BLD AUTO: 0.45 THOUSAND/ÂΜL (ref 0–0.61)
EOSINOPHIL NFR BLD AUTO: 5 % (ref 0–6)
ERYTHROCYTE [DISTWIDTH] IN BLOOD BY AUTOMATED COUNT: 14.8 % (ref 11.6–15.1)
GFR SERPL CREATININE-BSD FRML MDRD: 53 ML/MIN/1.73SQ M
GLUCOSE SERPL-MCNC: 98 MG/DL (ref 65–140)
HCT VFR BLD AUTO: 39.7 % (ref 34.8–46.1)
HGB BLD-MCNC: 12.2 G/DL (ref 11.5–15.4)
IMM GRANULOCYTES # BLD AUTO: 0.04 THOUSAND/UL (ref 0–0.2)
IMM GRANULOCYTES NFR BLD AUTO: 1 % (ref 0–2)
LACTATE SERPL-SCNC: 1.5 MMOL/L (ref 0.5–2)
LYMPHOCYTES # BLD AUTO: 2.22 THOUSANDS/ÂΜL (ref 0.6–4.47)
LYMPHOCYTES NFR BLD AUTO: 26 % (ref 14–44)
MCH RBC QN AUTO: 27.8 PG (ref 26.8–34.3)
MCHC RBC AUTO-ENTMCNC: 30.7 G/DL (ref 31.4–37.4)
MCV RBC AUTO: 90 FL (ref 82–98)
MONOCYTES # BLD AUTO: 0.85 THOUSAND/ÂΜL (ref 0.17–1.22)
MONOCYTES NFR BLD AUTO: 10 % (ref 4–12)
NEUTROPHILS # BLD AUTO: 5.02 THOUSANDS/ÂΜL (ref 1.85–7.62)
NEUTS SEG NFR BLD AUTO: 57 % (ref 43–75)
NRBC BLD AUTO-RTO: 0 /100 WBCS
PLATELET # BLD AUTO: 349 THOUSANDS/UL (ref 149–390)
PMV BLD AUTO: 10.3 FL (ref 8.9–12.7)
POTASSIUM SERPL-SCNC: 3.9 MMOL/L (ref 3.5–5.3)
RBC # BLD AUTO: 4.39 MILLION/UL (ref 3.81–5.12)
SODIUM SERPL-SCNC: 139 MMOL/L (ref 135–147)
WBC # BLD AUTO: 8.67 THOUSAND/UL (ref 4.31–10.16)

## 2024-02-23 PROCEDURE — 83605 ASSAY OF LACTIC ACID: CPT | Performed by: PHYSICIAN ASSISTANT

## 2024-02-23 PROCEDURE — 80048 BASIC METABOLIC PNL TOTAL CA: CPT | Performed by: PHYSICIAN ASSISTANT

## 2024-02-23 PROCEDURE — 96366 THER/PROPH/DIAG IV INF ADDON: CPT

## 2024-02-23 PROCEDURE — 96365 THER/PROPH/DIAG IV INF INIT: CPT

## 2024-02-23 PROCEDURE — 85025 COMPLETE CBC W/AUTO DIFF WBC: CPT | Performed by: PHYSICIAN ASSISTANT

## 2024-02-23 PROCEDURE — 99283 EMERGENCY DEPT VISIT LOW MDM: CPT

## 2024-02-23 PROCEDURE — 99285 EMERGENCY DEPT VISIT HI MDM: CPT | Performed by: PHYSICIAN ASSISTANT

## 2024-02-23 PROCEDURE — 87040 BLOOD CULTURE FOR BACTERIA: CPT | Performed by: PHYSICIAN ASSISTANT

## 2024-02-23 PROCEDURE — 36415 COLL VENOUS BLD VENIPUNCTURE: CPT | Performed by: PHYSICIAN ASSISTANT

## 2024-02-23 RX ORDER — SULFAMETHOXAZOLE AND TRIMETHOPRIM 800; 160 MG/1; MG/1
1 TABLET ORAL 2 TIMES DAILY
Qty: 14 TABLET | Refills: 0 | Status: SHIPPED | OUTPATIENT
Start: 2024-02-23 | End: 2024-03-01

## 2024-02-23 RX ORDER — CEPHALEXIN 500 MG/1
500 CAPSULE ORAL EVERY 6 HOURS SCHEDULED
Qty: 28 CAPSULE | Refills: 0 | Status: SHIPPED | OUTPATIENT
Start: 2024-02-23 | End: 2024-03-01

## 2024-02-23 RX ORDER — ACETAMINOPHEN 325 MG/1
650 TABLET ORAL ONCE
Status: COMPLETED | OUTPATIENT
Start: 2024-02-23 | End: 2024-02-23

## 2024-02-23 RX ADMIN — VANCOMYCIN HYDROCHLORIDE 1750 MG: 1 INJECTION, POWDER, LYOPHILIZED, FOR SOLUTION INTRAVENOUS at 11:25

## 2024-02-23 RX ADMIN — ACETAMINOPHEN 650 MG: 325 TABLET, FILM COATED ORAL at 10:02

## 2024-02-23 NOTE — ED PROVIDER NOTES
History  Chief Complaint   Patient presents with    Cellulitis     Cellulitis of the left lower extremity that started a couple weeks ago, states she was taking clindamycin took full dose leg had gotten better, but since has gotten worse      This is a 68-year-old female presenting to the emergency department today for evaluation of failed outpatient management of cellulitis left lower extremity sent in by primary care provider for IV antibiotics.    She started with a rash left ankle about 2 weeks ago, she states that she saw primary care which was verified via epic notation she was started on clindamycin she finished this 2-week ago she states it is still red slightly more red more painful.  She has a history of knee replacement left-sided.  She denies any systemic symptoms such as fevers chills or sweats.            Prior to Admission Medications   Prescriptions Last Dose Informant Patient Reported? Taking?   Hydrocortisone-Iodoquinol (Dermazene) 1-1 % CREA   No No   Sig: Apply topically 2 (two) times a day   Wound Dressings (Medi-Jair Performance Plus ABD) PADS   No No   Sig: Apply 1 each topically 2 (two) times a day   amLODIPine-benazepril (LOTREL 5-20) 5-20 MG per capsule   No No   Sig: Take 1 capsule by mouth daily   budesonide-formoterol (Symbicort) 160-4.5 mcg/act inhaler   No No   Sig: Inhale 2 puffs 2 (two) times a day Rinse mouth after use.   clotrimazole-betamethasone (LOTRISONE) 1-0.05 % cream   No No   Sig: Apply topically 2 (two) times a day   folic acid-pyridoxine-cyanocobalamin (Folbic) 2.5-25-2 mg   No No   Sig: Take 1 tablet by mouth daily   furosemide (LASIX) 20 mg tablet   No No   Sig: Take 1 tablet (20 mg total) by mouth daily   metFORMIN (GLUCOPHAGE-XR) 500 mg 24 hr tablet   No No   Sig: Take 4 tablets (2,000 mg total) by mouth daily with breakfast   metroNIDAZOLE (METROGEL) 1 % gel   No No   Sig: Apply topically daily   montelukast (SINGULAIR) 10 mg tablet   No No   Sig: Take 1 tablet (10 mg  total) by mouth daily at bedtime   multivitamin (THERAGRAN) TABS  Self Yes No   Sig: Take 1 tablet by mouth daily.   nystatin powder   No No   Sig: Apply topically 2 (two) times a day Apply to affected area   pravastatin (PRAVACHOL) 40 mg tablet   No No   Sig: Take 1 tablet (40 mg total) by mouth daily   silver sulfadiazine (SILVADENE,SSD) 1 % cream   No No   Sig: Apply 1/16 of an inch to the affected area twice daily.   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)        Past Surgical History:   Procedure Laterality Date    ANKLE SURGERY Right     APPENDECTOMY      BREAST EXCISIONAL BIOPSY Left     benign cyst    CLOSED REDUCTION METATARSAL FRACTURE Right     FOOT SURGERY      HEMORROIDECTOMY      HYSTERECTOMY      JOINT REPLACEMENT Bilateral     knee    LITHOTRIPSY      VENA CAVA FILTER PLACEMENT      onset: 1/10/10       Family History   Problem Relation Age of Onset    Pulmonary embolism Mother     Hypercoagulant Ability Mother         primary state    No Known Problems Father     No Known Problems Sister     No Known Problems Daughter     Hypercoagulant Ability Maternal Grandmother         primary state    No Known Problems Maternal Grandfather     No Known Problems Paternal Grandmother     No Known Problems Paternal Grandfather     Breast cancer Paternal Aunt     No Known Problems Paternal Aunt     No Known Problems Paternal Aunt     No Known Problems Paternal Aunt     No Known Problems Paternal Aunt     No Known Problems Paternal Aunt     No Known Problems Paternal Aunt      I  have reviewed and agree with the history as documented.    E-Cigarette/Vaping    E-Cigarette Use Never User      E-Cigarette/Vaping Substances     Social History     Tobacco Use    Smoking status: Never    Smokeless tobacco: Never   Vaping Use    Vaping status: Never Used   Substance Use Topics    Alcohol use: Never    Drug use: No       Review of Systems   Constitutional:  Negative for chills and fever.   HENT:  Negative for ear pain and sore throat.    Eyes:  Negative for pain and visual disturbance.   Respiratory:  Negative for cough and shortness of breath.    Cardiovascular:  Negative for chest pain and palpitations.   Gastrointestinal:  Negative for abdominal pain and vomiting.   Genitourinary:  Negative for dysuria and hematuria.   Musculoskeletal:  Negative for arthralgias and back pain.   Skin:  Negative for color change and rash.        Left lower extremity rash    Neurological:  Negative for seizures and syncope.   All other systems reviewed and are negative.      Physical Exam  Physical Exam  Vitals reviewed.   Constitutional:       General: She is not in acute distress.     Appearance: Normal appearance. She is obese. She is not ill-appearing, toxic-appearing or diaphoretic.   HENT:      Head: Normocephalic and atraumatic.      Right Ear: External ear normal.      Left Ear: External ear normal.   Eyes:      General: No scleral icterus.        Right eye: No discharge.         Left eye: No discharge.      Extraocular Movements: Extraocular movements intact.      Conjunctiva/sclera: Conjunctivae normal.   Cardiovascular:      Rate and Rhythm: Normal rate.      Pulses: Normal pulses.   Pulmonary:      Effort: Pulmonary effort is normal. No respiratory distress.      Breath sounds: No stridor.   Musculoskeletal:         General: No deformity or signs of injury.      Cervical back: Normal range of motion. No rigidity.   Skin:     General: Skin is warm.      Coloration: Skin is not jaundiced.      Findings:  Erythema present. No lesion or rash.   Neurological:      General: No focal deficit present.      Mental Status: She is alert and oriented to person, place, and time. Mental status is at baseline.      Gait: Gait normal.   Psychiatric:         Mood and Affect: Mood normal.         Thought Content: Thought content normal.         Judgment: Judgment normal.         Vital Signs  ED Triage Vitals   Temperature Pulse Respirations Blood Pressure SpO2   02/23/24 0942 02/23/24 0942 02/23/24 0942 02/23/24 0942 02/23/24 0942   97.6 °F (36.4 °C) 88 18 (!) 192/81 99 %      Temp Source Heart Rate Source Patient Position - Orthostatic VS BP Location FiO2 (%)   02/23/24 0942 02/23/24 0942 02/23/24 0942 02/23/24 0942 --   Temporal Monitor Lying Left arm       Pain Score       02/23/24 1002       8           Vitals:    02/23/24 1000 02/23/24 1030 02/23/24 1100 02/23/24 1130   BP: 144/59 143/69 145/67 148/79   Pulse: 79 71 68 74   Patient Position - Orthostatic VS: Sitting Sitting Sitting Sitting         Visual Acuity      ED Medications  Medications   vancomycin (VANCOCIN) 1,750 mg in sodium chloride 0.9 % 500 mL IVPB (1,750 mg Intravenous New Bag 2/23/24 1125)   acetaminophen (TYLENOL) tablet 650 mg (650 mg Oral Given 2/23/24 1002)       Diagnostic Studies  Results Reviewed       Procedure Component Value Units Date/Time    Basic metabolic panel [[id number]] Collected: 02/23/24 0958    Lab Status: Final result Specimen: Blood from Arm, Right Updated: 02/23/24 1023     Sodium 139 mmol/L      Potassium 3.9 mmol/L      Chloride 104 mmol/L      CO2 25 mmol/L      ANION GAP 10 mmol/L      BUN 25 mg/dL      Creatinine 1.07 mg/dL      Glucose 98 mg/dL      Calcium 9.4 mg/dL      eGFR 53 ml/min/1.73sq m     Narrative:      National Kidney Disease Foundation guidelines for Chronic Kidney Disease (CKD):     Stage 1 with normal or high GFR (GFR > 90 mL/min/1.73 square meters)    Stage 2 Mild CKD (GFR = 60-89 mL/min/1.73 square meters)    Stage  3A Moderate CKD (GFR = 45-59 mL/min/1.73 square meters)    Stage 3B Moderate CKD (GFR = 30-44 mL/min/1.73 square meters)    Stage 4 Severe CKD (GFR = 15-29 mL/min/1.73 square meters)    Stage 5 End Stage CKD (GFR <15 mL/min/1.73 square meters)  Note: GFR calculation is accurate only with a steady state creatinine    Lactic acid, plasma (w/reflex if result > 2.0) [[id number]]  (Normal) Collected: 02/23/24 0958    Lab Status: Final result Specimen: Blood from Arm, Right Updated: 02/23/24 1022     LACTIC ACID 1.5 mmol/L     Narrative:      Result may be elevated if tourniquet was used during collection.    CBC and differential [[id number]]  (Abnormal) Collected: 02/23/24 0958    Lab Status: Final result Specimen: Blood from Arm, Right Updated: 02/23/24 1011     WBC 8.67 Thousand/uL      RBC 4.39 Million/uL      Hemoglobin 12.2 g/dL      Hematocrit 39.7 %      MCV 90 fL      MCH 27.8 pg      MCHC 30.7 g/dL      RDW 14.8 %      MPV 10.3 fL      Platelets 349 Thousands/uL      nRBC 0 /100 WBCs      Neutrophils Relative 57 %      Immat GRANS % 1 %      Lymphocytes Relative 26 %      Monocytes Relative 10 %      Eosinophils Relative 5 %      Basophils Relative 1 %      Neutrophils Absolute 5.02 Thousands/µL      Immature Grans Absolute 0.04 Thousand/uL      Lymphocytes Absolute 2.22 Thousands/µL      Monocytes Absolute 0.85 Thousand/µL      Eosinophils Absolute 0.45 Thousand/µL      Basophils Absolute 0.09 Thousands/µL     Blood culture #2 [[id number]] Collected: 02/23/24 0958    Lab Status: In process Specimen: Blood from Arm, Right Updated: 02/23/24 1002    Blood culture #1 [[id number]] Collected: 02/23/24 0959    Lab Status: In process Specimen: Blood from Arm, Left Updated: 02/23/24 1002                   No orders to display              Procedures  Procedures         ED Course  ED Course as of 02/23/24 1239   Fri Feb 23, 2024   1006 SpO2: 99 %   1006 Respirations: 18   1006 Pulse: 88   1006 Temperature: 97.6 °F (36.4 °C)    1006 Blood Pressure(!): 192/81  Mildly hypertensive otherwise within reasonable limits   1041 WBC: 8.67   1041 Hemoglobin: 12.2   1041 Platelet Count: 349   1041 GFR, Calculated: 53   1041 Sodium: 139   1041 LACTIC ACID: 1.5  CBC does not reveal evidence of leukocytosis anemia or thrombocytopenia basic metabolic panel all within reasonable limits lactic acid normal.   1235 With patient at bedside regarding workup, she does not feel as though she needs to be admitted her lab work proves this correct.  Upon review of multiple prior admissions she had been on clindamycin IV in the past she is also been on vancomycin in the past Bactrim and Keflex.  She is not a candidate for IV clindamycin secondary to just completing this as an outpatient and failed, after dose of vancomycin she will be discharged on Keflex and Bactrim.                               SBIRT 20yo+      Flowsheet Row Most Recent Value   Initial Alcohol Screen: US AUDIT-C     1. How often do you have a drink containing alcohol? 0 Filed at: 02/23/2024 0944   2. How many drinks containing alcohol do you have on a typical day you are drinking?  0 Filed at: 02/23/2024 0944   3b. FEMALE Any Age, or MALE 65+: How often do you have 4 or more drinks on one occassion? 0 Filed at: 02/23/2024 0944   Audit-C Score 0 Filed at: 02/23/2024 0944   CAMACHO: How many times in the past year have you...    Used an illegal drug or used a prescription medication for non-medical reasons? Never Filed at: 02/23/2024 0944                      Medical Decision Making  68-year-old female who is here for evaluation of cellulitis failed outpatient antibiotic therapy clindamycin left lower leg.  Finished clindamycin a week ago prescribed by primary care they sent her back in for IV antibiotics and further evaluation.  No systemic symptoms of sepsis there is no tachycardia or hypoxia.  No hypotension.    Workup is significant for cellulitis however there is no leukocytosis or lactic  acidosis.  There is no hypotension fever or tachycardia she is not systemically ill it is reasonable to try dose of IV antibiotics here to see ED course for documentation on antibiotic choice to be discharged home on Keflex and Bactrim which she has taken both in the past.  Return precautions given.    Amount and/or Complexity of Data Reviewed  Labs: ordered. Decision-making details documented in ED Course.    Risk  OTC drugs.  Prescription drug management.             Disposition  Final diagnoses:   Cellulitis of left leg     Time reflects when diagnosis was documented in both MDM as applicable and the Disposition within this note       Time User Action Codes Description Comment    2/23/2024 12:36 PM Dov Sutton Add [L03.116] Cellulitis of left leg           ED Disposition       ED Disposition   Discharge    Condition   Stable    Date/Time   Fri Feb 23, 2024 1236    Comment   Nikki Roach discharge to home/self care.                   Follow-up Information       Follow up With Specialties Details Why Contact Info Additional Information    Christian Simon MD Family Medicine Schedule an appointment as soon as possible for a visit  For wound re-check [address] [place] [id number]  [phone]       [facility] Emergency Department Emergency Medicine Go to  If symptoms worsen [address] [facility] [address]  [phone] [facility] Emergency Department, [address], [place], [address]            Patient's Medications   Discharge Prescriptions    CEPHALEXIN (KEFLEX) 500 MG CAPSULE    Take 1 capsule (500 mg total) by mouth every 6 (six) hours for 7 days       Start Date: 2/23/2024 End Date: 3/1/2024       Order Dose: 500 mg       Quantity: 28 capsule    Refills: 0    SULFAMETHOXAZOLE-TRIMETHOPRIM (BACTRIM DS) 800-160 MG PER TABLET    Take 1 tablet by mouth 2 (two) times a day for 7 days smx-tmp DS (BACTRIM) 800-160 mg tabs (1tab q12 D10)        Start Date: 2/23/2024 End Date: 3/1/2024       Order Dose: 1 tablet       Quantity: 14 tablet    Refills: 0       No discharge procedures on file.    PDMP Review         Value Time User    PDMP Reviewed  Yes 3/18/2020  6:01 PM AFIA Sosa            ED Provider  Electronically Signed by             Dov Sutton PA-C  02/23/24 1237

## 2024-02-25 LAB
BACTERIA BLD CULT: NORMAL
BACTERIA BLD CULT: NORMAL

## 2024-02-28 ENCOUNTER — TELEPHONE (OUTPATIENT)
Dept: HEMATOLOGY ONCOLOGY | Facility: CLINIC | Age: 69
End: 2024-02-28

## 2024-02-28 ENCOUNTER — OFFICE VISIT (OUTPATIENT)
Dept: FAMILY MEDICINE CLINIC | Facility: CLINIC | Age: 69
End: 2024-02-28
Payer: MEDICARE

## 2024-02-28 VITALS — TEMPERATURE: 98.9 F | OXYGEN SATURATION: 98 % | HEART RATE: 88 BPM

## 2024-02-28 DIAGNOSIS — D68.59 HYPERCOAGULABLE STATE (HCC): ICD-10-CM

## 2024-02-28 DIAGNOSIS — Z86.718 HISTORY OF DVT (DEEP VEIN THROMBOSIS): Primary | ICD-10-CM

## 2024-02-28 DIAGNOSIS — Z79.01 ANTICOAGULATED ON COUMADIN: ICD-10-CM

## 2024-02-28 DIAGNOSIS — L03.116 CELLULITIS OF LEFT LOWER EXTREMITY: Primary | ICD-10-CM

## 2024-02-28 LAB
BACTERIA BLD CULT: NORMAL
BACTERIA BLD CULT: NORMAL

## 2024-02-28 PROCEDURE — 99213 OFFICE O/P EST LOW 20 MIN: CPT

## 2024-02-28 NOTE — TELEPHONE ENCOUNTER
Spoke with patient to make her aware. She will be going to the [facility] lab in [facility] tomorrow  [facility], please fax lab orders to them    [facility]: patient stated that she is having some weeping and redness in her leg still. She would like to be seen in office by MD

## 2024-02-28 NOTE — TELEPHONE ENCOUNTER
Patient Call    Who are you speaking with? Patient    If it is not the patient, are they listed on an active communication consent form? N/A   What is the reason for this call? Patient calling in regards to her recent ED visit.  Patient has cellulitis and was in the ED for treatment.  Patient had IV antibiotics in the ED vancomycin (VANCOCIN) 1,750 mg in sodium chloride 0.9 % 500 mL IVPB (1,750 mg Intravenous New Bag.  Patient was sent home with two new medication cephalexin (KEFLEX) 500 mg capsule and sulfamethoxazole-trimethoprim (BACTRIM DS) 800-160 mg per tablet.    Patient wanted to inform Dr Alicea of the new medications she is on.  Patient would like to speak with a nurse in regards to her care.    Patient states that she called the [facility] on Monday and did not receive a call back from a nurse.  I informed patient I did not see a phone encounter for Monday but would send a message to Dr Alicea's nurse right away.      Does this require a call back? Yes   If a call back is required, please list best call back number [phone]   If a call back is required, advise that a message will be forwarded to their care team and someone will return their call as soon as possible.   Did you relay this information to the patient? Yes

## 2024-02-28 NOTE — TELEPHONE ENCOUNTER
Spoke to patient and she insisted she wanted to come in when Dr. Simon was here.  I told her he doesn't have any openings until July.  After trying to get her to calm down, I told her she will need to be seen ASAP due to the weeping & redness in her legs. She said last time she was here, Mk sent her to the ER and she doesn't want to go back.  I told her she may have to depending on the situation. She agreed to schedule an appointment with Mk for today at 4:20

## 2024-02-28 NOTE — TELEPHONE ENCOUNTER
She is on coumadin. She should check INR within 5 days of being in the ED; note does not state dates.

## 2024-02-28 NOTE — PROGRESS NOTES
Name: Nikki Roach      : 1955      MRN: [id number]  Encounter Provider: Mk Reyes PA-C  Encounter Date: 2024   Encounter department: [facility]    Assessment & Plan     1. Cellulitis of left lower extremity  -     Ambulatory Referral to Wound Care; Future    Patient is to finish Bactrim and Keflex.  Patient stat referral to wound care placed for evaluation.  Patient is educated on signs and symptoms of worsening infection, and is to go to the emergency department if these present.  Patient is to call office or go to emergency department in the case of fever, shortness of breath, syncope, dizziness, spreading infection, severe pain, severe redness, necrosis.       Subjective      Patient is a 68-year-old female presenting for follow-up of cellulitis of left lower extremity.  Patient went to the emergency department after last office visit was given IV vancomycin  And IV fluids.  She was eventually discharged from the emergency department on Keflex 500 mg every 6 hours for 1 week and Bactrim DS twice daily for a week.  Patient finishes these antibiotics on Friday.  Patient states that she was doing good, but ended up experiencing burning in the area last night.  She cleaned it with Dial soap, and it seemed to help.  Patient states there is some weeping above the area today.      Review of Systems   Constitutional:  Negative for appetite change, chills, diaphoresis, fatigue and fever.   HENT:  Negative for congestion, ear discharge, ear pain, postnasal drip, rhinorrhea, sinus pressure, sinus pain, sneezing and sore throat.    Eyes:  Negative for pain, discharge, redness, itching and visual disturbance.   Respiratory:  Negative for apnea, cough, chest tightness, shortness of breath and wheezing.    Cardiovascular:  Negative for chest pain, palpitations and leg swelling.   Gastrointestinal:  Negative for abdominal pain, blood in stool, constipation, diarrhea, nausea and  vomiting.   Endocrine: Negative for cold intolerance, heat intolerance, polydipsia and polyuria.   Genitourinary:  Negative for dysuria, flank pain, frequency, hematuria and urgency.   Musculoskeletal:  Negative for arthralgias, back pain, myalgias, neck pain and neck stiffness.   Skin:  Positive for color change and wound. Negative for pallor and rash.   Allergic/Immunologic: Negative for environmental allergies and food allergies.   Neurological:  Negative for dizziness, tremors, seizures, syncope, speech difficulty, weakness, light-headedness, numbness and headaches.   Hematological:  Negative for adenopathy. Does not bruise/bleed easily.   Psychiatric/Behavioral:  Negative for agitation, confusion, decreased concentration, dysphoric mood, hallucinations, self-injury, sleep disturbance and suicidal ideas. The patient is not nervous/anxious and is not hyperactive.    All other systems reviewed and are negative.      Current Outpatient Medications on File Prior to Visit   Medication Sig    amLODIPine-benazepril (LOTREL 5-20) 5-20 MG per capsule Take 1 capsule by mouth daily    budesonide-formoterol (Symbicort) 160-4.5 mcg/act inhaler Inhale 2 puffs 2 (two) times a day Rinse mouth after use.    cephalexin (KEFLEX) 500 mg capsule Take 1 capsule (500 mg total) by mouth every 6 (six) hours for 7 days    clotrimazole-betamethasone (LOTRISONE) 1-0.05 % cream Apply topically 2 (two) times a day    folic acid-pyridoxine-cyanocobalamin (Folbic) 2.5-25-2 mg Take 1 tablet by mouth daily    furosemide (LASIX) 20 mg tablet Take 1 tablet (20 mg total) by mouth daily    Hydrocortisone-Iodoquinol (Dermazene) 1-1 % CREA Apply topically 2 (two) times a day    metFORMIN (GLUCOPHAGE-XR) 500 mg 24 hr tablet Take 4 tablets (2,000 mg total) by mouth daily with breakfast    metroNIDAZOLE (METROGEL) 1 % gel Apply topically daily    montelukast (SINGULAIR) 10 mg tablet Take 1 tablet (10 mg total) by mouth daily at bedtime    multivitamin  (THERAGRAN) TABS Take 1 tablet by mouth daily.    nystatin powder Apply topically 2 (two) times a day Apply to affected area    pravastatin (PRAVACHOL) 40 mg tablet Take 1 tablet (40 mg total) by mouth daily    silver sulfadiazine (SILVADENE,SSD) 1 % cream Apply 1/16 of an inch to the affected area twice daily.    sulfamethoxazole-trimethoprim (BACTRIM DS) 800-160 mg per tablet Take 1 tablet by mouth 2 (two) times a day for 7 days smx-tmp DS (BACTRIM) 800-160 mg tabs (1tab q12 D10)    triamcinolone (KENALOG) 0.1 % cream Apply topically 2 (two) times a day    warfarin (COUMADIN) 5 mg tablet Take  strictly as advised    Wound Dressings (Medi-Jair Performance Plus ABD) PADS Apply 1 each topically 2 (two) times a day       Objective     Pulse 88   Temp 98.9 °F (37.2 °C) (Tympanic)   LMP  (LMP Unknown)   SpO2 98%     Physical Exam  Vitals and nursing note reviewed.   Constitutional:       Appearance: Normal appearance. She is normal weight. She is not ill-appearing or toxic-appearing.   HENT:      Head: Normocephalic and atraumatic.      Right Ear: Tympanic membrane normal.      Left Ear: Tympanic membrane normal.      Nose: Nose normal.      Mouth/Throat:      Mouth: Mucous membranes are moist.      Pharynx: Oropharynx is clear.   Eyes:      Extraocular Movements: Extraocular movements intact.      Conjunctiva/sclera: Conjunctivae normal.      Pupils: Pupils are equal, round, and reactive to light.   Cardiovascular:      Rate and Rhythm: Normal rate and regular rhythm.      Pulses: Normal pulses.      Heart sounds: Normal heart sounds. No murmur heard.  Pulmonary:      Effort: Pulmonary effort is normal. No respiratory distress.      Breath sounds: Normal breath sounds. No stridor. No wheezing, rhonchi or rales.   Chest:      Chest wall: No tenderness.   Abdominal:      General: Bowel sounds are normal.      Palpations: Abdomen is soft. There is no mass.      Tenderness: There is no abdominal tenderness.    Musculoskeletal:         General: Swelling present. No tenderness. Normal range of motion.      Cervical back: Normal range of motion and neck supple. No tenderness.      Right lower leg: No edema.      Left lower leg: No edema.        Legs:       Comments: Erythema.  Discharge at [facility] [place].   Lymphadenopathy:      Cervical: No cervical adenopathy.   Skin:     General: Skin is warm.      Capillary Refill: Capillary refill takes less than 2 seconds.      Coloration: Skin is not jaundiced or pale.      Findings: Erythema present. No bruising, lesion or rash.   Neurological:      General: No focal deficit present.      Mental Status: She is alert and oriented to person, place, and time. Mental status is at baseline.      Motor: No weakness.      Coordination: Coordination normal.      Gait: Gait normal.   Psychiatric:         Mood and Affect: Mood normal.         Behavior: Behavior normal.         Thought Content: Thought content normal.         Judgment: Judgment normal.       Mk Reyes PA-C

## 2024-02-29 ENCOUNTER — LAB (OUTPATIENT)
Dept: LAB | Facility: HOSPITAL | Age: 69
End: 2024-02-29
Payer: MEDICARE

## 2024-02-29 ENCOUNTER — OFFICE VISIT (OUTPATIENT)
Dept: SURGERY | Facility: CLINIC | Age: 69
End: 2024-02-29
Payer: MEDICARE

## 2024-02-29 ENCOUNTER — TELEPHONE (OUTPATIENT)
Dept: HEMATOLOGY ONCOLOGY | Facility: CLINIC | Age: 69
End: 2024-02-29

## 2024-02-29 ENCOUNTER — TELEPHONE (OUTPATIENT)
Age: 69
End: 2024-02-29

## 2024-02-29 VITALS
TEMPERATURE: 98.4 F | DIASTOLIC BLOOD PRESSURE: 88 MMHG | HEIGHT: 64 IN | OXYGEN SATURATION: 97 % | HEART RATE: 88 BPM | SYSTOLIC BLOOD PRESSURE: 138 MMHG | BODY MASS INDEX: 75.7 KG/M2

## 2024-02-29 DIAGNOSIS — E11.9 TYPE 2 DIABETES MELLITUS WITHOUT COMPLICATION, WITHOUT LONG-TERM CURRENT USE OF INSULIN (HCC): ICD-10-CM

## 2024-02-29 DIAGNOSIS — Z00.6 ENCOUNTER FOR EXAMINATION FOR NORMAL COMPARISON OR CONTROL IN CLINICAL RESEARCH PROGRAM: ICD-10-CM

## 2024-02-29 DIAGNOSIS — Z86.718 HISTORY OF DVT (DEEP VEIN THROMBOSIS): ICD-10-CM

## 2024-02-29 DIAGNOSIS — L03.116 CELLULITIS OF LEFT LOWER EXTREMITY: ICD-10-CM

## 2024-02-29 DIAGNOSIS — D68.59 HYPERCOAGULABLE STATE (HCC): ICD-10-CM

## 2024-02-29 DIAGNOSIS — I87.332 VENOUS HYPERTENSION, CHRONIC, WITH ULCER AND INFLAMMATION, LEFT (HCC): Primary | ICD-10-CM

## 2024-02-29 DIAGNOSIS — Z79.01 ANTICOAGULATED ON COUMADIN: ICD-10-CM

## 2024-02-29 DIAGNOSIS — I89.0 LYMPHEDEMA OF BOTH LOWER EXTREMITIES: ICD-10-CM

## 2024-02-29 LAB
BASOPHILS # BLD AUTO: 0.08 THOUSANDS/ÂΜL (ref 0–0.1)
BASOPHILS NFR BLD AUTO: 1 % (ref 0–1)
EOSINOPHIL # BLD AUTO: 0.44 THOUSAND/ÂΜL (ref 0–0.61)
EOSINOPHIL NFR BLD AUTO: 5 % (ref 0–6)
ERYTHROCYTE [DISTWIDTH] IN BLOOD BY AUTOMATED COUNT: 14.5 % (ref 11.6–15.1)
HCT VFR BLD AUTO: 38.6 % (ref 34.8–46.1)
HGB BLD-MCNC: 11.8 G/DL (ref 11.5–15.4)
IMM GRANULOCYTES # BLD AUTO: 0.02 THOUSAND/UL (ref 0–0.2)
IMM GRANULOCYTES NFR BLD AUTO: 0 % (ref 0–2)
INR PPP: 3.04 (ref 0.84–1.19)
LYMPHOCYTES # BLD AUTO: 1.35 THOUSANDS/ÂΜL (ref 0.6–4.47)
LYMPHOCYTES NFR BLD AUTO: 17 % (ref 14–44)
MCH RBC QN AUTO: 27.4 PG (ref 26.8–34.3)
MCHC RBC AUTO-ENTMCNC: 30.6 G/DL (ref 31.4–37.4)
MCV RBC AUTO: 90 FL (ref 82–98)
MONOCYTES # BLD AUTO: 0.88 THOUSAND/ÂΜL (ref 0.17–1.22)
MONOCYTES NFR BLD AUTO: 11 % (ref 4–12)
NEUTROPHILS # BLD AUTO: 5.4 THOUSANDS/ÂΜL (ref 1.85–7.62)
NEUTS SEG NFR BLD AUTO: 66 % (ref 43–75)
NRBC BLD AUTO-RTO: 0 /100 WBCS
PLATELET # BLD AUTO: 362 THOUSANDS/UL (ref 149–390)
PMV BLD AUTO: 9.8 FL (ref 8.9–12.7)
PROTHROMBIN TIME: 30.8 SECONDS (ref 11.6–14.5)
RBC # BLD AUTO: 4.3 MILLION/UL (ref 3.81–5.12)
WBC # BLD AUTO: 8.17 THOUSAND/UL (ref 4.31–10.16)

## 2024-02-29 PROCEDURE — 99204 OFFICE O/P NEW MOD 45 MIN: CPT | Performed by: SURGERY

## 2024-02-29 PROCEDURE — 36415 COLL VENOUS BLD VENIPUNCTURE: CPT

## 2024-02-29 PROCEDURE — 85025 COMPLETE CBC W/AUTO DIFF WBC: CPT

## 2024-02-29 PROCEDURE — 85610 PROTHROMBIN TIME: CPT

## 2024-02-29 NOTE — TELEPHONE ENCOUNTER
Loreto from [facility] wound care center called. There was a referral put in for Dr Méndez to see patient Stat with wound care. She is confused because they do not typically do stat and patient is scheduled already to see Dr Méndez today at 2pm. She is going to close referral for now since it seems like patient is being seen for stat issue today with provider. If wound care is still needed today after appointment we can give them a call to reopen referral and schedule patient if needed.

## 2024-03-01 ENCOUNTER — TELEPHONE (OUTPATIENT)
Dept: HEMATOLOGY ONCOLOGY | Facility: CLINIC | Age: 69
End: 2024-03-01

## 2024-03-01 NOTE — RESULT ENCOUNTER NOTE
Spoke with patient informing her to her INR rechecked next Wednesday, patient verbalized understanding.

## 2024-03-01 NOTE — TELEPHONE ENCOUNTER
Spoke with patient regarding INR, patient advised to hold coumadin for two days, patient finished antibiotic today, will call back when Jeremiah would like patient to have another ptp. Pt verbalized understanding.

## 2024-03-02 PROBLEM — I89.0 LYMPHEDEMA OF BOTH LOWER EXTREMITIES: Status: ACTIVE | Noted: 2024-03-02

## 2024-03-02 PROBLEM — I87.332 VENOUS HYPERTENSION, CHRONIC, WITH ULCER AND INFLAMMATION, LEFT (HCC): Status: ACTIVE | Noted: 2024-03-02

## 2024-03-02 NOTE — ASSESSMENT & PLAN NOTE
Bilateral lower extremity lymphedema.  Patient does have a history of lymphedema pumps although she is missing certain aspects of the pump themselves and therefore she cannot use them.  Will sort this out at her wound care visit.  Feel that long-term lymphedema pumps are extremely important for her

## 2024-03-02 NOTE — ASSESSMENT & PLAN NOTE
Lab Results   Component Value Date    HGBA1C 5.8 (H) 08/05/2021     Most recent A1c is from approximately 2 and half years ago.  At that time it did appear controlled at 5.8.  Defer management to primary medical team.

## 2024-03-02 NOTE — ASSESSMENT & PLAN NOTE
I do not feel there is any active cellulitis to left lower extremity.  There is no warmth.  Mild erythema that is consistent with venous stasis changes and edema.  Advised her to complete her course of antibiotics but do not feel there is any additional need antibiotics after this course.

## 2024-03-02 NOTE — PROGRESS NOTES
Assessment/Plan:    Venous hypertension, chronic, with ulcer and inflammation, left (HCC)  Left lower extremity venous stasis disease in addition to lymphedema.  Open venous stasis ulceration with partial thickness.  Serous drainage.  Venous stasis changes to the skin.  No active cellulitis noted.  Patient would benefit from compression wrap therapy.  Will obtain ABIs in preparation for that.  Will refer to wound care center for further evaluation and management.  Keep legs elevated at all times.  For now we will supply patient with Maxorb and advised to keep covered and change on days that she bathes.    Cellulitis of left lower extremity  I do not feel there is any active cellulitis to left lower extremity.  There is no warmth.  Mild erythema that is consistent with venous stasis changes and edema.  Advised her to complete her course of antibiotics but do not feel there is any additional need antibiotics after this course.    Type 2 diabetes mellitus without complication, without long-term current use of insulin ([facility])    Lab Results   Component Value Date    HGBA1C 5.8 (H) 08/05/2021     Most recent A1c is from approximately 2 and half years ago.  At that time it did appear controlled at 5.8.  Defer management to primary medical team.    Hypertension, essential  Blood pressure reviewed on this office visit.  Appears stable.  Continue current medical management as per PCP.    Lymphedema of both lower extremities  Bilateral lower extremity lymphedema.  Patient does have a history of lymphedema pumps although she is missing certain aspects of the pump themselves and therefore she cannot use them.  Will sort this out at her wound care visit.  Feel that long-term lymphedema pumps are extremely important for her     Diagnoses and all orders for this visit:    Venous hypertension, chronic, with ulcer and inflammation, left (HCC)  -     VAS JAQUELIN & waveform analysis, multiple levels; Future  -     Ambulatory Referral to  Wound Care; Future    Cellulitis of left lower extremity    Type 2 diabetes mellitus without complication, without long-term current use of insulin (HCC)    Lymphedema of both lower extremities          Notes:    Recent ER note dated 5/3/2023, to the 24 was personally reviewed by me.  Recent office visit dated fibber 28th, 2024 was personally reviewed by me.    Lab/blood work:    Recent CBC dated fibber 23rd, 2024 in addition to lactic acid dated February 23, 2 and 24 were both personally reviewed by me.      Subjective:      Patient ID: Nikki Roach is a 68 y.o. female.    68-year-old female with morbid obesity, Lyme disease, hypertension, diabetes, lymphedema, CKD, presents today in consultation for evaluation of chronic cellulitis and drainage of her left lower extremity.  Patient states she has been dealing with this questionable cellulitis for some time.  There is significant drainage of the left lower extremity.  She is basically covering it with ABDs.  She has had multiple rounds of antibiotics.  No nausea vomiting fevers or chills.  No other associate symptoms.        The following portions of the patient's history were reviewed and updated as appropriate: She  has a past medical history of Blood clotting disorder ([facility]), Homocysteinemia, Hypercoagulable state ([facility]), Lyme disease, Renal disorder, and 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 ([facility]).  She   Patient Active Problem List    Diagnosis Date Noted    Venous hypertension, chronic, with ulcer and inflammation, left ([facility]) 03/02/2024    Lymphedema of both lower extremities 03/02/2024    Cellulitis of left lower extremity 02/22/2024    Type 2 diabetes mellitus without complication, without long-term current use of insulin (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) 09/02/2022    Prothrombin time increased 07/26/2021    Anemia 09/10/2020    Hyperphosphatemia  09/10/2020    S/P TKR (total knee replacement) 03/04/2020    Elida filter in place 02/17/2020    History of pulmonary embolism 02/17/2020    Rosacea 11/22/2019    Abnormality of coagulation ([facility]) 06/12/2019    Hypertension, essential 06/10/2019    Pulmonary HTN ([facility])     Heme positive stool 06/21/2018    Cellulitis of right lower extremity 03/30/2018    Lyme disease     Blood clotting disorder ([facility])     Essential hypertension     Homocysteinemia     Hypercoagulable state ([facility])     Obstructive sleep apnea on CPAP     Morbid obesity with BMI of 70 and over, adult ([facility]) 07/01/2016    Skin rash 05/22/2014    Chronic venous hypertension w ulceration ([facility]) 12/18/2013    Osteoporosis 12/02/2013    Edema 11/20/2013    Esophageal reflux 11/20/2013    Hyperlipidemia 11/20/2013    Polycystic ovarian syndrome 11/20/2013     She  has a past surgical history that includes Joint replacement (Bilateral); Appendectomy; Ankle surgery (Right); Hysterectomy; Hemorroidectomy; Foot surgery; LITHOTRIPSY; Closed reduction metatarsal fracture (Right); Vena cava filter placement; and Breast excisional biopsy (Left).  Her family history includes Breast cancer in her paternal aunt; Hypercoagulant Ability in her maternal grandmother and mother; No Known Problems in her daughter, father, maternal grandfather, paternal aunt, paternal aunt, paternal aunt, paternal aunt, paternal aunt, paternal aunt, paternal grandfather, paternal grandmother, and sister; Pulmonary embolism in her mother.  She  reports that she has never smoked. She has never used smokeless tobacco. She reports that she does not drink alcohol and does not use drugs.  Current Outpatient Medications   Medication Sig Dispense Refill    amLODIPine-benazepril (LOTREL 5-20) 5-20 MG per capsule Take 1 capsule by mouth daily 90 capsule 3    budesonide-formoterol (Symbicort) 160-4.5 mcg/act inhaler Inhale 2 puffs 2 (two) times a day Rinse mouth after use. 18 g 3     "clotrimazole-betamethasone (LOTRISONE) 1-0.05 % cream Apply topically 2 (two) times a day 45 g 5    folic acid-pyridoxine-cyanocobalamin (Folbic) 2.5-25-2 mg Take 1 tablet by mouth daily 90 tablet 3    furosemide (LASIX) 20 mg tablet Take 1 tablet (20 mg total) by mouth daily 90 tablet 3    Hydrocortisone-Iodoquinol (Dermazene) 1-1 % CREA Apply topically 2 (two) times a day 28.4 g 5    metFORMIN (GLUCOPHAGE-XR) 500 mg 24 hr tablet Take 4 tablets (2,000 mg total) by mouth daily with breakfast 360 tablet 3    metroNIDAZOLE (METROGEL) 1 % gel Apply topically daily 60 g 5    montelukast (SINGULAIR) 10 mg tablet Take 1 tablet (10 mg total) by mouth daily at bedtime 90 tablet 3    multivitamin (THERAGRAN) TABS Take 1 tablet by mouth daily.      nystatin powder Apply topically 2 (two) times a day Apply to affected area 60 g 5    pravastatin (PRAVACHOL) 40 mg tablet Take 1 tablet (40 mg total) by mouth daily 90 tablet 3    silver sulfadiazine (SILVADENE,SSD) 1 % cream Apply 1/16 of an inch to the affected area twice daily. 400 g 3    triamcinolone (KENALOG) 0.1 % cream Apply topically 2 (two) times a day 453.6 g 3    warfarin (COUMADIN) 5 mg tablet Take  strictly as advised 150 tablet 0    Wound Dressings (Medi-Jair Performance Plus ABD) PADS Apply 1 each topically 2 (two) times a day 48 each 3     No current facility-administered medications for this visit.     She is allergic to azithromycin, ciprofloxacin, doxycycline, and ancef [cefazolin]..    Review of Systems      Review of systems completed, all negative except as noted HPI.    Objective:      /88 (BP Location: Right arm, Patient Position: Sitting, Cuff Size: Standard)   Pulse 88   Temp 98.4 °F (36.9 °C) (Temporal)   Ht 5' 4\" (1.626 m)   LMP  (LMP Unknown)   SpO2 97%   BMI 75.70 kg/m²          Physical Exam  Vitals reviewed.   Constitutional:       Appearance: Normal appearance. She is not ill-appearing, toxic-appearing or diaphoretic.   HENT:      Head: " Normocephalic and atraumatic.      Right Ear: External ear normal.      Left Ear: External ear normal.   Eyes:      General: No scleral icterus.        Right eye: No discharge.         Left eye: No discharge.   Cardiovascular:      Rate and Rhythm: Normal rate.   Pulmonary:      Effort: Pulmonary effort is normal. No respiratory distress.   Musculoskeletal:         General: Swelling present.      Cervical back: Normal range of motion.      Right lower leg: Edema present.      Left lower leg: Edema present.   Skin:     General: Skin is warm.      Coloration: Skin is not jaundiced or pale.      Findings: Erythema present. No bruising.      Comments: Bilateral lower extremity edema.  Left lower extremity with noted venous stasis disease and ulceration.  Superficial ulceration with serous drainage.  No evidence of active infection.  Nontender no warmth.   Neurological:      General: No focal deficit present.      Mental Status: She is alert and oriented to person, place, and time.      Cranial Nerves: No cranial nerve deficit.   Psychiatric:         Mood and Affect: Mood normal.         Behavior: Behavior normal.         Thought Content: Thought content normal.         Judgment: Judgment normal.

## 2024-03-02 NOTE — ASSESSMENT & PLAN NOTE
Left lower extremity venous stasis disease in addition to lymphedema.  Open venous stasis ulceration with partial thickness.  Serous drainage.  Venous stasis changes to the skin.  No active cellulitis noted.  Patient would benefit from compression wrap therapy.  Will obtain ABIs in preparation for that.  Will refer to wound care center for further evaluation and management.  Keep legs elevated at all times.  For now we will supply patient with Maxorb and advised to keep covered and change on days that she bathes.

## 2024-03-02 NOTE — ASSESSMENT & PLAN NOTE
Blood pressure reviewed on this office visit.  Appears stable.  Continue current medical management as per PCP.

## 2024-03-05 LAB
INR PPP: 1.9
PROTHROMBIN TIME: 21.6 SEC (ref 12–14.6)

## 2024-03-06 ENCOUNTER — TELEPHONE (OUTPATIENT)
Dept: HEMATOLOGY ONCOLOGY | Facility: CLINIC | Age: 69
End: 2024-03-06

## 2024-03-06 NOTE — TELEPHONE ENCOUNTER
Left message for patient with INR results, patient to stay on dose prior to starting antibiotics, recheck in 2 weeks, advised pt to call back with any questions or concerns.

## 2024-03-06 NOTE — TELEPHONE ENCOUNTER
INR reviewed. Recommend continue normal dose; dose taking before recent antibiotics.  Recheck INR in 2 weeks.

## 2024-03-07 ENCOUNTER — TELEPHONE (OUTPATIENT)
Dept: WOUND CARE | Facility: CLINIC | Age: 69
End: 2024-03-07

## 2024-03-07 ENCOUNTER — OFFICE VISIT (OUTPATIENT)
Dept: WOUND CARE | Facility: CLINIC | Age: 69
End: 2024-03-07
Payer: MEDICARE

## 2024-03-07 ENCOUNTER — HOSPITAL ENCOUNTER (EMERGENCY)
Facility: HOSPITAL | Age: 69
Discharge: HOME | End: 2024-03-07
Attending: EMERGENCY MEDICINE
Payer: MEDICARE

## 2024-03-07 VITALS
HEART RATE: 88 BPM | SYSTOLIC BLOOD PRESSURE: 202 MMHG | TEMPERATURE: 96.9 F | RESPIRATION RATE: 22 BRPM | DIASTOLIC BLOOD PRESSURE: 106 MMHG

## 2024-03-07 VITALS
DIASTOLIC BLOOD PRESSURE: 73 MMHG | OXYGEN SATURATION: 96 % | HEART RATE: 82 BPM | TEMPERATURE: 97.2 F | RESPIRATION RATE: 22 BRPM | SYSTOLIC BLOOD PRESSURE: 158 MMHG

## 2024-03-07 DIAGNOSIS — I87.332 VENOUS HYPERTENSION, CHRONIC, WITH ULCER AND INFLAMMATION, LEFT (HCC): Primary | ICD-10-CM

## 2024-03-07 DIAGNOSIS — I89.0 LYMPHEDEMA OF BOTH LOWER EXTREMITIES: ICD-10-CM

## 2024-03-07 DIAGNOSIS — I10 HIGH BLOOD PRESSURE: Primary | ICD-10-CM

## 2024-03-07 DIAGNOSIS — I10 ESSENTIAL HYPERTENSION: ICD-10-CM

## 2024-03-07 PROCEDURE — 99213 OFFICE O/P EST LOW 20 MIN: CPT | Performed by: SURGERY

## 2024-03-07 PROCEDURE — 99284 EMERGENCY DEPT VISIT MOD MDM: CPT | Performed by: EMERGENCY MEDICINE

## 2024-03-07 PROCEDURE — 99283 EMERGENCY DEPT VISIT LOW MDM: CPT

## 2024-03-07 PROCEDURE — 99214 OFFICE O/P EST MOD 30 MIN: CPT | Performed by: SURGERY

## 2024-03-07 NOTE — ASSESSMENT & PLAN NOTE
Bilateral lower extremity lymphedema.  Recommend use of lymphedema pumps.  Encourage patient to follow-up with company regarding the need to purchase or have made available new sleeves.

## 2024-03-07 NOTE — TELEPHONE ENCOUNTER
Left VM informing patient that Dr. Simon was notified regarding her high BP. Left VM informing patient that she can be seen at [place] by a PA today. Requested call back to confirm.

## 2024-03-07 NOTE — PROGRESS NOTES
Patient ID: Nikki Roach is a 68 y.o. female Date of Birth 1955     Chief Complaint  Chief Complaint   Patient presents with    New Patient Visit     Left lower leg ulcer.         Allergies  Azithromycin, Ciprofloxacin, Doxycycline, and Ancef [cefazolin]    Assessment:    Venous hypertension, chronic, with ulcer and inflammation, left (HCC)  Left lower extremity edema with associated stasis disease and superficial ulceration.  Noted drainage.  No debridement required.  For now we will continue to control drainage with Maxorb and Tubigrip.  ABIs in the office were unfavorable.  Will await official arterial duplex and JAQUELIN to determine if compression wrap therapy is possible.  Patient will follow-up after her ABIs.    Lymphedema of both lower extremities  Bilateral lower extremity lymphedema.  Recommend use of lymphedema pumps.  Encourage patient to follow-up with company regarding the need to purchase or have made available new sleeves.    Essential hypertension  Patient blood pressure reviewed on today's office visit.  It does not appear stable.  It is exceedingly high.  Patient denies any changes in vision, headache, dizziness, lightheadedness, or chest pains or shortness of breath.  Retake of the blood pressure found the blood pressure was even higher.  The office reached out to her PCP for potential same-day appointment but patient was also encouraged to go to the ER to be evaluated.  Patient understands.  All questions answered.     Diagnoses and all orders for this visit:    Venous hypertension, chronic, with ulcer and inflammation, left (HCC)  -     Wound cleansing and dressings Venous Ulcer Distal;Left;Lower Leg; Future    Lymphedema of both lower extremities    Essential hypertension              Procedures    Plan:     Wound 03/07/24 Venous Ulcer Leg Distal;Left;Lower (Active)   Wound Image Images linked 03/07/24 0811   Wound Description Fragile;Drainage;Epithelialization;Other (Comment) (weeping)  03/07/24 0812   Leticia-wound Assessment Hyperpigmented;Edema;Dry;Scaly 03/07/24 0812   Wound Length (cm) 6 cm 03/07/24 0812   Wound Width (cm) 8 cm 03/07/24 0812   Wound Depth (cm) 0.1 cm 03/07/24 0812   Wound Surface Area (cm^2) 48 cm^2 03/07/24 0812   Wound Volume (cm^3) 4.8 cm^3 03/07/24 0812   Calculated Wound Volume (cm^3) 4.8 cm^3 03/07/24 0812   Drainage Amount Moderate 03/07/24 0812   Drainage Description Serous 03/07/24 0812   Non-staged Wound Description Partial thickness 03/07/24 0812       Wound 03/07/24 Venous Ulcer Leg Distal;Left;Lower (Active)   Date First Assessed/Time First Assessed: 03/07/24 0810   Primary Wound Type: Venous Ulcer  Location: Leg  Wound Location Orientation: Distal;Left;Lower       [REMOVED] Wound 09/09/20 Cellulitis Ankle Left;Medial (Removed)   Resolved Date/Resolved Time: 03/07/24 0803  Date First Assessed/Time First Assessed: 09/09/20 2220   Traumatic Wound Type: Cellulitis  Location: Ankle  Wound Location Orientation: Left;Medial  Wound Description (Comments): red, +2 edema  Wound Outcome...       Subjective:      .    68-year-old female with known homocystinemia, hypercoagulable state, lung disease, bilateral lower extremity lymphedema, venous stasis disease morbid obesity, presents today in consultation for evaluation of left lower extremity venous stasis disease with ulceration and drainage.  Patient was initially seen by surgical clinic and was found to have venous stasis disease with active drainage from the left lower extremity ulceration.  Denies any significant pain in lower extremities.  Notes that she still has issues with obtaining sleeves for lymphedema pumps.  She has not yet called the company.  No nausea vomiting fevers chills.  No other associate symptoms at this time.        The following portions of the patient's history were reviewed and updated as appropriate: She  has a past medical history of Blood clotting disorder (HCC), Homocysteinemia, Hypercoagulable  state ([facility]), Lyme disease, Renal disorder, and 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 ([facility]).  She   Patient Active Problem List    Diagnosis Date Noted    Venous hypertension, chronic, with ulcer and inflammation, left ([facility]) 03/02/2024    Lymphedema of both lower extremities 03/02/2024    Cellulitis of left lower extremity 02/22/2024    Type 2 diabetes mellitus without complication, without long-term current use of insulin ([facility]) 02/05/2024    Stage 3a chronic kidney disease ([facility]) 04/03/2023    Homocystinuria ([facility]) 09/02/2022    Prothrombin time increased 07/26/2021    Anemia 09/10/2020    Hyperphosphatemia 09/10/2020    S/P TKR (total knee replacement) 03/04/2020    [place] filter in place 02/17/2020    History of pulmonary embolism 02/17/2020    Rosacea 11/22/2019    Abnormality of coagulation ([facility]) 06/12/2019    Hypertension, essential 06/10/2019    Pulmonary HTN ([facility])     Heme positive stool 06/21/2018    Cellulitis of right lower extremity 03/30/2018    Lyme disease     Blood clotting disorder ([facility])     Essential hypertension     Homocysteinemia     Hypercoagulable state ([facility])     Obstructive sleep apnea on CPAP     Morbid obesity with BMI of 70 and over, adult ([facility]) 07/01/2016    Skin rash 05/22/2014    Chronic venous hypertension w ulceration ([facility]) 12/18/2013    Osteoporosis 12/02/2013    Edema 11/20/2013    Esophageal reflux 11/20/2013    Hyperlipidemia 11/20/2013    Polycystic ovarian syndrome 11/20/2013     She  has a past surgical history that includes Joint replacement (Bilateral); Appendectomy; Ankle surgery (Right); Hysterectomy; Hemorroidectomy; Foot surgery; LITHOTRIPSY; Closed reduction metatarsal fracture (Right); Vena cava filter placement; and Breast excisional biopsy (Left).  Her family history includes Breast cancer in her paternal aunt; Hypercoagulant Ability in her maternal grandmother and mother; No Known Problems in her daughter, father, maternal  grandfather, paternal aunt, paternal aunt, paternal aunt, paternal aunt, paternal aunt, paternal aunt, paternal grandfather, paternal grandmother, and sister; Pulmonary embolism in her mother.  She  reports that she has never smoked. She has never used smokeless tobacco. She reports that she does not drink alcohol and does not use drugs.  Current Outpatient Medications   Medication Sig Dispense Refill    amLODIPine-benazepril (LOTREL 5-20) 5-20 MG per capsule Take 1 capsule by mouth daily 90 capsule 3    budesonide-formoterol (Symbicort) 160-4.5 mcg/act inhaler Inhale 2 puffs 2 (two) times a day Rinse mouth after use. 18 g 3    clotrimazole-betamethasone (LOTRISONE) 1-0.05 % cream Apply topically 2 (two) times a day 45 g 5    folic acid-pyridoxine-cyanocobalamin (Folbic) 2.5-25-2 mg Take 1 tablet by mouth daily 90 tablet 3    furosemide (LASIX) 20 mg tablet Take 1 tablet (20 mg total) by mouth daily 90 tablet 3    Hydrocortisone-Iodoquinol (Dermazene) 1-1 % CREA Apply topically 2 (two) times a day 28.4 g 5    metFORMIN (GLUCOPHAGE-XR) 500 mg 24 hr tablet Take 4 tablets (2,000 mg total) by mouth daily with breakfast 360 tablet 3    metroNIDAZOLE (METROGEL) 1 % gel Apply topically daily 60 g 5    montelukast (SINGULAIR) 10 mg tablet Take 1 tablet (10 mg total) by mouth daily at bedtime 90 tablet 3    multivitamin (THERAGRAN) TABS Take 1 tablet by mouth daily.      nystatin powder Apply topically 2 (two) times a day Apply to affected area 60 g 5    pravastatin (PRAVACHOL) 40 mg tablet Take 1 tablet (40 mg total) by mouth daily 90 tablet 3    silver sulfadiazine (SILVADENE,SSD) 1 % cream Apply 1/16 of an inch to the affected area twice daily. (Patient not taking: Reported on 3/7/2024) 400 g 3    triamcinolone (KENALOG) 0.1 % cream Apply topically 2 (two) times a day (Patient not taking: Reported on 3/7/2024) 453.6 g 3    warfarin (COUMADIN) 5 mg tablet Take  strictly as advised 150 tablet 0    Wound Dressings (Medi-Jair  Performance Plus ABD) PADS Apply 1 each topically 2 (two) times a day 48 each 3     No current facility-administered medications for this visit.     She is allergic to azithromycin, ciprofloxacin, doxycycline, and ancef [cefazolin]..    Review of Systems   Constitutional:  Negative for activity change, appetite change, chills, diaphoresis, fatigue and fever.   Respiratory:  Negative for cough and shortness of breath.    Cardiovascular:  Positive for leg swelling. Negative for chest pain and palpitations.   Skin:  Positive for color change and wound (LLE). Negative for pallor and rash.   All other systems reviewed and are negative.        Objective:       Wound 03/07/24 Venous Ulcer Leg Distal;Left;Lower (Active)   Wound Image Images linked 03/07/24 0811   Wound Description Fragile;Drainage;Epithelialization;Other (Comment) (weeping) 03/07/24 0812   Leticia-wound Assessment Hyperpigmented;Edema;Dry;Scaly 03/07/24 0812   Wound Length (cm) 6 cm 03/07/24 0812   Wound Width (cm) 8 cm 03/07/24 0812   Wound Depth (cm) 0.1 cm 03/07/24 0812   Wound Surface Area (cm^2) 48 cm^2 03/07/24 0812   Wound Volume (cm^3) 4.8 cm^3 03/07/24 0812   Calculated Wound Volume (cm^3) 4.8 cm^3 03/07/24 0812   Drainage Amount Moderate 03/07/24 0812   Drainage Description Serous 03/07/24 0812   Non-staged Wound Description Partial thickness 03/07/24 0812       BP (!) 202/106   Pulse 88   Temp (!) 96.9 °F (36.1 °C)   Resp 22   LMP  (LMP Unknown)     Physical Exam  Vitals reviewed.   Constitutional:       General: She is not in acute distress.     Appearance: Normal appearance. She is obese. She is not ill-appearing, toxic-appearing or diaphoretic.   HENT:      Head: Normocephalic and atraumatic.      Right Ear: External ear normal.      Left Ear: External ear normal.   Eyes:      General: No scleral icterus.        Right eye: No discharge.         Left eye: No discharge.   Cardiovascular:      Rate and Rhythm: Normal rate.   Pulmonary:       Effort: Pulmonary effort is normal. No respiratory distress.   Musculoskeletal:         General: Swelling present.      Cervical back: Normal range of motion.      Right lower leg: Edema present.      Left lower leg: Edema present.   Skin:     General: Skin is warm.      Coloration: Skin is not jaundiced or pale.      Findings: No bruising or erythema.      Comments: Lower extremity with noted edema.  Serous drainage.  Superficial ulceration noted.  No active cellulitis noted.  No debridement required.   Neurological:      General: No focal deficit present.      Mental Status: She is alert and oriented to person, place, and time.      Cranial Nerves: No cranial nerve deficit.   Psychiatric:         Mood and Affect: Mood normal.         Behavior: Behavior normal.         Thought Content: Thought content normal.         Judgment: Judgment normal.           Wound Instructions:  Orders Placed This Encounter   Procedures    Wound cleansing and dressings Venous Ulcer Distal;Left;Lower Leg     Left lower leg ulcer:    Wash your hands with soap and water.  Remove old dressing, discard into plastic bag and place in trash.    Cleanse the wound with mild soap and water (Dove unscented) prior to applying a clean dressing. Do not use tissue or cotton balls. Do not scrub the wound. Pat dry using gauze.  Shower yes   Apply moisturizing lotion to skin surrounding wound  Apply alginate (Maxorb) to the left lower leg wound.  Cover with gauze.  Secure with amna.  Change dressing daily.    Follow 3/21/24 (after JAQUELIN study on 3/19/24).    Contact your lymphedema pump company to ask for assistance in how to use your pump and getting fitted for new sleeves.    Your blood pressure is high today.  Report to ER for changes in vision, headache, chest pain.  Contact your PCP regarding elevated Blood pressure at today's visit.     Standing Status:   Future     Standing Expiration Date:   3/7/2025        Diagnosis ICD-10-CM Associated Orders    1. Venous hypertension, chronic, with ulcer and inflammation, left (HCC)  I87.332 Wound cleansing and dressings Venous Ulcer Distal;Left;Lower Leg      2. Lymphedema of both lower extremities  I89.0       3. Essential hypertension  I10

## 2024-03-07 NOTE — ASSESSMENT & PLAN NOTE
Left lower extremity edema with associated stasis disease and superficial ulceration.  Noted drainage.  No debridement required.  For now we will continue to control drainage with Maxorb and Tubigrip.  ABIs in the office were unfavorable.  Will await official arterial duplex and JAQUELIN to determine if compression wrap therapy is possible.  Patient will follow-up after her ABIs.

## 2024-03-07 NOTE — ASSESSMENT & PLAN NOTE
Patient blood pressure reviewed on today's office visit.  It does not appear stable.  It is exceedingly high.  Patient denies any changes in vision, headache, dizziness, lightheadedness, or chest pains or shortness of breath.  Retake of the blood pressure found the blood pressure was even higher.  The office reached out to her PCP for potential same-day appointment but patient was also encouraged to go to the ER to be evaluated.  Patient understands.  All questions answered.

## 2024-03-07 NOTE — PATIENT INSTRUCTIONS
Orders Placed This Encounter   Procedures    Wound cleansing and dressings Venous Ulcer Distal;Left;Lower Leg     Left lower leg ulcer:    Wash your hands with soap and water.  Remove old dressing, discard into plastic bag and place in trash.    Cleanse the wound with mild soap and water (Dove unscented) prior to applying a clean dressing. Do not use tissue or cotton balls. Do not scrub the wound. Pat dry using gauze.  Shower yes   Apply moisturizing lotion to skin surrounding wound  Apply alginate (Maxorb) to the left lower leg wound.  Cover with gauze.  Secure with amna.  Change dressing daily.    Follow 3/21/24 (after JAQUELIN study on 3/19/24).    Contact your lymphedema pump company to ask for assistance in how to use your pump and getting fitted for new sleeves.    Your blood pressure is high today.  Report to ER for changes in vision, headache, chest pain.  Contact your PCP regarding elevated Blood pressure at today's visit.     Standing Status:   Future     Standing Expiration Date:   3/7/2025

## 2024-03-07 NOTE — ED PROVIDER NOTES
Final Diagnosis:  1. High blood pressure        Chief Complaint   Patient presents with    High Blood Pressure     Pt sent from wound care for high BP.      HPI  Patient presents for evaluation of high blood pressure.  Review records show that she was just at wound care and her systolic was over 200 at that time.  Patient states that she feels well.  She states that she thinks her blood pressure was increased because she does not like going to wound care and has a bad experiences there.  When she arrived there and she had the high blood pressure they told her to come to the emergency department to be evaluated.  Patient denies any chest pain, palpitations.  She states that she feels well.  She would like to be discharged.      Unless otherwise specified:  - No language barrier.   - History obtained from patient.   - There are no limitations to the history obtained.  - Previous charting was reviewed    PMH:   has a past medical history of Blood clotting disorder (HCC), Homocysteinemia, Hypercoagulable state (HCC), Lyme disease, Renal disorder, and 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).    PSH:   has a past surgical history that includes Joint replacement (Bilateral); Appendectomy; Ankle surgery (Right); Hysterectomy; Hemorroidectomy; Foot surgery; LITHOTRIPSY; Closed reduction metatarsal fracture (Right); Vena cava filter placement; and Breast excisional biopsy (Left).       ROS:  Review of Systems   - 13 point ROS was performed and all are normal unless stated in the history above.   - Nursing note reviewed. Vitals reviewed.   - Orders placed by myself and/or advanced practitioner / resident.        PE:   Vitals:    03/07/24 0939 03/07/24 1017   BP: (!) 173/79 158/73   BP Location: Right arm    Pulse: 85 82   Resp: 22 22   Temp: (!) 97.2 °F (36.2 °C)    TempSrc: Temporal    SpO2: 97% 96%     Vitals reviewed by me.     As him talking to patient her blood pressure is  1/4/1957 systolic  Unless otherwise specified above:    General: VS reviewed  Appears in NAD    Head: Normocephalic, atraumatic  Eyes: EOM-I.     ENT: Atraumatic external nose and ears.    No drooling.     Neck: No JVD.    CV: No pallor noted  Lungs:   No tachypnea  No respiratory distress    Abdomen:  Soft, non-tender, non-distended    MSK:   No obvious deformity    Skin: Dry, intact. No obvious rash.    Psychiatric/Behavioral: Appropriate mood and affect   Exam: deferred    Physical Exam     Procedures   A:  - Nursing note reviewed.                      No orders to display     No orders of the defined types were placed in this encounter.    Labs Reviewed - No data to display      Final Diagnosis:  1. High blood pressure        P:  -Patient without any acute complaints.  Improved blood pressure here.  No signs of hypertensive urgency.  Would like to defer workup.  I believe this is appropriate.  Continue to follow-up with specialist.      Unless otherwise noted the patient's medications were reviewed and they should continue as directed.    Unless otherwise specified:  CC is exclusive from any separately billable procedures  CC is exclusive of treating other patients  CC is exclusive of teaching time     Medications - No data to display  Time reflects when diagnosis was documented in both MDM as applicable and the Disposition within this note       Time User Action Codes Description Comment    3/7/2024 10:12 AM Austen Pugh Add [I10] HTN (hypertension)     3/7/2024 10:12 AM Austen Pugh Remove [I10] HTN (hypertension)     3/7/2024 10:12 AM Austen Pugh Add [I10] High blood pressure           ED Disposition       ED Disposition   Discharge    Condition   Stable    Date/Time   Thu Mar 7, 2024 10:12 AM    Comment   Nikki Roach discharge to home/self care.                   Follow-up Information       Follow up With Specialties Details Why Contact Info    Christian Simon MD Family Medicine   [address] [place] [address]  [facility] [address]  [phone]            Discharge Medication List as of 3/7/2024 10:12 AM        CONTINUE these medications which have NOT CHANGED    Details   amLODIPine-benazepril (LOTREL 5-20) 5-20 MG per capsule Take 1 capsule by mouth daily, Starting Mon 2/5/2024, Normal      folic acid-pyridoxine-cyanocobalamin (Folbic) 2.5-25-2 mg Take 1 tablet by mouth daily, Starting Mon 2/5/2024, Normal      furosemide (LASIX) 20 mg tablet Take 1 tablet (20 mg total) by mouth daily, Starting Mon 2/5/2024, Normal      metFORMIN (GLUCOPHAGE-XR) 500 mg 24 hr tablet Take 4 tablets (2,000 mg total) by mouth daily with breakfast, Starting Mon 2/5/2024, Normal      montelukast (SINGULAIR) 10 mg tablet Take 1 tablet (10 mg total) by mouth daily at bedtime, Starting Mon 2/5/2024, Normal      multivitamin (THERAGRAN) TABS Take 1 tablet by mouth daily., Historical Med      pravastatin (PRAVACHOL) 40 mg tablet Take 1 tablet (40 mg total) by mouth daily, Starting Mon 2/5/2024, Normal      warfarin (COUMADIN) 5 mg tablet Take  strictly as advised, Normal      budesonide-formoterol (Symbicort) 160-4.5 mcg/act inhaler Inhale 2 puffs 2 (two) times a day Rinse mouth after use., Starting Mon 2/5/2024, Normal      clotrimazole-betamethasone (LOTRISONE) 1-0.05 % cream Apply topically 2 (two) times a day, Starting Mon 2/5/2024, Normal      Hydrocortisone-Iodoquinol (Dermazene) 1-1 % CREA Apply topically 2 (two) times a day, Starting Mon 2/5/2024, Normal      metroNIDAZOLE (METROGEL) 1 % gel Apply topically daily, Starting Mon 2/5/2024, Normal      nystatin powder Apply topically 2 (two) times a day Apply to affected area, Starting Mon 2/5/2024, Normal      silver sulfadiazine (SILVADENE,SSD) 1 % cream Apply 1/16 of an inch to the affected area twice daily., Normal      triamcinolone (KENALOG) 0.1 % cream Apply topically 2 (two) times a day, Starting Mon 2/5/2024, Normal      Wound Dressings (Medi-Jair Performance Plus ABD)  PADS Apply 1 each topically 2 (two) times a day, Starting Mon 2/5/2024, Normal           No discharge procedures on file.  Prior to Admission Medications   Prescriptions Last Dose Informant Patient Reported? Taking?   Hydrocortisone-Iodoquinol (Dermazene) 1-1 % CREA   No No   Sig: Apply topically 2 (two) times a day   Wound Dressings (Medi-Jair Performance Plus ABD) PADS   No No   Sig: Apply 1 each topically 2 (two) times a day   amLODIPine-benazepril (LOTREL 5-20) 5-20 MG per capsule 3/7/2024  No Yes   Sig: Take 1 capsule by mouth daily   budesonide-formoterol (Symbicort) 160-4.5 mcg/act inhaler   No No   Sig: Inhale 2 puffs 2 (two) times a day Rinse mouth after use.   clotrimazole-betamethasone (LOTRISONE) 1-0.05 % cream   No No   Sig: Apply topically 2 (two) times a day   folic acid-pyridoxine-cyanocobalamin (Folbic) 2.5-25-2 mg 3/7/2024  No Yes   Sig: Take 1 tablet by mouth daily   furosemide (LASIX) 20 mg tablet 3/7/2024  No Yes   Sig: Take 1 tablet (20 mg total) by mouth daily   metFORMIN (GLUCOPHAGE-XR) 500 mg 24 hr tablet 3/7/2024  No Yes   Sig: Take 4 tablets (2,000 mg total) by mouth daily with breakfast   metroNIDAZOLE (METROGEL) 1 % gel   No No   Sig: Apply topically daily   montelukast (SINGULAIR) 10 mg tablet 3/7/2024  No Yes   Sig: Take 1 tablet (10 mg total) by mouth daily at bedtime   multivitamin (THERAGRAN) TABS 3/7/2024 Self Yes Yes   Sig: Take 1 tablet by mouth daily.   nystatin powder   No No   Sig: Apply topically 2 (two) times a day Apply to affected area   pravastatin (PRAVACHOL) 40 mg tablet 3/7/2024  No Yes   Sig: Take 1 tablet (40 mg total) by mouth daily   silver sulfadiazine (SILVADENE,SSD) 1 % cream   No No   Sig: Apply 1/16 of an inch to the affected area twice daily.   Patient not taking: Reported on 3/7/2024   triamcinolone (KENALOG) 0.1 % cream   No No   Sig: Apply topically 2 (two) times a day   Patient not taking: Reported on 3/7/2024   warfarin (COUMADIN) 5 mg tablet 3/7/2024  No  "Yes   Sig: Take  strictly as advised      Facility-Administered Medications: None       Portions of the record may have been created with voice recognition software. Occasional wrong word or \"sound a like\" substitutions may have occurred due to the inherent limitations of voice recognition software. Read the chart carefully and recognize, using context, where substitutions have occurred.    Electronically signed by:  MD Austen Gu MD  03/07/24 6653    "

## 2024-03-18 ENCOUNTER — HOSPITAL ENCOUNTER (EMERGENCY)
Facility: HOSPITAL | Age: 69
Discharge: HOME | End: 2024-03-18
Attending: EMERGENCY MEDICINE
Payer: MEDICARE

## 2024-03-18 ENCOUNTER — TELEPHONE (OUTPATIENT)
Dept: HEMATOLOGY ONCOLOGY | Facility: CLINIC | Age: 69
End: 2024-03-18

## 2024-03-18 VITALS
RESPIRATION RATE: 18 BRPM | WEIGHT: 293 LBS | OXYGEN SATURATION: 92 % | SYSTOLIC BLOOD PRESSURE: 158 MMHG | DIASTOLIC BLOOD PRESSURE: 73 MMHG | BODY MASS INDEX: 50.02 KG/M2 | HEIGHT: 64 IN | HEART RATE: 84 BPM | TEMPERATURE: 98.1 F

## 2024-03-18 DIAGNOSIS — Z79.01 WARFARIN ANTICOAGULATION: ICD-10-CM

## 2024-03-18 DIAGNOSIS — R04.0 LEFT-SIDED EPISTAXIS: Primary | ICD-10-CM

## 2024-03-18 LAB
ANION GAP SERPL CALCULATED.3IONS-SCNC: 9 MMOL/L (ref 4–13)
BUN SERPL-MCNC: 18 MG/DL (ref 5–25)
CALCIUM SERPL-MCNC: 9 MG/DL (ref 8.4–10.2)
CHLORIDE SERPL-SCNC: 100 MMOL/L (ref 96–108)
CO2 SERPL-SCNC: 28 MMOL/L (ref 21–32)
CREAT SERPL-MCNC: 1.18 MG/DL (ref 0.6–1.3)
ERYTHROCYTE [DISTWIDTH] IN BLOOD BY AUTOMATED COUNT: 15.2 % (ref 11.6–15.1)
GFR SERPL CREATININE-BSD FRML MDRD: 47 ML/MIN/1.73SQ M
GLUCOSE SERPL-MCNC: 111 MG/DL (ref 65–140)
HCT VFR BLD AUTO: 40 % (ref 34.8–46.1)
HGB BLD-MCNC: 12.3 G/DL (ref 11.5–15.4)
INR PPP: 2.47 (ref 0.84–1.19)
MCH RBC QN AUTO: 28 PG (ref 26.8–34.3)
MCHC RBC AUTO-ENTMCNC: 30.8 G/DL (ref 31.4–37.4)
MCV RBC AUTO: 91 FL (ref 82–98)
PLATELET # BLD AUTO: 387 THOUSANDS/UL (ref 149–390)
PMV BLD AUTO: 10.2 FL (ref 8.9–12.7)
POTASSIUM SERPL-SCNC: 3.8 MMOL/L (ref 3.5–5.3)
PROTHROMBIN TIME: 26.3 SECONDS (ref 11.6–14.5)
RBC # BLD AUTO: 4.4 MILLION/UL (ref 3.81–5.12)
SODIUM SERPL-SCNC: 137 MMOL/L (ref 135–147)
WBC # BLD AUTO: 11.16 THOUSAND/UL (ref 4.31–10.16)

## 2024-03-18 PROCEDURE — 99283 EMERGENCY DEPT VISIT LOW MDM: CPT

## 2024-03-18 PROCEDURE — 36415 COLL VENOUS BLD VENIPUNCTURE: CPT | Performed by: EMERGENCY MEDICINE

## 2024-03-18 PROCEDURE — 85027 COMPLETE CBC AUTOMATED: CPT | Performed by: EMERGENCY MEDICINE

## 2024-03-18 PROCEDURE — 85610 PROTHROMBIN TIME: CPT | Performed by: EMERGENCY MEDICINE

## 2024-03-18 PROCEDURE — 99284 EMERGENCY DEPT VISIT MOD MDM: CPT | Performed by: EMERGENCY MEDICINE

## 2024-03-18 PROCEDURE — 80048 BASIC METABOLIC PNL TOTAL CA: CPT | Performed by: EMERGENCY MEDICINE

## 2024-03-18 PROCEDURE — 96374 THER/PROPH/DIAG INJ IV PUSH: CPT

## 2024-03-18 PROCEDURE — 30905 CONTROL OF NOSEBLEED: CPT | Performed by: EMERGENCY MEDICINE

## 2024-03-18 RX ORDER — ONDANSETRON 2 MG/ML
4 INJECTION INTRAMUSCULAR; INTRAVENOUS ONCE
Status: COMPLETED | OUTPATIENT
Start: 2024-03-18 | End: 2024-03-18

## 2024-03-18 RX ORDER — AMOXICILLIN AND CLAVULANATE POTASSIUM 875; 125 MG/1; MG/1
1 TABLET, FILM COATED ORAL ONCE
Status: COMPLETED | OUTPATIENT
Start: 2024-03-18 | End: 2024-03-18

## 2024-03-18 RX ORDER — AMOXICILLIN AND CLAVULANATE POTASSIUM 875; 125 MG/1; MG/1
1 TABLET, FILM COATED ORAL EVERY 12 HOURS
Qty: 14 TABLET | Refills: 0 | Status: SHIPPED | OUTPATIENT
Start: 2024-03-18 | End: 2024-03-25

## 2024-03-18 RX ORDER — TRANEXAMIC ACID 100 MG/ML
1000 INJECTION, SOLUTION INTRAVENOUS ONCE
Status: COMPLETED | OUTPATIENT
Start: 2024-03-18 | End: 2024-03-18

## 2024-03-18 RX ADMIN — AMOXICILLIN AND CLAVULANATE POTASSIUM 1 TABLET: 875; 125 TABLET, FILM COATED ORAL at 03:49

## 2024-03-18 RX ADMIN — ONDANSETRON 4 MG: 2 INJECTION INTRAMUSCULAR; INTRAVENOUS at 02:55

## 2024-03-18 RX ADMIN — TRANEXAMIC ACID 1000 MG: 1 INJECTION, SOLUTION INTRAVENOUS at 01:34

## 2024-03-18 NOTE — TELEPHONE ENCOUNTER
Lab Inquiry   Who are you speaking with? Patient     If it is not the patient, are they listed on an active communication consent form? N/A   Name of ordering provider Dr. Alicea   What is being requested? Lab results to be reviewed   Lab draw location [facility]   What is the best call back number? [phone]    If patient at the lab, Was a live attempts to contact the team made? N/a

## 2024-03-18 NOTE — ED PROVIDER NOTES
History  Chief Complaint   Patient presents with    Nose Bleed     EMS arrival, Pt presents with nosebleed that started around 2330 yesterday, + thinners     Nikki Roach is a 68 y.o. year old female with PMH of VTE on coumadin presenting to the [facility] ED for evaluation of a nosebleed. Patient had sudden onset of nosebleed two hours PTA this evening. No reported fall or nose trauma. Patient has had mild nosebleeds previously which have self-resolved. Patient believes blood is mainly coming from the left nostril though also feels blood coming from right nostril and going down back of throat. Patient is anticoagulated on coumadin. Patient denies chest pain, shortness of breath, N/V or abdominal pain. Patient has not taken/received any medications at home for relief of symptoms.      History provided by:  Medical records and patient   used: No        Prior to Admission Medications   Prescriptions Last Dose Informant Patient Reported? Taking?   Hydrocortisone-Iodoquinol (Dermazene) 1-1 % CREA   No No   Sig: Apply topically 2 (two) times a day   Wound Dressings (Medi-Jair Performance Plus ABD) PADS   No No   Sig: Apply 1 each topically 2 (two) times a day   amLODIPine-benazepril (LOTREL 5-20) 5-20 MG per capsule   No No   Sig: Take 1 capsule by mouth daily   budesonide-formoterol (Symbicort) 160-4.5 mcg/act inhaler   No No   Sig: Inhale 2 puffs 2 (two) times a day Rinse mouth after use.   clotrimazole-betamethasone (LOTRISONE) 1-0.05 % cream   No No   Sig: Apply topically 2 (two) times a day   folic acid-pyridoxine-cyanocobalamin (Folbic) 2.5-25-2 mg   No No   Sig: Take 1 tablet by mouth daily   furosemide (LASIX) 20 mg tablet   No No   Sig: Take 1 tablet (20 mg total) by mouth daily   metFORMIN (GLUCOPHAGE-XR) 500 mg 24 hr tablet   No No   Sig: Take 4 tablets (2,000 mg total) by mouth daily with breakfast   metroNIDAZOLE (METROGEL) 1 % gel   No No   Sig: Apply topically daily   montelukast (SINGULAIR)  10 mg tablet   No No   Sig: Take 1 tablet (10 mg total) by mouth daily at bedtime   multivitamin (THERAGRAN) TABS  Self Yes No   Sig: Take 1 tablet by mouth daily.   nystatin powder   No No   Sig: Apply topically 2 (two) times a day Apply to affected area   pravastatin (PRAVACHOL) 40 mg tablet   No No   Sig: Take 1 tablet (40 mg total) by mouth daily   silver sulfadiazine (SILVADENE,SSD) 1 % cream   No No   Sig: Apply 1/16 of an inch to the affected area twice daily.   Patient not taking: Reported on 3/7/2024   triamcinolone (KENALOG) 0.1 % cream   No No   Sig: Apply topically 2 (two) times a day   Patient not taking: Reported on 3/7/2024   warfarin (COUMADIN) 5 mg tablet   No No   Sig: Take  strictly as advised      Facility-Administered Medications: None       Past Medical History:   Diagnosis Date    Blood clotting disorder (HCC)     Homocysteinemia     Hypercoagulable state (HCC)     Lyme disease     Renal disorder     kidney stones    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)        Past Surgical History:   Procedure Laterality Date    ANKLE SURGERY Right     APPENDECTOMY      BREAST EXCISIONAL BIOPSY Left     benign cyst    CLOSED REDUCTION METATARSAL FRACTURE Right     FOOT SURGERY      HEMORROIDECTOMY      HYSTERECTOMY      JOINT REPLACEMENT Bilateral     knee    LITHOTRIPSY      VENA CAVA FILTER PLACEMENT      onset: 1/10/10       Family History   Problem Relation Age of Onset    Pulmonary embolism Mother     Hypercoagulant Ability Mother         primary state    No Known Problems Father     No Known Problems Sister     No Known Problems Daughter     Hypercoagulant Ability Maternal Grandmother         primary state    No Known Problems Maternal Grandfather     No Known Problems Paternal Grandmother     No Known Problems Paternal Grandfather     Breast cancer Paternal Aunt     No Known Problems Paternal Aunt     No Known Problems Paternal Aunt     No Known  Problems Paternal Aunt     No Known Problems Paternal Aunt     No Known Problems Paternal Aunt     No Known Problems Paternal Aunt      I have reviewed and agree with the history as documented.    E-Cigarette/Vaping    E-Cigarette Use Never User      E-Cigarette/Vaping Substances     Social History     Tobacco Use    Smoking status: Never    Smokeless tobacco: Never   Vaping Use    Vaping status: Never Used   Substance Use Topics    Alcohol use: Never    Drug use: No       Review of Systems   HENT:  Positive for nosebleeds. Negative for facial swelling and sore throat.    Respiratory:  Negative for cough and shortness of breath.    Cardiovascular:  Negative for chest pain.   Gastrointestinal:  Negative for abdominal pain, nausea and vomiting.   Neurological:  Negative for syncope and light-headedness.   All other systems reviewed and are negative.      Physical Exam  Physical Exam  Vitals and nursing note reviewed.   Constitutional:       General: She is not in acute distress.     Appearance: Normal appearance. She is well-developed. She is not ill-appearing, toxic-appearing or diaphoretic.   HENT:      Head: Normocephalic and atraumatic.      Nose: No congestion or rhinorrhea.      Right Nostril: Epistaxis present.      Left Nostril: Epistaxis present.      Comments: Clotted blood in bilateral nostril with active bleeding from left nostril. Unable to visualize source of bleeding in nostril.     Mouth/Throat:      Mouth: No angioedema.      Comments: Blood visualized in posterior oropharynx  Eyes:      General:         Right eye: No discharge.         Left eye: No discharge.   Cardiovascular:      Rate and Rhythm: Normal rate and regular rhythm.   Pulmonary:      Effort: Pulmonary effort is normal. No accessory muscle usage or respiratory distress.      Breath sounds: Normal breath sounds. No stridor. No decreased breath sounds, wheezing, rhonchi or rales.   Abdominal:      General: There is no distension.       Palpations: Abdomen is soft.      Tenderness: There is no abdominal tenderness. There is no guarding or rebound.   Musculoskeletal:      Cervical back: Normal range of motion and neck supple. No rigidity.      Right lower leg: No tenderness. No edema.      Left lower leg: No tenderness. No edema.   Skin:     Capillary Refill: Capillary refill takes less than 2 seconds.      Findings: No rash.   Neurological:      Mental Status: She is alert and oriented to person, place, and time.   Psychiatric:         Mood and Affect: Mood normal.         Behavior: Behavior normal.         Vital Signs  ED Triage Vitals   Temperature Pulse Respirations Blood Pressure SpO2   03/18/24 0132 03/18/24 0131 03/18/24 0131 03/18/24 0131 03/18/24 0128   98.1 °F (36.7 °C) 97 16 150/79 97 %      Temp Source Heart Rate Source Patient Position - Orthostatic VS BP Location FiO2 (%)   03/18/24 0132 03/18/24 0131 03/18/24 0131 03/18/24 0131 --   Temporal Monitor Lying Left arm       Pain Score       03/18/24 0131       No Pain           Vitals:    03/18/24 0131   BP: 150/79   Pulse: 97   Patient Position - Orthostatic VS: Lying         Visual Acuity      ED Medications  Medications   tranexamic acid 100mg/mL (for epistaxis) 1,000 mg (1,000 mg Nasal Given by Other 3/18/24 0134)   ondansetron (ZOFRAN) injection 4 mg (4 mg Intravenous Given 3/18/24 0255)   amoxicillin-clavulanate (AUGMENTIN) 875-125 mg per tablet 1 tablet (1 tablet Oral Given 3/18/24 0349)       Diagnostic Studies  Results Reviewed       Procedure Component Value Units Date/Time    Protime-INR [[id number]]  (Abnormal) Collected: 03/18/24 0135    Lab Status: Final result Specimen: Blood from Arm, Right Updated: 03/18/24 0159     Protime 26.3 seconds      INR 2.47    Basic metabolic panel [[id number]] Collected: 03/18/24 0135    Lab Status: Final result Specimen: Blood from Arm, Right Updated: 03/18/24 0156     Sodium 137 mmol/L      Potassium 3.8 mmol/L      Chloride 100 mmol/L      CO2  28 mmol/L      ANION GAP 9 mmol/L      BUN 18 mg/dL      Creatinine 1.18 mg/dL      Glucose 111 mg/dL      Calcium 9.0 mg/dL      eGFR 47 ml/min/1.73sq m     Narrative:      National Kidney Disease Foundation guidelines for Chronic Kidney Disease (CKD):     Stage 1 with normal or high GFR (GFR > 90 mL/min/1.73 square meters)    Stage 2 Mild CKD (GFR = 60-89 mL/min/1.73 square meters)    Stage 3A Moderate CKD (GFR = 45-59 mL/min/1.73 square meters)    Stage 3B Moderate CKD (GFR = 30-44 mL/min/1.73 square meters)    Stage 4 Severe CKD (GFR = 15-29 mL/min/1.73 square meters)    Stage 5 End Stage CKD (GFR <15 mL/min/1.73 square meters)  Note: GFR calculation is accurate only with a steady state creatinine    CBC and Platelet [[id number]]  (Abnormal) Collected: 03/18/24 0135    Lab Status: Final result Specimen: Blood from Arm, Right Updated: 03/18/24 0141     WBC 11.16 Thousand/uL      RBC 4.40 Million/uL      Hemoglobin 12.3 g/dL      Hematocrit 40.0 %      MCV 91 fL      MCH 28.0 pg      MCHC 30.8 g/dL      RDW 15.2 %      Platelets 387 Thousands/uL      MPV 10.2 fL                    No orders to display              Procedures  Epistaxis management    Date/Time: 3/18/2024 1:43 AM    Performed by: Dov Zuluaga DO  Authorized by: Dov Zuluaga DO  Universal Protocol:  Consent: Verbal consent obtained.  Risks and benefits: risks, benefits and alternatives were discussed  Consent given by: patient  Required items: required blood products, implants, devices, and special equipment available  Patient identity confirmed: verbally with patient    Patient location:  ED  Procedure details:     Treatment site:  L anterior    Hemostasis method:  Anterior pack and posterior pack    Approach:  External    Treatment complexity:  Limited    Treatment episode: initial    Post-procedure details:     Assessment:  Bleeding stopped    Patient tolerance of procedure:  Tolerated well, no immediate complications           ED  Course  ED Course as of 03/18/24 0614   Mon Mar 18, 2024   0143 Hemoglobin: 12.3   0220 Anterior nasal packing removed. Replaced with posterior nasal packing at this time.   0242 Reassesed. No bleeding noted from nares at this time. Patient reporting residual clot in back of throat. Will observe for resolution.   0304 Patient reporting resolution of bleeding from the nose and no blood in the oropharynx.   0323 Patient reassessed.  No residual bleeding from the nose or oropharynx.  Will discharge with plaquing in place and emphasized importance of prompt ENT follow-up.                               SBIRT 22yo+      Flowsheet Row Most Recent Value   Initial Alcohol Screen: US AUDIT-C     1. How often do you have a drink containing alcohol? 0 Filed at: 03/18/2024 0130   2. How many drinks containing alcohol do you have on a typical day you are drinking?  0 Filed at: 03/18/2024 0130   3b. FEMALE Any Age, or MALE 65+: How often do you have 4 or more drinks on one occassion? 0 Filed at: 03/18/2024 0130   Audit-C Score 0 Filed at: 03/18/2024 0130   CAMACHO: How many times in the past year have you...    Used an illegal drug or used a prescription medication for non-medical reasons? Never Filed at: 03/18/2024 0130                      Medical Decision Making    68 y.o. female presenting for epistaxis.  VSS, no s/s airway compromise on exam.  Blood clot in bilateral nostril limits evaluation.  Will check labs to evaluate for anemia, coagulopathy, electrolyte abnormality or CIRILO.  Will trial atomized TXA and given extent of bleeding will place nasal packing.    Reassessment: bleeding persistent and failed anterior packing. Posterior nasal packing placed which resulted in resolution of bleeding.  Patient observed in ED with no residual bleeding.  D/w ENT Dr. Sotelo who recommends discharge and outpatient f/u with ENT for removal and further evaluation.    Disposition: I have discussed with the patient our plan to discharge them from  the ED and the patient is in agreement with this plan.     Discharge Plan: Discharged with posterior nasal packing in place. Provide nasal clamp to be applied if bleeding should return. Provided Rx for augmentin. Emphasized importance of ENT f/u. RTED precautions emphasized. The patient was provided a written after visit summary with strict RTED precautions.     Followup: I have discussed with the patient plan to follow up with ENT. Contact information provided in AVS.    Amount and/or Complexity of Data Reviewed  Labs: ordered. Decision-making details documented in ED Course.    Risk  Prescription drug management.             Disposition  Final diagnoses:   Left-sided epistaxis   Warfarin anticoagulation     Time reflects when diagnosis was documented in both MDM as applicable and the Disposition within this note       Time User Action Codes Description Comment    3/18/2024  3:06 AM Dov Zuluaga [R04.0] Left-sided epistaxis     3/18/2024  3:41 AM Dov Zuluaga [Z79.01] Warfarin anticoagulation           ED Disposition       ED Disposition   Discharge    Condition   Stable    Date/Time   Mon Mar 18, 2024 0336    Comment   Nikki Roach discharge to home/self care.                   Follow-up Information       Follow up With Specialties Details Why Contact Info Additional Information    [facility] Schedule an appointment as soon as possible for a visit  To make appointment for reevaluation ASA. [address] [place] [id number] [phone] [facility], [address] [place] [address], [address] [phone]    Marck Mak DO Otolaryngology Schedule an appointment as soon as possible for a visit  To make appointment for reevaluation in 3-5 days. [address] [place] [id number] [phone]               Patient's Medications   Discharge Prescriptions    AMOXICILLIN-CLAVULANATE (AUGMENTIN) 875-125 MG PER TABLET    Take 1 tablet  by mouth every 12 (twelve) hours for 7 days       Start Date: 3/18/2024 End Date: 3/25/2024       Order Dose: 1 tablet       Quantity: 14 tablet    Refills: 0       No discharge procedures on file.    PDMP Review         Value Time User    PDMP Reviewed  Yes 3/18/2020  6:01 PM AFIA Sosa            ED Provider  Electronically Signed by             Dov Zuluaga DO  03/18/24 0614

## 2024-03-18 NOTE — DISCHARGE INSTRUCTIONS
You have been seen for evaluation of a nosebleed. Please complete the course of augmentin antibiotics until your nasal packing is in place. You will need to see ENT or your PCP to have the packing removed. Return to the emergency department if you develop worsening bleeding, lightheadedness, trouble breathing or any other symptoms of concern. Please follow up with ENT and your PCP by calling the number provided.

## 2024-03-18 NOTE — TELEPHONE ENCOUNTER
Returned telephone call spoke with Pt.  Per Jeremiah TEMPLE, continue taking same dose.  Warfarin 5 mg Monday thru  Thursday and 7.5 mg F thru Sunday.  Repeat pt.inr in 2 weeks.  Pt states she understands.

## 2024-03-19 ENCOUNTER — HOSPITAL ENCOUNTER (OUTPATIENT)
Dept: NON INVASIVE DIAGNOSTICS | Facility: HOSPITAL | Age: 69
Discharge: HOME | End: 2024-03-19
Attending: SURGERY
Payer: MEDICARE

## 2024-03-19 DIAGNOSIS — I87.332 VENOUS HYPERTENSION, CHRONIC, WITH ULCER AND INFLAMMATION, LEFT (HCC): ICD-10-CM

## 2024-03-19 PROCEDURE — 93923 UPR/LXTR ART STDY 3+ LVLS: CPT | Performed by: SURGERY

## 2024-03-19 PROCEDURE — 93923 UPR/LXTR ART STDY 3+ LVLS: CPT

## 2024-03-20 ENCOUNTER — TELEPHONE (OUTPATIENT)
Age: 69
End: 2024-03-20

## 2024-03-20 LAB
APOB+LDLR+PCSK9 GENE MUT ANL BLD/T: NOT DETECTED
BRCA1+BRCA2 DEL+DUP + FULL MUT ANL BLD/T: NOT DETECTED
MLH1+MSH2+MSH6+PMS2 GN DEL+DUP+FUL M: NOT DETECTED

## 2024-03-20 NOTE — TELEPHONE ENCOUNTER
Caller: Patient    Doctor: Ross    Reason for call: Requested appt w/Dr Hawkins. Advised [facility]. Provided tel#    Call back#: [phone]

## 2024-03-21 ENCOUNTER — OFFICE VISIT (OUTPATIENT)
Dept: WOUND CARE | Facility: CLINIC | Age: 69
End: 2024-03-21
Payer: MEDICARE

## 2024-03-21 VITALS
RESPIRATION RATE: 18 BRPM | DIASTOLIC BLOOD PRESSURE: 80 MMHG | HEART RATE: 80 BPM | SYSTOLIC BLOOD PRESSURE: 140 MMHG | TEMPERATURE: 98 F

## 2024-03-21 DIAGNOSIS — I89.0 LYMPHEDEMA OF BOTH LOWER EXTREMITIES: ICD-10-CM

## 2024-03-21 DIAGNOSIS — I87.332 VENOUS HYPERTENSION, CHRONIC, WITH ULCER AND INFLAMMATION, LEFT (HCC): Primary | ICD-10-CM

## 2024-03-21 DIAGNOSIS — I10 ESSENTIAL HYPERTENSION: ICD-10-CM

## 2024-03-21 PROCEDURE — 99212 OFFICE O/P EST SF 10 MIN: CPT | Performed by: SURGERY

## 2024-03-21 NOTE — PATIENT INSTRUCTIONS
Orders Placed This Encounter   Procedures    Wound cleansing and dressings Venous Ulcer Distal;Left;Lower Leg     Left lower leg ulcer: healed   Apply moisturizer to bilateral lower extremity daily     Order Circaid juxtalite velcro compression garments   Contact your lymphedema pump company to ask for assistance in how to use your pump and getting fitted for new sleeves.      Elastic Tubular Stocking Instructions - spandagrip G applied today  Tubular elastic bandage: Apply from base of toes to behind the knee. Apply in AM, may remove for sleep.  Avoid prolonged standing in one place.  Elevate leg(s) above the level of the heart when sitting or as much as possible.     Follow with wound center as needed     Standing Status:   Future     Standing Expiration Date:   3/21/2025

## 2024-03-24 NOTE — ASSESSMENT & PLAN NOTE
Blood pressure reviewed on this office visit.  Appears stable.  Continue current management as per PCP.

## 2024-03-24 NOTE — ASSESSMENT & PLAN NOTE
Bilateral lower extremity lymphedema.  Once again recommended use of her lymphedema pumps.  Encourage patient to again reach out to the company to secure appropriate sleeve so that she can continue to use her pumps.

## 2024-03-24 NOTE — PROGRESS NOTES
Patient ID: Nikki Roach is a 68 y.o. female Date of Birth 1955     Chief Complaint  Chief Complaint   Patient presents with    Follow Up Wound Care Visit     Left leg wound       Allergies  Azithromycin, Ciprofloxacin, Doxycycline, and Ancef [cefazolin]    Assessment:    Venous hypertension, chronic, with ulcer and inflammation, left (HCC)  Left lower extremity ulceration now healed.  No drainage.  Encourage patient to purchase CircAid leg compression stockings.  Also once again encourage patient to reach out to the lymphedema pump company to obtain the appropriate sleeves to continue using her lymphedema pumps daily.  Discussion had with patient about the importance of both compression stockings and lymphedema pumps.  Frankly told patient that if she does not do this I guarantee her that these will recur and she will be back to my wound care office with again with ulcerations.  Patient understands.  Questions answered    Of note patient recent JAQUELIN study showing toe pressures within the healing range.    Essential hypertension  Blood pressure reviewed on this office visit.  Appears stable.  Continue current management as per PCP.    Lymphedema of both lower extremities  Bilateral lower extremity lymphedema.  Once again recommended use of her lymphedema pumps.  Encourage patient to again reach out to the company to secure appropriate sleeve so that she can continue to use her pumps.     Diagnoses and all orders for this visit:    Venous hypertension, chronic, with ulcer and inflammation, left (HCC)  -     Wound cleansing and dressings Venous Ulcer Distal;Left;Lower Leg; Future    Essential hypertension    Lymphedema of both lower extremities            Imaging:    Her recent vascular JAQUELIN studies dated March 19, 2024 personally reviewed me.    Procedures    Plan:     [REMOVED] Wound 03/07/24 Venous Ulcer Leg Distal;Left;Lower (Removed)   Wound Image Images linked 03/21/24 1108   Wound Description  Epithelialization;Pink 03/21/24 1112   Leticia-wound Assessment Hyperpigmented;Edema;Dry;Scaly 03/21/24 1112   Wound Length (cm) 0 cm 03/21/24 1112   Wound Width (cm) 0 cm 03/21/24 1112   Wound Depth (cm) 0 cm 03/21/24 1112   Wound Surface Area (cm^2) 0 cm^2 03/21/24 1112   Wound Volume (cm^3) 0 cm^3 03/21/24 1112   Calculated Wound Volume (cm^3) 0 cm^3 03/21/24 1112   Change in Wound Size % 100 03/21/24 1112   Drainage Amount None 03/21/24 1112   Non-staged Wound Description Not applicable 03/21/24 1112       [REMOVED] Wound 09/09/20 Cellulitis Ankle Left;Medial (Removed)   Resolved Date/Resolved Time: 03/07/24 0803  Date First Assessed/Time First Assessed: 09/09/20 2220   Traumatic Wound Type: Cellulitis  Location: Ankle  Wound Location Orientation: Left;Medial  Wound Description (Comments): red, +2 edema  Wound Outcome...       [REMOVED] Wound 03/07/24 Venous Ulcer Leg Distal;Left;Lower (Removed)   Resolved Date/Resolved Time: 03/21/24 1140  Date First Assessed/Time First Assessed: 03/07/24 0810   Primary Wound Type: Venous Ulcer  Location: Leg  Wound Location Orientation: Distal;Left;Lower  Wound Outcome: Healed       Subjective:      .    68-year-old female with known Lyme disease, homocystinemia, polycystic ovary disease, hypertension, lower extremity lymphedema presents today for further evaluation and treatment of left lower extremity venous stasis ulceration.  Patient states that there is no significant drainage noted.  No significant pain.  Overall she feels that it has improved.  She states she does not have any compression stockings and she is unable to actually put them on when she does have them.  She has never had CircAid's in the past.  She still has the lymphedema pumps but has not used them because she does not have the appropriate sleeves.  She has not called the company.        The following portions of the patient's history were reviewed and updated as appropriate: She  has a past medical history  of Blood clotting disorder ([facility]), Homocysteinemia, Hypercoagulable state (HCC), Lyme disease, Renal disorder, and 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 ([facility]).  She   Patient Active Problem List    Diagnosis Date Noted    Venous hypertension, chronic, with ulcer and inflammation, left ([facility]) 03/02/2024    Lymphedema of both lower extremities 03/02/2024    Cellulitis of left lower extremity 02/22/2024    Type 2 diabetes mellitus without complication, without long-term current use of insulin ([facility]) 02/05/2024    Stage 3a chronic kidney disease ([facility]) 04/03/2023    Homocystinuria ([facility]) 09/02/2022    Prothrombin time increased 07/26/2021    Anemia 09/10/2020    Hyperphosphatemia 09/10/2020    S/P TKR (total knee replacement) 03/04/2020    Arthur filter in place 02/17/2020    History of pulmonary embolism 02/17/2020    Rosacea 11/22/2019    Abnormality of coagulation ([facility]) 06/12/2019    Hypertension, essential 06/10/2019    Pulmonary HTN ([facility])     Heme positive stool 06/21/2018    Cellulitis of right lower extremity 03/30/2018    Lyme disease     Blood clotting disorder ([facility])     Essential hypertension     Homocysteinemia     Hypercoagulable state ([facility])     Obstructive sleep apnea on CPAP     Morbid obesity with BMI of 70 and over, adult ([facility]) 07/01/2016    Skin rash 05/22/2014    Chronic venous hypertension w ulceration ([facility]) 12/18/2013    Osteoporosis 12/02/2013    Edema 11/20/2013    Esophageal reflux 11/20/2013    Hyperlipidemia 11/20/2013    Polycystic ovarian syndrome 11/20/2013     She  has a past surgical history that includes Joint replacement (Bilateral); Appendectomy; Ankle surgery (Right); Hysterectomy; Hemorroidectomy; Foot surgery; LITHOTRIPSY; Closed reduction metatarsal fracture (Right); Vena cava filter placement; and Breast excisional biopsy (Left).  Her family history includes Breast cancer in her paternal aunt; Hypercoagulant Ability in her maternal  grandmother and mother; No Known Problems in her daughter, father, maternal grandfather, paternal aunt, paternal aunt, paternal aunt, paternal aunt, paternal aunt, paternal aunt, paternal grandfather, paternal grandmother, and sister; Pulmonary embolism in her mother.  She  reports that she has never smoked. She has never used smokeless tobacco. She reports that she does not drink alcohol and does not use drugs.  Current Outpatient Medications   Medication Sig Dispense Refill    amLODIPine-benazepril (LOTREL 5-20) 5-20 MG per capsule Take 1 capsule by mouth daily 90 capsule 3    amoxicillin-clavulanate (AUGMENTIN) 875-125 mg per tablet Take 1 tablet by mouth every 12 (twelve) hours for 7 days 14 tablet 0    budesonide-formoterol (Symbicort) 160-4.5 mcg/act inhaler Inhale 2 puffs 2 (two) times a day Rinse mouth after use. 18 g 3    clotrimazole-betamethasone (LOTRISONE) 1-0.05 % cream Apply topically 2 (two) times a day 45 g 5    folic acid-pyridoxine-cyanocobalamin (Folbic) 2.5-25-2 mg Take 1 tablet by mouth daily 90 tablet 3    furosemide (LASIX) 20 mg tablet Take 1 tablet (20 mg total) by mouth daily 90 tablet 3    Hydrocortisone-Iodoquinol (Dermazene) 1-1 % CREA Apply topically 2 (two) times a day 28.4 g 5    metFORMIN (GLUCOPHAGE-XR) 500 mg 24 hr tablet Take 4 tablets (2,000 mg total) by mouth daily with breakfast 360 tablet 3    metroNIDAZOLE (METROGEL) 1 % gel Apply topically daily 60 g 5    montelukast (SINGULAIR) 10 mg tablet Take 1 tablet (10 mg total) by mouth daily at bedtime 90 tablet 3    multivitamin (THERAGRAN) TABS Take 1 tablet by mouth daily.      nystatin powder Apply topically 2 (two) times a day Apply to affected area 60 g 5    pravastatin (PRAVACHOL) 40 mg tablet Take 1 tablet (40 mg total) by mouth daily 90 tablet 3    silver sulfadiazine (SILVADENE,SSD) 1 % cream Apply 1/16 of an inch to the affected area twice daily. (Patient not taking: Reported on 3/7/2024) 400 g 3    triamcinolone  (KENALOG) 0.1 % cream Apply topically 2 (two) times a day (Patient not taking: Reported on 3/7/2024) 453.6 g 3    warfarin (COUMADIN) 5 mg tablet Take  strictly as advised 150 tablet 0    Wound Dressings (Medi-Jair Performance Plus ABD) PADS Apply 1 each topically 2 (two) times a day 48 each 3     No current facility-administered medications for this visit.     She is allergic to azithromycin, ciprofloxacin, doxycycline, and ancef [cefazolin]..    Review of Systems   Constitutional:  Negative for activity change, appetite change, chills, diaphoresis, fatigue, fever and unexpected weight change.   Respiratory:  Negative for cough and shortness of breath.    Cardiovascular:  Positive for leg swelling. Negative for chest pain and palpitations.   Skin:  Positive for wound (LLE). Negative for color change, pallor and rash.         Objective:       [REMOVED] Wound 03/07/24 Venous Ulcer Leg Distal;Left;Lower (Removed)   Wound Image Images linked 03/21/24 1108   Wound Description Epithelialization;Pink 03/21/24 1112   Leticia-wound Assessment Hyperpigmented;Edema;Dry;Scaly 03/21/24 1112   Wound Length (cm) 0 cm 03/21/24 1112   Wound Width (cm) 0 cm 03/21/24 1112   Wound Depth (cm) 0 cm 03/21/24 1112   Wound Surface Area (cm^2) 0 cm^2 03/21/24 1112   Wound Volume (cm^3) 0 cm^3 03/21/24 1112   Calculated Wound Volume (cm^3) 0 cm^3 03/21/24 1112   Change in Wound Size % 100 03/21/24 1112   Drainage Amount None 03/21/24 1112   Non-staged Wound Description Not applicable 03/21/24 1112       /80   Pulse 80   Temp 98 °F (36.7 °C)   Resp 18   LMP  (LMP Unknown)     Physical Exam  Vitals reviewed. Exam conducted with a chaperone present.   Constitutional:       General: She is not in acute distress.     Appearance: Normal appearance. She is not ill-appearing, toxic-appearing or diaphoretic.   HENT:      Head: Normocephalic and atraumatic.   Eyes:      General: No scleral icterus.        Right eye: No discharge.         Left  eye: No discharge.   Cardiovascular:      Rate and Rhythm: Normal rate.   Pulmonary:      Effort: Pulmonary effort is normal. No respiratory distress.   Musculoskeletal:         General: Swelling present. Normal range of motion.      Cervical back: Normal range of motion.      Right lower leg: Edema present.      Left lower leg: Edema present.   Skin:     General: Skin is warm.      Coloration: Skin is not jaundiced or pale.      Findings: No bruising or erythema.      Comments: Left lower extremity ulceration completely healed.  No active drainage.  No evidence of infection.  Nontender.   Neurological:      General: No focal deficit present.      Mental Status: She is alert and oriented to person, place, and time.      Cranial Nerves: No cranial nerve deficit.   Psychiatric:         Mood and Affect: Mood normal.         Thought Content: Thought content normal.         Judgment: Judgment normal.           Wound Instructions:  Orders Placed This Encounter   Procedures    Wound cleansing and dressings Venous Ulcer Distal;Left;Lower Leg     Left lower leg ulcer: healed   Apply moisturizer to bilateral lower extremity daily     Order Circaid juxtalite velcro compression garments   Contact your lymphedema pump company to ask for assistance in how to use your pump and getting fitted for new sleeves.      Elastic Tubular Stocking Instructions - spandagrip G applied today  Tubular elastic bandage: Apply from base of toes to behind the knee. Apply in AM, may remove for sleep.  Avoid prolonged standing in one place.  Elevate leg(s) above the level of the heart when sitting or as much as possible.     Follow with wound center as needed     Standing Status:   Future     Standing Expiration Date:   3/21/2025        Diagnosis ICD-10-CM Associated Orders   1. Venous hypertension, chronic, with ulcer and inflammation, left ([facility])  I87.332 Wound cleansing and dressings Venous Ulcer Distal;Left;Lower Leg      2. Essential hypertension   I10       3. Lymphedema of both lower extremities  I89.0

## 2024-03-24 NOTE — ASSESSMENT & PLAN NOTE
Left lower extremity ulceration now healed.  No drainage.  Encourage patient to purchase CircAid leg compression stockings.  Also once again encourage patient to reach out to the lymphedema pump company to obtain the appropriate sleeves to continue using her lymphedema pumps daily.  Discussion had with patient about the importance of both compression stockings and lymphedema pumps.  Frankly told patient that if she does not do this I guarantee her that these will recur and she will be back to my wound care office with again with ulcerations.  Patient understands.  Questions answered    Of note patient recent JAQUELIN study showing toe pressures within the healing range.

## 2024-04-03 ENCOUNTER — TELEPHONE (OUTPATIENT)
Age: 69
End: 2024-04-03

## 2024-04-03 DIAGNOSIS — L97.909 CHRONIC VENOUS HYPERTENSION W ULCERATION (HCC): Primary | ICD-10-CM

## 2024-04-03 DIAGNOSIS — I87.319 CHRONIC VENOUS HYPERTENSION W ULCERATION (HCC): Primary | ICD-10-CM

## 2024-04-03 NOTE — TELEPHONE ENCOUNTER
Last visit 02/28/2024  Next appt 05/20/2024    Patient is requesting scripts for sleeves for her lymphedema machine, the medic power press unit. Patient called medicare to verify what pharmacy participates. Please fax to [employer] at  [phone]. Please advise.    FYI: Patient stated to pass on the message that she will only  sleeves as long as they fully accept medicare and [facility]. Patient stated she will not be paying out of pocket.

## 2024-04-04 LAB
INR PPP: 2
PROTHROMBIN TIME: 22.1 SEC (ref 12–14.6)

## 2024-04-05 ENCOUNTER — TELEPHONE (OUTPATIENT)
Dept: HEMATOLOGY ONCOLOGY | Facility: CLINIC | Age: 69
End: 2024-04-05

## 2024-04-05 NOTE — TELEPHONE ENCOUNTER
Patient Call    Who are you speaking with? Patient    If it is not the patient, are they listed on an active communication consent form? N/A   What is the reason for this call? Pt is calling in regards to lab work she had done on 4/3/24 at [facility]. Pt was looking for the results of her PT/INR and what her dosing should be for her coumadin. Pt would like a call back from someone at Dr Alicea's office please.    Does this require a call back? Yes   If a call back is required, please list best call back number [phone]   If a call back is required, advise that a message will be forwarded to their care team and someone will return their call as soon as possible.   Did you relay this information to the patient? Yes

## 2024-04-08 NOTE — TELEPHONE ENCOUNTER
[employer] is requesting clinical notes and order for leg sleeves  to submit to medicare to see what they will cover. Please fax to [phone]  BREANNA mercado

## 2024-04-11 ENCOUNTER — TELEPHONE (OUTPATIENT)
Dept: WOUND CARE | Facility: CLINIC | Age: 69
End: 2024-04-11

## 2024-04-11 NOTE — TELEPHONE ENCOUNTER
Patient stated that when she called the lymphedema pump company to order new sleeves, they explained that they needed additional information from wound care office. The company said that they would send the patient a letter in the mail detailing exactly the documentation that she would need from the wound care center. I advised the patient to call back once she has the specific documentation requirements and we would be happy to send all necessary materials to ensure she is able to obtain her sleeves.     She also inquired about how to obtain spandagrip. I explained that based on her last visit note, she was actually encouraged to obtain circaid juxtalites and those could be purchased online. She verbalized understanding with intent to comply.

## 2024-04-14 NOTE — TELEPHONE ENCOUNTER
Returned telephone call left voice message  10/13/22 10/13/22 INR 1 9  Per Dr Ashutosh White, continue taking coumadin 7 5 mg M thru Friday and 5 mg Saturday and Sunday  Repeat lab in 2 weeks  Oh and Happy [employer]  Previously Declined (within the last year)

## 2024-05-02 LAB
INR PPP: 2.7
PROTHROMBIN TIME: 28.2 SEC (ref 12–14.6)

## 2024-05-03 ENCOUNTER — TELEPHONE (OUTPATIENT)
Dept: HEMATOLOGY ONCOLOGY | Facility: CLINIC | Age: 69
End: 2024-05-03

## 2024-05-03 DIAGNOSIS — Z79.01 ANTICOAGULATED ON COUMADIN: ICD-10-CM

## 2024-05-03 DIAGNOSIS — Z86.718 HISTORY OF DVT (DEEP VEIN THROMBOSIS): Primary | ICD-10-CM

## 2024-05-03 NOTE — TELEPHONE ENCOUNTER
Appointment Confirmation   Who are you speaking with? Patient   If it is not the patient, are they listed on an active communication consent form? N/A   Which provider is the appointment scheduled with?  Dr. Alicea   When is the appointment scheduled?  Please list date and time 6/27/24   At which location is the appointment scheduled to take place? [place]   Did caller verbalize understanding of appointment details? Yes

## 2024-05-03 NOTE — TELEPHONE ENCOUNTER
Spoke with patient to let her know that there are no changes to her coumadin at this time  Coumadin 5mg Monday-Thursday  Coumadin 7.5mg Friday- Sunday    She will get repeat lab in 2 weeks    Portia, can you place order for CBC and CMP and mail to her house (for her appt in June)

## 2024-05-03 NOTE — TELEPHONE ENCOUNTER
Appointment Change  Cancel, Reschedule, Change to Virtual      Who are you speaking with? Patient   If it is not the patient, is the caller listed on the communication consent form? Yes   Which provider is the appointment scheduled with? Dr. Alicea   When was the original appointment scheduled?    Please list date and time 6/17/24   At which location is the appointment scheduled to take place? [place]   Was the appointment rescheduled?     Was the appointment changed from an in person visit to a virtual visit?    If so, please list the details of the change. 6/27/24   What is the reason for the appointment change? Provider/left message

## 2024-05-03 NOTE — TELEPHONE ENCOUNTER
Patient Call    Who are you speaking with? Patient    If it is not the patient, are they listed on an active communication consent form? Yes   What is the reason for this call? INR results and protocol   Does this require a call back? Yes   If a call back is required, please list best call back number [phone]    If a call back is required, advise that a message will be forwarded to their care team and someone will return their call as soon as possible.   Did you relay this information to the patient? Yes

## 2024-05-06 ENCOUNTER — OFFICE VISIT (OUTPATIENT)
Dept: WOUND CARE | Facility: CLINIC | Age: 69
End: 2024-05-06
Payer: MEDICARE

## 2024-05-06 VITALS
HEART RATE: 80 BPM | BODY MASS INDEX: 51.91 KG/M2 | RESPIRATION RATE: 20 BRPM | HEIGHT: 63 IN | SYSTOLIC BLOOD PRESSURE: 148 MMHG | DIASTOLIC BLOOD PRESSURE: 86 MMHG | TEMPERATURE: 96.7 F | WEIGHT: 293 LBS

## 2024-05-06 DIAGNOSIS — I89.0 LYMPHEDEMA OF BOTH LOWER EXTREMITIES: ICD-10-CM

## 2024-05-06 DIAGNOSIS — I87.332 VENOUS HYPERTENSION, CHRONIC, WITH ULCER AND INFLAMMATION, LEFT (HCC): Primary | ICD-10-CM

## 2024-05-06 DIAGNOSIS — I10 HYPERTENSION, ESSENTIAL: ICD-10-CM

## 2024-05-06 PROCEDURE — 99213 OFFICE O/P EST LOW 20 MIN: CPT | Performed by: SURGERY

## 2024-05-06 NOTE — PATIENT INSTRUCTIONS
Orders Placed This Encounter   Procedures    Wound cleansing and dressings Left;Lower Leg     Left lower leg wound    Wash your hands with soap and water.  Remove old dressing, discard into plastic bag and place in trash.    Cleanse the wound with mild soap (Dove) and water prior to applying a clean dressing. Do not use tissue or cotton balls.   Do not scrub the wound. Pat dry using gauze.  Shower yes     Apply dermagran to the wound.  Cover with gauze  Secure with amna and tape  Change dressing every other day.    Circ-aid    Apply Circaid devices as instructed first thing qAM & remove qHS.    Avoid prolonged standing in one place.    Elevate leg(s) above the level of the heart when sitting or as much as possible.     Follow up in 2 weeks    Use lymphedema pumps twice daily for 1 hour at a time.      Mechanically debrided with gauze and saline     Standing Status:   Future     Standing Expiration Date:   5/13/2024

## 2024-05-06 NOTE — PROGRESS NOTES
Wound Procedure Treatment Left;Lower Leg    Performed by: Zandra Rasmussen RN  Authorized by: Farhad Méndez DO  Associated wounds:   Wound 05/06/24 Leg Left;Lower  Wound cleansed with:  NSS  Applied primary dressing:  Dermagran  Applied secondary dressing:  Gauze  Wound secured with:  Vanesa and Tape

## 2024-05-09 NOTE — ASSESSMENT & PLAN NOTE
Recurrent ulceration secondary to lymphedema in the setting of venous stasis disease.  Minimal drainage.  Patient was treating herself with calcium alginate from her last treatment.  Drainage is controlled.  On exam minimal opening and scant serous drainage noted.  For now we will treat with Dermagran and make use of her new CircAid stockings.  Follow-up 2 weeks to ensure adequate healing.  Encouraged leg elevation and to wear her stockings.  ABIs were evaluated and reviewed.  If need be can apply compression wrap starting at next visit.

## 2024-05-09 NOTE — PROGRESS NOTES
Patient ID: Nikki Roach is a 68 y.o. female Date of Birth 1955     Chief Complaint  Chief Complaint   Patient presents with    New Patient Visit     Left lower leg wounds       Allergies  Azithromycin, Ciprofloxacin, Doxycycline, and Ancef [cefazolin]    Assessment:    Venous hypertension, chronic, with ulcer and inflammation, left (HCC)  Recurrent ulceration secondary to lymphedema in the setting of venous stasis disease.  Minimal drainage.  Patient was treating herself with calcium alginate from her last treatment.  Drainage is controlled.  On exam minimal opening and scant serous drainage noted.  For now we will treat with Dermagran and make use of her new CircAid stockings.  Follow-up 2 weeks to ensure adequate healing.  Encouraged leg elevation and to wear her stockings.  ABIs were evaluated and reviewed.  If need be can apply compression wrap starting at next visit.    Lymphedema of both lower extremities  Encourage patient to again start wearing her lymphedema pumps.  She has issues with sleeves and will need to order new ones.  She states that she was trying to get this through the company and/or insurance but this was not working and she will just have to buy them resolved.  She is okay with this and understands the importance of doing so.    Hypertension, essential  Blood pressure reviewed on today's office visit.  Appears stable.  Continue current management via primary care physician.     Diagnoses and all orders for this visit:    Venous hypertension, chronic, with ulcer and inflammation, left (HCC)  -     Wound cleansing and dressings Left;Lower Leg; Future  -     Wound Procedure Treatment Left;Lower Leg    Lymphedema of both lower extremities    Hypertension, essential            Imaging:    Recent JAQUELIN bilateral lower extremity report dated March 19, 2024 was personally reviewed with me.      Procedures    Plan:     Wound 05/06/24 Venous Ulcer Leg Left;Lower (Active)   Wound Image Images  linked 05/06/24 1149   Wound Description Epithelialization;Pink 05/06/24 1156   Leticia-wound Assessment Edema;Yellow-brown;Hyperpigmented 05/06/24 1156   Wound Length (cm) 0.3 cm 05/06/24 1156   Wound Width (cm) 0.1 cm 05/06/24 1156   Wound Depth (cm) 0.1 cm 05/06/24 1156   Wound Surface Area (cm^2) 0.03 cm^2 05/06/24 1156   Wound Volume (cm^3) 0.003 cm^3 05/06/24 1156   Calculated Wound Volume (cm^3) 0 cm^3 05/06/24 1156   Drainage Amount Small 05/06/24 1156   Drainage Description Serous 05/06/24 1156   Non-staged Wound Description Full thickness 05/06/24 1156       Wound 05/06/24 Venous Ulcer Leg Left;Lower (Active)   Date First Assessed/Time First Assessed: 05/06/24 1148   Primary Wound Type: Venous Ulcer  Location: Leg  Wound Location Orientation: Left;Lower       Subjective:      .    68-year-old female with numerous comorbidities including bilateral lower extremity lymphedema and venous stasis disease with history of ulceration, presents today for consultation of involving recurrent venous stasis ulcerations left lower extremity.  Patient states she recently noticed breakdown of her skin and drainage of her left lower extremity.  She started to treat this herself with calcium alginate that she had leftover from previous treatments.  She states drainage is better controlled but no significant discomfort.  She also did purchase CircAid pressure stockings.  She also states that she has been in contact with her lymphedema pump company about new sleeves and she is hoping to buy some shortly no other associate symptoms at this time.        The following portions of the patient's history were reviewed and updated as appropriate: She  has a past medical history of Blood clotting disorder (formerly [facility]), Homocysteinemia, Hypercoagulable state (HCC), Lyme disease, Renal disorder, and 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 (formerly [facility]).  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[facility]) 09/02/2022    Prothrombin time increased 07/26/2021    Anemia 09/10/2020    Hyperphosphatemia 09/10/2020    S/P TKR (total knee replacement) 03/04/2020    Elida filter in place 02/17/2020    History of pulmonary embolism 02/17/2020    Rosacea 11/22/2019    Abnormality of coagulation ([facility]) 06/12/2019    Hypertension, essential 06/10/2019    Pulmonary HTN ([facility])     Heme positive stool 06/21/2018    Cellulitis of right lower extremity 03/30/2018    Lyme disease     Blood clotting disorder ([facility])     Essential hypertension     Homocysteinemia     Hypercoagulable state ([facility])     Obstructive sleep apnea on CPAP     Morbid obesity with BMI of 70 and over, adult ([facility]) 07/01/2016    Skin rash 05/22/2014    Chronic venous hypertension w ulceration ([facility]) 12/18/2013    Osteoporosis 12/02/2013    Edema 11/20/2013    Esophageal reflux 11/20/2013    Hyperlipidemia 11/20/2013    Polycystic ovarian syndrome 11/20/2013     She  has a past surgical history that includes Joint replacement (Bilateral); Appendectomy; Ankle surgery (Right); Hysterectomy; Hemorroidectomy; Foot surgery; LITHOTRIPSY; Closed reduction metatarsal fracture (Right); Vena cava filter placement; and Breast excisional biopsy (Left).  Her family history includes Breast cancer in her paternal aunt; Hypercoagulant Ability in her maternal grandmother and mother; No Known Problems in her daughter, father, maternal grandfather, paternal aunt, paternal aunt, paternal aunt, paternal aunt, paternal aunt, paternal aunt, paternal grandfather, paternal grandmother, and sister; Pulmonary embolism in her  mother.  She  reports that she has never smoked. She has never used smokeless tobacco. She reports that she does not drink alcohol and does not use drugs.  Current Outpatient Medications   Medication Sig Dispense Refill    amLODIPine-benazepril (LOTREL 5-20) 5-20 MG per capsule Take 1 capsule by mouth daily 90 capsule 3    budesonide-formoterol (Symbicort) 160-4.5 mcg/act inhaler Inhale 2 puffs 2 (two) times a day Rinse mouth after use. 18 g 3    clotrimazole-betamethasone (LOTRISONE) 1-0.05 % cream Apply topically 2 (two) times a day 45 g 5    folic acid-pyridoxine-cyanocobalamin (Folbic) 2.5-25-2 mg Take 1 tablet by mouth daily 90 tablet 3    furosemide (LASIX) 20 mg tablet Take 1 tablet (20 mg total) by mouth daily 90 tablet 3    Hydrocortisone-Iodoquinol (Dermazene) 1-1 % CREA Apply topically 2 (two) times a day 28.4 g 5    metFORMIN (GLUCOPHAGE-XR) 500 mg 24 hr tablet Take 4 tablets (2,000 mg total) by mouth daily with breakfast 360 tablet 3    metroNIDAZOLE (METROGEL) 1 % gel Apply topically daily 60 g 5    montelukast (SINGULAIR) 10 mg tablet Take 1 tablet (10 mg total) by mouth daily at bedtime 90 tablet 3    multivitamin (THERAGRAN) TABS Take 1 tablet by mouth daily.      nystatin powder Apply topically 2 (two) times a day Apply to affected area 60 g 5    pravastatin (PRAVACHOL) 40 mg tablet Take 1 tablet (40 mg total) by mouth daily 90 tablet 3    silver sulfadiazine (SILVADENE,SSD) 1 % cream Apply 1/16 of an inch to the affected area twice daily. (Patient not taking: Reported on 3/7/2024) 400 g 3    triamcinolone (KENALOG) 0.1 % cream Apply topically 2 (two) times a day (Patient not taking: Reported on 3/7/2024) 453.6 g 3    warfarin (COUMADIN) 5 mg tablet Take  strictly as advised 150 tablet 0    Wound Dressings (Medi-Jair Performance Plus ABD) PADS Apply 1 each topically 2 (two) times a day 48 each 3     No current facility-administered medications for this visit.     She is allergic to azithromycin,  "ciprofloxacin, doxycycline, and ancef [cefazolin]..    Review of Systems      Review of systems completed, all negative except as noted HPI.    Objective:       Wound 05/06/24 Venous Ulcer Leg Left;Lower (Active)   Wound Image Images linked 05/06/24 1149   Wound Description Epithelialization;Pink 05/06/24 1156   Leticia-wound Assessment Edema;Yellow-brown;Hyperpigmented 05/06/24 1156   Wound Length (cm) 0.3 cm 05/06/24 1156   Wound Width (cm) 0.1 cm 05/06/24 1156   Wound Depth (cm) 0.1 cm 05/06/24 1156   Wound Surface Area (cm^2) 0.03 cm^2 05/06/24 1156   Wound Volume (cm^3) 0.003 cm^3 05/06/24 1156   Calculated Wound Volume (cm^3) 0 cm^3 05/06/24 1156   Drainage Amount Small 05/06/24 1156   Drainage Description Serous 05/06/24 1156   Non-staged Wound Description Full thickness 05/06/24 1156       /86   Pulse 80   Temp (!) 96.7 °F (35.9 °C)   Resp 20   Ht 5' 3\" (1.6 m)   Wt (!) 198 kg (436 lb)   LMP  (LMP Unknown)   BMI 77.23 kg/m²     Physical Exam  Vitals reviewed. Exam conducted with a chaperone present.   Constitutional:       General: She is not in acute distress.     Appearance: Normal appearance. She is obese. She is not ill-appearing, toxic-appearing or diaphoretic.   HENT:      Head: Normocephalic and atraumatic.      Right Ear: External ear normal.      Left Ear: External ear normal.   Eyes:      General: No scleral icterus.        Right eye: No discharge.         Left eye: No discharge.   Cardiovascular:      Rate and Rhythm: Normal rate.   Pulmonary:      Effort: Pulmonary effort is normal. No respiratory distress.   Musculoskeletal:         General: Swelling present.      Cervical back: Normal range of motion.      Right lower leg: Edema present.      Left lower leg: Edema present.   Skin:     General: Skin is warm.      Coloration: Skin is not jaundiced or pale.      Findings: No bruising or erythema.      Comments: Left lower extremity venous stasis disease with ulceration.  Scant drainage " noted.  No debridement required.   Neurological:      General: No focal deficit present.      Mental Status: She is alert and oriented to person, place, and time.      Cranial Nerves: No cranial nerve deficit.   Psychiatric:         Mood and Affect: Mood normal.         Behavior: Behavior normal.         Thought Content: Thought content normal.         Judgment: Judgment normal.           Wound Instructions:  Orders Placed This Encounter   Procedures    Wound cleansing and dressings Left;Lower Leg     Left lower leg wound    Wash your hands with soap and water.  Remove old dressing, discard into plastic bag and place in trash.    Cleanse the wound with mild soap (Dove) and water prior to applying a clean dressing. Do not use tissue or cotton balls.   Do not scrub the wound. Pat dry using gauze.  Shower yes     Apply dermagran to the wound.  Cover with gauze  Secure with amna and tape  Change dressing every other day.    Circ-aid    Apply Circaid devices as instructed first thing qAM & remove qHS.    Avoid prolonged standing in one place.    Elevate leg(s) above the level of the heart when sitting or as much as possible.     Follow up in 2 weeks    Use lymphedema pumps twice daily for 1 hour at a time.      Mechanically debrided with gauze and saline     Standing Status:   Future     Standing Expiration Date:   5/13/2024    Wound Procedure Treatment Left;Lower Leg     This order was created via procedure documentation        Diagnosis ICD-10-CM Associated Orders   1. Venous hypertension, chronic, with ulcer and inflammation, left ([facility])  I87.332 Wound cleansing and dressings Left;Lower Leg     Wound Procedure Treatment Left;Lower Leg      2. Lymphedema of both lower extremities  I89.0       3. Hypertension, essential  I10

## 2024-05-09 NOTE — ASSESSMENT & PLAN NOTE
Encourage patient to again start wearing her lymphedema pumps.  She has issues with sleeves and will need to order new ones.  She states that she was trying to get this through the company and/or insurance but this was not working and she will just have to buy them resolved.  She is okay with this and understands the importance of doing so.

## 2024-05-09 NOTE — ASSESSMENT & PLAN NOTE
Blood pressure reviewed on today's office visit.  Appears stable.  Continue current management via primary care physician.

## 2024-05-14 ENCOUNTER — TELEPHONE (OUTPATIENT)
Dept: HEMATOLOGY ONCOLOGY | Facility: CLINIC | Age: 69
End: 2024-05-14

## 2024-05-14 DIAGNOSIS — D68.9 ABNORMALITY OF COAGULATION (HCC): Primary | ICD-10-CM

## 2024-05-14 DIAGNOSIS — E11.9 TYPE 2 DIABETES MELLITUS WITHOUT COMPLICATION, WITHOUT LONG-TERM CURRENT USE OF INSULIN (HCC): Primary | ICD-10-CM

## 2024-05-14 LAB
INR PPP: 3.1
PROTHROMBIN TIME: 31.7 SEC (ref 12–14.6)

## 2024-05-14 NOTE — TELEPHONE ENCOUNTER
Patient Call    Who are you speaking with? Patient    If it is not the patient, are they listed on an active communication consent form? N/A   What is the reason for this call? Patient calling to get her protocol, she had her labs done at [facility] yesterday for protime-INR   Does this require a call back? Yes   If a call back is required, please list best call back number [phone]   If a call back is required, advise that a message will be forwarded to their care team and someone will return their call as soon as possible.   Did you relay this information to the patient? yes

## 2024-05-15 ENCOUNTER — TELEPHONE (OUTPATIENT)
Dept: FAMILY MEDICINE CLINIC | Facility: CLINIC | Age: 69
End: 2024-05-15

## 2024-05-15 NOTE — TELEPHONE ENCOUNTER
[3:34 PM] iPlar Wilkinson  [facility] for patient to return call regarding diabetic eye exam     Let patient know we are offering diabetic eye exams in office on 5/21 and that the eye exam involves no dilation and the patient is able to drive themself to and from the visit.     It is scheduled as just a nurse visit so the patient doesn't need to see a provider and will take approx 20 mins to complete.

## 2024-05-17 DIAGNOSIS — L03.119 CELLULITIS OF LOWER EXTREMITY, UNSPECIFIED LATERALITY: Primary | ICD-10-CM

## 2024-05-17 RX ORDER — CEPHALEXIN 500 MG/1
500 CAPSULE ORAL 3 TIMES DAILY
Qty: 21 CAPSULE | Refills: 0 | Status: SHIPPED | OUTPATIENT
Start: 2024-05-17 | End: 2024-05-24

## 2024-05-20 ENCOUNTER — OFFICE VISIT (OUTPATIENT)
Dept: FAMILY MEDICINE CLINIC | Facility: CLINIC | Age: 69
End: 2024-05-20
Payer: MEDICARE

## 2024-05-20 ENCOUNTER — OFFICE VISIT (OUTPATIENT)
Dept: WOUND CARE | Facility: CLINIC | Age: 69
End: 2024-05-20
Payer: MEDICARE

## 2024-05-20 VITALS
DIASTOLIC BLOOD PRESSURE: 80 MMHG | HEART RATE: 89 BPM | TEMPERATURE: 98.9 F | WEIGHT: 293 LBS | OXYGEN SATURATION: 95 % | SYSTOLIC BLOOD PRESSURE: 115 MMHG | HEIGHT: 63 IN | BODY MASS INDEX: 51.91 KG/M2

## 2024-05-20 VITALS — HEART RATE: 98 BPM | TEMPERATURE: 98 F | RESPIRATION RATE: 18 BRPM

## 2024-05-20 DIAGNOSIS — N18.31 STAGE 3A CHRONIC KIDNEY DISEASE (HCC): ICD-10-CM

## 2024-05-20 DIAGNOSIS — I87.332 VENOUS HYPERTENSION, CHRONIC, WITH ULCER AND INFLAMMATION, LEFT (HCC): ICD-10-CM

## 2024-05-20 DIAGNOSIS — Z00.00 MEDICARE ANNUAL WELLNESS VISIT, SUBSEQUENT: Primary | ICD-10-CM

## 2024-05-20 DIAGNOSIS — I27.20 PULMONARY HTN (HCC): ICD-10-CM

## 2024-05-20 DIAGNOSIS — D68.9 BLOOD CLOTTING DISORDER (HCC): ICD-10-CM

## 2024-05-20 DIAGNOSIS — L97.909 CHRONIC VENOUS HYPERTENSION W ULCERATION (HCC): ICD-10-CM

## 2024-05-20 DIAGNOSIS — E72.11 HOMOCYSTINURIA (HCC): ICD-10-CM

## 2024-05-20 DIAGNOSIS — E11.9 TYPE 2 DIABETES MELLITUS WITHOUT COMPLICATION, WITHOUT LONG-TERM CURRENT USE OF INSULIN (HCC): ICD-10-CM

## 2024-05-20 DIAGNOSIS — G47.33 OBSTRUCTIVE SLEEP APNEA ON CPAP: ICD-10-CM

## 2024-05-20 DIAGNOSIS — I89.0 LYMPHEDEMA OF BOTH LOWER EXTREMITIES: ICD-10-CM

## 2024-05-20 DIAGNOSIS — I10 ESSENTIAL HYPERTENSION: ICD-10-CM

## 2024-05-20 DIAGNOSIS — I87.332 VENOUS HYPERTENSION, CHRONIC, WITH ULCER AND INFLAMMATION, LEFT (HCC): Primary | ICD-10-CM

## 2024-05-20 DIAGNOSIS — I87.319 CHRONIC VENOUS HYPERTENSION W ULCERATION (HCC): ICD-10-CM

## 2024-05-20 DIAGNOSIS — E66.01 MORBID OBESITY WITH BMI OF 70 AND OVER, ADULT (HCC): ICD-10-CM

## 2024-05-20 LAB — SL AMB POCT HEMOGLOBIN AIC: 5.6 (ref ?–6.5)

## 2024-05-20 PROCEDURE — 99214 OFFICE O/P EST MOD 30 MIN: CPT | Performed by: SURGERY

## 2024-05-20 PROCEDURE — G0438 PPPS, INITIAL VISIT: HCPCS | Performed by: FAMILY MEDICINE

## 2024-05-20 PROCEDURE — 29581 APPL MULTLAYER CMPRN SYS LEG: CPT

## 2024-05-20 PROCEDURE — 99214 OFFICE O/P EST MOD 30 MIN: CPT | Performed by: FAMILY MEDICINE

## 2024-05-20 PROCEDURE — 83036 HEMOGLOBIN GLYCOSYLATED A1C: CPT | Performed by: FAMILY MEDICINE

## 2024-05-20 RX ORDER — LIDOCAINE HYDROCHLORIDE 40 MG/ML
5 SOLUTION TOPICAL ONCE
Status: COMPLETED | OUTPATIENT
Start: 2024-05-20 | End: 2024-05-20

## 2024-05-20 RX ADMIN — LIDOCAINE HYDROCHLORIDE 5 ML: 40 SOLUTION TOPICAL at 10:22

## 2024-05-20 NOTE — PROGRESS NOTES
Ambulatory Visit  Name: Nikki Roach      : 1955      MRN: [id number]  Encounter Provider: Christian Simon MD  Encounter Date: 2024   Encounter department: [facility]    Assessment & Plan   1. Medicare annual wellness visit, subsequent  2. Type 2 diabetes mellitus without complication, without long-term current use of insulin (HCC)  -     POCT hemoglobin A1c  -     Albumin / creatinine urine ratio; Future; Expected date: 2024  3. Homocystinuria (HCC)  4. Essential hypertension  Assessment & Plan:  BP at goal.  Continue current.  5. Chronic venous hypertension w ulceration (HCC)  Assessment & Plan:  Plan per wound care.  6. Pulmonary HTN (HCC)  Assessment & Plan:  Stable.  Continue current.  7. Venous hypertension, chronic, with ulcer and inflammation, left (HCC)  Assessment & Plan:  Plan per wound care.  8. Obstructive sleep apnea on CPAP  Assessment & Plan:  Stable.  Continue current.  9. Stage 3a chronic kidney disease (HCC)  Assessment & Plan:  Lab Results   Component Value Date    EGFR 47 2024    EGFR 53 2024    EGFR 57 (L) 2023    CREATININE 1.18 2024    CREATININE 1.07 2024    CREATININE 1.07 2023   GFR stable.  Continue current.  10. Blood clotting disorder (HCC)  Assessment & Plan:  Plan per heme-onc.  11. Morbid obesity with BMI of 70 and over, adult ([facility])     Preventive health issues were discussed with patient, and age appropriate screening tests were ordered as noted in patient's After Visit Summary. Personalized health advice and appropriate referrals for health education or preventive services given if needed, as noted in patient's After Visit Summary.    History of Present Illness     She has chronic venous stasis and secondary infection.  She has no F/C.  She saw wound care today.         Patient Care Team:  Christian Simon MD as PCP - General (Family Medicine)  Ivan Alicea MD (Hematology and Oncology)  Farhad  MD Colin (Cardiology)  [facility] (Orthopedic Surgery)  Erin Ward RN as  (Care Coordination)    Review of Systems  Medical History Reviewed by provider this encounter:       Annual Wellness Visit Questionnaire   Last Medicare Wellness visit information reviewed, patient interviewed and updates made to the record today.      Health Risk Assessment:   Patient rates overall health as good. Patient feels that their physical health rating is slightly worse. Patient is satisfied with their life. Eyesight was rated as slightly worse. Hearing was rated as same. Patient feels that their emotional and mental health rating is same. Patients states they are never, rarely angry. Patient states they are often unusually tired/fatigued. Pain experienced in the last 7 days has been a lot. Patient's pain rating has been 8/10. Patient states that she has experienced weight loss or gain in last 6 months.     Depression Screening:   PHQ-2 Score: 0      Fall Risk Screening:   In the past year, patient has experienced: no history of falling in past year      Urinary Incontinence Screening:   Patient has leaked urine accidently in the last six months.     Home Safety:  Patient has trouble with stairs inside or outside of their home. Patient has working smoke alarms and has no working carbon monoxide detector. Home safety hazards include: none.     Nutrition:   Current diet is Regular.     Medications:   Patient is currently taking over-the-counter supplements. OTC medications include: see medication list. Patient is able to manage medications.     Activities of Daily Living (ADLs)/Instrumental Activities of Daily Living (IADLs):   Walk and transfer into and out of bed and chair?: Yes  Dress and groom yourself?: Yes    Bathe or shower yourself?: Yes    Feed yourself? Yes  Do your laundry/housekeeping?: Yes  Manage your money, pay your bills and track your expenses?: Yes  Make your own meals?: Yes    Do your own  shopping?: Yes    Previous Hospitalizations:   Any hospitalizations or ED visits within the last 12 months?: No      Advance Care Planning:   Living will: Yes    Durable POA for healthcare: Yes    Advanced directive: Yes      Cognitive Screening:   Provider or family/friend/caregiver concerned regarding cognition?: No    PREVENTIVE SCREENINGS      Cardiovascular Screening:    General: Screening Not Indicated and History Lipid Disorder      Diabetes Screening:     General: Screening Not Indicated and History Diabetes      Colorectal Cancer Screening:     General: Screening Current      Breast Cancer Screening:     General: Screening Current      Cervical Cancer Screening:    General: Screening Not Indicated      Osteoporosis Screening:    General: Screening Not Indicated and History Osteoporosis      Abdominal Aortic Aneurysm (AAA) Screening:        General: Screening Not Indicated      Lung Cancer Screening:     General: Screening Not Indicated      Hepatitis C Screening:    General: Screening Current    Screening, Brief Intervention, and Referral to Treatment (SBIRT)    Screening  Typical number of drinks in a day: 0  Typical number of drinks in a week: 0  Interpretation: Low risk drinking behavior.    Single Item Drug Screening:  How often have you used an illegal drug (including marijuana) or a prescription medication for non-medical reasons in the past year? never    Single Item Drug Screen Score: 0  Interpretation: Negative screen for possible drug use disorder  Diabetic Foot Exam    Patient's shoes and socks removed.    Right Foot/Ankle   Right Foot Inspection  Skin Exam: skin normal and skin intact. No dry skin, no warmth, no callus, no erythema, no maceration, no abnormal color, no pre-ulcer, no ulcer and no callus.     Toe Exam: ROM and strength within normal limits.     Sensory   Vibration: intact  Proprioception: intact  Monofilament testing: intact    Vascular  Capillary refills: < 3 seconds  The right  "DP pulse is 2+. The right PT pulse is 2+.     Left Foot/Ankle  Left Foot Inspection  Skin Exam: skin normal and skin intact. No dry skin, no warmth, no erythema, no maceration, normal color, no pre-ulcer, no ulcer and no callus.     Toe Exam: ROM and strength within normal limits.     Sensory   Vibration: intact  Proprioception: intact  Monofilament testing: intact    Vascular  Capillary refills: < 3 seconds  The left DP pulse is 2+. The left PT pulse is 2+.     Assign Risk Category  No deformity present  No loss of protective sensation  No weak pulses  Risk: 0    Social Determinants of Health     Financial Resource Strain: Low Risk  (5/20/2024)    Overall Financial Resource Strain (CARDIA)     Difficulty of Paying Living Expenses: Not hard at all   Food Insecurity: No Food Insecurity (5/20/2024)    Hunger Vital Sign     Worried About Running Out of Food in the Last Year: Never true     Ran Out of Food in the Last Year: Never true   Transportation Needs: No Transportation Needs (5/20/2024)    PRAPARE - Transportation     Lack of Transportation (Medical): No     Lack of Transportation (Non-Medical): No   Housing Stability: Unknown (5/20/2024)    Housing Stability Vital Sign     Unable to Pay for Housing in the Last Year: No     Homeless in the Last Year: No   Utilities: Not At Risk (5/20/2024)    [facility] Utilities     Threatened with loss of utilities: No     No results found.    Objective     /80 (BP Location: Right arm, Patient Position: Sitting)   Pulse 89   Temp 98.9 °F (37.2 °C) (Tympanic)   Ht 5' 3\" (1.6 m)   Wt (!) 194 kg (426 lb 12.8 oz)   LMP  (LMP Unknown)   SpO2 95%   BMI 75.60 kg/m²     Physical Exam  Cardiovascular:      Pulses: no weak pulses.           Dorsalis pedis pulses are 2+ on the right side and 2+ on the left side.        Posterior tibial pulses are 2+ on the right side and 2+ on the left side.   Feet:      Right foot:      Skin integrity: No ulcer, skin breakdown, erythema, warmth, " callus or dry skin.      Left foot:      Skin integrity: No ulcer, skin breakdown, erythema, warmth, callus or dry skin.       Administrative Statements

## 2024-05-20 NOTE — PATIENT INSTRUCTIONS
Orders Placed This Encounter   Procedures    Wound cleansing and dressings Venous Ulcer Left;Lower Leg     Wound cleansing and dressings Left;Lower Leg       Wash your hands with soap and water.  Remove old dressing, discard into plastic bag and place in trash.    Cleanse the wound with mild soap (Dove) and water prior to applying a clean dressing. Do not use tissue or cotton balls.   Do not scrub the wound. Pat dry using gauze.  Shower yes      Apply silver alginate to the wound. Then apply super absorbant pad or ABD  Secure with amna and tape  Apply Coflex Lite      Multi-layer compression wrap Instructions    Keep compression wrap/wraps clean and dry. If wraps are too tight and you experience numbness/tingling, call the wound center. If after hours, remove wraps or proceed to nearest E.R. and call wound center in AM.    Wrap will be changed 1 x weekly    Avoid prolonged standing in one place.    Elevate leg(s) above the level of the heart when sitting or as much as possible.      Follow up in Tuesday May 28 for  nurse visit to change wraps     Use lymphedema pumps twice daily for 1 hour at a time.     Standing Status:   Future     Standing Expiration Date:   5/27/2024    Wound Procedure Treatment Venous Ulcer Left;Lower Leg     This order was created via procedure documentation    Cast Application     This order was created via procedure documentation

## 2024-05-20 NOTE — PROGRESS NOTES
Patient ID: Nikki Roach is a 68 y.o. female Date of Birth 1955     Chief Complaint  Chief Complaint   Patient presents with    Follow Up Wound Care Visit     Wound left lower leg       Allergies  Azithromycin, Ciprofloxacin, Doxycycline, and Ancef [cefazolin]    Assessment:    Lymphedema of both lower extremities  Once again encourage patient to make use of the lymphedema pumps.  Also keep her legs elevated.    Venous hypertension, chronic, with ulcer and inflammation, left (HCC)  Worsening inflammation and ulceration of the left lower extremity.  Noted significant drainage.  Patient recently started on p.o. antibiotics by PCP for concern for infection.  Given the significant drainage we will continue with Maxorb instead of Dermagran.  Will also start to wrap the leg with a Coflex lite to offer some compression in the setting of JAQUELIN 0.7.  Also uses superabsorbent.  Patient will follow-up in 1 week for nurses visit given the fact that it is a more Memorial day weekend on Monday.  She will then follow-up with me on 2 weeks on Monday for further evaluation.  No debridement required.     Diagnoses and all orders for this visit:    Venous hypertension, chronic, with ulcer and inflammation, left (HCC)  -     Wound cleansing and dressings Venous Ulcer Left;Lower Leg; Future  -     Wound Procedure Treatment Venous Ulcer Left;Lower Leg  -     Cast Application    Lymphedema of both lower extremities  -     Wound cleansing and dressings Venous Ulcer Left;Lower Leg; Future  -     lidocaine (XYLOCAINE) 4 % topical solution 5 mL    Other orders  -     Cancel: Cast Application              Procedures    Plan:     Wound 05/06/24 Venous Ulcer Leg Left;Lower (Active)   Wound Image Images linked 05/20/24 0900   Wound Description Epithelialization;Pink;Yellow;Slough 05/20/24 0903   Leticia-wound Assessment Edema;Erythema;Scaly;Dry;Yellow-brown;Scar Tissue 05/20/24 0903   Wound Length (cm) 22.5 cm 05/20/24 0903   Wound Width (cm) 5  cm 05/20/24 0903   Wound Depth (cm) 0.1 cm 05/20/24 0903   Wound Surface Area (cm^2) 112.5 cm^2 05/20/24 0903   Wound Volume (cm^3) 11.25 cm^3 05/20/24 0903   Calculated Wound Volume (cm^3) 11.25 cm^3 05/20/24 0903   Drainage Amount Moderate 05/20/24 0903   Drainage Description Serous 05/20/24 0903   Non-staged Wound Description Full thickness 05/20/24 0903       Wound 05/06/24 Venous Ulcer Leg Left;Lower (Active)   Date First Assessed/Time First Assessed: 05/06/24 1148   Primary Wound Type: Venous Ulcer  Location: Leg  Wound Location Orientation: Left;Lower       Subjective:      .    68-year-old female with numerous comorbidities including lymphedema bilateral lower extremity and venous stasis with ulceration of left lower EXTR presents today for follow-up evaluation and treatment of the left lower extremity ulceration.  Since her last visit patient states that there has been significant more drainage and change in color of the left lower extremity.  She has new open ulcerations as well.  She was started on antibiotics per her PCP who was shown a picture of her leg by her daughter.  Currently her daughter has been changing the dressing twice a day due to the significant drainage.  Denies any fevers or chills.  No nausea vomit.  No other associate symptoms.        The following portions of the patient's history were reviewed and updated as appropriate: She  has a past medical history of Blood clotting disorder ([facility]), Homocysteinemia, Hypercoagulable state ([facility]), Lyme disease, Renal disorder, and 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 ([facility]).  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[facility]) 09/02/2022    Prothrombin time increased 07/26/2021    Anemia 09/10/2020    Hyperphosphatemia 09/10/2020    S/P TKR (total knee replacement) 03/04/2020    [place] filter in place 02/17/2020    History of pulmonary embolism 02/17/2020    Rosacea 11/22/2019    Abnormality of coagulation ([facility]) 06/12/2019    Hypertension, essential 06/10/2019    Pulmonary HTN ([facility])     Heme positive stool 06/21/2018    Cellulitis of right lower extremity 03/30/2018    Lyme disease     Blood clotting disorder ([facility])     Essential hypertension     Homocysteinemia     Hypercoagulable state ([facility])     Obstructive sleep apnea on CPAP     Morbid obesity with BMI of 70 and over, adult ([facility]) 07/01/2016    Skin rash 05/22/2014    Chronic venous hypertension w ulceration ([facility]) 12/18/2013    Osteoporosis 12/02/2013    Edema 11/20/2013    Esophageal reflux 11/20/2013    Hyperlipidemia 11/20/2013    Polycystic ovarian syndrome 11/20/2013     She  has a past surgical history that includes Joint replacement (Bilateral); Appendectomy; Ankle surgery (Right); Hysterectomy; Hemorroidectomy; Foot surgery; LITHOTRIPSY; Closed reduction metatarsal fracture (Right); Vena cava filter placement; and Breast excisional biopsy (Left).  Her family history includes Breast cancer in her paternal aunt; Hypercoagulant Ability in her maternal grandmother and mother; No Known Problems in her daughter, father, maternal grandfather, paternal aunt, paternal aunt, paternal aunt, paternal aunt, paternal aunt, paternal aunt, paternal grandfather, paternal grandmother, and sister; Pulmonary embolism in her mother.  She  reports that she has never smoked. She has never used smokeless tobacco. She reports that she does not drink alcohol and does not use drugs.  Current Outpatient Medications   Medication Sig Dispense Refill    amLODIPine-benazepril (LOTREL 5-20) 5-20 MG per capsule Take  1 capsule by mouth daily 90 capsule 3    budesonide-formoterol (Symbicort) 160-4.5 mcg/act inhaler Inhale 2 puffs 2 (two) times a day Rinse mouth after use. 18 g 3    cephalexin (KEFLEX) 500 mg capsule Take 1 capsule (500 mg total) by mouth 3 (three) times a day for 7 days 21 capsule 0    clotrimazole-betamethasone (LOTRISONE) 1-0.05 % cream Apply topically 2 (two) times a day 45 g 5    folic acid-pyridoxine-cyanocobalamin (Folbic) 2.5-25-2 mg Take 1 tablet by mouth daily 90 tablet 3    furosemide (LASIX) 20 mg tablet Take 1 tablet (20 mg total) by mouth daily 90 tablet 3    Hydrocortisone-Iodoquinol (Dermazene) 1-1 % CREA Apply topically 2 (two) times a day 28.4 g 5    metFORMIN (GLUCOPHAGE-XR) 500 mg 24 hr tablet Take 4 tablets (2,000 mg total) by mouth daily with breakfast 360 tablet 3    metroNIDAZOLE (METROGEL) 1 % gel Apply topically daily 60 g 5    montelukast (SINGULAIR) 10 mg tablet Take 1 tablet (10 mg total) by mouth daily at bedtime 90 tablet 3    multivitamin (THERAGRAN) TABS Take 1 tablet by mouth daily.      nystatin powder Apply topically 2 (two) times a day Apply to affected area 60 g 5    pravastatin (PRAVACHOL) 40 mg tablet Take 1 tablet (40 mg total) by mouth daily 90 tablet 3    silver sulfadiazine (SILVADENE,SSD) 1 % cream Apply 1/16 of an inch to the affected area twice daily. (Patient not taking: Reported on 3/7/2024) 400 g 3    triamcinolone (KENALOG) 0.1 % cream Apply topically 2 (two) times a day (Patient not taking: Reported on 3/7/2024) 453.6 g 3    warfarin (COUMADIN) 5 mg tablet Take  strictly as advised 150 tablet 0    Wound Dressings (Medi-Jair Performance Plus ABD) PADS Apply 1 each topically 2 (two) times a day 48 each 3     No current facility-administered medications for this visit.     She is allergic to azithromycin, ciprofloxacin, doxycycline, and ancef [cefazolin]..    Review of Systems   Constitutional:  Negative for activity change, appetite change, chills, diaphoresis,  fatigue, fever and unexpected weight change.   Cardiovascular:  Positive for leg swelling.   Skin:  Positive for wound (LLE). Negative for color change, pallor and rash.         Objective:       Wound 05/06/24 Venous Ulcer Leg Left;Lower (Active)   Wound Image Images linked 05/20/24 0900   Wound Description Epithelialization;Pink;Yellow;Slough 05/20/24 0903   Leticia-wound Assessment Edema;Erythema;Scaly;Dry;Yellow-brown;Scar Tissue 05/20/24 0903   Wound Length (cm) 22.5 cm 05/20/24 0903   Wound Width (cm) 5 cm 05/20/24 0903   Wound Depth (cm) 0.1 cm 05/20/24 0903   Wound Surface Area (cm^2) 112.5 cm^2 05/20/24 0903   Wound Volume (cm^3) 11.25 cm^3 05/20/24 0903   Calculated Wound Volume (cm^3) 11.25 cm^3 05/20/24 0903   Drainage Amount Moderate 05/20/24 0903   Drainage Description Serous 05/20/24 0903   Non-staged Wound Description Full thickness 05/20/24 0903       Pulse 98   Temp 98 °F (36.7 °C)   Resp 18   LMP  (LMP Unknown)     Physical Exam  Vitals reviewed. Exam conducted with a chaperone present.   Constitutional:       General: She is not in acute distress.     Appearance: Normal appearance. She is obese. She is not ill-appearing, toxic-appearing or diaphoretic.   HENT:      Head: Normocephalic and atraumatic.   Eyes:      General: No scleral icterus.        Right eye: No discharge.         Left eye: No discharge.   Cardiovascular:      Rate and Rhythm: Normal rate.   Pulmonary:      Effort: Pulmonary effort is normal. No respiratory distress.   Musculoskeletal:         General: Swelling present.      Cervical back: Normal range of motion.      Right lower leg: Edema present.      Left lower leg: Edema present.   Skin:     General: Skin is warm.      Coloration: Skin is not jaundiced or pale.      Findings: No bruising or erythema.      Comments: LLE with note edema and venous stasis disease with ulceration.  There are new ulcerations on the more proximal portion of the lower extremity.  Mild erythema which  the patient states is improved.  Ulcerations appear superficial.  No significant slough burden no need for debridement.   Neurological:      General: No focal deficit present.      Mental Status: She is oriented to person, place, and time.      Cranial Nerves: No cranial nerve deficit.   Psychiatric:         Mood and Affect: Mood normal.         Behavior: Behavior normal.         Thought Content: Thought content normal.         Judgment: Judgment normal.           Wound Instructions:  Orders Placed This Encounter   Procedures    Wound cleansing and dressings Venous Ulcer Left;Lower Leg     Wound cleansing and dressings Left;Lower Leg       Wash your hands with soap and water.  Remove old dressing, discard into plastic bag and place in trash.    Cleanse the wound with mild soap (Dove) and water prior to applying a clean dressing. Do not use tissue or cotton balls.   Do not scrub the wound. Pat dry using gauze.  Shower yes      Apply silver alginate to the wound. Then apply super absorbant pad or ABD  Secure with amna and tape  Apply Coflex Lite      Multi-layer compression wrap Instructions    Keep compression wrap/wraps clean and dry. If wraps are too tight and you experience numbness/tingling, call the wound center. If after hours, remove wraps or proceed to nearest E.R. and call wound center in AM.    Wrap will be changed 1 x weekly    Avoid prolonged standing in one place.    Elevate leg(s) above the level of the heart when sitting or as much as possible.      Follow up in Tuesday May 28 for  nurse visit to change wraps     Use lymphedema pumps twice daily for 1 hour at a time.     Standing Status:   Future     Standing Expiration Date:   5/27/2024    Wound Procedure Treatment Venous Ulcer Left;Lower Leg     This order was created via procedure documentation    Cast Application     This order was created via procedure documentation        Diagnosis ICD-10-CM Associated Orders   1. Venous hypertension, chronic,  with ulcer and inflammation, left ([facility])  I87.332 Wound cleansing and dressings Venous Ulcer Left;Lower Leg     Wound Procedure Treatment Venous Ulcer Left;Lower Leg     Cast Application      2. Lymphedema of both lower extremities  I89.0 Wound cleansing and dressings Venous Ulcer Left;Lower Leg     lidocaine (XYLOCAINE) 4 % topical solution 5 mL

## 2024-05-20 NOTE — ASSESSMENT & PLAN NOTE
Worsening inflammation and ulceration of the left lower extremity.  Noted significant drainage.  Patient recently started on p.o. antibiotics by PCP for concern for infection.  Given the significant drainage we will continue with Maxorb instead of Dermagran.  Will also start to wrap the leg with a Coflex lite to offer some compression in the setting of JAQUELIN 0.7.  Also uses superabsorbent.  Patient will follow-up in 1 week for nurses visit given the fact that it is a more Memorial day weekend on Monday.  She will then follow-up with me on 2 weeks on Monday for further evaluation.  No debridement required.

## 2024-05-20 NOTE — PROGRESS NOTES
"Cast Application    Date/Time: 5/20/2024 9:00 AM    Performed by: Soledad Day RN  Authorized by: Farhad Méndez DO  Universal Protocol:  Consent: Verbal consent obtained.  Consent given by: patient  Time out: Immediately prior to procedure a \"time out\" was called to verify the correct patient, procedure, equipment, support staff and site/side marked as required.  Patient understanding: patient states understanding of the procedure being performed  Patient consent: the patient's understanding of the procedure matches consent given  Patient identity confirmed: verbally with patient    Pre-procedure details:     Sensation:  Normal    Skin color:  Wnl  Procedure details:     Laterality:  Left    Location:  Leg    Leg:  L lower legCast type:  Multi-layer compression short leg      Post-procedure details:     Pain:  Improved    Sensation:  Normal    Skin color:  Wnl    Patient tolerance of procedure:  Tolerated well, no immediate complications  Comments:      Multilayer wrap procedure     Before application, JAQUELIN and/or TBI determined to be adequate for healing and application of compression. Lower extremity washed prior to application of compression wrap. With the foot in dorsiflexed position, Coflex Lite was applied as per physician orders without complications or complaint of pain.  The procedure was tolerated well. Toes warm & pink post application.  Patient provided education & reinforced to observe toes for any discoloration, swelling or tingling and instructed when to report to the Wound Center or to remove compression. Wound care as per providers orders, patient tolerated well.        "

## 2024-05-20 NOTE — ASSESSMENT & PLAN NOTE
Lab Results   Component Value Date    EGFR 47 03/18/2024    EGFR 53 02/23/2024    EGFR 57 (L) 06/06/2023    CREATININE 1.18 03/18/2024    CREATININE 1.07 02/23/2024    CREATININE 1.07 06/06/2023   GFR stable.  Continue current.

## 2024-05-20 NOTE — PATIENT INSTRUCTIONS
Medicare Preventive Visit Patient Instructions  Thank you for completing your Welcome to Medicare Visit or Medicare Annual Wellness Visit today. Your next wellness visit will be due in one year (5/21/2025).  The screening/preventive services that you may require over the next 5-10 years are detailed below. Some tests may not apply to you based off risk factors and/or age. Screening tests ordered at today's visit but not completed yet may show as past due. Also, please note that scanned in results may not display below.  Preventive Screenings:  Service Recommendations Previous Testing/Comments   Colorectal Cancer Screening  * Colonoscopy    * Fecal Occult Blood Test (FOBT)/Fecal Immunochemical Test (FIT)  * Fecal DNA/Cologuard Test  * Flexible Sigmoidoscopy Age: 45-75 years old   Colonoscopy: every 10 years (may be performed more frequently if at higher risk)  OR  FOBT/FIT: every 1 year  OR  Cologuard: every 3 years  OR  Sigmoidoscopy: every 5 years  Screening may be recommended earlier than age 45 if at higher risk for colorectal cancer. Also, an individualized decision between you and your healthcare provider will decide whether screening between the ages of 76-85 would be appropriate. Colonoscopy: 11/15/2018  FOBT/FIT: Not on file  Cologuard: Not on file  Sigmoidoscopy: Not on file          Breast Cancer Screening Age: 40+ years old  Frequency: every 1-2 years  Not required if history of left and right mastectomy Mammogram: 12/26/2023        Cervical Cancer Screening Between the ages of 21-29, pap smear recommended once every 3 years.   Between the ages of 30-65, can perform pap smear with HPV co-testing every 5 years.   Recommendations may differ for women with a history of total hysterectomy, cervical cancer, or abnormal pap smears in past. Pap Smear: 05/23/2018        Hepatitis C Screening Once for adults born between 1945 and 1965  More frequently in patients at high risk for Hepatitis C Hep C Antibody:  05/09/2018        Diabetes Screening 1-2 times per year if you're at risk for diabetes or have pre-diabetes Fasting glucose: No results in last 5 years (No results in last 5 years)  A1C: 5.8 % (8/5/2021)      Cholesterol Screening Once every 5 years if you don't have a lipid disorder. May order more often based on risk factors. Lipid panel: 06/06/2023          Other Preventive Screenings Covered by Medicare:  Abdominal Aortic Aneurysm (AAA) Screening: covered once if your at risk. You're considered to be at risk if you have a family history of AAA.  Lung Cancer Screening: covers low dose CT scan once per year if you meet all of the following conditions: (1) Age 55-77; (2) No signs or symptoms of lung cancer; (3) Current smoker or have quit smoking within the last 15 years; (4) You have a tobacco smoking history of at least 20 pack years (packs per day multiplied by number of years you smoked); (5) You get a written order from a healthcare provider.  Glaucoma Screening: covered annually if you're considered high risk: (1) You have diabetes OR (2) Family history of glaucoma OR (3)  aged 50 and older OR (4)  American aged 65 and older  Osteoporosis Screening: covered every 2 years if you meet one of the following conditions: (1) You're estrogen deficient and at risk for osteoporosis based off medical history and other findings; (2) Have a vertebral abnormality; (3) On glucocorticoid therapy for more than 3 months; (4) Have primary hyperparathyroidism; (5) On osteoporosis medications and need to assess response to drug therapy.   Last bone density test (DXA Scan): 06/24/2015.  HIV Screening: covered annually if you're between the age of 15-65. Also covered annually if you are younger than 15 and older than 65 with risk factors for HIV infection. For pregnant patients, it is covered up to 3 times per pregnancy.    Immunizations:  Immunization Recommendations   Influenza Vaccine Annual influenza  vaccination during flu season is recommended for all persons aged >= 6 months who do not have contraindications   Pneumococcal Vaccine   * Pneumococcal conjugate vaccine = PCV13 (Prevnar 13), PCV15 (Vaxneuvance), PCV20 (Prevnar 20)  * Pneumococcal polysaccharide vaccine = PPSV23 (Pneumovax) Adults 19-65 yo with certain risk factors or if 65+ yo  If never received any pneumonia vaccine: recommend Prevnar 20 (PCV20)  Give PCV20 if previously received 1 dose of PCV13 or PPSV23   Hepatitis B Vaccine 3 dose series if at intermediate or high risk (ex: diabetes, end stage renal disease, liver disease)   Respiratory syncytial virus (RSV) Vaccine - COVERED BY MEDICARE PART D  * RSVPreF3 (Arexvy) CDC recommends that adults 60 years of age and older may receive a single dose of RSV vaccine using shared clinical decision-making (SCDM)   Tetanus (Td) Vaccine - COST NOT COVERED BY MEDICARE PART B Following completion of primary series, a booster dose should be given every 10 years to maintain immunity against tetanus. Td may also be given as tetanus wound prophylaxis.   Tdap Vaccine - COST NOT COVERED BY MEDICARE PART B Recommended at least once for all adults. For pregnant patients, recommended with each pregnancy.   Shingles Vaccine (Shingrix) - COST NOT COVERED BY MEDICARE PART B  2 shot series recommended in those 19 years and older who have or will have weakened immune systems or those 50 years and older     Health Maintenance Due:      Topic Date Due   • Breast Cancer Screening: Mammogram  12/26/2024   • Colorectal Cancer Screening  11/15/2028   • Hepatitis C Screening  Completed     Immunizations Due:  There are no preventive care reminders to display for this patient.  Advance Directives   What are advance directives?  Advance directives are legal documents that state your wishes and plans for medical care. These plans are made ahead of time in case you lose your ability to make decisions for yourself. Advance directives  can apply to any medical decision, such as the treatments you want, and if you want to donate organs.   What are the types of advance directives?  There are many types of advance directives, and each state has rules about how to use them. You may choose a combination of any of the following:  Living will:  This is a written record of the treatment you want. You can also choose which treatments you do not want, which to limit, and which to stop at a certain time. This includes surgery, medicine, IV fluid, and tube feedings.   Durable power of  for healthcare (DPAHC):  This is a written record that states who you want to make healthcare choices for you when you are unable to make them for yourself. This person, called a proxy, is usually a family member or a friend. You may choose more than 1 proxy.  Do not resuscitate (DNR) order:  A DNR order is used in case your heart stops beating or you stop breathing. It is a request not to have certain forms of treatment, such as CPR. A DNR order may be included in other types of advance directives.  Medical directive:  This covers the care that you want if you are in a coma, near death, or unable to make decisions for yourself. You can list the treatments you want for each condition. Treatment may include pain medicine, surgery, blood transfusions, dialysis, IV or tube feedings, and a ventilator (breathing machine).  Values history:  This document has questions about your views, beliefs, and how you feel and think about life. This information can help others choose the care that you would choose.  Why are advance directives important?  An advance directive helps you control your care. Although spoken wishes may be used, it is better to have your wishes written down. Spoken wishes can be misunderstood, or not followed. Treatments may be given even if you do not want them. An advance directive may make it easier for your family to make difficult choices about your care.    Weight Management   Why it is important to manage your weight:  Being overweight increases your risk of health conditions such as heart disease, high blood pressure, type 2 diabetes, and certain types of cancer. It can also increase your risk for osteoarthritis, sleep apnea, and other respiratory problems. Aim for a slow, steady weight loss. Even a small amount of weight loss can lower your risk of health problems.  How to lose weight safely:  A safe and healthy way to lose weight is to eat fewer calories and get regular exercise. You can lose up about 1 pound a week by decreasing the number of calories you eat by 500 calories each day.   Healthy meal plan for weight management:  A healthy meal plan includes a variety of foods, contains fewer calories, and helps you stay healthy. A healthy meal plan includes the following:  Eat whole-grain foods more often.  A healthy meal plan should contain fiber. Fiber is the part of grains, fruits, and vegetables that is not broken down by your body. Whole-grain foods are healthy and provide extra fiber in your diet. Some examples of whole-grain foods are whole-wheat breads and pastas, oatmeal, brown rice, and bulgur.  Eat a variety of vegetables every day.  Include dark, leafy greens such as spinach, kale, gerard greens, and mustard greens. Eat yellow and orange vegetables such as carrots, sweet potatoes, and winter squash.   Eat a variety of fruits every day.  Choose fresh or canned fruit (canned in its own juice or light syrup) instead of juice. Fruit juice has very little or no fiber.  Eat low-fat dairy foods.  Drink fat-free (skim) milk or 1% milk. Eat fat-free yogurt and low-fat cottage cheese. Try low-fat cheeses such as mozzarella and other reduced-fat cheeses.  Choose meat and other protein foods that are low in fat.  Choose beans or other legumes such as split peas or lentils. Choose fish, skinless poultry (chicken or turkey), or lean cuts of red meat (beef or  pork). Before you cook meat or poultry, cut off any visible fat.   Use less fat and oil.  Try baking foods instead of frying them. Add less fat, such as margarine, sour cream, regular salad dressing and mayonnaise to foods. Eat fewer high-fat foods. Some examples of high-fat foods include french fries, doughnuts, ice cream, and cakes.  Eat fewer sweets.  Limit foods and drinks that are high in sugar. This includes candy, cookies, regular soda, and sweetened drinks.  Exercise:  Exercise at least 30 minutes per day on most days of the week. Some examples of exercise include walking, biking, dancing, and swimming. You can also fit in more physical activity by taking the stairs instead of the elevator or parking farther away from stores. Ask your healthcare provider about the best exercise plan for you.      © Copyright [employer] 2018 Information is for End User's use only and may not be sold, redistributed or otherwise used for commercial purposes. All illustrations and images included in CareNotes® are the copyrighted property of [employer] or [employer]

## 2024-05-21 ENCOUNTER — TELEPHONE (OUTPATIENT)
Dept: HEMATOLOGY ONCOLOGY | Facility: CLINIC | Age: 69
End: 2024-05-21

## 2024-05-22 ENCOUNTER — TELEPHONE (OUTPATIENT)
Dept: HEMATOLOGY ONCOLOGY | Facility: CLINIC | Age: 69
End: 2024-05-22

## 2024-05-22 NOTE — TELEPHONE ENCOUNTER
Returned call to patient.  Reports she was started on Keflex on Saturday - leg wound/cellulitis  Patient is currently on Coumadin 5mg on Mon-Thursday and 7.5mg on Fri,Sat and Sun    Dr. Alicea would like patient to have INR checked ASAP  Patient states it is too hot to go out today.  She will go first thing tomorrow morning and will call tomorrow afternoon for further instruction on Coumadin dosing

## 2024-05-22 NOTE — TELEPHONE ENCOUNTER
Patient Call    Who are you speaking with? Patient    If it is not the patient, are they listed on an active communication consent form? Yes   What is the reason for this call? Patients needs to speak to a nurse about her getting checked as she is on a medication    Does this require a call back? Yes   If a call back is required, please list best call back number [phone]   If a call back is required, advise that a message will be forwarded to their care team and someone will return their call as soon as possible.   Did you relay this information to the patient? Yes

## 2024-05-24 LAB
INR PPP: 3
PROTHROMBIN TIME: 31.2 SEC (ref 12–14.6)

## 2024-05-28 ENCOUNTER — OFFICE VISIT (OUTPATIENT)
Dept: WOUND CARE | Facility: CLINIC | Age: 69
End: 2024-05-28
Payer: MEDICARE

## 2024-05-28 VITALS
HEART RATE: 80 BPM | DIASTOLIC BLOOD PRESSURE: 64 MMHG | TEMPERATURE: 98 F | SYSTOLIC BLOOD PRESSURE: 122 MMHG | RESPIRATION RATE: 20 BRPM

## 2024-05-28 DIAGNOSIS — L97.909 CHRONIC VENOUS HYPERTENSION W ULCERATION (HCC): Primary | ICD-10-CM

## 2024-05-28 DIAGNOSIS — E66.01 MORBID OBESITY WITH BMI OF 70 AND OVER, ADULT (HCC): ICD-10-CM

## 2024-05-28 DIAGNOSIS — I87.332 VENOUS HYPERTENSION, CHRONIC, WITH ULCER AND INFLAMMATION, LEFT (HCC): ICD-10-CM

## 2024-05-28 DIAGNOSIS — I87.319 CHRONIC VENOUS HYPERTENSION W ULCERATION (HCC): Primary | ICD-10-CM

## 2024-05-28 PROCEDURE — 29581 APPL MULTLAYER CMPRN SYS LEG: CPT

## 2024-05-28 PROCEDURE — 99204 OFFICE O/P NEW MOD 45 MIN: CPT | Performed by: PODIATRIST

## 2024-05-28 RX ORDER — LIDOCAINE 40 MG/G
CREAM TOPICAL ONCE
Status: COMPLETED | OUTPATIENT
Start: 2024-05-28 | End: 2024-05-28

## 2024-05-28 RX ADMIN — LIDOCAINE 1 APPLICATION: 40 CREAM TOPICAL at 10:44

## 2024-05-28 NOTE — PATIENT INSTRUCTIONS
Orders Placed This Encounter   Procedures    Wound cleansing and dressings Venous Ulcer Left;Lower Leg     Wound cleansing and dressings Left;Lower Leg             Wash your hands with soap and water.  Remove old dressing, discard into plastic bag and place in trash.    Cleanse the wound with mild soap (Dove) and water prior to applying a clean dressing. Do not use tissue or cotton balls.   Do not scrub the wound. Pat dry using gauze.     Apply silver alginate to the wound. Then apply super absorbant pad or ABD  Secure with amna and tape  Apply Coflex lite- change 3 times a week.     Multi-layer compression wrap Instructions:   Keep compression wrap/wraps clean and dry. If wraps are too tight and you experience numbness/tingling or it becomes displaced, call the wound center or if after hours, remove wraps or proceed to nearest E.R. and call wound center in AM.     Wrap will be changed 3 times a week.     Avoid prolonged standing in one place.     Elevate leg(s) above the level of the heart when sitting or as much as possible.      Follow up on Monday 6/3/24 with Dr. Méndez     Use lymphedema pumps twice daily for 1 hour at a time.    Home Health was ordered for  you today and will contact you     Standing Status:   Future     Standing Expiration Date:   6/4/2024       Minimal (Score 0-3)

## 2024-05-28 NOTE — PROGRESS NOTES
Patient ID: Nikki Roach is a 68 y.o. female Date of Birth 1955       Chief Complaint   Patient presents with    Follow Up Wound Care Visit     LLE Wound       Allergies:  Azithromycin, Ciprofloxacin, Doxycycline, and Ancef [cefazolin]    Diagnosis:  1. Chronic venous hypertension w ulceration (HCC)  2. Venous hypertension, chronic, with ulcer and inflammation, left (HCC)  -     Wound cleansing and dressings Venous Ulcer Left;Lower Leg; Future  -     lidocaine (LMX) 4 % cream  -     Referral to [facility]; Future     Diagnosis ICD-10-CM Associated Orders   1. Chronic venous hypertension w ulceration (HCC)  I87.319     L97.909       2. Venous hypertension, chronic, with ulcer and inflammation, left ([facility])  I87.332 Wound cleansing and dressings Venous Ulcer Left;Lower Leg     lidocaine (LMX) 4 % cream     Referral to [facility]           Assessment & Plan:  See wound orders.   -No debridement today, she has had substantial worsening of the left lower extremity in spite of using Coflex lite  - Vascular studies were reviewed and her JAQUELIN is supranormal, we will increase the frequency of dressing changes over the next week to hopefully allow the wrap to stay intact  - If this does not work, we could increase her compression if necessary  -We discussed lifestyle modifications at length, elevation dietary modification and sleeping arrangements    We discussed at length lifestyle modifications for venous insufficiency and lymphedema. We recommend to not sleep in a recliner. To use lymphedema pumps on a compliant basis if available. To avoid periods of sitting with legs in a dependent position. To elevate legs and lay flat for periods in the day and at night. To take diuretics as directed. Also recommend weight loss, increased ambulation, increased activity, and monitor diet especially sodium intake. To use compression stockings.     Subjective:   Patient presents for evaluation  management of her left lower extremity, states that she been 4 to sleep in a recliner for the past year.  Has had ulceration since earlier this year.  She states that she is having worsening back pain and she has not seen spine pain for this.        The following portions of the patient's history were reviewed and updated as appropriate:   Patient Active Problem List   Diagnosis    Morbid obesity with BMI of 70 and over, adult (HCC)    Lyme disease    Blood clotting disorder (HCC)    Essential hypertension    Homocysteinemia    Hypercoagulable state (HCC)    Obstructive sleep apnea on CPAP    Cellulitis of right lower extremity    Chronic venous hypertension w ulceration (HCC)    Edema    Esophageal reflux    Hyperlipidemia    Polycystic ovarian syndrome    Skin rash    Heme positive stool    Osteoporosis    Pulmonary HTN (HCC)    Abnormality of coagulation ([facility])    Rosacea    Elida filter in place    History of pulmonary embolism    S/P TKR (total knee replacement)    Anemia    Hyperphosphatemia    Prothrombin time increased    Homocystinuria ([facility])    Stage 3a chronic kidney disease (HCC)    Hypertension, essential    Type 2 diabetes mellitus without complication, without long-term current use of insulin (HCC)    Cellulitis of left lower extremity    Venous hypertension, chronic, with ulcer and inflammation, left (HCC)    Lymphedema of both lower extremities     Past Medical History:   Diagnosis Date    Blood clotting disorder (HCC)     Homocysteinemia     Hypercoagulable state (HCC)     Lyme disease     Renal disorder     kidney stones    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)      Past Surgical History:   Procedure Laterality Date    ANKLE SURGERY Right     APPENDECTOMY      BREAST EXCISIONAL BIOPSY Left     benign cyst    CLOSED REDUCTION METATARSAL FRACTURE Right     FOOT SURGERY      HEMORROIDECTOMY      HYSTERECTOMY      JOINT REPLACEMENT Bilateral      knee    LITHOTRIPSY      VENA CAVA FILTER PLACEMENT      onset: 1/10/10     Social History     Socioeconomic History    Marital status: /Civil Union     Spouse name: None    Number of children: None    Years of education: None    Highest education level: None   Occupational History    Occupation: unemployed   Tobacco Use    Smoking status: Never    Smokeless tobacco: Never   Vaping Use    Vaping status: Never Used   Substance and Sexual Activity    Alcohol use: Never    Drug use: No    Sexual activity: Not Currently   Other Topics Concern    None   Social History Narrative    None     Social Determinants of Health     Financial Resource Strain: Low Risk  (5/20/2024)    Overall Financial Resource Strain (CARDIA)     Difficulty of Paying Living Expenses: Not hard at all   Food Insecurity: No Food Insecurity (5/20/2024)    Hunger Vital Sign     Worried About Running Out of Food in the Last Year: Never true     Ran Out of Food in the Last Year: Never true   Transportation Needs: No Transportation Needs (5/20/2024)    PRAPARE - Transportation     Lack of Transportation (Medical): No     Lack of Transportation (Non-Medical): No   Physical Activity: Not on file   Stress: Not on file   Social Connections: Not on file   Intimate Partner Violence: Not on file   Housing Stability: Unknown (5/20/2024)    Housing Stability Vital Sign     Unable to Pay for Housing in the Last Year: No     Number of Times Moved in the Last Year: Not on file     Homeless in the Last Year: No        Current Outpatient Medications:     amLODIPine-benazepril (LOTREL 5-20) 5-20 MG per capsule, Take 1 capsule by mouth daily, Disp: 90 capsule, Rfl: 3    clotrimazole-betamethasone (LOTRISONE) 1-0.05 % cream, Apply topically 2 (two) times a day, Disp: 45 g, Rfl: 5    folic acid-pyridoxine-cyanocobalamin (Folbic) 2.5-25-2 mg, Take 1 tablet by mouth daily, Disp: 90 tablet, Rfl: 3    furosemide (LASIX) 20 mg tablet, Take 1 tablet (20 mg total) by mouth  daily, Disp: 90 tablet, Rfl: 3    Hydrocortisone-Iodoquinol (Dermazene) 1-1 % CREA, Apply topically 2 (two) times a day, Disp: 28.4 g, Rfl: 5    metFORMIN (GLUCOPHAGE-XR) 500 mg 24 hr tablet, Take 4 tablets (2,000 mg total) by mouth daily with breakfast, Disp: 360 tablet, Rfl: 3    montelukast (SINGULAIR) 10 mg tablet, Take 1 tablet (10 mg total) by mouth daily at bedtime, Disp: 90 tablet, Rfl: 3    multivitamin (THERAGRAN) TABS, Take 1 tablet by mouth daily., Disp: , Rfl:     nystatin powder, Apply topically 2 (two) times a day Apply to affected area, Disp: 60 g, Rfl: 5    pravastatin (PRAVACHOL) 40 mg tablet, Take 1 tablet (40 mg total) by mouth daily, Disp: 90 tablet, Rfl: 3    warfarin (COUMADIN) 5 mg tablet, Take  strictly as advised, Disp: 150 tablet, Rfl: 0    Wound Dressings (Medi-Jair Performance Plus ABD) PADS, Apply 1 each topically 2 (two) times a day, Disp: 48 each, Rfl: 3  No current facility-administered medications for this visit.  Family History   Problem Relation Age of Onset    Pulmonary embolism Mother     Hypercoagulant Ability Mother         primary state    No Known Problems Father     No Known Problems Sister     No Known Problems Daughter     Hypercoagulant Ability Maternal Grandmother         primary state    No Known Problems Maternal Grandfather     No Known Problems Paternal Grandmother     No Known Problems Paternal Grandfather     Breast cancer Paternal Aunt     No Known Problems Paternal Aunt     No Known Problems Paternal Aunt     No Known Problems Paternal Aunt     No Known Problems Paternal Aunt     No Known Problems Paternal Aunt     No Known Problems Paternal Aunt       Review of Systems   Constitutional:  Negative for chills and fever.   HENT:  Negative for ear pain and sore throat.    Eyes:  Negative for pain and visual disturbance.   Respiratory:  Negative for cough and shortness of breath.    Cardiovascular:  Negative for chest pain and palpitations.   Gastrointestinal:   Negative for abdominal pain and vomiting.   Genitourinary:  Negative for dysuria and hematuria.   Musculoskeletal:  Negative for arthralgias and back pain.   Skin:  Negative for color change and rash.   Neurological:  Negative for seizures and syncope.   All other systems reviewed and are negative.        Objective:  /64   Pulse 80   Temp 98 °F (36.7 °C)   Resp 20   LMP  (LMP Unknown)     Physical Exam  Skin:     Comments: There is desquamation and some erythema but blanchable to the left lower extremity there is inverted champagne bottle appearance bilateral lower extremities             Wound 05/06/24 Venous Ulcer Leg Left;Lower (Active)   Wound Image     05/28/24 1010   Wound Description Epithelialization;Pink;Hypergranulation 05/28/24 1023   Leticia-wound Assessment Edema;Erythema;Scaly;Dry;Yellow-brown;Scar Tissue 05/28/24 1023   Wound Length (cm) 16.5 cm 05/28/24 1023   Wound Width (cm) 20.4 cm 05/28/24 1023   Wound Depth (cm) 0.1 cm 05/28/24 1023   Wound Surface Area (cm^2) 336.6 cm^2 05/28/24 1023   Wound Volume (cm^3) 33.66 cm^3 05/28/24 1023   Calculated Wound Volume (cm^3) 33.66 cm^3 05/28/24 1023   Drainage Amount Large 05/28/24 1023   Drainage Description Foul smelling;Green;Yellow 05/28/24 1023   Non-staged Wound Description Full thickness 05/28/24 1023                         Procedures             Wound Instructions:  Orders Placed This Encounter   Procedures    Wound cleansing and dressings Venous Ulcer Left;Lower Leg     Wound cleansing and dressings Left;Lower Leg             Wash your hands with soap and water.  Remove old dressing, discard into plastic bag and place in trash.    Cleanse the wound with mild soap (Dove) and water prior to applying a clean dressing. Do not use tissue or cotton balls.   Do not scrub the wound. Pat dry using gauze.     Apply silver alginate to the wound. Then apply super absorbant pad or ABD  Secure with amna and tape  Apply Coflex lite- change 3 times a  "week.     Multi-layer compression wrap Instructions:   Keep compression wrap/wraps clean and dry. If wraps are too tight and you experience numbness/tingling or it becomes displaced, call the wound center or if after hours, remove wraps or proceed to nearest E.R. and call wound center in AM.     Wrap will be changed 3 times a week.     Avoid prolonged standing in one place.     Elevate leg(s) above the level of the heart when sitting or as much as possible.      Follow up on Monday 6/3/24 with Dr. Méndez     Use lymphedema pumps twice daily for 1 hour at a time.    Home Health was ordered for  you today and will contact you     Standing Status:   Future     Standing Expiration Date:   6/4/2024    Referral to Home Health- [facility]     Standing Status:   Future     Standing Expiration Date:   5/28/2025     Referral Priority:   Routine     Referral Type:   Home Health     Referral Reason:   Specialty Services Required     Requested Specialty:   Home Health Services     Number of Visits Requested:   1     Expiration Date:   5/28/2025         Yobany Muñoz DPM      Portions of the record may have been created with voice recognition software. Occasional wrong word or \"sound a like\" substitutions may have occurred due to the inherent limitations of voice recognition software. Read the chart carefully and recognize, using context, where substitutions have occurred.      "

## 2024-05-28 NOTE — PROGRESS NOTES
"Cast Application    Date/Time: 5/28/2024 11:03 AM    Performed by: Konstantin Dickerson RN  Authorized by: Yobany Muñoz DPM  Universal Protocol:  Consent: Verbal consent obtained.  Risks and benefits: risks, benefits and alternatives were discussed  Consent given by: patient  Time out: Immediately prior to procedure a \"time out\" was called to verify the correct patient, procedure, equipment, support staff and site/side marked as required.  Patient understanding: patient states understanding of the procedure being performed  Patient consent: the patient's understanding of the procedure matches consent given  Procedure consent: procedure consent matches procedure scheduled  Patient identity confirmed: verbally with patient    Pre-procedure details:     Sensation:  Normal    Skin color:  WDL  Procedure details:     Laterality:  Left    Location:  Leg    Leg:  L lower legCast type:  Multi-layer compression short leg      Post-procedure details:     Pain:  Unchanged    Sensation:  Normal    Skin color:  WDL    Patient tolerance of procedure:  Tolerated well, no immediate complications  Comments:       Multiplayer wrap procedure     Before application, JAQUELIN and/or TBI determined to be adequate for healing and application of compression. Lower extremity washed prior to application of compression wrap. With the foot in dorsiflexed position, Coflex lite was applied as per physician orders without complications or complaint of pain.  The procedure was tolerated well. Toes warm & pink post application.  Patient provided education & reinforced to observe toes for any discoloration, swelling or tingling and instructed when to report to the Wound Center or to remove compression        "

## 2024-05-28 NOTE — PROGRESS NOTES
Wound Procedure Treatment Venous Ulcer Left;Lower Leg    Performed by: Konstantin Dickerson RN  Authorized by: Yobany Muñoz DPM    Associated wounds:   Wound 05/06/24 Venous Ulcer Leg Left;Lower  Wound cleansed with:  NSS and Soap and water  Applied primary dressing:  Silver and Calcium alginate  Applied secondary dressing:  Superabsorber and Cast padding  Dressing secured with:  Compression wrap, Vanesa and Kerlix

## 2024-06-03 ENCOUNTER — OFFICE VISIT (OUTPATIENT)
Dept: WOUND CARE | Facility: CLINIC | Age: 69
End: 2024-06-03
Payer: MEDICARE

## 2024-06-03 VITALS
RESPIRATION RATE: 18 BRPM | SYSTOLIC BLOOD PRESSURE: 118 MMHG | HEART RATE: 108 BPM | TEMPERATURE: 97 F | DIASTOLIC BLOOD PRESSURE: 74 MMHG

## 2024-06-03 DIAGNOSIS — I87.332 VENOUS HYPERTENSION, CHRONIC, WITH ULCER AND INFLAMMATION, LEFT (HCC): Primary | ICD-10-CM

## 2024-06-03 PROCEDURE — 29581 APPL MULTLAYER CMPRN SYS LEG: CPT

## 2024-06-03 PROCEDURE — 99213 OFFICE O/P EST LOW 20 MIN: CPT | Performed by: SURGERY

## 2024-06-03 RX ORDER — LIDOCAINE HYDROCHLORIDE 40 MG/ML
5 SOLUTION TOPICAL ONCE
Status: COMPLETED | OUTPATIENT
Start: 2024-06-03 | End: 2024-06-03

## 2024-06-03 RX ADMIN — LIDOCAINE HYDROCHLORIDE 5 ML: 40 SOLUTION TOPICAL at 10:09

## 2024-06-03 NOTE — PROGRESS NOTES
"Cast Application    Date/Time: 6/3/2024 9:45 AM    Performed by: Cadence Vasquez RN  Authorized by: Farhad Méndez DO  Universal Protocol:  Consent: Verbal consent obtained.  Consent given by: patient  Time out: Immediately prior to procedure a \"time out\" was called to verify the correct patient, procedure, equipment, support staff and site/side marked as required.  Patient understanding: patient states understanding of the procedure being performed  Patient consent: the patient's understanding of the procedure matches consent given  Procedure consent: procedure consent matches procedure scheduled  Patient identity confirmed: verbally with patient    Pre-procedure details:     Sensation:  Normal    Skin color:  WDL  Procedure details:     Laterality:  Left    Location:  Leg    Leg:  L lower legCast type:  Multi-layer compression short leg        Supplies:  2 layer wrap  Post-procedure details:     Pain:  Unchanged    Sensation:  Normal    Skin color:  WDL    Patient tolerance of procedure:  Tolerated well, no immediate complications  Comments:      Multiplayer wrap procedure     Before application, JAQUELIN and/or TBI determined to be adequate for healing and application of compression. Lower extremity washed prior to application of compression wrap. With the foot in dorsiflexed position, coflex lite was applied as per physician orders without complications or complaint of pain.  The procedure was tolerated well. Toes warm & pink post application.  Patient provided education & reinforced to observe toes for any discoloration, swelling or tingling and instructed when to report to the Wound Center or to remove compression        "

## 2024-06-03 NOTE — ASSESSMENT & PLAN NOTE
Venous stasis hypertension with ulceration left lower extremity.  Overall improved compared to last weeks the pictures when the patient saw my colleague Dr. Muñoz of podiatry.  Agree with continue with wraps changed 3 times a week for better drainage control.  Keep legs elevated.  Follow-up 2 weeks.

## 2024-06-03 NOTE — PROGRESS NOTES
Patient ID: Nikki Roach is a 68 y.o. female Date of Birth 1955     Chief Complaint  Chief Complaint   Patient presents with    Follow Up Wound Care Visit     L leg wound        Allergies  Azithromycin, Ciprofloxacin, Doxycycline, and Ancef [cefazolin]    Assessment:    Venous hypertension, chronic, with ulcer and inflammation, left (HCC)  Venous stasis hypertension with ulceration left lower extremity.  Overall improved compared to last weeks the pictures when the patient saw my colleague Dr. Muñoz of podiatry.  Agree with continue with wraps changed 3 times a week for better drainage control.  Keep legs elevated.  Follow-up 2 weeks.     Diagnoses and all orders for this visit:    Venous hypertension, chronic, with ulcer and inflammation, left (HCC)  -     Wound cleansing and dressings Venous Ulcer Left;Lower Leg; Future  -     lidocaine (XYLOCAINE) 4 % topical solution 5 mL  -     Wound Procedure Treatment Venous Ulcer Left;Lower Leg    Other orders  -     Cancel: Cast Application  -     Cast Application              Notes:    Recent wound care note dated May 28, 2024 from podiatry was personally reviewed by me.    Procedures    Plan:     Wound 05/06/24 Venous Ulcer Leg Left;Lower (Active)   Wound Image Images linked 06/03/24 0949   Wound Description Epithelialization;Pink;Hypergranulation 06/03/24 1006   Leticia-wound Assessment Edema;Erythema;Scaly;Dry;Yellow-brown;Scar Tissue 06/03/24 1006   Wound Length (cm) 9 cm 06/03/24 1006   Wound Width (cm) 15.9 cm 06/03/24 1006   Wound Depth (cm) 0.1 cm 06/03/24 1006   Wound Surface Area (cm^2) 143.1 cm^2 06/03/24 1006   Wound Volume (cm^3) 14.31 cm^3 06/03/24 1006   Calculated Wound Volume (cm^3) 14.31 cm^3 06/03/24 1006   Drainage Amount Moderate 06/03/24 1006   Drainage Description Serosanguineous 06/03/24 1006   Non-staged Wound Description Full thickness 06/03/24 1006       Wound 05/06/24 Venous Ulcer Leg Left;Lower (Active)   Date First Assessed/Time First  Assessed: 05/06/24 1148   Primary Wound Type: Venous Ulcer  Location: Leg  Wound Location Orientation: Left;Lower       Subjective:      .    68-year-old female with known CKD, diabetes, obesity, lower extremity lymphedema, left lower extremity venous stasis disease with hypertension and ulceration presents today for follow-up evaluation and treatment of left lower extremity ulceration.  Overall doing pretty well.  Her labs were increased frequency of changing 3 times a day by my colleague Dr. Muñoz of podiatry.  Patient has been doing her best to keep her legs elevated.  Drainage appears to be better controlled as per the patient.        The following portions of the patient's history were reviewed and updated as appropriate: She  has a past medical history of Blood clotting disorder ([facility]), Homocysteinemia, Hypercoagulable state (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).  She   Patient Active Problem List    Diagnosis Date Noted    Venous hypertension, chronic, with ulcer and inflammation, left ([facility]) 03/02/2024    Lymphedema of both lower extremities 03/02/2024    Cellulitis of left lower extremity 02/22/2024    Type 2 diabetes mellitus without complication, without long-term current use of insulin ([facility]) 02/05/2024    Stage 3a chronic kidney disease (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) 09/02/2022    Prothrombin time increased 07/26/2021    Anemia 09/10/2020    Hyperphosphatemia 09/10/2020    S/P TKR (total knee replacement) 03/04/2020    [place] filter in place 02/17/2020    History of pulmonary embolism 02/17/2020    Rosacea 11/22/2019    Abnormality of coagulation ([facility]) 06/12/2019    Hypertension, essential 06/10/2019    Pulmonary HTN ([facility])     Heme positive stool 06/21/2018    Cellulitis of right lower extremity 03/30/2018    Lyme disease     Blood clotting disorder ([facility])     Essential hypertension     Homocysteinemia      Hypercoagulable state (HCC)     Obstructive sleep apnea on CPAP     Morbid obesity with BMI of 70 and over, adult ([facility]) 07/01/2016    Skin rash 05/22/2014    Chronic venous hypertension w ulceration ([facility]) 12/18/2013    Osteoporosis 12/02/2013    Edema 11/20/2013    Esophageal reflux 11/20/2013    Hyperlipidemia 11/20/2013    Polycystic ovarian syndrome 11/20/2013     She  has a past surgical history that includes Joint replacement (Bilateral); Appendectomy; Ankle surgery (Right); Hysterectomy; Hemorroidectomy; Foot surgery; LITHOTRIPSY; Closed reduction metatarsal fracture (Right); Vena cava filter placement; and Breast excisional biopsy (Left).  Her family history includes Breast cancer in her paternal aunt; Hypercoagulant Ability in her maternal grandmother and mother; No Known Problems in her daughter, father, maternal grandfather, paternal aunt, paternal aunt, paternal aunt, paternal aunt, paternal aunt, paternal aunt, paternal grandfather, paternal grandmother, and sister; Pulmonary embolism in her mother.  She  reports that she has never smoked. She has never used smokeless tobacco. She reports that she does not drink alcohol and does not use drugs.  Current Outpatient Medications   Medication Sig Dispense Refill    amLODIPine-benazepril (LOTREL 5-20) 5-20 MG per capsule Take 1 capsule by mouth daily 90 capsule 3    clotrimazole-betamethasone (LOTRISONE) 1-0.05 % cream Apply topically 2 (two) times a day 45 g 5    folic acid-pyridoxine-cyanocobalamin (Folbic) 2.5-25-2 mg Take 1 tablet by mouth daily 90 tablet 3    furosemide (LASIX) 20 mg tablet Take 1 tablet (20 mg total) by mouth daily 90 tablet 3    Hydrocortisone-Iodoquinol (Dermazene) 1-1 % CREA Apply topically 2 (two) times a day 28.4 g 5    metFORMIN (GLUCOPHAGE-XR) 500 mg 24 hr tablet Take 4 tablets (2,000 mg total) by mouth daily with breakfast 360 tablet 3    montelukast (SINGULAIR) 10 mg tablet Take 1 tablet (10 mg total) by mouth daily at  bedtime 90 tablet 3    multivitamin (THERAGRAN) TABS Take 1 tablet by mouth daily.      nystatin powder Apply topically 2 (two) times a day Apply to affected area 60 g 5    pravastatin (PRAVACHOL) 40 mg tablet Take 1 tablet (40 mg total) by mouth daily 90 tablet 3    warfarin (COUMADIN) 5 mg tablet Take  strictly as advised 150 tablet 0    Wound Dressings (Medi-Jair Performance Plus ABD) PADS Apply 1 each topically 2 (two) times a day 48 each 3     No current facility-administered medications for this visit.     She is allergic to azithromycin, ciprofloxacin, doxycycline, and ancef [cefazolin]..    Review of Systems   Constitutional:  Negative for activity change, appetite change, chills, diaphoresis, fatigue and fever.   Cardiovascular:  Positive for leg swelling.   Skin:  Positive for wound (LLE). Negative for color change, pallor and rash.         Objective:       Wound 05/06/24 Venous Ulcer Leg Left;Lower (Active)   Wound Image Images linked 06/03/24 0934   Wound Description Epithelialization;Pink;Hypergranulation 06/03/24 1006   Leticia-wound Assessment Edema;Erythema;Scaly;Dry;Yellow-brown;Scar Tissue 06/03/24 1006   Wound Length (cm) 9 cm 06/03/24 1006   Wound Width (cm) 15.9 cm 06/03/24 1006   Wound Depth (cm) 0.1 cm 06/03/24 1006   Wound Surface Area (cm^2) 143.1 cm^2 06/03/24 1006   Wound Volume (cm^3) 14.31 cm^3 06/03/24 1006   Calculated Wound Volume (cm^3) 14.31 cm^3 06/03/24 1006   Drainage Amount Moderate 06/03/24 1006   Drainage Description Serosanguineous 06/03/24 1006   Non-staged Wound Description Full thickness 06/03/24 1006       /74   Pulse (!) 108   Temp (!) 97 °F (36.1 °C) (Tympanic)   Resp 18   LMP  (LMP Unknown)     Physical Exam  Vitals reviewed.   Constitutional:       General: She is not in acute distress.     Appearance: Normal appearance. She is obese. She is not ill-appearing, toxic-appearing or diaphoretic.   HENT:      Head: Normocephalic and atraumatic.   Eyes:      General:  No scleral icterus.        Right eye: No discharge.         Left eye: No discharge.   Cardiovascular:      Rate and Rhythm: Normal rate.   Pulmonary:      Effort: Pulmonary effort is normal. No respiratory distress.   Musculoskeletal:         General: Swelling present.      Cervical back: Normal range of motion.      Right lower leg: Edema present.      Left lower leg: Edema present.   Skin:     General: Skin is warm.      Coloration: Skin is not jaundiced or pale.      Findings: No bruising or erythema.      Comments: Left lower extremity venous stasis ulceration.  Appears superficial.  Overall the erythema and associated swelling and blistering have been proved.  No debridement required.   Neurological:      General: No focal deficit present.      Mental Status: She is alert and oriented to person, place, and time.      Cranial Nerves: No cranial nerve deficit.   Psychiatric:         Mood and Affect: Mood normal.         Behavior: Behavior normal.         Thought Content: Thought content normal.         Judgment: Judgment normal.           Wound Instructions:  Orders Placed This Encounter   Procedures    Wound cleansing and dressings Venous Ulcer Left;Lower Leg     Wound cleansing and dressings Left;Lower Leg             Wash your hands with soap and water.  Remove old dressing, discard into plastic bag and place in trash.    Cleanse the wound with mild soap (Dove) and water prior to applying a clean dressing. Do not use tissue or cotton balls.   Do not scrub the wound. Pat dry using gauze.     Apply silver alginate to the wound. Then apply super absorbant pad or ABD  Secure with amna and tape  Apply Coflex lite- change 3 times a week.     Multi-layer compression wrap Instructions:   Keep compression wrap/wraps clean and dry. If wraps are too tight and you experience numbness/tingling or it becomes displaced, call the wound center or if after hours, remove wraps or proceed to nearest E.R. and call wound center in  AM.     Wrap will be changed 3 times a week.     Avoid prolonged standing in one place.     Elevate leg(s) above the level of the heart when sitting or as much as possible.      Follow up with Dr. Méndez on Thursday 6/13/24.     Use lymphedema pumps twice daily for 1 hour at a time.     Standing Status:   Future     Standing Expiration Date:   6/10/2024    Wound Procedure Treatment Venous Ulcer Left;Lower Leg     This order was created via procedure documentation    Cast Application     This order was created via procedure documentation        Diagnosis ICD-10-CM Associated Orders   1. Venous hypertension, chronic, with ulcer and inflammation, left ([facility])  I87.332 Wound cleansing and dressings Venous Ulcer Left;Lower Leg     lidocaine (XYLOCAINE) 4 % topical solution 5 mL     Wound Procedure Treatment Venous Ulcer Left;Lower Leg

## 2024-06-03 NOTE — PATIENT INSTRUCTIONS
Orders Placed This Encounter   Procedures    Wound cleansing and dressings Venous Ulcer Left;Lower Leg     Wound cleansing and dressings Left;Lower Leg             Wash your hands with soap and water.  Remove old dressing, discard into plastic bag and place in trash.    Cleanse the wound with mild soap (Dove) and water prior to applying a clean dressing. Do not use tissue or cotton balls.   Do not scrub the wound. Pat dry using gauze.     Apply silver alginate to the wound. Then apply super absorbant pad or ABD  Secure with amna and tape  Apply Coflex lite- change 3 times a week.     Multi-layer compression wrap Instructions:   Keep compression wrap/wraps clean and dry. If wraps are too tight and you experience numbness/tingling or it becomes displaced, call the wound center or if after hours, remove wraps or proceed to nearest E.R. and call wound center in AM.     Wrap will be changed 3 times a week.     Avoid prolonged standing in one place.     Elevate leg(s) above the level of the heart when sitting or as much as possible.      Follow up with Dr. Méndez on Thursday 6/13/24.     Use lymphedema pumps twice daily for 1 hour at a time.     Standing Status:   Future     Standing Expiration Date:   6/10/2024

## 2024-06-03 NOTE — PROGRESS NOTES
Wound Procedure Treatment Venous Ulcer Left;Lower Leg    Performed by: Cadence Vasquez RN  Authorized by: Farhad Méndez DO    Associated wounds:   Wound 05/06/24 Venous Ulcer Leg Left;Lower  Wound cleansed with:  Soap and water  Applied primary dressing:  Calcium alginate and Silver  Applied secondary dressing:  ABD  Dressing secured with:  Vanesa, Tape and Compression wrap

## 2024-06-03 NOTE — LETTER
Pending sale to [facility]  [address] [facility] [address]  Phone#  [phone]  Fax#  [phone]    Patient:  Nikki Roach  YOB: 1955  Phone:  [phone]  Date of Visit:  6/3/2024    Orders Placed This Encounter   Procedures   • Wound cleansing and dressings Venous Ulcer Left;Lower Leg     Wound cleansing and dressings Left;Lower Leg             Wash your hands with soap and water.  Remove old dressing, discard into plastic bag and place in trash.    Cleanse the wound with mild soap (Dove) and water prior to applying a clean dressing. Do not use tissue or cotton balls.   Do not scrub the wound. Pat dry using gauze.     Apply silver alginate to the wound. Then apply super absorbant pad or ABD  Secure with amna and tape  Apply Coflex lite- change 3 times a week.     Multi-layer compression wrap Instructions:   Keep compression wrap/wraps clean and dry. If wraps are too tight and you experience numbness/tingling or it becomes displaced, call the wound center or if after hours, remove wraps or proceed to nearest E.R. and call wound center in AM.     Wrap will be changed 3 times a week.     Avoid prolonged standing in one place.     Elevate leg(s) above the level of the heart when sitting or as much as possible.      Follow up with Dr. Méndez on Thursday 6/13/24.     Use lymphedema pumps twice daily for 1 hour at a time.     Standing Status:   Future     Standing Expiration Date:   6/10/2024   • Wound Procedure Treatment Venous Ulcer Left;Lower Leg     This order was created via procedure documentation   • Cast Application     This order was created via procedure documentation         Electronically signed by Farhad Méndez DO

## 2024-06-13 ENCOUNTER — OFFICE VISIT (OUTPATIENT)
Dept: WOUND CARE | Facility: CLINIC | Age: 69
End: 2024-06-13
Payer: MEDICARE

## 2024-06-13 VITALS
HEART RATE: 82 BPM | TEMPERATURE: 98.1 F | RESPIRATION RATE: 18 BRPM | SYSTOLIC BLOOD PRESSURE: 120 MMHG | DIASTOLIC BLOOD PRESSURE: 72 MMHG

## 2024-06-13 DIAGNOSIS — L97.909 CHRONIC VENOUS HYPERTENSION W ULCERATION (HCC): Primary | ICD-10-CM

## 2024-06-13 DIAGNOSIS — I87.319 CHRONIC VENOUS HYPERTENSION W ULCERATION (HCC): Primary | ICD-10-CM

## 2024-06-13 DIAGNOSIS — I87.332 VENOUS HYPERTENSION, CHRONIC, WITH ULCER AND INFLAMMATION, LEFT (HCC): ICD-10-CM

## 2024-06-13 PROCEDURE — 97597 DBRDMT OPN WND 1ST 20 CM/<: CPT | Performed by: SURGERY

## 2024-06-13 RX ORDER — LIDOCAINE 40 MG/G
CREAM TOPICAL ONCE
Status: COMPLETED | OUTPATIENT
Start: 2024-06-13 | End: 2024-06-13

## 2024-06-13 RX ADMIN — LIDOCAINE: 40 CREAM TOPICAL at 11:02

## 2024-06-13 NOTE — PROGRESS NOTES
Wound Procedure Treatment Venous Ulcer Left;Lower Leg    Performed by: Soledad Day RN  Authorized by: Farhad Méndez DO    Associated wounds:   Wound 05/06/24 Venous Ulcer Leg Left;Lower  Wound cleansed with:  Wound aggrssively cleansed with NSS and gauze and Soap and water  Applied to periwound:  Moisture lotion  Applied primary dressing:  Dermagran  Applied secondary dressing:  Gauze and ABD  Dressing secured with:  Vanesa, Tape and Compression wrap  Verbal consent obtained?: Yes    Time Out:     Time out: Immediately prior to the procedure a time out was called    Patient states understanding of procedure being performed: Yes    Patient's understanding of procedure matches consent: Yes    Procedure consent matches procedure scheduled: Yes    Patient identity confirmed:  Verbally with patient  Compression -Pre-procedure details:     Skin color:  Wnl    Laterality:  Left    Location:  Leg    Cast type:  Multi-layer compression short leg    Supplies:  2 layer wrap    Pain:  Unchanged    Sensation:  Normal    Skin color:  Wnl    Patient tolerance of procedure:  Tolerated well, no immediate complications      Multiplayer wrap procedure     Before application, JAQUELIN and/or TBI determined to be adequate for healing and application of compression. Lower extremity washed prior to application of compression wrap. With the foot in dorsiflexed position, Coflex Lite was applied as per physician orders without complications or complaint of pain.  The procedure was tolerated well. Toes warm & pink post application.  Patient provided education & reinforced to observe toes for any discoloration, swelling or tingling and instructed when to report to the Wound Center or to remove compression

## 2024-06-13 NOTE — LETTER
[facility]  [address] [facility] [address]  Phone#  [phone]  Fax#  [phone]    Patient:  Nikki Roach  YOB: 1955  Phone:  [phone]  Date of Visit:  6/13/2024    Orders Placed This Encounter   Procedures   • Wound cleansing and dressings Venous Ulcer Left;Lower Leg     Wound cleansing and dressings Venous Ulcer Left;Lower Leg       Wash your hands with soap and water.  Remove old dressing, discard into plastic bag and place in trash.    Cleanse the wound with mild soap (Dove) and water prior to applying a clean dressing. Do not use tissue or cotton balls.   Do not scrub the wound. Pat dry using gauze.   Apply moisturizing lotion to dry skin areas   Apply Dermagran to the wound. Then apply gauze  or ABD  Secure with amna and tape  Apply Coflex lite- change 2 times a week.     Multi-layer compression wrap Instructions:   Keep compression wrap/wraps clean and dry. If wraps are too tight and you experience numbness/tingling or it becomes displaced, call the wound center or if after hours, remove wraps or proceed to nearest E.R. and call wound center in AM     Wrap will be changed 2 times a week.     Avoid prolonged standing in one place.     Elevate leg(s) above the level of the heart when sitting or as much as possible.      Follow up with Dr. Méndez in 2 weeks     Use lymphedema pumps twice daily for 1 hour at a time.     Standing Status:   Future     Standing Expiration Date:   6/20/2024         Electronically signed by Farhad Méndez DO

## 2024-06-13 NOTE — PATIENT INSTRUCTIONS
Orders Placed This Encounter   Procedures    Wound cleansing and dressings Venous Ulcer Left;Lower Leg     Wound cleansing and dressings Venous Ulcer Left;Lower Leg       Wash your hands with soap and water.  Remove old dressing, discard into plastic bag and place in trash.    Cleanse the wound with mild soap (Dove) and water prior to applying a clean dressing. Do not use tissue or cotton balls.   Do not scrub the wound. Pat dry using gauze.   Apply moisturizing lotion to dry skin areas   Apply Dermagran to the wound. Then apply gauze  or ABD  Secure with amna and tape  Apply Coflex lite- change 2 times a week.     Multi-layer compression wrap Instructions:   Keep compression wrap/wraps clean and dry. If wraps are too tight and you experience numbness/tingling or it becomes displaced, call the wound center or if after hours, remove wraps or proceed to nearest E.R. and call wound center in AM     Wrap will be changed 2 times a week.     Avoid prolonged standing in one place.     Elevate leg(s) above the level of the heart when sitting or as much as possible.      Follow up with Dr. Méndez in 2 weeks     Use lymphedema pumps twice daily for 1 hour at a time.     Standing Status:   Future     Standing Expiration Date:   6/20/2024      Orders Placed This Encounter   Procedures    Wound cleansing and dressings Venous Ulcer Left;Lower Leg     Wound cleansing and dressings Venous Ulcer Left;Lower Leg       Wash your hands with soap and water.  Remove old dressing, discard into plastic bag and place in trash.    Cleanse the wound with mild soap (Dove) and water prior to applying a clean dressing. Do not use tissue or cotton balls.   Do not scrub the wound. Pat dry using gauze.   Apply moisturizing lotion to dry skin areas   Apply Dermagran to the wound. Then apply gauze  or ABD  Secure with amna and tape  Apply Coflex lite- change 2 times a week.     Multi-layer compression wrap Instructions:   Keep compression wrap/wraps  clean and dry. If wraps are too tight and you experience numbness/tingling or it becomes displaced, call the wound center or if after hours, remove wraps or proceed to nearest E.R. and call wound center in AM     Wrap will be changed 2 times a week.     Avoid prolonged standing in one place.     Elevate leg(s) above the level of the heart when sitting or as much as possible.      Follow up with Dr. Méndez in 2 weeks     Use lymphedema pumps twice daily for 1 hour at a time.     Standing Status:   Future     Standing Expiration Date:   6/20/2024

## 2024-06-16 NOTE — ASSESSMENT & PLAN NOTE
Venous stasis hypertension with ulceration of left lower extremity.  Overall looks slightly improved compared to last visit.  Selective debridement completed to remove slough and some old dried product.  Current chronic using is now sticking to the wound.  Drainage is much better controlled.  Will switch to Dermagran and a Coflex lite.  Change twice a week.  Keep leg elevated.  Still encouraging to use lymphedema pumps once she gets them working.  Follow-up 2 weeks.

## 2024-06-16 NOTE — PROGRESS NOTES
"Patient ID: Nikki Roach is a 68 y.o. female Date of Birth 1955     Chief Complaint  Chief Complaint   Patient presents with    Follow Up Wound Care Visit     Left lower leg wound       Allergies  Azithromycin, Ciprofloxacin, Doxycycline, and Ancef [cefazolin]    Assessment:    Venous hypertension, chronic, with ulcer and inflammation, left (HCC)  Venous stasis hypertension with ulceration of left lower extremity.  Overall looks slightly improved compared to last visit.  Selective debridement completed to remove slough and some old dried product.  Current chronic using is now sticking to the wound.  Drainage is much better controlled.  Will switch to Dermagran and a Coflex lite.  Change twice a week.  Keep leg elevated.  Still encouraging to use lymphedema pumps once she gets them working.  Follow-up 2 weeks.     Diagnoses and all orders for this visit:    Chronic venous hypertension w ulceration (HCC)  -     Wound cleansing and dressings Venous Ulcer Left;Lower Leg; Future  -     Wound Procedure Treatment Venous Ulcer Left;Lower Leg  -     lidocaine (LMX) 4 % cream    Venous hypertension, chronic, with ulcer and inflammation, left (HCC)  -     Wound cleansing and dressings Venous Ulcer Left;Lower Leg; Future  -     Wound Procedure Treatment Venous Ulcer Left;Lower Leg  -     lidocaine (LMX) 4 % cream              Debridement   Wound 05/06/24 Venous Ulcer Leg Left;Lower    Universal Protocol:  Consent: Verbal consent obtained.  Risks and benefits: risks, benefits and alternatives were discussed  Consent given by: patient  Time out: Immediately prior to procedure a \"time out\" was called to verify the correct patient, procedure, equipment, support staff and site/side marked as required.  Patient understanding: patient states understanding of the procedure being performed  Patient consent: the patient's understanding of the procedure matches consent given  Patient identity confirmed: verbally with " patient    Debridement Details  Performed by: physician  Debridement type: selective  Pain control: lidocaine 4%      Post-debridement measurements  Length (cm): 8  Width (cm): 3  Depth (cm): 0.1  Percent debrided: 10%  Surface Area (cm^2): 24  Area Debrided (cm^2): 2.4  Volume (cm^3): 2.4    Devitalized tissue debrided: biofilm and slough  Instrument(s) utilized: curette  Bleeding: none  Hemostasis obtained with: not applicable        Plan:     Wound 05/06/24 Venous Ulcer Leg Left;Lower (Active)   Wound Image Images linked 06/13/24 1022   Wound Description Epithelialization;Pink;Hypergranulation 06/13/24 1028   Leticia-wound Assessment Edema;Erythema;Scaly;Dry;Yellow-brown;Scar Tissue 06/13/24 1028   Wound Length (cm) 8 cm 06/13/24 1028   Wound Width (cm) 3 cm 06/13/24 1028   Wound Depth (cm) 0.1 cm 06/13/24 1028   Wound Surface Area (cm^2) 24 cm^2 06/13/24 1028   Wound Volume (cm^3) 2.4 cm^3 06/13/24 1028   Calculated Wound Volume (cm^3) 2.4 cm^3 06/13/24 1028   Drainage Amount Small 06/13/24 1028   Drainage Description Serosanguineous 06/13/24 1028       Wound 05/06/24 Venous Ulcer Leg Left;Lower (Active)   Date First Assessed/Time First Assessed: 05/06/24 1148   Primary Wound Type: Venous Ulcer  Location: Leg  Wound Location Orientation: Left;Lower       Subjective:      .    68-year-old woman with multiple comorbidities presents today for follow-up evaluation and treatment of left lower extremity stasis ulceration.  Overall doing well.  States that the product we are using is now sticking to the leg.  Drainage better controlled.  Pain improved.  No other associate symptoms.        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[facility]) 09/02/2022    Prothrombin time increased 07/26/2021    Anemia 09/10/2020    Hyperphosphatemia 09/10/2020    S/P TKR (total knee replacement) 03/04/2020    Elida filter in place 02/17/2020    History of pulmonary embolism 02/17/2020    Rosacea 11/22/2019    Abnormality of coagulation ([facility]) 06/12/2019    Hypertension, essential 06/10/2019    Pulmonary HTN ([facility])     Heme positive stool 06/21/2018    Cellulitis of right lower extremity 03/30/2018    Lyme disease     Blood clotting disorder ([facility])     Essential hypertension     Homocysteinemia     Hypercoagulable state ([facility])     Obstructive sleep apnea on CPAP     Morbid obesity with BMI of 70 and over, adult ([facility]) 07/01/2016    Skin rash 05/22/2014    Chronic venous hypertension w ulceration ([facility]) 12/18/2013    Osteoporosis 12/02/2013    Edema 11/20/2013    Esophageal reflux 11/20/2013    Hyperlipidemia 11/20/2013    Polycystic ovarian syndrome 11/20/2013     She  has a past surgical history that includes Joint replacement (Bilateral); Appendectomy; Ankle surgery (Right); Hysterectomy; Hemorroidectomy; Foot surgery; LITHOTRIPSY; Closed reduction metatarsal fracture (Right); Vena cava filter placement; and Breast excisional biopsy (Left).  Her family history includes Breast cancer in her paternal aunt; Hypercoagulant Ability in her maternal grandmother and mother; No Known Problems in her daughter, father, maternal grandfather, paternal aunt, paternal aunt, paternal aunt, paternal aunt, paternal aunt, paternal aunt, paternal grandfather, paternal grandmother, and  sister; Pulmonary embolism in her mother.  She  reports that she has never smoked. She has never used smokeless tobacco. She reports that she does not drink alcohol and does not use drugs.  Current Outpatient Medications   Medication Sig Dispense Refill    amLODIPine-benazepril (LOTREL 5-20) 5-20 MG per capsule Take 1 capsule by mouth daily 90 capsule 3    clotrimazole-betamethasone (LOTRISONE) 1-0.05 % cream Apply topically 2 (two) times a day 45 g 5    folic acid-pyridoxine-cyanocobalamin (Folbic) 2.5-25-2 mg Take 1 tablet by mouth daily 90 tablet 3    furosemide (LASIX) 20 mg tablet Take 1 tablet (20 mg total) by mouth daily 90 tablet 3    Hydrocortisone-Iodoquinol (Dermazene) 1-1 % CREA Apply topically 2 (two) times a day 28.4 g 5    metFORMIN (GLUCOPHAGE-XR) 500 mg 24 hr tablet Take 4 tablets (2,000 mg total) by mouth daily with breakfast 360 tablet 3    montelukast (SINGULAIR) 10 mg tablet Take 1 tablet (10 mg total) by mouth daily at bedtime 90 tablet 3    multivitamin (THERAGRAN) TABS Take 1 tablet by mouth daily.      nystatin powder Apply topically 2 (two) times a day Apply to affected area 60 g 5    pravastatin (PRAVACHOL) 40 mg tablet Take 1 tablet (40 mg total) by mouth daily 90 tablet 3    warfarin (COUMADIN) 5 mg tablet Take  strictly as advised 150 tablet 0    Wound Dressings (Medi-Jair Performance Plus ABD) PADS Apply 1 each topically 2 (two) times a day 48 each 3     No current facility-administered medications for this visit.     She is allergic to azithromycin, ciprofloxacin, doxycycline, and ancef [cefazolin]..    Review of Systems   Constitutional:  Negative for activity change, appetite change, chills, diaphoresis, fatigue, fever and unexpected weight change.   Cardiovascular:  Positive for leg swelling. Negative for chest pain.   Skin:  Positive for wound (LLE). Negative for color change, pallor and rash.         Objective:       Wound 05/06/24 Venous Ulcer Leg Left;Lower (Active)   Wound  Image Images linked 06/13/24 1022   Wound Description Epithelialization;Pink;Hypergranulation 06/13/24 1028   Leticia-wound Assessment Edema;Erythema;Scaly;Dry;Yellow-brown;Scar Tissue 06/13/24 1028   Wound Length (cm) 8 cm 06/13/24 1028   Wound Width (cm) 3 cm 06/13/24 1028   Wound Depth (cm) 0.1 cm 06/13/24 1028   Wound Surface Area (cm^2) 24 cm^2 06/13/24 1028   Wound Volume (cm^3) 2.4 cm^3 06/13/24 1028   Calculated Wound Volume (cm^3) 2.4 cm^3 06/13/24 1028   Drainage Amount Small 06/13/24 1028   Drainage Description Serosanguineous 06/13/24 1028       /72   Pulse 82   Temp 98.1 °F (36.7 °C)   Resp 18   LMP  (LMP Unknown)     Physical Exam  Vitals reviewed.   Constitutional:       General: She is not in acute distress.     Appearance: Normal appearance. She is not ill-appearing, toxic-appearing or diaphoretic.   HENT:      Head: Normocephalic and atraumatic.      Right Ear: External ear normal.      Left Ear: External ear normal.   Eyes:      General: No scleral icterus.        Right eye: No discharge.         Left eye: No discharge.   Cardiovascular:      Rate and Rhythm: Normal rate.   Pulmonary:      Effort: Pulmonary effort is normal. No respiratory distress.   Musculoskeletal:         General: Swelling present.      Cervical back: Normal range of motion.      Right lower leg: Edema present.      Left lower leg: Edema present.   Skin:     General: Skin is warm.      Coloration: Skin is not jaundiced or pale.      Findings: No bruising or erythema.      Comments: Left lower extremity with noted venous stasis disease ulcerations.  Overall appears smaller.  More superficial.  Less drainage.  Selective debridement completed remove slough also some old dried product.   Neurological:      General: No focal deficit present.      Mental Status: She is alert and oriented to person, place, and time.      Cranial Nerves: No cranial nerve deficit.   Psychiatric:         Mood and Affect: Mood normal.          Behavior: Behavior normal.         Thought Content: Thought content normal.         Judgment: Judgment normal.           Wound Instructions:  Orders Placed This Encounter   Procedures    Wound cleansing and dressings Venous Ulcer Left;Lower Leg     Wound cleansing and dressings Venous Ulcer Left;Lower Leg       Wash your hands with soap and water.  Remove old dressing, discard into plastic bag and place in trash.    Cleanse the wound with mild soap (Dove) and water prior to applying a clean dressing. Do not use tissue or cotton balls.   Do not scrub the wound. Pat dry using gauze.   Apply moisturizing lotion to dry skin areas   Apply Dermagran to the wound. Then apply gauze  or ABD  Secure with amna and tape  Apply Coflex lite- change 2 times a week.     Multi-layer compression wrap Instructions:   Keep compression wrap/wraps clean and dry. If wraps are too tight and you experience numbness/tingling or it becomes displaced, call the wound center or if after hours, remove wraps or proceed to nearest E.R. and call wound center in AM     Wrap will be changed 2 times a week.     Avoid prolonged standing in one place.     Elevate leg(s) above the level of the heart when sitting or as much as possible.      Follow up with Dr. Méndez in 2 weeks     Use lymphedema pumps twice daily for 1 hour at a time.     Standing Status:   Future     Standing Expiration Date:   6/20/2024    Wound Procedure Treatment Venous Ulcer Left;Lower Leg     This order was created via procedure documentation        Diagnosis ICD-10-CM Associated Orders   1. Chronic venous hypertension w ulceration ([facility])  I87.319 Wound cleansing and dressings Venous Ulcer Left;Lower Leg    L97.909 Wound Procedure Treatment Venous Ulcer Left;Lower Leg     lidocaine (LMX) 4 % cream      2. Venous hypertension, chronic, with ulcer and inflammation, left ([facility])  I87.332 Wound cleansing and dressings Venous Ulcer Left;Lower Leg     Wound Procedure Treatment Venous Ulcer  Left;Lower Leg     lidocaine (LMX) 4 % cream

## 2024-06-20 LAB
INR PPP: 2.1
PROTHROMBIN TIME: 23.7 SEC (ref 12–14.6)

## 2024-06-21 ENCOUNTER — TELEPHONE (OUTPATIENT)
Dept: HEMATOLOGY ONCOLOGY | Facility: CLINIC | Age: 69
End: 2024-06-21

## 2024-06-21 NOTE — TELEPHONE ENCOUNTER
Patient aware----- Message from Mariza Martin PA-C sent at 6/21/2024  9:16 AM EDT -----  INR is good. No change in coumadin. Recheck INR in 1 month.

## 2024-06-21 NOTE — TELEPHONE ENCOUNTER
Spoke to pt and informed her that per Jeremiah, INR is good.No change in coumadin. Will recheck INR in 1 month. Pt verbalized understanding.

## 2024-06-27 ENCOUNTER — OFFICE VISIT (OUTPATIENT)
Dept: WOUND CARE | Facility: CLINIC | Age: 69
End: 2024-06-27
Payer: MEDICARE

## 2024-06-27 VITALS
TEMPERATURE: 97.2 F | RESPIRATION RATE: 18 BRPM | HEART RATE: 88 BPM | SYSTOLIC BLOOD PRESSURE: 138 MMHG | DIASTOLIC BLOOD PRESSURE: 78 MMHG

## 2024-06-27 DIAGNOSIS — I87.332 VENOUS HYPERTENSION, CHRONIC, WITH ULCER AND INFLAMMATION, LEFT (HCC): Primary | ICD-10-CM

## 2024-06-27 PROCEDURE — 29581 APPL MULTLAYER CMPRN SYS LEG: CPT

## 2024-06-27 PROCEDURE — 99212 OFFICE O/P EST SF 10 MIN: CPT | Performed by: SURGERY

## 2024-06-27 PROCEDURE — 99213 OFFICE O/P EST LOW 20 MIN: CPT | Performed by: SURGERY

## 2024-06-27 NOTE — PROGRESS NOTES
"Cast Application    Date/Time: 6/27/2024 10:00 AM    Performed by: Soledad Day RN  Authorized by: Farhad Méndez DO  Universal Protocol:  Consent: Verbal consent obtained.  Consent given by: patient  Time out: Immediately prior to procedure a \"time out\" was called to verify the correct patient, procedure, equipment, support staff and site/side marked as required.  Patient understanding: patient states understanding of the procedure being performed  Patient consent: the patient's understanding of the procedure matches consent given  Procedure consent: procedure consent matches procedure scheduled  Patient identity confirmed: verbally with patient    Pre-procedure details:     Sensation:  Normal  Procedure details:     Laterality:  Left    Location:  Leg    Leg:  L lower legCast type:  Multi-layer compression short leg      Post-procedure details:     Pain:  Improved    Sensation:  Normal    Skin color:  Wnl    Patient tolerance of procedure:  Tolerated well, no immediate complications  Comments:      Multilayer wrap procedure     Before application, JAQUELIN and/or TBI determined to be adequate for healing and application of compression. Lower extremity washed prior to application of compression wrap. With the foot in dorsiflexed position, Coflex Lite was applied as per physician orders without complications or complaint of pain.  The procedure was tolerated well. Toes warm & pink post application.  Patient provided education & reinforced to observe toes for any discoloration, swelling or tingling and instructed when to report to the Wound Center or to remove compression. Wound care as per providers orders, patient tolerated well.        "

## 2024-06-27 NOTE — PATIENT INSTRUCTIONS
Orders Placed This Encounter   Procedures    Wound cleansing and dressings Venous Ulcer Left;Lower Leg     Wound cleansing and dressings Venous Ulcer Left;Lower Leg        Wash your hands with soap and water.  Remove old dressing, discard into plastic bag and place in trash.    Cleanse the wound with mild soap (Dove) and water prior to applying a clean dressing. Do not use tissue or cotton balls.   Do not scrub the wound. Pat dry using gauze.   Apply moisturizing lotion to dry skin areas   Apply Dermagran to the wound. Then apply gauze  or ABD  Secure with amna and tape  Apply Coflex lite- change 2 times a week.     Multi-layer compression wrap Instructions:   Keep compression wrap/wraps clean and dry. If wraps are too tight and you experience numbness/tingling or it becomes displaced, call the wound center or if after hours, remove wraps or proceed to nearest E.R. and call wound center in AM     Wrap will be changed 2 times a week.     Avoid prolonged standing in one place.     Elevate leg(s) above the level of the heart when sitting or as much as possible.      Follow up with Dr. Méndez in 2 weeks     Standing Status:   Future     Standing Expiration Date:   7/11/2024

## 2024-06-27 NOTE — PROGRESS NOTES
Wound Procedure Treatment Venous Ulcer Left;Lower Leg    Performed by: Soledad Day RN  Authorized by: Farhad Méndez DO    Associated wounds:   Wound 05/06/24 Venous Ulcer Leg Left;Lower  Wound cleansed with:  Soap and water  Applied to periwound:  Moisture lotion  Applied primary dressing:  Dermagran  Applied secondary dressing:  Gauze  Dressing secured with:  Compression wrap, Vanesa and Tape

## 2024-06-27 NOTE — LETTER
[facility]  [address] [facility] [address]  Phone#  [phone]  Fax#  [phone]    Patient:  Nikki Roach  YOB: 1955  Phone:  [phone]  Date of Visit:  6/27/2024    Orders Placed This Encounter   Procedures   • Wound cleansing and dressings Venous Ulcer Left;Lower Leg     Wound cleansing and dressings Venous Ulcer Left;Lower Leg        Wash your hands with soap and water.  Remove old dressing, discard into plastic bag and place in trash.    Cleanse the wound with mild soap (Dove) and water prior to applying a clean dressing. Do not use tissue or cotton balls.   Do not scrub the wound. Pat dry using gauze.   Apply moisturizing lotion to dry skin areas   Apply Dermagran to the wound. Then apply gauze  or ABD  Secure with amna and tape  Apply Coflex lite- change 2 times a week.     Multi-layer compression wrap Instructions:   Keep compression wrap/wraps clean and dry. If wraps are too tight and you experience numbness/tingling or it becomes displaced, call the wound center or if after hours, remove wraps or proceed to nearest E.R. and call wound center in AM     Wrap will be changed 2 times a week.     Avoid prolonged standing in one place.     Elevate leg(s) above the level of the heart when sitting or as much as possible.      Follow up with Dr. Méndez in 2 weeks     Standing Status:   Future     Standing Expiration Date:   7/11/2024   • Wound Procedure Treatment Venous Ulcer Left;Lower Leg     This order was created via procedure documentation   • Cast Application     This order was created via procedure documentation         Electronically signed by Farhad Méndez DO

## 2024-06-27 NOTE — ASSESSMENT & PLAN NOTE
Venous stasis disease of left lower extremity with ulceration.  Overall the left lower extremity appears much healthier especially with the periwound skin.  Less dry crusted regions.  The ulcerations and cells are superficial and overall there is new epithelization noted.  Patient has been spending a lot of time sitting keeping her legs up which I believe is definitely helping.  For now we will continue with the compression wrap therapy and Dermagran twice a week.  Keep her legs elevated when possible.  Will also apply a Tubigrip to the right lower extremity help prevent any ulcerations as well.  Follow-up in 2 weeks.

## 2024-07-10 LAB
ALBUMIN SERPL-MCNC: 3.5 G/DL (ref 3.5–5.7)
ALP SERPL-CCNC: 58 U/L (ref 35–120)
ALT SERPL-CCNC: 11 U/L
ANION GAP SERPL CALCULATED.3IONS-SCNC: 11 MMOL/L (ref 3–11)
AST SERPL-CCNC: 19 U/L
BILIRUB SERPL-MCNC: 0.6 MG/DL (ref 0.2–1)
BUN SERPL-MCNC: 20 MG/DL (ref 7–25)
CALCIUM SERPL-MCNC: 9.2 MG/DL (ref 8.5–10.1)
CHLORIDE SERPL-SCNC: 103 MMOL/L (ref 100–109)
CHOLEST SERPL-MCNC: 137 MG/DL
CHOLEST/HDLC SERPL: 2.5 {RATIO}
CO2 SERPL-SCNC: 27 MMOL/L (ref 21–31)
CREAT SERPL-MCNC: 1.06 MG/DL (ref 0.4–1.1)
CYTOLOGY CMNT CVX/VAG CYTO-IMP: ABNORMAL
EST. AVERAGE GLUCOSE BLD GHB EST-MCNC: 120 MG/DL
GFR/BSA.PRED SERPLBLD CYS-BASED-ARV: 57 ML/MIN/{1.73_M2}
GLUCOSE SERPL-MCNC: 87 MG/DL (ref 65–99)
HBA1C MFR BLD: 5.8 %
HDLC SERPL-MCNC: 54 MG/DL (ref 23–92)
LDLC SERPL CALC-MCNC: 64 MG/DL
NONHDLC SERPL-MCNC: 83 MG/DL
POTASSIUM SERPL-SCNC: 4.3 MMOL/L (ref 3.5–5.2)
PROT SERPL-MCNC: 7.8 G/DL (ref 6.3–8.3)
SODIUM SERPL-SCNC: 141 MMOL/L (ref 135–145)
TRIGL SERPL-MCNC: 96 MG/DL
TSH SERPL-ACNC: 1.85 UIU/ML (ref 0.45–5.33)

## 2024-07-11 ENCOUNTER — OFFICE VISIT (OUTPATIENT)
Dept: WOUND CARE | Facility: CLINIC | Age: 69
End: 2024-07-11
Payer: MEDICARE

## 2024-07-11 ENCOUNTER — TELEPHONE (OUTPATIENT)
Dept: FAMILY MEDICINE CLINIC | Facility: CLINIC | Age: 69
End: 2024-07-11

## 2024-07-11 VITALS
TEMPERATURE: 97.4 F | HEART RATE: 78 BPM | DIASTOLIC BLOOD PRESSURE: 78 MMHG | RESPIRATION RATE: 18 BRPM | SYSTOLIC BLOOD PRESSURE: 128 MMHG

## 2024-07-11 DIAGNOSIS — I87.332 VENOUS HYPERTENSION, CHRONIC, WITH ULCER AND INFLAMMATION, LEFT (HCC): Primary | ICD-10-CM

## 2024-07-11 PROCEDURE — 99213 OFFICE O/P EST LOW 20 MIN: CPT | Performed by: SURGERY

## 2024-07-11 PROCEDURE — 99212 OFFICE O/P EST SF 10 MIN: CPT | Performed by: SURGERY

## 2024-07-11 NOTE — PROGRESS NOTES
Wound Procedure Treatment Venous Ulcer Left;Lower Leg    Performed by: Soledad Day RN  Authorized by: Farhad Méndez DO    Associated wounds:   Wound 05/06/24 Venous Ulcer Leg Left;Lower  Wound cleansed with:  Soap and water  Applied to periwound:  Moisture lotion  Dressing secured with:  Size H and Elastic tubular stocking

## 2024-07-11 NOTE — PATIENT INSTRUCTIONS
Orders Placed This Encounter   Procedures    Wound cleansing and dressings Venous Ulcer Left;Lower Leg     Wound is healed  Use moisturizing lotion  Elevate legs above heart level  Wear compression stockings or spandagrip ( replace every 2 weeks)  Replace compression stockings every 6 months , hand wash and line dry   Purchase lymphedema pumps and use 2 times a day 1 hour at a time   Follow up as needed     Standing Status:   Future     Standing Expiration Date:   7/18/2024

## 2024-07-12 NOTE — ASSESSMENT & PLAN NOTE
Left lower extremity edema with venous stasis disease noted.  Ulcerations have healed.  Patient has been spending more time laying down keeping her feet up.  Since she is healed we will no longer wrap her leg.  We encouraged her to wear compression stockings.  She states that her regular compression stockings she is unable to get on and her daughter cannot always help her.  However she does have Tubigrip stockings which she purchased off [employer] which she can get on herself.  We encouraged her to use those daily and change them every 2 weeks.  Also instructed her that she notices any new drainage or open ulcerations that she should give us a call immediately.

## 2024-07-12 NOTE — PROGRESS NOTES
Patient ID: Nikki Roach is a 68 y.o. female Date of Birth 1955     Chief Complaint  Chief Complaint   Patient presents with    Follow Up Wound Care Visit     Wound left lower leg       Allergies  Azithromycin, Ciprofloxacin, Doxycycline, and Ancef [cefazolin]    Assessment:    Venous hypertension, chronic, with ulcer and inflammation, left (HCC)  Left lower extremity edema with venous stasis disease noted.  Ulcerations have healed.  Patient has been spending more time laying down keeping her feet up.  Since she is healed we will no longer wrap her leg.  We encouraged her to wear compression stockings.  She states that her regular compression stockings she is unable to get on and her daughter cannot always help her.  However she does have Tubigrip stockings which she purchased off [employer] which she can get on herself.  We encouraged her to use those daily and change them every 2 weeks.  Also instructed her that she notices any new drainage or open ulcerations that she should give us a call immediately.     Diagnoses and all orders for this visit:    Venous hypertension, chronic, with ulcer and inflammation, left (HCC)  -     Wound cleansing and dressings Venous Ulcer Left;Lower Leg; Future  -     Wound Procedure Treatment Venous Ulcer Left;Lower Leg              Blood work/laboratory:    Recent blood work including hemoglobin A1c, CMP, was personally reviewed by me.    Procedures    Plan:     Wound 05/06/24 Venous Ulcer Leg Left;Lower (Active)   Wound Image Images linked 07/11/24 1127   Wound Description Epithelialization 07/11/24 1126   Leticia-wound Assessment Edema;Erythema;Scaly;Dry;Yellow-brown;Scar Tissue 07/11/24 1126   Wound Length (cm) 0 cm 07/11/24 1126   Wound Width (cm) 0 cm 07/11/24 1126   Wound Depth (cm) 0 cm 07/11/24 1126   Wound Surface Area (cm^2) 0 cm^2 07/11/24 1126   Wound Volume (cm^3) 0 cm^3 07/11/24 1126   Calculated Wound Volume (cm^3) 0 cm^3 07/11/24 1126   Drainage Amount None 07/11/24  1126       Wound 05/06/24 Venous Ulcer Leg Left;Lower (Active)   Date First Assessed/Time First Assessed: 05/06/24 1148   Primary Wound Type: Venous Ulcer  Location: Leg  Wound Location Orientation: Left;Lower  Wound Outcome: Healed       Subjective:      .    68-year-old obese female well-known to me, presents today for follow-up evaluation and treatment of left lower extremity venous stasis disease with ulceration.  Currently undergoing compression wrap therapy.  Doing well.  Drainage controlled.  No significant pain.  Patient is continue to keep her legs elevated and staying off her feet to help improve overall healing.        The following portions of the patient's history were reviewed and updated as appropriate: She  has a past medical history of Blood clotting disorder ([facility]), Homocysteinemia, Hypercoagulable state ([facility]), Lyme disease, Renal disorder, and 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 ([facility]).  She   Patient Active Problem List    Diagnosis Date Noted    Venous hypertension, chronic, with ulcer and inflammation, left ([facility]) 03/02/2024    Lymphedema of both lower extremities 03/02/2024    Cellulitis of left lower extremity 02/22/2024    Type 2 diabetes mellitus without complication, without long-term current use of insulin ([facility]) 02/05/2024    Stage 3a chronic kidney disease ([facility]) 04/03/2023    Homocystinuria ([facility]) 09/02/2022    Prothrombin time increased 07/26/2021    Anemia 09/10/2020    Hyperphosphatemia 09/10/2020    S/P TKR (total knee replacement) 03/04/2020    Elida filter in place 02/17/2020    History of pulmonary embolism 02/17/2020    Rosacea 11/22/2019    Abnormality of coagulation ([facility]) 06/12/2019    Hypertension, essential 06/10/2019    Pulmonary HTN ([facility])     Heme positive stool 06/21/2018    Cellulitis of right lower extremity 03/30/2018    Lyme disease     Blood clotting disorder ([facility])     Essential hypertension     Homocysteinemia      Hypercoagulable state (HCC)     Obstructive sleep apnea on CPAP     Morbid obesity with BMI of 70 and over, adult ([facility]) 07/01/2016    Skin rash 05/22/2014    Chronic venous hypertension w ulceration ([facility]) 12/18/2013    Osteoporosis 12/02/2013    Edema 11/20/2013    Esophageal reflux 11/20/2013    Hyperlipidemia 11/20/2013    Polycystic ovarian syndrome 11/20/2013     She  has a past surgical history that includes Joint replacement (Bilateral); Appendectomy; Ankle surgery (Right); Hysterectomy; Hemorroidectomy; Foot surgery; LITHOTRIPSY; Closed reduction metatarsal fracture (Right); Vena cava filter placement; and Breast excisional biopsy (Left).  Her family history includes Breast cancer in her paternal aunt; Hypercoagulant Ability in her maternal grandmother and mother; No Known Problems in her daughter, father, maternal grandfather, paternal aunt, paternal aunt, paternal aunt, paternal aunt, paternal aunt, paternal aunt, paternal grandfather, paternal grandmother, and sister; Pulmonary embolism in her mother.  She  reports that she has never smoked. She has never used smokeless tobacco. She reports that she does not drink alcohol and does not use drugs.  Current Outpatient Medications   Medication Sig Dispense Refill    amLODIPine-benazepril (LOTREL 5-20) 5-20 MG per capsule Take 1 capsule by mouth daily 90 capsule 3    clotrimazole-betamethasone (LOTRISONE) 1-0.05 % cream Apply topically 2 (two) times a day 45 g 5    folic acid-pyridoxine-cyanocobalamin (Folbic) 2.5-25-2 mg Take 1 tablet by mouth daily 90 tablet 3    furosemide (LASIX) 20 mg tablet Take 1 tablet (20 mg total) by mouth daily 90 tablet 3    Hydrocortisone-Iodoquinol (Dermazene) 1-1 % CREA Apply topically 2 (two) times a day 28.4 g 5    metFORMIN (GLUCOPHAGE-XR) 500 mg 24 hr tablet Take 4 tablets (2,000 mg total) by mouth daily with breakfast 360 tablet 3    montelukast (SINGULAIR) 10 mg tablet Take 1 tablet (10 mg total) by mouth daily at bedtime  90 tablet 3    multivitamin (THERAGRAN) TABS Take 1 tablet by mouth daily.      nystatin powder Apply topically 2 (two) times a day Apply to affected area 60 g 5    pravastatin (PRAVACHOL) 40 mg tablet Take 1 tablet (40 mg total) by mouth daily 90 tablet 3    warfarin (COUMADIN) 5 mg tablet Take  strictly as advised 150 tablet 0    Wound Dressings (Medi-Jair Performance Plus ABD) PADS Apply 1 each topically 2 (two) times a day 48 each 3     No current facility-administered medications for this visit.     She is allergic to azithromycin, ciprofloxacin, doxycycline, and ancef [cefazolin]..    Review of Systems   Constitutional:  Negative for activity change, appetite change, chills, diaphoresis, fatigue, fever and unexpected weight change.   Respiratory:  Negative for shortness of breath.    Cardiovascular:  Positive for leg swelling. Negative for chest pain and palpitations.   Skin:  Positive for wound (LLE). Negative for color change, pallor and rash.         Objective:       Wound 05/06/24 Venous Ulcer Leg Left;Lower (Active)   Wound Image Images linked 07/11/24 1127   Wound Description Epithelialization 07/11/24 1126   Leticia-wound Assessment Edema;Erythema;Scaly;Dry;Yellow-brown;Scar Tissue 07/11/24 1126   Wound Length (cm) 0 cm 07/11/24 1126   Wound Width (cm) 0 cm 07/11/24 1126   Wound Depth (cm) 0 cm 07/11/24 1126   Wound Surface Area (cm^2) 0 cm^2 07/11/24 1126   Wound Volume (cm^3) 0 cm^3 07/11/24 1126   Calculated Wound Volume (cm^3) 0 cm^3 07/11/24 1126   Drainage Amount None 07/11/24 1126       /78   Pulse 78   Temp (!) 97.4 °F (36.3 °C)   Resp 18   LMP  (LMP Unknown)     Physical Exam  Vitals reviewed.   Constitutional:       General: She is not in acute distress.     Appearance: Normal appearance. She is not ill-appearing, toxic-appearing or diaphoretic.   HENT:      Head: Normocephalic and atraumatic.      Right Ear: External ear normal.      Left Ear: External ear normal.   Eyes:      General:  No scleral icterus.        Right eye: No discharge.         Left eye: No discharge.   Pulmonary:      Effort: Pulmonary effort is normal. No respiratory distress.   Musculoskeletal:         General: Swelling present. No deformity. Normal range of motion.      Cervical back: Normal range of motion.      Right lower leg: Edema present.      Left lower leg: Edema present.   Skin:     General: Skin is warm.      Coloration: Skin is not jaundiced or pale.      Findings: No bruising or erythema.      Comments: Left lower extremity edema with venous stasis noted.  No open ulcerations noted.  Well-healed.   Neurological:      General: No focal deficit present.      Mental Status: She is alert and oriented to person, place, and time.      Cranial Nerves: No cranial nerve deficit.   Psychiatric:         Mood and Affect: Mood normal.         Behavior: Behavior normal.         Thought Content: Thought content normal.         Judgment: Judgment normal.           Wound Instructions:  Orders Placed This Encounter   Procedures    Wound cleansing and dressings Venous Ulcer Left;Lower Leg     Wound is healed  Use moisturizing lotion  Elevate legs above heart level  Wear compression stockings or spandagrip ( replace every 2 weeks)  Replace compression stockings every 6 months , hand wash and line dry   Purchase lymphedema pumps and use 2 times a day 1 hour at a time   Follow up as needed     Standing Status:   Future     Standing Expiration Date:   7/18/2024    Wound Procedure Treatment Venous Ulcer Left;Lower Leg     This order was created via procedure documentation        Diagnosis ICD-10-CM Associated Orders   1. Venous hypertension, chronic, with ulcer and inflammation, left ([facility])  I87.332 Wound cleansing and dressings Venous Ulcer Left;Lower Leg     Wound Procedure Treatment Venous Ulcer Left;Lower Leg

## 2024-07-15 ENCOUNTER — OFFICE VISIT (OUTPATIENT)
Dept: HEMATOLOGY ONCOLOGY | Facility: CLINIC | Age: 69
End: 2024-07-15
Payer: MEDICARE

## 2024-07-15 VITALS
WEIGHT: 293 LBS | SYSTOLIC BLOOD PRESSURE: 126 MMHG | HEIGHT: 64 IN | BODY MASS INDEX: 50.02 KG/M2 | OXYGEN SATURATION: 96 % | RESPIRATION RATE: 17 BRPM | HEART RATE: 96 BPM | TEMPERATURE: 97.2 F | DIASTOLIC BLOOD PRESSURE: 80 MMHG

## 2024-07-15 DIAGNOSIS — Z95.828 GREENFIELD FILTER IN PLACE: ICD-10-CM

## 2024-07-15 DIAGNOSIS — R79.83 HOMOCYSTEINEMIA: ICD-10-CM

## 2024-07-15 DIAGNOSIS — Z86.711 HISTORY OF PULMONARY EMBOLISM: ICD-10-CM

## 2024-07-15 DIAGNOSIS — D68.59 HYPERCOAGULABLE STATE (HCC): Primary | ICD-10-CM

## 2024-07-15 DIAGNOSIS — Z86.718 HISTORY OF DVT (DEEP VEIN THROMBOSIS): ICD-10-CM

## 2024-07-15 DIAGNOSIS — Z79.01 ANTICOAGULATED ON COUMADIN: ICD-10-CM

## 2024-07-15 PROCEDURE — G2211 COMPLEX E/M VISIT ADD ON: HCPCS | Performed by: INTERNAL MEDICINE

## 2024-07-15 PROCEDURE — 99213 OFFICE O/P EST LOW 20 MIN: CPT | Performed by: INTERNAL MEDICINE

## 2024-07-15 RX ORDER — WARFARIN SODIUM 5 MG/1
TABLET ORAL
Qty: 150 TABLET | Refills: 2 | Status: SHIPPED | OUTPATIENT
Start: 2024-07-15

## 2024-07-15 NOTE — PATIENT INSTRUCTIONS
Renewed Coumadin prescription and sent to [facility] mail service.  PT/INR once every 2 weeks.  Blood counts and chemistry once every 6 months.  Visit in 1 year.

## 2024-07-15 NOTE — PROGRESS NOTES
HPI: Continuation of care for recurrent DVTs and pulmonary embolism and strong family history of blood clots.  Screening hypercoagulation workup showed high homocystine level.  Patient has been on long-term anticoagulation and she has been on Coumadin and Folbic.  She wants to stay on these 2 medications.  She goes for PT INR test on regular basis and gets instructions about Coumadin dose, either she calls our office or we call her with instructions. No history of bleeding or blood clots on Coumadin..  She has been aware of drug and drug interaction with Coumadin and food and drug interaction with Coumadin.  Patient has been advised to always let prescribing physician know that patient has been on Coumadin.   She has IVC filter and she wants to keep that. She has  history of lymphedema both legs and has compression machine. She uses nystatin powder off and on for redness in the groin areas in summer months.   She wants to remain on anticoglation for life unless contraindicated.  For bleeding and procedures Coumadin could be interrupted  under physicians supervision and guidance.  She is aware to contact us  if she were to receive any new medication from other providers and if she would be going for any procedure.  Patient knows not to take aspirin, NSAIDs and other blood thinners.   Patient is overweight.  She has tiredness.  She has exertional dyspnea probably because of her weight.  Has arthritic symptoms.   .She is regular with her medical checkups and GYN checkups and mammography.   .                Current Outpatient Medications:   •  warfarin (Coumadin) 5 mg tablet, 1 tablet a day Mondays through Thursdays.  1 and half tablet a day on Fridays Saturdays and Sundays.  Coumadin dosage is subject to change depending upon PT/INR., Disp: 150 tablet, Rfl: 2  •  amLODIPine-benazepril (LOTREL 5-20) 5-20 MG per capsule, Take 1 capsule by mouth daily, Disp: 90 capsule, Rfl: 3  •  clotrimazole-betamethasone (LOTRISONE)  1-0.05 % cream, Apply topically 2 (two) times a day, Disp: 45 g, Rfl: 5  •  folic acid-pyridoxine-cyanocobalamin (Folbic) 2.5-25-2 mg, Take 1 tablet by mouth daily, Disp: 90 tablet, Rfl: 3  •  furosemide (LASIX) 20 mg tablet, Take 1 tablet (20 mg total) by mouth daily, Disp: 90 tablet, Rfl: 3  •  Hydrocortisone-Iodoquinol (Dermazene) 1-1 % CREA, Apply topically 2 (two) times a day, Disp: 28.4 g, Rfl: 5  •  metFORMIN (GLUCOPHAGE-XR) 500 mg 24 hr tablet, Take 4 tablets (2,000 mg total) by mouth daily with breakfast, Disp: 360 tablet, Rfl: 3  •  montelukast (SINGULAIR) 10 mg tablet, Take 1 tablet (10 mg total) by mouth daily at bedtime, Disp: 90 tablet, Rfl: 3  •  multivitamin (THERAGRAN) TABS, Take 1 tablet by mouth daily., Disp: , Rfl:   •  nystatin powder, Apply topically 2 (two) times a day Apply to affected area, Disp: 60 g, Rfl: 5  •  pravastatin (PRAVACHOL) 40 mg tablet, Take 1 tablet (40 mg total) by mouth daily, Disp: 90 tablet, Rfl: 3  •  warfarin (COUMADIN) 5 mg tablet, Take  strictly as advised, Disp: 150 tablet, Rfl: 0  •  Wound Dressings (Medi-Jair Performance Plus ABD) PADS, Apply 1 each topically 2 (two) times a day, Disp: 48 each, Rfl: 3    Allergies   Allergen Reactions   • Azithromycin Diarrhea   • Ciprofloxacin Other (See Comments), Dizziness and Confusion      weepy       • Doxycycline Headache   • Ancef [Cefazolin] Rash     States tolerated Keflex       Oncology History    No history exists.       ROS:  07/16/24 Reviewed 13 systems:  Presently no other neurological, cardiac, pulmonary, GI and  symptoms other than listed in HPI..  Other symptoms are in HPI.    No symptoms like fever, chills, bleeding, bone pains, skin rash other than stasis dermatitis, no weight loss,    weakness, numbness,  claudication and gait problem. No frequent infections.  Not unusually sensitive to heat or cold.  No swollen glands..   Patient is anxious.         /80 (BP Location: Left arm, Patient Position: Sitting,  "Cuff Size: Adult)   Pulse 96   Temp (!) 97.2 °F (36.2 °C)   Resp 17   Ht 5' 4\" (1.626 m)   Wt (!) 189 kg (417 lb)   LMP  (LMP Unknown)   SpO2 96%   BMI 71.58 kg/m²     Physical Exam:  Patient is alert and oriented.  Patient is not in distress.  Vital signs are above.  There is no icterus.  No oral thrush.  No palpable neck mass.  Lung fields are clear to percussion and auscultation.  Regular heart rate.  There is no palpable abdominal mass.  Abdomen is soft and nontender.  There is no ascites.  She has pitting lymphedema of both legs and has stasis dermatitis.  There is no calf tenderness.  No  focal neurological deficit.  She has generalized weakness, no skin rash other than stasis dermatitis, there is no palpable lymphadenopathy in the neck and axillary areas,  no clubbing.    Patient is anxious.  Performance status 2 because of weight and lymphedema.       IMPRESSION:  No mammographic evidence of malignancy.           ASSESSMENT/BI-RADS CATEGORY:  Left: 1 - Negative  Right: 1 - Negative  Overall: 1 - Negative     RECOMMENDATION:       - Routine screening mammogram in 1 year for both breasts.     Workstation ID: [id number]              Imaging    Mammo screening bilateral w 3d & cad (Order: [id number]) - 12/26/2023  LABS:      Results for orders placed or performed in visit on 07/10/24   Hemoglobin A1C   Result Value Ref Range    Hemoglobin A1C 5.8 (H) <5.7 %    Estimated Average Glucose 120 mg/dL   Comprehensive metabolic panel   Result Value Ref Range    Glucose, Random 87 65 - 99 mg/dL    BUN 20 7 - 25 mg/dL    Creatinine 1.06 0.40 - 1.10 mg/dL    Sodium 141 135 - 145 mmol/L    Potassium 4.3 3.5 - 5.2 mmol/L    Chloride 103 100 - 109 mmol/L    CO2 27 21 - 31 mmol/L    Calcium 9.2 8.5 - 10.1 mg/dL    Alkaline Phosphatase 58 35 - 120 U/L    Albumin 3.5 3.5 - 5.7 g/dL    TOTAL BILIRUBIN 0.6 0.2 - 1.0 mg/dL    Protein, Total 7.8 6.3 - 8.3 g/dL    AST 19 <41 U/L    ALT 11 <56 U/L    ANION GAP 11 3 - 11    " eGFR 57 (L) >59    Comment (Note)    Lipid panel   Result Value Ref Range    Cholesterol, Total 137 <200 mg/dL    Triglycerides 96 <150 mg/dL    HDL Cholesterol 54 23 - 92 mg/dL    Non HDL Chol. (LDL+VLDL) 83 <160 mg/dL    LDL Calculated 64 <130 mg/dL    Chol HDLC Ratio 2.5    TSH, 3rd generation with Free T4 reflex   Result Value Ref Range    TSH 1.85 0.45 - 5.33 uIU/mL     *Note: Due to a large number of results and/or encounters for the requested time period, some results have not been displayed. A complete set of results can be found in Results Review.                   Component  Ref Range & Units 2/29/24  1:28 PM 2/23/24  9:58 AM 9/29/21  2:13 PM 9/17/20  4:19 AM 9/16/20  4:46 AM 9/15/20  4:52 AM 9/14/20  3:58 PM   WBC  4.31 - 10.16 Thousand/uL 8.17 8.67 9.10 7.59 6.65  10.64 High    RBC  3.81 - 5.12 Million/uL 4.30 4.39 4.33 3.89 3.96  4.08   Hemoglobin  11.5 - 15.4 g/dL 11.8 12.2 12.5 11.1 Low  11.2 Low   11.5   Hematocrit  34.8 - 46.1 % 38.6 39.7 39.1 34.9 35.4  36.9   MCV  82 - 98 fL 90 90 90 90 89  90   MCH  26.8 - 34.3 pg 27.4 27.8 28.9 28.5 28.3  28.2   MCHC  31.4 - 37.4 g/dL 30.6 Low  30.7 Low  32.0 31.8 31.6  31.2 Low    RDW  11.6 - 15.1 % 14.5 14.8 15.6 High  14.8 14.6  14.9   MPV  8.9 - 12.7 fL 9.8 10.3 10.0 9.9 9.9 9.9 10.1   Platelets  149 - 390 Thousands/uL 362 349 328 360 347 353 373   nRBC  /100 WBCs 0 0 0 0 0  0   Segmented %  43 - 75 % 66 57 67 54 49  73   Immature Grans %  0 - 2 % 0 1 0 1 1  1   Lymphocytes %  14 - 44 % 17 26 21 31 36  16   Monocytes %  4 - 12 % 11 10 9 8 8  7   Eosinophils Relative  0 - 6 % 5 5 2 5 5  2   Basophils Relative  0 - 1 % 1 1 1 1 1  1   Absolute Neutrophils  1.85 - 7.62 Thousands/µL 5.40 5.02 6.07 4.16 3.31  7.81 High    Absolute Immature Grans  0.00 - 0.20 Thousand/uL 0.02 0.04 0.04 0.04 0.03  0.06   Absolute Lymphocytes  0.60 - 4.47 Thousands/µL 1.35 2.22 1.92 2.36 2.37  1.71   Absolute Monocytes  0.17 - 1.22 Thousand/µL 0.88 0.85 0.81 0.61 0.55  0.75    Eosinophils Absolute  0.00 - 0.61 Thousand/µL 0.44 0.45 0.17 0.34 0.31  0.23   Basophils Absolute  0.00 - 0.10 Thousands/µL 0.08 0.09 0.09 0.08 0.08  0.08                             Labs, Imaging, & Other studies:   All pertinent labs and imaging studies were personally reviewed      Reviewed test results and discussed with patient.    Assessment and plan:  Continuation of care for recurrent DVTs and pulmonary embolism and strong family history of blood clots.  Screening hypercoagulation workup showed high homocystine level.  Patient has been on long-term anticoagulation and she has been on Coumadin and Folbic.  She wants to stay on these 2 medications.  She goes for PT INR test on regular basis and gets instructions about Coumadin dose, either she calls our office or we call her with instructions. No history of bleeding or blood clots on Coumadin..  She has been aware of drug and drug interaction with Coumadin and food and drug interaction with Coumadin.  Patient has been advised to always let prescribing physician know that patient has been on Coumadin.   She has IVC filter and she wants to keep that. She has  history of lymphedema both legs and has compression machine. She uses nystatin powder off and on for redness in the groin areas in summer months.   She wants to remain on anticoglation for life unless contraindicated.  For bleeding and procedures Coumadin could be interrupted  under physicians supervision and guidance.  She is aware to contact us  if she were to receive any new medication from other providers and if she would be going for any procedure.  Patient knows not to take aspirin, NSAIDs and other blood thinners.   Patient is overweight.  She has tiredness.  She has exertional dyspnea probably because of her weight.  Has arthritic symptoms.   .She is regular with her medical checkups and GYN checkups and mammography.      Physical examination and test results are as recorded and discussed.  Plan  is to continue her on Coumadin and Folbic as above .  Condition discussed and explained.  Questions answered.  Discussed the importance of self-breast examination, eating healthy foods, staying active and health screening test.  She is capable of self-care.  She has instructions to report to the emergency room in case of blood clot or bleeding or other medical emergencies and also call our office.   .     .  See diagnoses, orders and instructions  1. Hypercoagulable state (HCC)      2. History of pulmonary embolism    - warfarin (Coumadin) 5 mg tablet; 1 tablet a day Mondays through Thursdays.  1 and half tablet a day on Fridays Saturdays and Sundays.  Coumadin dosage is subject to change depending upon PT/INR.  Dispense: 150 tablet; Refill: 2    3. History of DVT (deep vein thrombosis)    - warfarin (Coumadin) 5 mg tablet; 1 tablet a day Mondays through Thursdays.  1 and half tablet a day on Fridays Saturdays and Sundays.  Coumadin dosage is subject to change depending upon PT/INR.  Dispense: 150 tablet; Refill: 2    4. Homocysteinemia      5. Anticoagulated on Coumadin    - warfarin (Coumadin) 5 mg tablet; 1 tablet a day Mondays through Thursdays.  1 and half tablet a day on Fridays Saturdays and Sundays.  Coumadin dosage is subject to change depending upon PT/INR.  Dispense: 150 tablet; Refill: 2    6. Elida filter in place              Renewed Coumadin prescription and sent to [facility] mail service.  PT/INR once every 2 weeks.  Blood counts and chemistry once every 6 months.  Visit in 1 year.          Patient voiced understanding and agreed.   I used the dictation device to dictate this note and there could be mistakes in my note and patient may contact our office for that    Counseling / Coordination of Care  Provided counseling and support.

## 2024-07-19 DIAGNOSIS — Z86.718 HISTORY OF DVT (DEEP VEIN THROMBOSIS): ICD-10-CM

## 2024-07-19 RX ORDER — WARFARIN SODIUM 5 MG/1
TABLET ORAL
Qty: 150 TABLET | Refills: 2 | Status: SHIPPED | OUTPATIENT
Start: 2024-07-19

## 2024-07-19 NOTE — TELEPHONE ENCOUNTER
Reason for call:   [x] Refill   [] Prior Auth  [x] Other: NOT A DUP! CHANGE IN PHARMACY    Office:   [] PCP/Provider -   [x] Specialty/Provider - Hematology & Oncology [place]    Medication:      Does the patient have enough for 3 days?   [] Yes   [x] No - Send as HP to POD

## 2024-07-22 LAB
INR PPP: 2.4
PROTHROMBIN TIME: 25.8 SEC (ref 12–14.6)

## 2024-07-23 ENCOUNTER — TELEPHONE (OUTPATIENT)
Age: 69
End: 2024-07-23

## 2024-07-23 NOTE — TELEPHONE ENCOUNTER
Pt calling to ask for her coumadin instructions.    Pt's INR is 2.4 - 7/11/24  She is currently taking coumadin 5 mg Monday - Thursday,  Friday, Saturday, Sunday 7-5.    Please advise her about the coumadin dose.    Thank you,  LORRAINE Dangelo

## 2024-07-23 NOTE — TELEPHONE ENCOUNTER
Left msg for patient explaining no change to current Coumadin instructions.  Patient to repeat labs in 3-4 weeks  Asked that she call back with any additional questions or concerns

## 2024-07-29 ENCOUNTER — TELEPHONE (OUTPATIENT)
Dept: OTHER | Facility: HOSPITAL | Age: 69
End: 2024-07-29

## 2024-07-29 NOTE — TELEPHONE ENCOUNTER
Patient called requesting refill on [place] called pharmacy on patients behalf to confirm medication was and will be available for  tomorrow

## 2024-08-20 ENCOUNTER — OFFICE VISIT (OUTPATIENT)
Dept: FAMILY MEDICINE CLINIC | Facility: CLINIC | Age: 69
End: 2024-08-20
Payer: MEDICARE

## 2024-08-20 VITALS
HEART RATE: 65 BPM | DIASTOLIC BLOOD PRESSURE: 70 MMHG | SYSTOLIC BLOOD PRESSURE: 120 MMHG | OXYGEN SATURATION: 97 % | BODY MASS INDEX: 50.02 KG/M2 | TEMPERATURE: 96.5 F | HEIGHT: 64 IN | WEIGHT: 293 LBS

## 2024-08-20 DIAGNOSIS — E78.5 HYPERLIPIDEMIA, UNSPECIFIED HYPERLIPIDEMIA TYPE: ICD-10-CM

## 2024-08-20 DIAGNOSIS — E66.01 MORBID OBESITY WITH BMI OF 70 AND OVER, ADULT (HCC): ICD-10-CM

## 2024-08-20 DIAGNOSIS — D68.9 BLOOD CLOTTING DISORDER (HCC): ICD-10-CM

## 2024-08-20 DIAGNOSIS — G47.33 OBSTRUCTIVE SLEEP APNEA ON CPAP: ICD-10-CM

## 2024-08-20 DIAGNOSIS — I87.332 VENOUS HYPERTENSION, CHRONIC, WITH ULCER AND INFLAMMATION, LEFT (HCC): ICD-10-CM

## 2024-08-20 DIAGNOSIS — L97.909 CHRONIC VENOUS HYPERTENSION W ULCERATION (HCC): ICD-10-CM

## 2024-08-20 DIAGNOSIS — I27.20 PULMONARY HTN (HCC): ICD-10-CM

## 2024-08-20 DIAGNOSIS — D68.9 ABNORMALITY OF COAGULATION (HCC): ICD-10-CM

## 2024-08-20 DIAGNOSIS — E11.9 TYPE 2 DIABETES MELLITUS WITHOUT COMPLICATION, WITHOUT LONG-TERM CURRENT USE OF INSULIN (HCC): Primary | ICD-10-CM

## 2024-08-20 DIAGNOSIS — E83.39 HYPERPHOSPHATEMIA: ICD-10-CM

## 2024-08-20 DIAGNOSIS — N18.31 STAGE 3A CHRONIC KIDNEY DISEASE (HCC): ICD-10-CM

## 2024-08-20 DIAGNOSIS — E72.11 HOMOCYSTINURIA (HCC): ICD-10-CM

## 2024-08-20 DIAGNOSIS — I87.319 CHRONIC VENOUS HYPERTENSION W ULCERATION (HCC): ICD-10-CM

## 2024-08-20 DIAGNOSIS — I10 ESSENTIAL HYPERTENSION: ICD-10-CM

## 2024-08-20 DIAGNOSIS — D68.59 HYPERCOAGULABLE STATE (HCC): ICD-10-CM

## 2024-08-20 PROCEDURE — G2211 COMPLEX E/M VISIT ADD ON: HCPCS

## 2024-08-20 PROCEDURE — 99214 OFFICE O/P EST MOD 30 MIN: CPT

## 2024-08-20 NOTE — ASSESSMENT & PLAN NOTE
Continue current management.  Educated on healthy diet and activity.  Refused eye exam today.  Placed lab orders today.  Lab Results   Component Value Date    HGBA1C 5.8 (H) 07/10/2024

## 2024-08-20 NOTE — PROGRESS NOTES
Ambulatory Visit  Name: Nikki Roach      : 1955      MRN: [id number]  Encounter Provider: Mk Reyes PA-C  Encounter Date: 2024   Encounter department: [facility]    Assessment & Plan   1. Type 2 diabetes mellitus without complication, without long-term current use of insulin (HCC)  Assessment & Plan:  Continue current management.  Educated on healthy diet and activity.  Refused eye exam today.  Placed lab orders today.  Lab Results   Component Value Date    HGBA1C 5.8 (H) 07/10/2024     Orders:  -     Hemoglobin A1C; Future  -     Lipid panel; Future  -     Hemoglobin A1C  -     Lipid panel  2. Chronic venous hypertension w ulceration (HCC)  Assessment & Plan:  Continue follow-up with wound care.  Go to ER if any worsening.  3. Essential hypertension  Assessment & Plan:  At goal today.  Continue current management.  4. Pulmonary HTN (HCC)  Assessment & Plan:  Will follow-up pending echocardiogram results.  Orders:  -     Echo complete w/ contrast if indicated; Future; Expected date: 2024  5. Venous hypertension, chronic, with ulcer and inflammation, left (HCC)  Assessment & Plan:  Continue follow-up with wound care.  Go to ER if any worsening.  6. Obstructive sleep apnea on CPAP  Assessment & Plan:  Stable on CPAP.  7. Stage 3a chronic kidney disease (HCC)  Assessment & Plan:  Will continue to monitor.  Educated on avoidance of nephrotoxic agents.  Lab Results   Component Value Date    EGFR 57 (L) 07/10/2024    EGFR 47 2024    EGFR 53 2024    CREATININE 1.06 07/10/2024    CREATININE 1.18 2024    CREATININE 1.07 2024     8. Blood clotting disorder (HCC)  Assessment & Plan:  Continue follow-up with hematology.  Currently on warfarin.  9. Abnormality of coagulation (HCC)  Assessment & Plan:  Continue follow-up with hematology.  Continue warfarin.  10. Hypercoagulable state (HCC)  Assessment & Plan:  Continue follow-up with hematology.  Continue  warfarin.  11. Homocystinuria (HCC)  Assessment & Plan:  Stable.  Continue current management.  Will continue to monitor.  12. Morbid obesity with BMI of 70 and over, adult (HCC)  Assessment & Plan:  Educated on healthy diet and activity.  13. Hyperlipidemia, unspecified hyperlipidemia type  Assessment & Plan:  Will continue to monitor.  Educated on healthy diet and activity.  Continue pravastatin.  14. Hyperphosphatemia  Assessment & Plan:  Will continue to monitor.       History of Present Illness     Patient is a 68-year-old female presenting for follow-up.  Patient has no concerns today.  Patient refuses diabetic eye exam today.  Educated on risk/benefits.  Patient was seen in the hospital for nosebleed 2 to 3 months ago.  Resolved, and has not had any issues since.  Follows with hematology.  Denies any bleeding other than this.  Following with wound care for cellulitis and ulcer.        Review of Systems   Constitutional:  Negative for appetite change, chills, diaphoresis, fatigue and fever.   Eyes:  Negative for pain, discharge, redness, itching and visual disturbance.   Cardiovascular:  Negative for chest pain, palpitations and leg swelling.   Gastrointestinal:  Negative for abdominal pain, blood in stool, constipation, diarrhea, nausea and vomiting.   Endocrine: Negative for cold intolerance, heat intolerance, polydipsia and polyuria.   Genitourinary:  Negative for dysuria, flank pain, frequency, hematuria and urgency.   Musculoskeletal:  Negative for arthralgias, back pain, myalgias, neck pain and neck stiffness.   Skin:  Positive for color change. Negative for rash.   Allergic/Immunologic: Negative for environmental allergies and food allergies.   Neurological:  Negative for dizziness, tremors, seizures, syncope, speech difficulty, weakness, light-headedness, numbness and headaches.   Hematological:  Negative for adenopathy. Does not bruise/bleed easily.   Psychiatric/Behavioral:  Negative for agitation,  confusion, decreased concentration, dysphoric mood, hallucinations, self-injury, sleep disturbance and suicidal ideas. The patient is not nervous/anxious and is not hyperactive.    All other systems reviewed and are negative.    Medical History Reviewed by provider this encounter:       Current Outpatient Medications on File Prior to Visit   Medication Sig Dispense Refill    amLODIPine-benazepril (LOTREL 5-20) 5-20 MG per capsule Take 1 capsule by mouth daily 90 capsule 3    clotrimazole-betamethasone (LOTRISONE) 1-0.05 % cream Apply topically 2 (two) times a day 45 g 5    folic acid-pyridoxine-cyanocobalamin (Folbic) 2.5-25-2 mg Take 1 tablet by mouth daily 90 tablet 3    furosemide (LASIX) 20 mg tablet Take 1 tablet (20 mg total) by mouth daily 90 tablet 3    Hydrocortisone-Iodoquinol (Dermazene) 1-1 % CREA Apply topically 2 (two) times a day 28.4 g 5    metFORMIN (GLUCOPHAGE-XR) 500 mg 24 hr tablet Take 4 tablets (2,000 mg total) by mouth daily with breakfast 360 tablet 3    montelukast (SINGULAIR) 10 mg tablet Take 1 tablet (10 mg total) by mouth daily at bedtime 90 tablet 3    multivitamin (THERAGRAN) TABS Take 1 tablet by mouth daily.      nystatin powder Apply topically 2 (two) times a day Apply to affected area 60 g 5    pravastatin (PRAVACHOL) 40 mg tablet Take 1 tablet (40 mg total) by mouth daily 90 tablet 3    warfarin (Coumadin) 5 mg tablet 1 tablet a day Mondays through Thursdays.  1 and half tablet a day on Fridays Saturdays and Sundays.  Coumadin dosage is subject to change depending upon PT/INR. 150 tablet 2    warfarin (COUMADIN) 5 mg tablet Take  strictly as advised 150 tablet 2    Wound Dressings (Medi-Jair Performance Plus ABD) PADS Apply 1 each topically 2 (two) times a day 48 each 3     No current facility-administered medications on file prior to visit.      Social History     Tobacco Use    Smoking status: Never    Smokeless tobacco: Never   Vaping Use    Vaping status: Never Used   Substance  "and Sexual Activity    Alcohol use: Never    Drug use: No    Sexual activity: Not Currently     Objective     /70 (BP Location: Left arm, Patient Position: Sitting)   Pulse 65   Temp (!) 96.5 °F (35.8 °C) (Tympanic)   Ht 5' 4\" (1.626 m)   Wt (!) 188 kg (415 lb)   LMP  (LMP Unknown)   SpO2 97%   BMI 71.23 kg/m²     Physical Exam  Vitals and nursing note reviewed.   Constitutional:       General: She is not in acute distress.     Appearance: Normal appearance. She is well-developed. She is obese. She is not ill-appearing, toxic-appearing or diaphoretic.   HENT:      Head: Normocephalic and atraumatic.      Right Ear: Tympanic membrane normal.      Left Ear: Tympanic membrane normal.      Nose: Nose normal.      Mouth/Throat:      Mouth: Mucous membranes are moist.      Pharynx: Oropharynx is clear.   Eyes:      Conjunctiva/sclera: Conjunctivae normal.      Pupils: Pupils are equal, round, and reactive to light.   Cardiovascular:      Rate and Rhythm: Normal rate and regular rhythm.      Pulses: Normal pulses.      Heart sounds: Normal heart sounds. No murmur heard.  Pulmonary:      Effort: Pulmonary effort is normal. No respiratory distress.      Breath sounds: Normal breath sounds. No wheezing.   Chest:      Chest wall: No tenderness.   Abdominal:      General: Bowel sounds are normal.      Palpations: Abdomen is soft. There is no mass.      Tenderness: There is no abdominal tenderness.   Musculoskeletal:         General: No swelling or tenderness. Normal range of motion.      Cervical back: Normal range of motion and neck supple. No tenderness.      Right lower leg: No edema.      Left lower leg: No edema.   Lymphadenopathy:      Cervical: No cervical adenopathy.   Skin:     General: Skin is warm and dry.      Capillary Refill: Capillary refill takes less than 2 seconds.      Findings: Erythema, lesion (Cellulitis and venous dermatitis) and rash present.   Neurological:      General: No focal deficit " present.      Mental Status: She is alert and oriented to person, place, and time. Mental status is at baseline.      Motor: No weakness.      Coordination: Coordination normal.      Gait: Gait normal.   Psychiatric:         Mood and Affect: Mood normal.         Behavior: Behavior normal.         Thought Content: Thought content normal.         Judgment: Judgment normal.       Administrative Statements

## 2024-08-20 NOTE — ASSESSMENT & PLAN NOTE
Will continue to monitor.  Educated on avoidance of nephrotoxic agents.  Lab Results   Component Value Date    EGFR 57 (L) 07/10/2024    EGFR 47 03/18/2024    EGFR 53 02/23/2024    CREATININE 1.06 07/10/2024    CREATININE 1.18 03/18/2024    CREATININE 1.07 02/23/2024

## 2024-08-21 LAB
INR PPP: 3.3
PROTHROMBIN TIME: 33.6 SEC (ref 12–14.6)

## 2024-09-05 LAB
INR PPP: 1.9
PROTHROMBIN TIME: 22.1 SEC (ref 12–14.6)

## 2024-09-06 ENCOUNTER — TELEPHONE (OUTPATIENT)
Age: 69
End: 2024-09-06

## 2024-09-06 ENCOUNTER — TELEPHONE (OUTPATIENT)
Dept: HEMATOLOGY ONCOLOGY | Facility: CLINIC | Age: 69
End: 2024-09-06

## 2024-09-06 DIAGNOSIS — D68.9 ABNORMALITY OF COAGULATION (HCC): Primary | ICD-10-CM

## 2024-09-06 NOTE — TELEPHONE ENCOUNTER
Pt called in regarding her Coumadin dose instructions. INR from 9/5 was 1.9. Requesting call back.

## 2024-09-06 NOTE — TELEPHONE ENCOUNTER
INR 1.9  Spoke to patient  Coumadin 5mg Mon-Friday , 7.5 mg on the weekend  Repeat INR in 3 weeks  Patient verbalizes understanding

## 2024-09-26 ENCOUNTER — APPOINTMENT (OUTPATIENT)
Dept: LAB | Facility: HOSPITAL | Age: 69
End: 2024-09-26
Payer: MEDICARE

## 2024-09-26 ENCOUNTER — HOSPITAL ENCOUNTER (OUTPATIENT)
Dept: NON INVASIVE DIAGNOSTICS | Facility: HOSPITAL | Age: 69
Discharge: HOME | End: 2024-09-26
Payer: MEDICARE

## 2024-09-26 VITALS
HEIGHT: 64 IN | WEIGHT: 293 LBS | HEART RATE: 93 BPM | SYSTOLIC BLOOD PRESSURE: 128 MMHG | BODY MASS INDEX: 50.02 KG/M2 | DIASTOLIC BLOOD PRESSURE: 72 MMHG

## 2024-09-26 DIAGNOSIS — I27.20 PULMONARY HTN (HCC): ICD-10-CM

## 2024-09-26 LAB
AORTIC ROOT: 3.1 CM
APICAL FOUR CHAMBER EJECTION FRACTION: 60 %
AV PEAK GRADIENT: 14 MMHG
BSA FOR ECHO PROCEDURE: 2.67 M2
E WAVE DECELERATION TIME: 235 MS
E/A RATIO: 0.86
FRACTIONAL SHORTENING: 31 (ref 28–44)
INTERVENTRICULAR SEPTUM IN DIASTOLE (PARASTERNAL SHORT AXIS VIEW): 1.1 CM
INTERVENTRICULAR SEPTUM: 1.1 CM (ref 0.6–1.1)
LAAS-AP2: 22.7 CM2
LAAS-AP4: 22.4 CM2
LEFT ATRIUM SIZE: 5 CM
LEFT ATRIUM VOLUME (MOD BIPLANE): 79 ML
LEFT ATRIUM VOLUME INDEX (MOD BIPLANE): 29.6 ML/M2
LEFT INTERNAL DIMENSION IN SYSTOLE: 2.7 CM (ref 2.1–4)
LEFT VENTRICULAR INTERNAL DIMENSION IN DIASTOLE: 3.9 CM (ref 3.5–6)
LEFT VENTRICULAR POSTERIOR WALL IN END DIASTOLE: 1.3 CM
LEFT VENTRICULAR STROKE VOLUME: 41 ML
LVSV (TEICH): 41 ML
MV E'TISSUE VEL-LAT: 11 CM/S
MV E'TISSUE VEL-SEP: 8 CM/S
MV PEAK A VEL: 0.85 M/S
MV PEAK E VEL: 73 CM/S
MV STENOSIS PRESSURE HALF TIME: 68 MS
MV VALVE AREA P 1/2 METHOD: 3.24
PULM VEIN S/D RATIO: 0.8
PV PEAK D VEL: 0.15 M/S
PV PEAK S VEL: 0.12 M/S
RIGHT ATRIAL 2D VOLUME: 69 ML
RIGHT ATRIUM AREA SYSTOLE A4C: 21.7 CM2
SL CV LEFT ATRIUM LENGTH A2C: 5.9 CM
SL CV LV EF: 60
SL CV PED ECHO LEFT VENTRICLE DIASTOLIC VOLUME (MOD BIPLANE) 2D: 68 ML
SL CV PED ECHO LEFT VENTRICLE SYSTOLIC VOLUME (MOD BIPLANE) 2D: 27 ML
TRICUSPID ANNULAR PLANE SYSTOLIC EXCURSION: 3.1 CM
TRICUSPID VALVE PEAK E WAVE VELOCITY: 0.16 M/S
TRICUSPID VALVE PEAK REGURGITATION VELOCITY: 1.83 M/S

## 2024-09-26 PROCEDURE — 93306 TTE W/DOPPLER COMPLETE: CPT | Performed by: INTERNAL MEDICINE

## 2024-09-26 PROCEDURE — 93306 TTE W/DOPPLER COMPLETE: CPT

## 2024-09-27 ENCOUNTER — NURSE TRIAGE (OUTPATIENT)
Age: 69
End: 2024-09-27

## 2024-09-27 ENCOUNTER — TELEPHONE (OUTPATIENT)
Dept: FAMILY MEDICINE CLINIC | Facility: CLINIC | Age: 69
End: 2024-09-27

## 2024-09-27 ENCOUNTER — TELEPHONE (OUTPATIENT)
Age: 69
End: 2024-09-27

## 2024-09-27 DIAGNOSIS — Z79.01 ANTICOAGULATED ON COUMADIN: Primary | ICD-10-CM

## 2024-09-27 NOTE — TELEPHONE ENCOUNTER
----- Message from AFIA Mccallum sent at 9/27/2024  8:58 AM EDT -----  Please pass on to Nikki that her heart ultrasound was normal - no overt concerns.  She has a follow-up with christy in December.     General

## 2024-09-27 NOTE — TELEPHONE ENCOUNTER
Regarding: Protime INR  ----- Message from Dahiana LUIS sent at 9/27/2024 11:53 AM EDT -----  Patient had her protime INR drawn and would like to know her protocol for medication based on the results.  Please call her at [phone], She states it is okay to leave the instructions on her voicemail if she does not answer.

## 2024-09-27 NOTE — TELEPHONE ENCOUNTER
Spoke with patient to make her aware to continue the same and have repeat lab in 3 weeks  She verbalized understanding and agreed to plan    Coumadin 5mg Mon-Friday , 7.5 mg on the weekend (labs from yesterday PT: 27.8 and INR: 2.56)

## 2024-09-27 NOTE — TELEPHONE ENCOUNTER
Pt stated Oncology Provider: Dr Alicea    Actionable item:Please review PT/INR and call patient with recommendations for her coumadin.Thank you.    Priority:  High

## 2024-10-18 LAB
INR PPP: 2.3
PROTHROMBIN TIME: 25.4 SEC (ref 12–14.6)

## 2024-10-21 ENCOUNTER — TELEPHONE (OUTPATIENT)
Dept: HEMATOLOGY ONCOLOGY | Facility: CLINIC | Age: 69
End: 2024-10-21

## 2024-10-21 DIAGNOSIS — Z79.01 ANTICOAGULATED ON COUMADIN: Primary | ICD-10-CM

## 2024-10-21 NOTE — TELEPHONE ENCOUNTER
Spoke with patient to make her aware of the below from Dr. Alicea  Confirm that she is taking the following coumadin dosage: Coumadin 5mg Mon-Friday , 7.5 mg on the weekend     She verbalized understanding and agreed to plan

## 2024-10-21 NOTE — TELEPHONE ENCOUNTER
----- Message from Ivan Alicea MD sent at 10/20/2024  6:18 PM EDT -----  Robb Bower,  No change in Coumadin dose.  Repeat test in 3 weeks.  Please ask patient how much Coumadin she is taking and please documented.  Eric Alicea  ----- Message -----  From: Jasbir Winters Orders/Results  Sent: 10/18/2024   9:24 PM EDT  To: Ivan Alicea MD

## 2024-11-13 ENCOUNTER — TELEPHONE (OUTPATIENT)
Age: 69
End: 2024-11-13

## 2024-11-13 LAB
INR PPP: 2.4
PROTHROMBIN TIME: 26.4 SEC (ref 12–14.6)

## 2024-11-13 NOTE — TELEPHONE ENCOUNTER
Patient calling from [facility], requesting script be sent over to the lab via fax as she was there now. Order faxed to [phone] as requested via epic/right fax

## 2024-11-14 ENCOUNTER — RESULTS FOLLOW-UP (OUTPATIENT)
Dept: HEMATOLOGY ONCOLOGY | Facility: CLINIC | Age: 69
End: 2024-11-14

## 2024-11-14 DIAGNOSIS — Z79.01 ANTICOAGULATED ON COUMADIN: Primary | ICD-10-CM

## 2024-11-14 NOTE — TELEPHONE ENCOUNTER
----- Message from Ivan Alicea MD sent at 11/14/2024  7:53 AM EST -----  Hi,   No change in Coumadin dose.  Check PT/INR in 3 weeks.  Thanks  Nixon  ----- Message -----  From: Jasbir Winters Orders/Results  Sent: 11/13/2024   9:49 PM EST  To: Ivan Alicea MD

## 2024-11-26 ENCOUNTER — RA CDI HCC (OUTPATIENT)
Dept: OTHER | Facility: HOSPITAL | Age: 69
End: 2024-11-26

## 2024-11-28 NOTE — TELEPHONE ENCOUNTER
Left message for patient that lab results were not in Epic  Call placed to [facility] [phone] for results  Results to be faxed to [facility] office  Spoke with Shaggy at [facility]  Verbal PT 19 5, INR 2 56 read back occurred  Please review with Dr Dillon Padron and call patient with results and Coumadin dosage instructions  No

## 2024-12-04 ENCOUNTER — OFFICE VISIT (OUTPATIENT)
Dept: FAMILY MEDICINE CLINIC | Facility: CLINIC | Age: 69
End: 2024-12-04
Payer: MEDICARE

## 2024-12-04 VITALS
WEIGHT: 293 LBS | BODY MASS INDEX: 50.02 KG/M2 | DIASTOLIC BLOOD PRESSURE: 80 MMHG | OXYGEN SATURATION: 99 % | SYSTOLIC BLOOD PRESSURE: 128 MMHG | RESPIRATION RATE: 16 BRPM | HEIGHT: 64 IN | HEART RATE: 83 BPM | TEMPERATURE: 96.7 F

## 2024-12-04 DIAGNOSIS — I10 ESSENTIAL HYPERTENSION: ICD-10-CM

## 2024-12-04 DIAGNOSIS — D68.59 HYPERCOAGULABLE STATE (HCC): ICD-10-CM

## 2024-12-04 DIAGNOSIS — I87.319 CHRONIC VENOUS HYPERTENSION W ULCERATION (HCC): ICD-10-CM

## 2024-12-04 DIAGNOSIS — I27.20 PULMONARY HTN (HCC): ICD-10-CM

## 2024-12-04 DIAGNOSIS — S81.802A WOUND OF LEFT LOWER EXTREMITY, INITIAL ENCOUNTER: ICD-10-CM

## 2024-12-04 DIAGNOSIS — E66.01 MORBID OBESITY WITH BMI OF 70 AND OVER, ADULT (HCC): ICD-10-CM

## 2024-12-04 DIAGNOSIS — E11.22 TYPE 2 DIABETES MELLITUS WITH CHRONIC KIDNEY DISEASE, WITHOUT LONG-TERM CURRENT USE OF INSULIN, UNSPECIFIED CKD STAGE (HCC): ICD-10-CM

## 2024-12-04 DIAGNOSIS — L97.909 CHRONIC VENOUS HYPERTENSION W ULCERATION (HCC): ICD-10-CM

## 2024-12-04 DIAGNOSIS — D68.9 ABNORMALITY OF COAGULATION (HCC): ICD-10-CM

## 2024-12-04 DIAGNOSIS — Z23 ENCOUNTER FOR IMMUNIZATION: ICD-10-CM

## 2024-12-04 DIAGNOSIS — E72.11 HOMOCYSTINURIA (HCC): ICD-10-CM

## 2024-12-04 DIAGNOSIS — I87.332 VENOUS HYPERTENSION, CHRONIC, WITH ULCER AND INFLAMMATION, LEFT (HCC): ICD-10-CM

## 2024-12-04 DIAGNOSIS — D68.9 BLOOD CLOTTING DISORDER (HCC): ICD-10-CM

## 2024-12-04 DIAGNOSIS — E11.9 TYPE 2 DIABETES MELLITUS WITHOUT COMPLICATION, WITHOUT LONG-TERM CURRENT USE OF INSULIN (HCC): Primary | ICD-10-CM

## 2024-12-04 DIAGNOSIS — N18.31 STAGE 3A CHRONIC KIDNEY DISEASE (HCC): ICD-10-CM

## 2024-12-04 LAB — SL AMB POCT HEMOGLOBIN AIC: 5.5 (ref ?–6.5)

## 2024-12-04 PROCEDURE — 99214 OFFICE O/P EST MOD 30 MIN: CPT

## 2024-12-04 PROCEDURE — 83036 HEMOGLOBIN GLYCOSYLATED A1C: CPT

## 2024-12-04 RX ORDER — MUPIROCIN 20 MG/G
OINTMENT TOPICAL 3 TIMES DAILY
Qty: 22 G | Refills: 1 | Status: SHIPPED | OUTPATIENT
Start: 2024-12-04

## 2024-12-04 RX ORDER — CLINDAMYCIN HYDROCHLORIDE 300 MG/1
300 CAPSULE ORAL 4 TIMES DAILY
Qty: 40 CAPSULE | Refills: 0 | Status: SHIPPED | OUTPATIENT
Start: 2024-12-04 | End: 2024-12-14

## 2024-12-04 NOTE — ASSESSMENT & PLAN NOTE
Continue follow-up with hematology/oncology.  Continue warfarin.  Educated on signs/symptoms of clotting, and is to go to ER if these present.

## 2024-12-04 NOTE — ASSESSMENT & PLAN NOTE
At goal today.  Continue amlodipine-benazepril, Lasix.  Encouraged to take home blood pressures and contact office if persistently elevated.

## 2024-12-04 NOTE — ASSESSMENT & PLAN NOTE
Stable.  Educated on conservative measures.  Contact office with worsening symptoms.  Continue follow-up with hematology/oncology and cardiology.

## 2024-12-04 NOTE — ASSESSMENT & PLAN NOTE
Lab Results   Component Value Date    EGFR 57 (L) 07/10/2024    EGFR 47 03/18/2024    EGFR 53 02/23/2024    CREATININE 1.06 07/10/2024    CREATININE 1.18 03/18/2024    CREATININE 1.07 02/23/2024     Will continue to monitor.  Avoid nephrotoxic agents.

## 2024-12-04 NOTE — ASSESSMENT & PLAN NOTE
A1c at goal today.  Will continue to monitor.  Will schedule for iris exam.  Educated on healthy diet and activity.  Continue metformin.  Lab Results   Component Value Date    HGBA1C 5.5 12/04/2024

## 2024-12-04 NOTE — PROGRESS NOTES
Name: Nikki Roach      : 1955      MRN: [id number]  Encounter Provider: Mk Reyes PA-C  Encounter Date: 2024   Encounter department: [facility]  :  Assessment & Plan  Type 2 diabetes mellitus without complication, without long-term current use of insulin (HCC)  A1c at goal today.  Will continue to monitor.  Will schedule for iris exam.  Educated on healthy diet and activity.  Continue metformin.  Lab Results   Component Value Date    HGBA1C 5.5 2024       Orders:    POCT hemoglobin A1c    Albumin / creatinine urine ratio; Future    Comprehensive metabolic panel; Future    Lipid panel; Future    CBC and differential; Future    Chronic venous hypertension w ulceration (HCC)  Stable.  Educated on conservative measures.  Contact office with worsening symptoms.  Continue follow-up with hematology/oncology and cardiology.       Essential hypertension  At goal today.  Continue amlodipine-benazepril, Lasix.  Encouraged to take home blood pressures and contact office if persistently elevated.       Pulmonary HTN (HCC)  Continue follow-up with cardiology.       Venous hypertension, chronic, with ulcer and inflammation, left (HCC)  Stable.  Educated on conservative measures.  Contact office with worsening symptoms.  Continue follow-up with hematology/oncology and cardiology.       Stage 3a chronic kidney disease (HCC)  Lab Results   Component Value Date    EGFR 57 (L) 07/10/2024    EGFR 47 2024    EGFR 53 2024    CREATININE 1.06 07/10/2024    CREATININE 1.18 2024    CREATININE 1.07 2024     Will continue to monitor.  Avoid nephrotoxic agents.      Blood clotting disorder (HCC)  Continue follow-up with hematology/oncology.  Continue warfarin.  Educated on signs/symptoms of clotting, and is to go to ER if these present.          Abnormality of coagulation (HCC)  Continue follow-up with hematology/oncology.  Continue warfarin.  Educated on signs/symptoms  of clotting, and is to go to ER if these present.       Hypercoagulable state (HCC)  Continue follow-up with hematology/oncology.  Continue warfarin.  Educated on signs/symptoms of clotting, and is to go to ER if these present.       Homocystinuria (HCC)  Stable.  Will continue to monitor.       Morbid obesity with BMI of 70 and over, adult ([facility])  Educated on healthy diet and activity.           Wound of left lower extremity, initial encounter  Will do clindamycin 4 times a day for 10 days and mupirocin.  Educated on signs/symptoms of worsening, and is to go to ER if these present.  Will refer to wound care.  Orders:    clindamycin (CLEOCIN) 300 MG capsule; Take 1 capsule (300 mg total) by mouth 4 (four) times a day for 10 days    mupirocin (BACTROBAN) 2 % ointment; Apply topically 3 (three) times a day    Ambulatory Referral to Wound Care; Future    Type 2 diabetes mellitus with chronic kidney disease, without long-term current use of insulin, unspecified CKD stage ([facility])  A1c at goal today.  Will continue to monitor.  Will schedule for iris exam.  Educated on healthy diet and activity.  Continue metformin.  Lab Results   Component Value Date    HGBA1C 5.5 12/04/2024                   History of Present Illness     Patient is a 69-year-old female presenting for follow-up.  Patient follows with hematology, cardiology, and orthopedics.  Patient was scratched by her daughters dog on her left lateral leg.  Scabbed wounds healing, but scab fell off and she now has redness and discharge.  Denies fever, chills, joint swelling, dizziness, syncope.  Does have tenderness to touch.  No other concerns today.      Review of Systems   Constitutional:  Negative for appetite change, chills, diaphoresis, fatigue and fever.   HENT:  Negative for congestion, ear discharge, ear pain, postnasal drip, rhinorrhea, sinus pressure, sinus pain, sneezing and sore throat.    Eyes:  Negative for pain, discharge, redness, itching and visual  disturbance.   Respiratory:  Negative for apnea, cough, chest tightness, shortness of breath and wheezing.    Cardiovascular:  Negative for chest pain, palpitations and leg swelling.   Gastrointestinal:  Negative for abdominal pain, blood in stool, constipation, diarrhea, nausea and vomiting.   Endocrine: Negative for cold intolerance, heat intolerance, polydipsia and polyuria.   Genitourinary:  Negative for dysuria, flank pain, frequency, hematuria and urgency.   Musculoskeletal:  Negative for arthralgias, back pain, myalgias, neck pain and neck stiffness.   Skin:  Positive for wound. Negative for color change and rash.   Allergic/Immunologic: Negative for environmental allergies and food allergies.   Neurological:  Negative for dizziness, tremors, seizures, syncope, speech difficulty, weakness, light-headedness, numbness and headaches.   Hematological:  Negative for adenopathy. Does not bruise/bleed easily.   Psychiatric/Behavioral:  Negative for agitation, confusion, decreased concentration, dysphoric mood, hallucinations, self-injury, sleep disturbance and suicidal ideas. The patient is not nervous/anxious and is not hyperactive.    All other systems reviewed and are negative.    Medical History Reviewed by provider this encounter:  Tobacco  Allergies  Meds  Problems  Med Hx  Surg Hx  Fam Hx     .  Current Outpatient Medications on File Prior to Visit   Medication Sig Dispense Refill    amLODIPine-benazepril (LOTREL 5-20) 5-20 MG per capsule Take 1 capsule by mouth daily 90 capsule 3    clotrimazole-betamethasone (LOTRISONE) 1-0.05 % cream Apply topically 2 (two) times a day 45 g 5    folic acid-pyridoxine-cyanocobalamin (Folbic) 2.5-25-2 mg Take 1 tablet by mouth daily 90 tablet 3    furosemide (LASIX) 20 mg tablet Take 1 tablet (20 mg total) by mouth daily 90 tablet 3    Hydrocortisone-Iodoquinol (Dermazene) 1-1 % CREA Apply topically 2 (two) times a day 28.4 g 5    metFORMIN (GLUCOPHAGE-XR) 500 mg 24  "hr tablet Take 4 tablets (2,000 mg total) by mouth daily with breakfast 360 tablet 3    montelukast (SINGULAIR) 10 mg tablet Take 1 tablet (10 mg total) by mouth daily at bedtime 90 tablet 3    multivitamin (THERAGRAN) TABS Take 1 tablet by mouth daily.      nystatin powder Apply topically 2 (two) times a day Apply to affected area 60 g 5    pravastatin (PRAVACHOL) 40 mg tablet Take 1 tablet (40 mg total) by mouth daily 90 tablet 3    warfarin (Coumadin) 5 mg tablet 1 tablet a day Mondays through Thursdays.  1 and half tablet a day on Fridays Saturdays and Sundays.  Coumadin dosage is subject to change depending upon PT/INR. 150 tablet 2    warfarin (COUMADIN) 5 mg tablet Take  strictly as advised 150 tablet 2    Wound Dressings (Medi-Jair Performance Plus ABD) PADS Apply 1 each topically 2 (two) times a day 48 each 3     No current facility-administered medications on file prior to visit.      Social History     Tobacco Use    Smoking status: Never    Smokeless tobacco: Never   Vaping Use    Vaping status: Never Used   Substance and Sexual Activity    Alcohol use: Never    Drug use: No    Sexual activity: Not Currently        Objective   /80   Pulse 83   Temp (!) 96.7 °F (35.9 °C)   Resp 16   Ht 5' 4\" (1.626 m)   Wt (!) 189 kg (416 lb)   LMP  (LMP Unknown)   SpO2 99%   BMI 71.41 kg/m²      Physical Exam  Vitals and nursing note reviewed.   Constitutional:       General: She is not in acute distress.     Appearance: Normal appearance. She is well-developed. She is obese. She is not ill-appearing, toxic-appearing or diaphoretic.   HENT:      Head: Normocephalic and atraumatic.      Right Ear: Tympanic membrane normal.      Left Ear: Tympanic membrane normal.      Nose: Nose normal.      Mouth/Throat:      Mouth: Mucous membranes are moist.      Pharynx: Oropharynx is clear.   Eyes:      Conjunctiva/sclera: Conjunctivae normal.      Pupils: Pupils are equal, round, and reactive to light.   Cardiovascular: "      Rate and Rhythm: Normal rate and regular rhythm.      Pulses: Normal pulses.      Heart sounds: Normal heart sounds. No murmur heard.  Pulmonary:      Effort: Pulmonary effort is normal. No respiratory distress.      Breath sounds: Normal breath sounds. No wheezing.   Chest:      Chest wall: No tenderness.   Abdominal:      General: Bowel sounds are normal.      Palpations: Abdomen is soft. There is no mass.      Tenderness: There is no abdominal tenderness.   Musculoskeletal:         General: No swelling or tenderness.      Cervical back: Normal range of motion and neck supple. No tenderness.      Right lower leg: No edema.      Left lower leg: No edema.   Lymphadenopathy:      Cervical: No cervical adenopathy.   Skin:     General: Skin is warm and dry.      Capillary Refill: Capillary refill takes less than 2 seconds.      Findings: Lesion (Left lateral lower leg wound.  Mild discharge and erythema.  2cm diameter) present. No erythema or rash.   Neurological:      General: No focal deficit present.      Mental Status: She is alert and oriented to person, place, and time. Mental status is at baseline.      Motor: No weakness.      Coordination: Coordination normal.      Gait: Gait normal.   Psychiatric:         Mood and Affect: Mood normal.         Behavior: Behavior normal.         Thought Content: Thought content normal.         Judgment: Judgment normal.

## 2024-12-04 NOTE — ASSESSMENT & PLAN NOTE
Will do clindamycin 4 times a day for 10 days and mupirocin.  Educated on signs/symptoms of worsening, and is to go to ER if these present.  Will refer to wound care.  Orders:    clindamycin (CLEOCIN) 300 MG capsule; Take 1 capsule (300 mg total) by mouth 4 (four) times a day for 10 days    mupirocin (BACTROBAN) 2 % ointment; Apply topically 3 (three) times a day    Ambulatory Referral to Wound Care; Future

## 2024-12-04 NOTE — ASSESSMENT & PLAN NOTE
A1c at goal today.  Will continue to monitor.  Will schedule for iris exam.  Educated on healthy diet and activity.  Continue metformin.  Lab Results   Component Value Date    HGBA1C 5.5 12/04/2024       Orders:    POCT hemoglobin A1c    Albumin / creatinine urine ratio; Future    Comprehensive metabolic panel; Future    Lipid panel; Future    CBC and differential; Future

## 2024-12-10 LAB
ALBUMIN SERPL-MCNC: 3.5 G/DL (ref 3.5–5.7)
ALP SERPL-CCNC: 60 U/L (ref 35–120)
ALT SERPL-CCNC: 9 U/L
ANION GAP SERPL CALCULATED.3IONS-SCNC: 10 MMOL/L (ref 3–11)
AST SERPL-CCNC: 18 U/L
BASOPHILS # BLD AUTO: 0.1 THOU/CMM (ref 0–0.1)
BASOPHILS NFR BLD AUTO: 1 %
BILIRUB SERPL-MCNC: 0.7 MG/DL (ref 0.2–1)
BUN SERPL-MCNC: 27 MG/DL (ref 7–25)
CALCIUM SERPL-MCNC: 9.2 MG/DL (ref 8.5–10.5)
CHLORIDE SERPL-SCNC: 100 MMOL/L (ref 100–109)
CHOLEST SERPL-MCNC: 146 MG/DL
CHOLEST/HDLC SERPL: 3.2 {RATIO}
CO2 SERPL-SCNC: 28 MMOL/L (ref 21–31)
CREAT SERPL-MCNC: 1.06 MG/DL (ref 0.4–1.1)
CYTOLOGY CMNT CVX/VAG CYTO-IMP: ABNORMAL
DIFFERENTIAL METHOD BLD: NORMAL
EOSINOPHIL # BLD AUTO: 0.3 THOU/CMM (ref 0–0.5)
EOSINOPHIL NFR BLD AUTO: 4 %
ERYTHROCYTE [DISTWIDTH] IN BLOOD BY AUTOMATED COUNT: 15.3 % (ref 12–16)
GFR/BSA.PRED SERPLBLD CYS-BASED-ARV: 57 ML/MIN/{1.73_M2}
GLUCOSE SERPL-MCNC: 95 MG/DL (ref 65–99)
HCT VFR BLD AUTO: 39.4 % (ref 35–43)
HDLC SERPL-MCNC: 46 MG/DL (ref 23–92)
HGB BLD-MCNC: 13.2 G/DL (ref 11.5–14.5)
INR PPP: 2.7
LDLC SERPL CALC-MCNC: 78 MG/DL
LYMPHOCYTES # BLD AUTO: 1.9 THOU/CMM (ref 1–3)
LYMPHOCYTES NFR BLD AUTO: 23 %
MCH RBC QN AUTO: 28.5 PG (ref 26–34)
MCHC RBC AUTO-ENTMCNC: 33.4 G/DL (ref 32–37)
MCV RBC AUTO: 85 FL (ref 80–100)
MONOCYTES # BLD AUTO: 0.8 THOU/CMM (ref 0.3–1)
MONOCYTES NFR BLD AUTO: 10 %
NEUTROPHILS # BLD AUTO: 5 THOU/CMM (ref 1.8–7.8)
NEUTROPHILS NFR BLD AUTO: 62 %
NONHDLC SERPL-MCNC: 100 MG/DL
PLATELET # BLD AUTO: 308 THOU/CMM (ref 140–350)
PMV BLD REES-ECKER: 9.4 FL (ref 7.5–11.3)
POTASSIUM SERPL-SCNC: 4 MMOL/L (ref 3.5–5.2)
PROT SERPL-MCNC: 7.7 G/DL (ref 6.3–8.3)
PROTHROMBIN TIME: 28.6 SEC (ref 12–14.6)
RBC # BLD AUTO: 4.62 MILL/CMM (ref 3.7–4.7)
SODIUM SERPL-SCNC: 138 MMOL/L (ref 135–145)
TRIGL SERPL-MCNC: 110 MG/DL
WBC # BLD AUTO: 8.1 THOU/CMM (ref 4–10)

## 2024-12-11 ENCOUNTER — RESULTS FOLLOW-UP (OUTPATIENT)
Dept: FAMILY MEDICINE CLINIC | Facility: CLINIC | Age: 69
End: 2024-12-11

## 2024-12-19 ENCOUNTER — OFFICE VISIT (OUTPATIENT)
Dept: WOUND CARE | Facility: CLINIC | Age: 69
End: 2024-12-19
Payer: MEDICARE

## 2024-12-19 VITALS
RESPIRATION RATE: 18 BRPM | DIASTOLIC BLOOD PRESSURE: 60 MMHG | WEIGHT: 293 LBS | HEIGHT: 63 IN | SYSTOLIC BLOOD PRESSURE: 112 MMHG | TEMPERATURE: 98 F | BODY MASS INDEX: 51.91 KG/M2 | HEART RATE: 88 BPM

## 2024-12-19 DIAGNOSIS — S81.802A TRAUMATIC OPEN WOUND OF LEFT LOWER LEG, INITIAL ENCOUNTER: Primary | ICD-10-CM

## 2024-12-19 PROCEDURE — 99213 OFFICE O/P EST LOW 20 MIN: CPT | Performed by: SURGERY

## 2024-12-19 PROCEDURE — 97597 DBRDMT OPN WND 1ST 20 CM/<: CPT | Performed by: SURGERY

## 2024-12-19 PROCEDURE — 99214 OFFICE O/P EST MOD 30 MIN: CPT | Performed by: SURGERY

## 2024-12-19 RX ORDER — LIDOCAINE 40 MG/G
CREAM TOPICAL ONCE
Status: COMPLETED | OUTPATIENT
Start: 2024-12-19 | End: 2024-12-19

## 2024-12-19 RX ADMIN — LIDOCAINE: 40 CREAM TOPICAL at 10:50

## 2024-12-19 NOTE — PATIENT INSTRUCTIONS
Orders Placed This Encounter   Procedures    Wound cleansing and dressings Traumatic Left;Lower;Anterior Leg     Left Lower Leg    Wash your hands with soap and water.  Remove old dressing, discard into plastic bag and place in trash.    Cleanse the wound with normal saline prior to applying a clean dressing. Do not use tissue or cotton balls.   Do not scrub the wound. Pat dry using gauze.  Shower yes     Apply dermagran to the wound.  Cover with gauze  Secure with amna and paper tape  Change dressing every other day      Elastic Tubular Stocking    Tubular elastic bandage: Apply from base of toes to behind the knee. Apply in AM, may remove for sleep.    Avoid prolonged standing in one place.    Elevate leg(s) above the level of the heart when sitting or as much as possible.     Home Health ordered    Follow up in 2 weeks     Standing Status:   Future     Expiration Date:   12/26/2024

## 2024-12-23 NOTE — PROGRESS NOTES
"Name: Nikki Roach      : 1955      MRN: [id number]  Encounter Provider: Farhad Méndez DO  Encounter Date: 2024   Encounter department: [facility]  :  Assessment & Plan  Traumatic open wound of left lower leg, initial encounter  Traumatic wound of the left lower extremity after a dog scratch.  Nonhealing wound after the scab fell off.  Selective incompleted to remove slough.  Will treat with Dermagran.  Follow-up in 2 weeks.    Orders:    lidocaine (LMX) 4 % cream    Wound cleansing and dressings Traumatic Left;Lower;Anterior Leg; Future    Referral to Home Health- [facility]; Future    Debridement      Notes:    Recent PCP note dated 2024 was personally reviewed by me.    Blood work:    Recent blood work to include CBC, CMP dated December 10, 2024 was personally reviewed by me.      History of Present Illness   Chief Complaint   Patient presents with    New Patient Visit     Patient reports wound to left lower leg. Scratched by dog on 24.   69-year-old female who presents today in consultation for evaluation of a wound of the left lower extremity.  This was a traumatic wound from a dog.  This was not a dog bite this was a dog scratch.  Patient states that she developed a scab but then the scab fell off and now it will not heal and he continues to drain cause her some discomfort.  She was seen by her PCP who put her on topical clindamycin and mupirocin.  No fevers or chills.  No other associate symptoms.      Here for wound follow up.    Objective   /60   Pulse 88   Temp 98 °F (36.7 °C)   Resp 18   Ht 5' 3\" (1.6 m)   Wt (!) 189 kg (416 lb)   LMP  (LMP Unknown)   BMI 73.69 kg/m²     Physical Exam  Vitals reviewed.   Constitutional:       General: She is not in acute distress.     Appearance: Normal appearance. She is not ill-appearing, toxic-appearing or diaphoretic.   HENT:      Head: Normocephalic and atraumatic.      Right Ear: " External ear normal.      Left Ear: External ear normal.   Eyes:      General: No scleral icterus.        Right eye: No discharge.         Left eye: No discharge.   Cardiovascular:      Rate and Rhythm: Normal rate.   Pulmonary:      Effort: Pulmonary effort is normal. No respiratory distress.   Musculoskeletal:         General: Signs of injury (LLE) present. No swelling. Normal range of motion.      Cervical back: Normal range of motion.      Right lower leg: Edema present.      Left lower leg: Edema present.   Skin:     General: Skin is warm.      Coloration: Skin is not jaundiced or pale.      Findings: No bruising or erythema.      Comments: Tissue but also some slough.  Selective debridement completed.   Neurological:      General: No focal deficit present.      Mental Status: She is alert and oriented to person, place, and time.      Cranial Nerves: No cranial nerve deficit.   Psychiatric:         Mood and Affect: Mood normal.         Behavior: Behavior normal.         Thought Content: Thought content normal.         Judgment: Judgment normal.       Wound 12/19/24 Traumatic Leg Left;Lower;Anterior (Active)   Wound Image   12/19/24 1047   Wound Description Slough;Yellow;Pink;Dry;Granulation tissue 12/19/24 1046   Leticia-wound Assessment Edema;Pink 12/19/24 1046   Wound Length (cm) 2 cm 12/19/24 1046   Wound Width (cm) 1.4 cm 12/19/24 1046   Wound Depth (cm) 0.1 cm 12/19/24 1046   Wound Surface Area (cm^2) 2.8 cm^2 12/19/24 1046   Wound Volume (cm^3) 0.28 cm^3 12/19/24 1046   Calculated Wound Volume (cm^3) 0.28 cm^3 12/19/24 1046   Drainage Amount Small 12/19/24 1046   Drainage Description Serosanguineous 12/19/24 1046   Non-staged Wound Description Full thickness 12/19/24 1046       Debridement   Wound 12/19/24 Traumatic Leg Left;Lower;Anterior    Universal Protocol:  procedure performed by consultantConsent: Verbal consent obtained.  Risks and benefits: risks, benefits and alternatives were discussed  Consent  "given by: patient  Time out: Immediately prior to procedure a \"time out\" was called to verify the correct patient, procedure, equipment, support staff and site/side marked as required.  Patient understanding: patient states understanding of the procedure being performed  Patient consent: the patient's understanding of the procedure matches consent given    Debridement Details  Performed by: physician  Debridement type: selective  Pain control: lidocaine 4%      Post-debridement measurements  Length (cm): 2  Width (cm): 1.4  Depth (cm): 0.1  Percent debrided: 90%  Surface Area (cm^2): 2.8  Area Debrided (cm^2): 2.52  Volume (cm^3): 0.28    Devitalized tissue debrided: biofilm and slough  Instrument(s) utilized: curette  Bleeding: small  Hemostasis obtained with: pressure  Response to treatment: procedure was tolerated well               "

## 2024-12-23 NOTE — ASSESSMENT & PLAN NOTE
Traumatic wound of the left lower extremity after a dog scratch.  Nonhealing wound after the scab fell off.  Selective incompleted to remove slough.  Will treat with Dermagran.  Follow-up in 2 weeks.    Orders:    lidocaine (LMX) 4 % cream    Wound cleansing and dressings Traumatic Left;Lower;Anterior Leg; Future    Referral to [facility]; Future    Debridement

## 2024-12-27 ENCOUNTER — HOSPITAL ENCOUNTER (OUTPATIENT)
Dept: MAMMOGRAPHY | Facility: HOSPITAL | Age: 69
Discharge: HOME | End: 2024-12-27
Attending: FAMILY MEDICINE
Payer: MEDICARE

## 2024-12-27 VITALS — HEIGHT: 63 IN | WEIGHT: 293 LBS | BODY MASS INDEX: 51.91 KG/M2

## 2024-12-27 DIAGNOSIS — Z12.31 SCREENING MAMMOGRAM FOR BREAST CANCER: ICD-10-CM

## 2024-12-27 PROCEDURE — 77067 SCR MAMMO BI INCL CAD: CPT

## 2024-12-27 PROCEDURE — 77063 BREAST TOMOSYNTHESIS BI: CPT

## 2024-12-31 ENCOUNTER — TELEPHONE (OUTPATIENT)
Age: 69
End: 2024-12-31

## 2024-12-31 LAB
ALBUMIN/CREAT UR: NORMAL
CREAT UR-MCNC: 139.7 MG/DL (ref 50–200)
INR PPP: 2.2
MICROALBUMIN UR-MCNC: <0.7 MG/DL
PROTHROMBIN TIME: 23.9 SEC (ref 12–14.6)

## 2024-12-31 NOTE — TELEPHONE ENCOUNTER
Patient is asking if her current prescription of amoxicillin is still okay to take. It was prescribed by her dentist with a fill date of 12/27/23 and a discard after date of 11/26/24    She has a dental cleaning on 01/02/25 and requires the amoxicillin because she an implant. If a new prescription is best, please send it in today, 12/31/24, as her insurance coverage changes 01/01/25 and she has a high deductible.    Patient would like a dispense quantity of #16 capsules with a refill. She will have future dental appointments as she is getting teeth pulled and would like to have the antibiotic on hand.    Reason for call:   [x] Refill   [] Prior Auth  [] Other:     Office:   [x] PCP/Provider - Richard WASHINGTON / Eric    Medication: amoxicillin    Dose/Frequency: 500mg (take 4 caps one hour prior to procedure)    Quantity: 16 capsules + 1 refill     Pharmacy: [id number]  [place] [address]     Does the patient have enough for 3 days?   [] Yes   [x] No - Send as HP to POD

## 2025-01-02 ENCOUNTER — RESULTS FOLLOW-UP (OUTPATIENT)
Dept: FAMILY MEDICINE CLINIC | Facility: CLINIC | Age: 70
End: 2025-01-02

## 2025-01-02 ENCOUNTER — OFFICE VISIT (OUTPATIENT)
Dept: WOUND CARE | Facility: CLINIC | Age: 70
End: 2025-01-02
Payer: MEDICARE

## 2025-01-02 VITALS
SYSTOLIC BLOOD PRESSURE: 112 MMHG | DIASTOLIC BLOOD PRESSURE: 70 MMHG | TEMPERATURE: 96.5 F | RESPIRATION RATE: 20 BRPM | HEART RATE: 90 BPM

## 2025-01-02 DIAGNOSIS — S81.802A TRAUMATIC OPEN WOUND OF LEFT LOWER LEG, INITIAL ENCOUNTER: Primary | ICD-10-CM

## 2025-01-02 DIAGNOSIS — K04.7 DENTAL INFECTION: Primary | ICD-10-CM

## 2025-01-02 PROCEDURE — 97597 DBRDMT OPN WND 1ST 20 CM/<: CPT | Performed by: SURGERY

## 2025-01-02 RX ORDER — LIDOCAINE 40 MG/G
CREAM TOPICAL ONCE
Status: COMPLETED | OUTPATIENT
Start: 2025-01-02 | End: 2025-01-02

## 2025-01-02 RX ORDER — AMOXICILLIN 500 MG/1
500 CAPSULE ORAL EVERY 12 HOURS SCHEDULED
Qty: 16 CAPSULE | Refills: 1 | Status: SHIPPED | OUTPATIENT
Start: 2025-01-02 | End: 2025-01-10

## 2025-01-02 RX ADMIN — LIDOCAINE: 40 CREAM TOPICAL at 08:19

## 2025-01-02 NOTE — PROGRESS NOTES
Wound Procedure Treatment Traumatic Left;Lower;Anterior Leg    Performed by: Cadence Vasquez RN  Authorized by: Farhad Méndez DO    Associated wounds:   Wound 12/19/24 Traumatic Leg Left;Lower;Anterior  Wound cleansed with:  NSS  Applied primary dressing:  Dermagran  Applied secondary dressing:  Gauze  Dressing secured with:  Vanesa and Tape  Verbal consent obtained?: Yes    Risks and benefits: Risks, benefits and alternatives were discussed    Consent given by:  Patient  Time Out:     Time out: Immediately prior to the procedure a time out was called    Patient states understanding of procedure being performed: Yes    Patient's understanding of procedure matches consent: Yes    Procedure consent matches procedure scheduled: Yes    Patient identity confirmed:  Verbally with patient  Compression -Pre-procedure details:     Sensation:  Normal    Skin color:  WNL    Laterality:  Left    Location:  Leg    Cast type:  Unna boot    Pain:  Unchanged    Sensation:  Normal    Skin color:  WNL    Patient tolerance of procedure:  Tolerated well, no immediate complications     Multi-layer wrap procedure     Before application, JAQUELIN and/or TBI determined to be adequate for healing and application of compression. Lower extremity washed prior to application of compression wrap. With the foot in dorsiflexed position, COFLEX LITE was applied as per physician orders without complications or complaint of pain.  The procedure was tolerated well. Toes warm & pink post application.  Patient provided education & reinforced to observe toes for any discoloration, swelling or tingling and instructed when to report to the Wound Center or to remove compression

## 2025-01-02 NOTE — LETTER
[facility]  [address] [address]  Phone#  [phone]  Fax#  [phone]    Patient:  Nikki Roach  YOB: 1955  Phone:  [phone]  Date of Visit:  1/2/2025    Orders Placed This Encounter   Procedures   • Wound cleansing and dressings Traumatic Left;Lower;Anterior Leg     Traumatic Left;Lower;Anterior Leg           Wash your hands with soap and water.  Remove old dressing, discard into plastic bag and place in trash.    Cleanse the wound with normal saline prior to applying a clean dressing. Do not use tissue or cotton balls.   Do not scrub the wound. Pat dry using gauze.  Shower yes          Apply dermagran to the wound.  Cover with gauze  Secure with amna and paper tape  Apply COFLEX LITE        Multi-layer compression wrap Instructions COFLEX LITE    Keep compression wrap/wraps clean and dry. If wraps are too tight and you experience numbness/tingling, call the wound center. If after hours, remove wraps or proceed to nearest E.R. and call wound center in AM.    Wrap will be changed 1 x weekly    Avoid prolonged standing in one place.    Elevate leg(s) above the level of the heart when sitting or as much as possible.       Home Health to change once weekly      Follow up in 2 weeks     Standing Status:   Future     Expected Date:   1/16/2025     Expiration Date:   1/16/2025   • Wound Procedure Treatment Traumatic Left;Lower;Anterior Leg     This order was created via procedure documentation         Electronically signed by Farhad Méndez DO

## 2025-01-02 NOTE — PROGRESS NOTES
Name: Nikki Roach      : 1955      MRN: [id number]  Encounter Provider: Farhad Méndez DO  Encounter Date: 2025   Encounter department: [facility]  :  Assessment & Plan  Traumatic open wound of left lower leg, initial encounter  Left lower extremity traumatic wound from a dog.  Wound appears healthy.  No evidence of infection.  Slough noted.  Slight debridement completed in the office.  There is noted edema.  Given the edema I think to help this along we will apply compression wrap there continue Dermagran and apply Coflex lite.  Change once a week.  Follow-up 2 weeks.    Orders:    lidocaine (LMX) 4 % cream    Wound cleansing and dressings Traumatic Left;Lower;Anterior Leg; Future    Wound Procedure Treatment Traumatic Left;Lower;Anterior Leg    Debridement        History of Present Illness   Chief Complaint   Patient presents with    Follow Up Wound Care Visit     LLE wound    69-year-old female status post/traumatic wound to the left lower extremity presents today for further follow-up evaluation and treatment.  No overall changes in her symptoms.  No significant pain.  Drainage well-controlled.  No nausea vomiting fevers chills.      Here for wound follow up.    Objective   /70   Pulse 90   Temp (!) 96.5 °F (35.8 °C)   Resp 20   LMP  (LMP Unknown)     Physical Exam  Vitals reviewed. Exam conducted with a chaperone present.   Constitutional:       Appearance: Normal appearance.   HENT:      Head: Normocephalic and atraumatic.      Right Ear: External ear normal.      Left Ear: External ear normal.   Eyes:      General: No scleral icterus.        Right eye: No discharge.         Left eye: No discharge.   Cardiovascular:      Rate and Rhythm: Normal rate.   Pulmonary:      Effort: Pulmonary effort is normal. No respiratory distress.   Musculoskeletal:         General: Swelling present. Normal range of motion.      Cervical back: Normal range of motion.       "Right lower leg: Edema present.      Left lower leg: Edema present.   Skin:     General: Skin is warm.      Coloration: Skin is not jaundiced or pale.      Comments: Open wound to left lower extremity.  Healthy granulation tissue noted.  Slough noted.  Selective debridement completed.  Healthy bleeding.   Neurological:      General: No focal deficit present.      Mental Status: She is alert and oriented to person, place, and time.      Cranial Nerves: No cranial nerve deficit.   Psychiatric:         Mood and Affect: Mood normal.         Behavior: Behavior normal.         Thought Content: Thought content normal.         Judgment: Judgment normal.       Wound 12/19/24 Traumatic Leg Left;Lower;Anterior (Active)   Wound Image   01/02/25 0812   Wound Description Yellow;Slough;Pink 01/02/25 0817   Leticia-wound Assessment Pink;Edema 01/02/25 0817   Wound Length (cm) 2.6 cm 01/02/25 0817   Wound Width (cm) 1.7 cm 01/02/25 0817   Wound Depth (cm) 0.1 cm 01/02/25 0817   Wound Surface Area (cm^2) 4.42 cm^2 01/02/25 0817   Wound Volume (cm^3) 0.442 cm^3 01/02/25 0817   Calculated Wound Volume (cm^3) 0.44 cm^3 01/02/25 0817   Change in Wound Size % -57.14 01/02/25 0817   Drainage Amount Small 01/02/25 0817   Drainage Description Serosanguineous 01/02/25 0817   Non-staged Wound Description Full thickness 01/02/25 0817       Debridement   Wound 12/19/24 Traumatic Leg Left;Lower;Anterior    Universal Protocol:  procedure performed by consultantConsent: Verbal consent obtained.  Risks and benefits: risks, benefits and alternatives were discussed  Consent given by: patient  Time out: Immediately prior to procedure a \"time out\" was called to verify the correct patient, procedure, equipment, support staff and site/side marked as required.  Patient understanding: patient states understanding of the procedure being performed  Patient consent: the patient's understanding of the procedure matches consent given  Patient identity confirmed: " verbally with patient    Debridement Details  Performed by: physician  Debridement type: selective      Post-debridement measurements  Length (cm): 2.6  Width (cm): 1.7  Depth (cm): 0.1  Percent debrided: 100%  Surface Area (cm^2): 4.42  Area Debrided (cm^2): 4.42  Volume (cm^3): 0.44    Devitalized tissue debrided: biofilm and slough  Instrument(s) utilized: curette  Bleeding: small  Hemostasis obtained with: pressure  Response to treatment: procedure was tolerated well

## 2025-01-02 NOTE — ASSESSMENT & PLAN NOTE
Left lower extremity traumatic wound from a dog.  Wound appears healthy.  No evidence of infection.  Slough noted.  Slight debridement completed in the office.  There is noted edema.  Given the edema I think to help this along we will apply compression wrap there continue Dermagran and apply Coflex lite.  Change once a week.  Follow-up 2 weeks.    Orders:    lidocaine (LMX) 4 % cream    Wound cleansing and dressings Traumatic Left;Lower;Anterior Leg; Future    Wound Procedure Treatment Traumatic Left;Lower;Anterior Leg    Debridement

## 2025-01-02 NOTE — PATIENT INSTRUCTIONS
Orders Placed This Encounter   Procedures    Wound cleansing and dressings Traumatic Left;Lower;Anterior Leg     Traumatic Left;Lower;Anterior Leg           Wash your hands with soap and water.  Remove old dressing, discard into plastic bag and place in trash.    Cleanse the wound with normal saline prior to applying a clean dressing. Do not use tissue or cotton balls.   Do not scrub the wound. Pat dry using gauze.  Shower yes          Apply dermagran to the wound.  Cover with gauze  Secure with amna and paper tape  Apply COFLEX LITE        Multi-layer compression wrap Instructions COFLEX LITE    Keep compression wrap/wraps clean and dry. If wraps are too tight and you experience numbness/tingling, call the wound center. If after hours, remove wraps or proceed to nearest E.R. and call wound center in AM.    Wrap will be changed 1 x weekly    Avoid prolonged standing in one place.    Elevate leg(s) above the level of the heart when sitting or as much as possible.       Home Health to change once weekly      Follow up in 2 weeks     Standing Status:   Future     Expected Date:   1/16/2025     Expiration Date:   1/16/2025    Wound Procedure Treatment Traumatic Left;Lower;Anterior Leg     This order was created via procedure documentation    Debridement Traumatic Left;Lower;Anterior Leg     This order was created via procedure documentation

## 2025-01-03 ENCOUNTER — TELEPHONE (OUTPATIENT)
Age: 70
End: 2025-01-03

## 2025-01-03 NOTE — TELEPHONE ENCOUNTER
Left message for patient relaying message from Mariza IRIZARRY. Requested call back with any questions. [facility] number provided.

## 2025-01-03 NOTE — TELEPHONE ENCOUNTER
Call received from patient, questioning her coumadin dose. She had her INR checked on 12/31. Currently her dose is:    M-F 5 mg , Saturday and Sunday 7.5mg     Said she wont be home to answer her phone but would like us to leave a VM with instructions.

## 2025-01-16 ENCOUNTER — OFFICE VISIT (OUTPATIENT)
Dept: WOUND CARE | Facility: CLINIC | Age: 70
End: 2025-01-16
Payer: MEDICARE

## 2025-01-16 VITALS
HEART RATE: 76 BPM | TEMPERATURE: 97.1 F | RESPIRATION RATE: 18 BRPM | DIASTOLIC BLOOD PRESSURE: 76 MMHG | SYSTOLIC BLOOD PRESSURE: 112 MMHG

## 2025-01-16 DIAGNOSIS — S81.802A TRAUMATIC OPEN WOUND OF LEFT LOWER LEG, INITIAL ENCOUNTER: Primary | ICD-10-CM

## 2025-01-16 PROCEDURE — 99212 OFFICE O/P EST SF 10 MIN: CPT | Performed by: SURGERY

## 2025-01-16 PROCEDURE — 99213 OFFICE O/P EST LOW 20 MIN: CPT | Performed by: SURGERY

## 2025-01-16 NOTE — PROGRESS NOTES
Wound Procedure Treatment Traumatic Left;Lower;Anterior Leg    Performed by: Cadence Vasquez RN  Authorized by: Farhad Méndez DO    Associated wounds:   Wound 12/19/24 Traumatic Leg Left;Lower;Anterior  Wound cleansed with:  Soap and water  Applied to periwound:  Moisture lotion  Dressing secured with:  Elastic tubular stocking and Size G       never used

## 2025-01-16 NOTE — ASSESSMENT & PLAN NOTE
Left lower extremity traumatic wound appears healed.  There is superficial scab noted.  No active drainage.  Periwound is intact and healthy.  Advised patient to keep her legs elevated when possible.  She should also continue Tubigrip.  Follow-up as needed.    Orders:    Wound cleansing and dressings Traumatic Left;Lower;Anterior Leg; Future    Wound Procedure Treatment Traumatic Left;Lower;Anterior Leg

## 2025-01-16 NOTE — PATIENT INSTRUCTIONS
Orders Placed This Encounter   Procedures    Wound cleansing and dressings Traumatic Left;Lower;Anterior Leg     Traumatic Left;Lower;Anterior Leg - HEALED                          Wear Circaids if able, if not:    Wear Elastic Tubular Stocking - Spandagrip G    Tubular elastic bandage: Apply from base of toes to behind the knee. Apply in AM, may remove for sleep.    Avoid prolonged standing in one place.    Elevate leg(s) above the level of the heart when sitting or as much as possible.       Follow up if needed. If any new drainage or new issues arise, don't hesitate to call us here at the wound clinic.     Standing Status:   Future     Expected Date:   1/23/2025     Expiration Date:   1/23/2025

## 2025-01-18 NOTE — PROGRESS NOTES
Name: Nikki Roach      : 1955      MRN: [id number]  Encounter Provider: Farhad Méndez DO  Encounter Date: 2025   Encounter department: [facility]  :  Assessment & Plan  Traumatic open wound of left lower leg, initial encounter  Left lower extremity traumatic wound appears healed.  There is superficial scab noted.  No active drainage.  Periwound is intact and healthy.  Advised patient to keep her legs elevated when possible.  She should also continue Tubigrip.  Follow-up as needed.    Orders:    Wound cleansing and dressings Traumatic Left;Lower;Anterior Leg; Future    Wound Procedure Treatment Traumatic Left;Lower;Anterior Leg        History of Present Illness   Chief Complaint   Patient presents with    Follow Up Wound Care Visit     LLE wound    61-year-old female with multiple medical comorbidities presents today for follow-up evaluation and treatment of a left lower extremity traumatic open wound.  Overall doing well.  She continues to keep her legs elevated and wear her compression stockings.  Patient states no drainage.  There is there is a scab noted.  No other associated symptoms at this time.      Here for wound follow up.    Objective   /76   Pulse 76   Temp (!) 97.1 °F (36.2 °C)   Resp 18   LMP  (LMP Unknown)     Physical Exam  Vitals reviewed. Exam conducted with a chaperone present.   Constitutional:       General: She is not in acute distress.     Appearance: Normal appearance. She is not ill-appearing, toxic-appearing or diaphoretic.   HENT:      Head: Normocephalic and atraumatic.      Right Ear: External ear normal.      Left Ear: External ear normal.   Eyes:      General: No scleral icterus.        Right eye: No discharge.         Left eye: No discharge.   Cardiovascular:      Rate and Rhythm: Normal rate.   Pulmonary:      Effort: Pulmonary effort is normal. No respiratory distress.   Musculoskeletal:         General: Swelling present. Normal  range of motion.      Cervical back: Normal range of motion.      Right lower leg: Edema present.      Left lower leg: Edema present.   Skin:     General: Skin is warm.      Coloration: Skin is not jaundiced or pale.      Findings: No bruising or erythema.      Comments: Left lower extremity will open wound with noted superficial scab.  No fluctuance.  No erythema.  Wound appears to be healed.   Neurological:      General: No focal deficit present.      Mental Status: She is alert and oriented to person, place, and time.      Cranial Nerves: No cranial nerve deficit.   Psychiatric:         Mood and Affect: Mood normal.         Behavior: Behavior normal.         Thought Content: Thought content normal.         Judgment: Judgment normal.       Wound 12/19/24 Traumatic Leg Left;Lower;Anterior (Active)   Wound Image   01/02/25 0812   Wound Description Brown;Epithelialization 01/16/25 0906   Leticia-wound Assessment Pink;Edema 01/16/25 0906   Wound Length (cm) 0 cm 01/16/25 0906   Wound Width (cm) 0 cm 01/16/25 0906   Wound Depth (cm) 0 cm 01/16/25 0906   Wound Surface Area (cm^2) 0 cm^2 01/16/25 0906   Wound Volume (cm^3) 0 cm^3 01/16/25 0906   Calculated Wound Volume (cm^3) 0 cm^3 01/16/25 0906   Change in Wound Size % 100 01/16/25 0906   Drainage Amount None 01/16/25 0906   Drainage Description Serosanguineous 01/02/25 0817   Non-staged Wound Description Not applicable 01/16/25 0906       Procedures

## 2025-01-27 LAB
INR PPP: 2.3
PROTHROMBIN TIME: 24.8 SEC (ref 12–14.6)

## 2025-01-30 ENCOUNTER — TELEPHONE (OUTPATIENT)
Age: 70
End: 2025-01-30

## 2025-01-30 DIAGNOSIS — Z79.01 ANTICOAGULATED ON COUMADIN: Primary | ICD-10-CM

## 2025-01-30 NOTE — TELEPHONE ENCOUNTER
Patient had INR completed Monday 1/27.    INR 2.3  Takes Coumadin 5mg Mon-Fri and 7.5mg on Sat and Sun    She would like a call back if any dose changes are needed and when she should repeat her INR next.    Best callback number: [phone], ok to leave detailed VM

## 2025-01-30 NOTE — TELEPHONE ENCOUNTER
Spoke with Nikki and reviewed recommendations made by Dr. Alicea per Cheli, RN's note. She is agreeable with plan and will have INR rechecked in 3 weeks

## 2025-02-14 LAB
INR PPP: 2
PROTHROMBIN TIME: 22.9 SEC (ref 12–14.6)

## 2025-02-16 ENCOUNTER — RA CDI HCC (OUTPATIENT)
Dept: OTHER | Facility: HOSPITAL | Age: 70
End: 2025-02-16

## 2025-02-17 ENCOUNTER — TELEPHONE (OUTPATIENT)
Age: 70
End: 2025-02-17

## 2025-02-17 NOTE — TELEPHONE ENCOUNTER
1st attempt-Left message for patient/daughter to call back. [facility] number provided [phone].

## 2025-02-17 NOTE — TELEPHONE ENCOUNTER
2ND Attempt    Attempted to c/b pt and daughter to review instructions.    Left a VM to c/b on the [facility].    Awaiting c/b

## 2025-02-17 NOTE — TELEPHONE ENCOUNTER
Call from Nikki. Gave her the message re: her coumadin dose and next blood draw. She had no questions at this time.

## 2025-02-17 NOTE — TELEPHONE ENCOUNTER
Call from Nikki, stating that she had a PT/INR drawn on 2/14 and would like further instructions on her coumadin dose and when she should have her blood retested.

## 2025-02-19 DIAGNOSIS — E78.5 HYPERLIPIDEMIA, UNSPECIFIED HYPERLIPIDEMIA TYPE: ICD-10-CM

## 2025-02-19 DIAGNOSIS — Z86.718 HISTORY OF DVT (DEEP VEIN THROMBOSIS): ICD-10-CM

## 2025-02-19 DIAGNOSIS — D68.59 HYPERCOAGULABLE STATE (HCC): ICD-10-CM

## 2025-02-19 DIAGNOSIS — Z86.711 HISTORY OF PULMONARY EMBOLISM: ICD-10-CM

## 2025-02-19 DIAGNOSIS — R79.83 HOMOCYSTEINEMIA: ICD-10-CM

## 2025-02-19 DIAGNOSIS — E28.2 PCOS (POLYCYSTIC OVARIAN SYNDROME): ICD-10-CM

## 2025-02-19 DIAGNOSIS — I10 ESSENTIAL HYPERTENSION: ICD-10-CM

## 2025-02-19 DIAGNOSIS — Z79.01 ANTICOAGULATED ON COUMADIN: ICD-10-CM

## 2025-02-19 DIAGNOSIS — T78.40XA ALLERGY, INITIAL ENCOUNTER: ICD-10-CM

## 2025-02-19 RX ORDER — FOLIC ACID-PYRIDOXINE-CYANOCOBALAMIN TAB 2.5-25-2 MG 2.5-25-2 MG
1 TAB ORAL DAILY
Qty: 90 TABLET | Refills: 1 | Status: SHIPPED | OUTPATIENT
Start: 2025-02-19

## 2025-02-19 NOTE — TELEPHONE ENCOUNTER
Reason for call:     Pharmacy change due to insurance    [x] Refill   [] Prior Auth  [] Other:     Office:   [x] PCP/Provider - Eric  [] Specialty/Provider -     Medication:   Amlodipine 5-20, 1 qd, 90  Furosemide 20 mg, 1 qd, 90  Metformin 500 mg, 4 tab qd, 360  Montelukast 10 mg, 1 qd, 90  Pravastatin 40 mg, 1 qd, 90  Warfarin 5 mg, 150 qty    Pharmacy:   [facility]    Does the patient have enough for 3 days?   [x] Yes   [] No - Send as HP to POD

## 2025-02-19 NOTE — TELEPHONE ENCOUNTER
Reason for call:   [x] Refill   [] Prior Auth  [] Other:     Office:   [x] PCP/Provider - Eric  [] Specialty/Provider -     Medication:   Folbic 2.5-25.2 mg, 1 qd, 90    Pharmacy:   [facility]      Does the patient have enough for 3 days?   [] Yes   [x] No - Send as HP to POD

## 2025-02-20 RX ORDER — FUROSEMIDE 20 MG/1
20 TABLET ORAL DAILY
Qty: 90 TABLET | Refills: 1 | Status: SHIPPED | OUTPATIENT
Start: 2025-02-20

## 2025-02-20 RX ORDER — METFORMIN HYDROCHLORIDE 500 MG/1
2000 TABLET, EXTENDED RELEASE ORAL
Qty: 360 TABLET | Refills: 1 | Status: SHIPPED | OUTPATIENT
Start: 2025-02-20

## 2025-02-20 RX ORDER — AMLODIPINE AND BENAZEPRIL HYDROCHLORIDE 5; 20 MG/1; MG/1
1 CAPSULE ORAL DAILY
Qty: 90 CAPSULE | Refills: 1 | Status: SHIPPED | OUTPATIENT
Start: 2025-02-20

## 2025-02-20 RX ORDER — MONTELUKAST SODIUM 10 MG/1
10 TABLET ORAL
Qty: 90 TABLET | Refills: 1 | Status: SHIPPED | OUTPATIENT
Start: 2025-02-20

## 2025-02-20 RX ORDER — FOLIC ACID-PYRIDOXINE-CYANOCOBALAMIN TAB 2.5-25-2 MG 2.5-25-2 MG
1 TAB ORAL DAILY
Qty: 90 TABLET | Refills: 3 | OUTPATIENT
Start: 2025-02-20

## 2025-02-20 RX ORDER — WARFARIN SODIUM 5 MG/1
TABLET ORAL
Qty: 150 TABLET | Refills: 1 | Status: SHIPPED | OUTPATIENT
Start: 2025-02-20

## 2025-02-20 RX ORDER — PRAVASTATIN SODIUM 40 MG
40 TABLET ORAL DAILY
Qty: 90 TABLET | Refills: 1 | Status: SHIPPED | OUTPATIENT
Start: 2025-02-20

## 2025-02-25 ENCOUNTER — OFFICE VISIT (OUTPATIENT)
Dept: FAMILY MEDICINE CLINIC | Facility: CLINIC | Age: 70
End: 2025-02-25
Payer: MEDICARE

## 2025-02-25 VITALS
DIASTOLIC BLOOD PRESSURE: 80 MMHG | OXYGEN SATURATION: 98 % | WEIGHT: 293 LBS | HEART RATE: 84 BPM | BODY MASS INDEX: 51.91 KG/M2 | HEIGHT: 63 IN | TEMPERATURE: 95.3 F | SYSTOLIC BLOOD PRESSURE: 110 MMHG

## 2025-02-25 DIAGNOSIS — D68.59 HYPERCOAGULABLE STATE (HCC): ICD-10-CM

## 2025-02-25 DIAGNOSIS — E66.01 MORBID OBESITY WITH BMI OF 70 AND OVER, ADULT (HCC): ICD-10-CM

## 2025-02-25 DIAGNOSIS — E72.11 HOMOCYSTINURIA (HCC): ICD-10-CM

## 2025-02-25 DIAGNOSIS — E78.5 HYPERLIPIDEMIA, UNSPECIFIED HYPERLIPIDEMIA TYPE: ICD-10-CM

## 2025-02-25 DIAGNOSIS — I27.20 PULMONARY HTN (HCC): ICD-10-CM

## 2025-02-25 DIAGNOSIS — R31.0 GROSS HEMATURIA: ICD-10-CM

## 2025-02-25 DIAGNOSIS — N18.31 STAGE 3A CHRONIC KIDNEY DISEASE (HCC): ICD-10-CM

## 2025-02-25 DIAGNOSIS — I87.332 VENOUS HYPERTENSION, CHRONIC, WITH ULCER AND INFLAMMATION, LEFT (HCC): ICD-10-CM

## 2025-02-25 DIAGNOSIS — L97.909 CHRONIC VENOUS HYPERTENSION W ULCERATION (HCC): Primary | ICD-10-CM

## 2025-02-25 DIAGNOSIS — E11.9 TYPE 2 DIABETES MELLITUS WITHOUT COMPLICATION, WITHOUT LONG-TERM CURRENT USE OF INSULIN (HCC): ICD-10-CM

## 2025-02-25 DIAGNOSIS — D68.9 ABNORMALITY OF COAGULATION (HCC): ICD-10-CM

## 2025-02-25 DIAGNOSIS — I87.319 CHRONIC VENOUS HYPERTENSION W ULCERATION (HCC): Primary | ICD-10-CM

## 2025-02-25 DIAGNOSIS — D68.9 BLOOD CLOTTING DISORDER (HCC): ICD-10-CM

## 2025-02-25 DIAGNOSIS — E11.22 TYPE 2 DIABETES MELLITUS WITH CHRONIC KIDNEY DISEASE, WITHOUT LONG-TERM CURRENT USE OF INSULIN, UNSPECIFIED CKD STAGE (HCC): ICD-10-CM

## 2025-02-25 PROCEDURE — G2211 COMPLEX E/M VISIT ADD ON: HCPCS

## 2025-02-25 PROCEDURE — 99214 OFFICE O/P EST MOD 30 MIN: CPT

## 2025-02-25 NOTE — PROGRESS NOTES
Name: Nikki Roach      : 1955      MRN: [id number]  Encounter Provider: Mk Reyes PA-C  Encounter Date: 2025   Encounter department: [facility]  :  Assessment & Plan  Chronic venous hypertension w ulceration (HCC)  Stable.  Contact office with recurrence and will refer to wound care.       Pulmonary HTN (HCC)  Stable.  Continue follow-up with cardiology.       Venous hypertension, chronic, with ulcer and inflammation, left (HCC)  Stable.  Contact office with recurrence, and will refer to wound care..       Type 2 diabetes mellitus with chronic kidney disease, without long-term current use of insulin, unspecified CKD stage ([facility])    Lab Results   Component Value Date    HGBA1C 5.5 2024     A1c at goal.  Will continue metformin.  Patient offered diabetes eye exam and foot exam today.  Patient declines.  Educated patient on importance of this.  Patient declines.  Contact office if he would like to complete these.  Does see eye doctor and podiatry.  Will try to get reports from the specialists.  Orders:    Hemoglobin A1C; Future    Comprehensive metabolic panel; Future    CBC and differential; Future    Type 2 diabetes mellitus without complication, without long-term current use of insulin ([facility])    A1c at goal.  Will continue metformin.  Patient offered diabetes eye exam and foot exam today.  Patient declines.  Educated patient on importance of this.  Patient declines.  Contact office if he would like to complete these.  Does see eye doctor and podiatry.  Will try to get reports from the specialists.  Lab Results   Component Value Date    HGBA1C 5.5 2024       Orders:    Hemoglobin A1C; Future    Comprehensive metabolic panel; Future    CBC and differential; Future    Morbid obesity with BMI of 70 and over, adult ([facility])  Educated on healthy diet and activity.         Stage 3a chronic kidney disease ([facility])  Lab Results   Component Value Date    EGFR 57 (L) 12/10/2024     EGFR 57 (L) 12/10/2024    EGFR 57 (L) 07/10/2024    CREATININE 1.06 12/10/2024    CREATININE 1.06 12/10/2024    CREATININE 1.06 07/10/2024     Will continue to monitor.  Avoid nephrotoxic agents.  Orders:    Comprehensive metabolic panel; Future    Blood clotting disorder (HCC)  Continue follow-up and plan per hematology.  Continue warfarin.  Go to ER with signs/symptoms of clot.       Abnormality of coagulation (HCC)  Continue follow-up and plan per hematology.  Continue warfarin.  Go to ER with signs/symptoms of clot.       Hypercoagulable state (HCC)  Continue follow-up and plan per hematology.  Continue warfarin.  Go to ER with signs/symptoms of clot.       Homocystinuria (HCC)  Stable.  Continue current.       Gross hematuria  Will check urinalysis.  Will follow-up pending results.  If hematuria, will refer to urology and place ultrasound.  Orders:    Urinalysis with microscopic; Future    Hyperlipidemia, unspecified hyperlipidemia type  Educated on healthy diet and activity.  Will continue to monitor.  Orders:    Lipid Panel with Direct LDL reflex; Future           History of Present Illness   Patient is a 69-year-old female presenting for follow-up.  Patient states that urine was pink twice.  Last happened over 1 month ago.  Denies dysuria, frequency, urgency, abdominal pain, flank pain.  Never had this happen before.  Continue to hydrate afterwards, and symptoms went away.  Patient has no other questions or concerns today.      Review of Systems   Constitutional:  Negative for appetite change, chills, diaphoresis, fatigue and fever.   HENT:  Negative for congestion, ear discharge, ear pain, postnasal drip, rhinorrhea, sinus pressure, sinus pain, sneezing and sore throat.    Eyes:  Negative for pain, discharge, redness, itching and visual disturbance.   Respiratory:  Negative for apnea, cough, chest tightness, shortness of breath and wheezing.    Cardiovascular:  Negative for chest pain, palpitations and  "leg swelling.   Gastrointestinal:  Negative for abdominal pain, blood in stool, constipation, diarrhea, nausea and vomiting.   Endocrine: Negative for cold intolerance, heat intolerance, polydipsia and polyuria.   Genitourinary:  Positive for hematuria. Negative for dysuria, flank pain, frequency and urgency.   Musculoskeletal:  Negative for arthralgias, back pain, myalgias, neck pain and neck stiffness.   Skin:  Negative for color change and rash.   Allergic/Immunologic: Negative for environmental allergies and food allergies.   Neurological:  Negative for dizziness, tremors, seizures, syncope, speech difficulty, weakness, light-headedness, numbness and headaches.   Hematological:  Negative for adenopathy. Does not bruise/bleed easily.   Psychiatric/Behavioral:  Negative for agitation, confusion, decreased concentration, dysphoric mood, hallucinations, self-injury, sleep disturbance and suicidal ideas. The patient is not nervous/anxious and is not hyperactive.    All other systems reviewed and are negative.      Objective   /80 (BP Location: Left arm, Patient Position: Sitting)   Pulse 84   Temp (!) 95.3 °F (35.2 °C) (Tympanic)   Ht 5' 3\" (1.6 m)   Wt (!) 190 kg (418 lb)   LMP  (LMP Unknown)   SpO2 98%   BMI 74.05 kg/m²      Physical Exam  Vitals and nursing note reviewed.   Constitutional:       General: She is not in acute distress.     Appearance: Normal appearance. She is well-developed. She is obese. She is not ill-appearing, toxic-appearing or diaphoretic.   HENT:      Head: Normocephalic and atraumatic.      Right Ear: Tympanic membrane normal.      Left Ear: Tympanic membrane normal.      Nose: Nose normal.      Mouth/Throat:      Mouth: Mucous membranes are moist.      Pharynx: Oropharynx is clear.   Eyes:      Conjunctiva/sclera: Conjunctivae normal.      Pupils: Pupils are equal, round, and reactive to light.   Neck:      Vascular: No carotid bruit.   Cardiovascular:      Rate and Rhythm: " Normal rate and regular rhythm.      Pulses: Normal pulses.      Heart sounds: Normal heart sounds. No murmur heard.  Pulmonary:      Effort: Pulmonary effort is normal. No respiratory distress.      Breath sounds: Normal breath sounds. No wheezing.   Chest:      Chest wall: No tenderness.   Abdominal:      General: Bowel sounds are normal.      Palpations: Abdomen is soft. There is no mass.      Tenderness: There is no abdominal tenderness.   Musculoskeletal:         General: No swelling or tenderness.      Cervical back: Normal range of motion and neck supple. No tenderness.      Right lower leg: No edema.      Left lower leg: No edema.   Lymphadenopathy:      Cervical: No cervical adenopathy.   Skin:     General: Skin is warm and dry.      Capillary Refill: Capillary refill takes less than 2 seconds.      Findings: No erythema, lesion or rash.   Neurological:      General: No focal deficit present.      Mental Status: She is alert and oriented to person, place, and time. Mental status is at baseline.      Motor: No weakness.      Coordination: Coordination normal.      Gait: Gait normal.   Psychiatric:         Mood and Affect: Mood normal.         Behavior: Behavior normal.         Thought Content: Thought content normal.         Judgment: Judgment normal.

## 2025-02-25 NOTE — ASSESSMENT & PLAN NOTE
Continue follow-up and plan per hematology.  Continue warfarin.  Go to ER with signs/symptoms of clot.

## 2025-02-25 NOTE — ASSESSMENT & PLAN NOTE
Lab Results   Component Value Date    EGFR 57 (L) 12/10/2024    EGFR 57 (L) 12/10/2024    EGFR 57 (L) 07/10/2024    CREATININE 1.06 12/10/2024    CREATININE 1.06 12/10/2024    CREATININE 1.06 07/10/2024     Will continue to monitor.  Avoid nephrotoxic agents.  Orders:    Comprehensive metabolic panel; Future

## 2025-02-25 NOTE — ASSESSMENT & PLAN NOTE
Educated on healthy diet and activity.  Will continue to monitor.  Orders:    Lipid Panel with Direct LDL reflex; Future

## 2025-02-25 NOTE — PROGRESS NOTES
Pt got very annoyed when I told her she needs a diabetic foot exam.  She asked why and I told her because of her diabetes.  She said she's only pre-diabetic.  Says she goes to foot doctor regularly to get her toe nails cut and she said he would tell her if anything is wrong.  Pt refused Diabetic Foot Exam.    Pt refused Diabetic Eye Exam.  She said last time she was here she said she doesn't want it done.  States she sees Dr. Henry in [place].  I will call to see if they have any records of a Diabetic Eye Exam. I called Dr. Henry's office and they said a diagnosis of diabetes is not listed in her chart so  they don't  do them on her.

## 2025-02-25 NOTE — ASSESSMENT & PLAN NOTE
A1c at goal.  Will continue metformin.  Patient offered diabetes eye exam and foot exam today.  Patient declines.  Educated patient on importance of this.  Patient declines.  Contact office if he would like to complete these.  Does see eye doctor and podiatry.  Will try to get reports from the specialists.  Lab Results   Component Value Date    HGBA1C 5.5 12/04/2024       Orders:    Hemoglobin A1C; Future    Comprehensive metabolic panel; Future    CBC and differential; Future

## 2025-02-25 NOTE — ASSESSMENT & PLAN NOTE
Lab Results   Component Value Date    HGBA1C 5.5 12/04/2024     A1c at goal.  Will continue metformin.  Patient offered diabetes eye exam and foot exam today.  Patient declines.  Educated patient on importance of this.  Patient declines.  Contact office if he would like to complete these.  Does see eye doctor and podiatry.  Will try to get reports from the specialists.  Orders:    Hemoglobin A1C; Future    Comprehensive metabolic panel; Future    CBC and differential; Future

## 2025-03-20 LAB
INR PPP: 2.3
PROTHROMBIN TIME: 24.8 SEC (ref 12–14.6)

## 2025-03-21 ENCOUNTER — TELEPHONE (OUTPATIENT)
Age: 70
End: 2025-03-21

## 2025-03-21 NOTE — TELEPHONE ENCOUNTER
Called and spoke with patient.  Reviewed note from Cheli regarding Dr. Alicea's recommendations.  Patient verbalized understanding.

## 2025-03-25 LAB
LEFT EYE DIABETIC RETINOPATHY: NORMAL
RIGHT EYE DIABETIC RETINOPATHY: NORMAL

## 2025-04-28 DIAGNOSIS — I10 ESSENTIAL HYPERTENSION: ICD-10-CM

## 2025-04-29 LAB
INR PPP: 2.4
PROTHROMBIN TIME: 25.3 SEC (ref 12–14.6)

## 2025-04-29 RX ORDER — AMLODIPINE AND BENAZEPRIL HYDROCHLORIDE 5; 20 MG/1; MG/1
1 CAPSULE ORAL DAILY
Qty: 90 CAPSULE | Refills: 1 | OUTPATIENT
Start: 2025-04-29

## 2025-05-01 ENCOUNTER — TELEPHONE (OUTPATIENT)
Age: 70
End: 2025-05-01

## 2025-05-01 DIAGNOSIS — Z79.01 ANTICOAGULATED ON COUMADIN: Primary | ICD-10-CM

## 2025-05-01 NOTE — TELEPHONE ENCOUNTER
Return call placed to patient and VM left with recommendations. [facility] number left to call back if she has any more questions.

## 2025-05-01 NOTE — TELEPHONE ENCOUNTER
Call received from patient. Requesting her INR be reviewed and called back with coumadin dosing. If she doesn't answer requesting that we leave a VM with the instructions.

## 2025-05-21 LAB
INR PPP: 2.6
PROTHROMBIN TIME: 27.5 SEC (ref 12–14.6)

## 2025-05-22 ENCOUNTER — RESULTS FOLLOW-UP (OUTPATIENT)
Dept: HEMATOLOGY ONCOLOGY | Facility: CLINIC | Age: 70
End: 2025-05-22

## 2025-05-27 ENCOUNTER — OFFICE VISIT (OUTPATIENT)
Dept: FAMILY MEDICINE CLINIC | Facility: CLINIC | Age: 70
End: 2025-05-27
Payer: MEDICARE

## 2025-05-27 VITALS
BODY MASS INDEX: 51.91 KG/M2 | HEIGHT: 63 IN | HEART RATE: 81 BPM | DIASTOLIC BLOOD PRESSURE: 58 MMHG | SYSTOLIC BLOOD PRESSURE: 128 MMHG | OXYGEN SATURATION: 95 % | TEMPERATURE: 97.3 F | WEIGHT: 293 LBS

## 2025-05-27 DIAGNOSIS — D68.9 ABNORMALITY OF COAGULATION (HCC): ICD-10-CM

## 2025-05-27 DIAGNOSIS — Z86.711 HISTORY OF PULMONARY EMBOLISM: ICD-10-CM

## 2025-05-27 DIAGNOSIS — Z00.00 MEDICARE ANNUAL WELLNESS VISIT, SUBSEQUENT: Primary | ICD-10-CM

## 2025-05-27 DIAGNOSIS — E78.5 HYPERLIPIDEMIA, UNSPECIFIED HYPERLIPIDEMIA TYPE: ICD-10-CM

## 2025-05-27 DIAGNOSIS — Z91.81 HISTORY OF FALLING: ICD-10-CM

## 2025-05-27 DIAGNOSIS — I10 ESSENTIAL HYPERTENSION: ICD-10-CM

## 2025-05-27 DIAGNOSIS — I87.332 VENOUS HYPERTENSION, CHRONIC, WITH ULCER AND INFLAMMATION, LEFT (HCC): ICD-10-CM

## 2025-05-27 DIAGNOSIS — Z79.01 ANTICOAGULATED ON COUMADIN: ICD-10-CM

## 2025-05-27 DIAGNOSIS — L97.909 CHRONIC VENOUS HYPERTENSION W ULCERATION (HCC): ICD-10-CM

## 2025-05-27 DIAGNOSIS — E66.01 MORBID OBESITY WITH BMI OF 70 AND OVER, ADULT (HCC): ICD-10-CM

## 2025-05-27 DIAGNOSIS — E11.22 TYPE 2 DIABETES MELLITUS WITH CHRONIC KIDNEY DISEASE, WITHOUT LONG-TERM CURRENT USE OF INSULIN, UNSPECIFIED CKD STAGE (HCC): ICD-10-CM

## 2025-05-27 DIAGNOSIS — T78.40XA ALLERGY, INITIAL ENCOUNTER: ICD-10-CM

## 2025-05-27 DIAGNOSIS — E72.11 HOMOCYSTINURIA (HCC): ICD-10-CM

## 2025-05-27 DIAGNOSIS — N18.31 STAGE 3A CHRONIC KIDNEY DISEASE (HCC): ICD-10-CM

## 2025-05-27 DIAGNOSIS — D68.9 BLOOD CLOTTING DISORDER (HCC): ICD-10-CM

## 2025-05-27 DIAGNOSIS — D68.59 HYPERCOAGULABLE STATE (HCC): ICD-10-CM

## 2025-05-27 DIAGNOSIS — E28.2 PCOS (POLYCYSTIC OVARIAN SYNDROME): ICD-10-CM

## 2025-05-27 DIAGNOSIS — Z86.718 HISTORY OF DVT (DEEP VEIN THROMBOSIS): ICD-10-CM

## 2025-05-27 DIAGNOSIS — I27.20 PULMONARY HTN (HCC): ICD-10-CM

## 2025-05-27 DIAGNOSIS — I87.319 CHRONIC VENOUS HYPERTENSION W ULCERATION (HCC): ICD-10-CM

## 2025-05-27 DIAGNOSIS — E11.9 TYPE 2 DIABETES MELLITUS WITHOUT COMPLICATION, WITHOUT LONG-TERM CURRENT USE OF INSULIN (HCC): ICD-10-CM

## 2025-05-27 DIAGNOSIS — G47.33 OBSTRUCTIVE SLEEP APNEA ON CPAP: ICD-10-CM

## 2025-05-27 LAB — SL AMB POCT HEMOGLOBIN AIC: 5.6 (ref ?–6.5)

## 2025-05-27 PROCEDURE — 99214 OFFICE O/P EST MOD 30 MIN: CPT

## 2025-05-27 PROCEDURE — 83036 HEMOGLOBIN GLYCOSYLATED A1C: CPT

## 2025-05-27 PROCEDURE — G0439 PPPS, SUBSEQ VISIT: HCPCS

## 2025-05-27 PROCEDURE — G2211 COMPLEX E/M VISIT ADD ON: HCPCS

## 2025-05-27 NOTE — ASSESSMENT & PLAN NOTE
Lab Results   Component Value Date    EGFR 57 (L) 12/10/2024    EGFR 57 (L) 12/10/2024    EGFR 57 (L) 07/10/2024    CREATININE 1.06 12/10/2024    CREATININE 1.06 12/10/2024    CREATININE 1.06 07/10/2024     Will continue to monitor avoid nephrotoxic agents  Orders:    Comprehensive metabolic panel; Future

## 2025-05-27 NOTE — ASSESSMENT & PLAN NOTE
Lab Results   Component Value Date    HGBA1C 5.6 05/27/2025     A1c at goal. Will continue metformin. Patient offered diabetes eye exam and foot exam today. Patient declines. Educated patient on importance of this. Patient declines. Contact office if would like to complete these. Does see eye doctor and podiatry. Will try to get reports from the specialists.   Orders:    CBC and differential; Future    Lipid Panel with Direct LDL reflex; Future    Comprehensive metabolic panel; Future    Hemoglobin A1C; Future

## 2025-05-27 NOTE — ASSESSMENT & PLAN NOTE
Continue follow-up and plan per hematology.  Continue warfarin.  Go to ER with signs/symptoms of clot.        Orders:    CBC and differential; Future

## 2025-05-27 NOTE — PROGRESS NOTES
Name: Nikki Roach      : 1955      MRN: [id number]  Encounter Provider: Mk Reyes PA-C  Encounter Date: 2025   Encounter department: [facility]  :  Assessment & Plan  Medicare annual wellness visit, subsequent         Chronic venous hypertension w ulceration (HCC)  Stable.  Contact office with recurrence and will refer to wound care.       Pulmonary HTN (HCC)  Stable.  Continue follow-up with cardiology.       Essential hypertension  At goal today.  Continue amlodipine-benazepril, Lasix.  Encouraged to take home blood pressures and contact office if persistently elevated.          Venous hypertension, chronic, with ulcer and inflammation, left (HCC)  Stable.  Contact office with recurrence, and will refer to wound care..          Obstructive sleep apnea on CPAP  Utilizes CPAP.  Contact office with symptom recurrence.       Type 2 diabetes mellitus with chronic kidney disease, without long-term current use of insulin, unspecified CKD stage ([facility])    Lab Results   Component Value Date    HGBA1C 5.6 2025     A1c at goal. Will continue metformin. Patient offered diabetes eye exam and foot exam today. Patient declines. Educated patient on importance of this. Patient declines. Contact office if would like to complete these. Does see eye doctor and podiatry. Will try to get reports from the specialists.   Orders:    POCT hemoglobin A1c    CBC and differential; Future    Lipid Panel with Direct LDL reflex; Future    Comprehensive metabolic panel; Future    Hemoglobin A1C; Future    Type 2 diabetes mellitus without complication, without long-term current use of insulin ([facility])    Lab Results   Component Value Date    HGBA1C 5.6 2025     A1c at goal. Will continue metformin. Patient offered diabetes eye exam and foot exam today. Patient declines. Educated patient on importance of this. Patient declines. Contact office if would like to complete these. Does see eye doctor  and podiatry. Will try to get reports from the specialists.   Orders:    CBC and differential; Future    Lipid Panel with Direct LDL reflex; Future    Comprehensive metabolic panel; Future    Hemoglobin A1C; Future    Stage 3a chronic kidney disease (HCC)  Lab Results   Component Value Date    EGFR 57 (L) 12/10/2024    EGFR 57 (L) 12/10/2024    EGFR 57 (L) 07/10/2024    CREATININE 1.06 12/10/2024    CREATININE 1.06 12/10/2024    CREATININE 1.06 07/10/2024     Will continue to monitor avoid nephrotoxic agents  Orders:    Comprehensive metabolic panel; Future    Blood clotting disorder (HCC)  Continue follow-up and plan per hematology.  Continue warfarin.  Go to ER with signs/symptoms of clot.        Orders:    CBC and differential; Future    Abnormality of coagulation (HCC)  Continue follow-up and plan per hematology.  Continue warfarin.  Go to ER with signs/symptoms of clot.        Orders:    CBC and differential; Future    Hypercoagulable state (HCC)  Continue follow-up and plan per hematology.  Continue warfarin.  Go to ER with signs/symptoms of clot.        Orders:    CBC and differential; Future    Homocystinuria (HCC)  Stable.  Continue current.       Hyperlipidemia, unspecified hyperlipidemia type  Educated on healthy diet and activity.  Will continue to monitor.  Orders:    Lipid Panel with Direct LDL reflex; Future    Morbid obesity with BMI of 70 and over, adult (HCC)  Educated on healthy diet and activity.         History of falling  1 month ago.  Patient declines any imaging.  Contact office if willing to complete these.  Educated on fall risk/prevention.         Depression Screening and Follow-up Plan: Patient was screened for depression during today's encounter. They screened negative with a PHQ-2 score of 0.      Falls Plan of Care: balance, strength, and gait training instructions were provided.     Urinary Incontinence Plan of Care: counseling topics discussed: limit alcohol, caffeine, spicy foods,  and acidic foods, limiting fluid intake 3-4 hours before bed and weight loss.       Preventive health issues were discussed with patient, and age appropriate screening tests were ordered as noted in patient's After Visit Summary. Personalized health advice and appropriate referrals for health education or preventive services given if needed, as noted in patient's After Visit Summary.    History of Present Illness     Patient is a 69-year-old female.  Did have a fall 1 month ago.  Did not go to the emergency department.  Denies any deformity, residual pain, head trauma, numbness/tingling, facial drooping, abnormalities of gait, slurring of speech.  Has been following with chiropractor.       Patient Care Team:  Mk Reyes PA-C as PCP - General (Family Medicine)  Ivan Alicea MD (Hematology and Oncology)  Farhad Shaw MD (Cardiology)  [facility] (Orthopedic Surgery)  Erin Ward RN as  (Care Coordination)    Review of Systems   Constitutional:  Negative for appetite change, chills, diaphoresis, fatigue and fever.   HENT:  Negative for congestion, ear discharge, ear pain, postnasal drip, rhinorrhea, sinus pressure, sinus pain, sneezing and sore throat.    Eyes:  Negative for pain, discharge, redness, itching and visual disturbance.   Respiratory:  Negative for apnea, cough, chest tightness, shortness of breath and wheezing.    Cardiovascular:  Negative for chest pain, palpitations and leg swelling.   Gastrointestinal:  Negative for abdominal pain, blood in stool, constipation, diarrhea, nausea and vomiting.   Endocrine: Negative for cold intolerance, heat intolerance, polydipsia and polyuria.   Genitourinary:  Negative for dysuria, flank pain, frequency, hematuria and urgency.   Musculoskeletal:  Positive for back pain. Negative for arthralgias, myalgias, neck pain and neck stiffness.   Skin:  Negative for color change and rash.   Allergic/Immunologic: Negative for environmental  allergies and food allergies.   Neurological:  Negative for dizziness, tremors, seizures, syncope, speech difficulty, weakness, light-headedness, numbness and headaches.   Hematological:  Negative for adenopathy. Does not bruise/bleed easily.   Psychiatric/Behavioral:  Negative for agitation, confusion, decreased concentration, dysphoric mood, hallucinations, self-injury, sleep disturbance and suicidal ideas. The patient is not nervous/anxious and is not hyperactive.    All other systems reviewed and are negative.    Medical History Reviewed by provider this encounter:       Annual Wellness Visit Questionnaire       Health Risk Assessment:   Patient rates overall health as good. Patient feels that their physical health rating is same. Patient is satisfied with their life. Eyesight was rated as slightly better. Hearing was rated as same. Patient feels that their emotional and mental health rating is same. Patients states they are never, rarely angry. Patient states they are sometimes unusually tired/fatigued. Pain experienced in the last 7 days has been a lot. Patient's pain rating has been 8/10. Patient states that she has experienced no weight loss or gain in last 6 months.     Depression Screening:   PHQ-2 Score: 0      Fall Risk Screening:   In the past year, patient has experienced: history of falling in past year    Number of falls: 1  Injured during fall?: Yes    Feels unsteady when standing or walking?: Yes    Worried about falling?: Yes      Urinary Incontinence Screening:   Patient has leaked urine accidently in the last six months.     Home Safety:  Patient does not have trouble with stairs inside or outside of their home. Patient has working smoke alarms and has no working carbon monoxide detector. Home safety hazards include: none.     Nutrition:   Current diet is Regular.     Medications:   Patient is not currently taking any over-the-counter supplements. Patient is able to manage medications.      Activities of Daily Living (ADLs)/Instrumental Activities of Daily Living (IADLs):   Walk and transfer into and out of bed and chair?: Yes  Dress and groom yourself?: Yes    Bathe or shower yourself?: Yes    Feed yourself? Yes  Do your laundry/housekeeping?: Yes  Manage your money, pay your bills and track your expenses?: Yes  Make your own meals?: Yes    Do your own shopping?: Yes    Previous Hospitalizations:   Any hospitalizations or ED visits within the last 12 months?: No      Cognitive Screening:   Provider or family/friend/caregiver concerned regarding cognition?: No    Preventive Screenings      Cardiovascular Screening:    General: Screening Not Indicated and History Lipid Disorder      Diabetes Screening:     General: Screening Not Indicated and History Diabetes      Colorectal Cancer Screening:     General: Screening Current      Breast Cancer Screening:     General: Screening Current      Cervical Cancer Screening:    General: Screening Not Indicated      Osteoporosis Screening:    General: Screening Not Indicated and History Osteoporosis      Lung Cancer Screening:     General: Screening Not Indicated      Hepatitis C Screening:    General: Screening Current    Immunizations:    - Risks/benefits immunizations discussed      Screening, Brief Intervention, and Referral to Treatment (SBIRT)     Screening  Typical number of drinks in a day: 0  Typical number of drinks in a week: 0  Interpretation: Low risk drinking behavior.    AUDIT-C Screenin) How often did you have a drink containing alcohol in the past year? never  2) How many drinks did you have on a typical day when you were drinking in the past year? 0  3) How often did you have 6 or more drinks on one occasion in the past year? never    AUDIT-C Score: 0  Interpretation: Score 0-2 (female): Negative screen for alcohol misuse    Single Item Drug Screening:  How often have you used an illegal drug (including marijuana) or a prescription  "medication for non-medical reasons in the past year? never    Single Item Drug Screen Score: 0  Interpretation: Negative screen for possible drug use disorder    Brief Intervention  Alcohol & drug use screenings were reviewed. No concerns regarding substance use disorder identified.     Other Counseling Topics:   Car/seat belt/driving safety, skin self-exam, sunscreen and calcium and vitamin D intake and regular weightbearing exercise.     Social Drivers of Health     Financial Resource Strain: Low Risk  (5/20/2024)    Overall Financial Resource Strain (CARDIA)     Difficulty of Paying Living Expenses: Not hard at all   Food Insecurity: No Food Insecurity (5/27/2025)    Nursing - Inadequate Food Risk Classification     Worried About Running Out of Food in the Last Year: Never true     Ran Out of Food in the Last Year: Never true   Transportation Needs: No Transportation Needs (5/27/2025)    PRAPARE - Transportation     Lack of Transportation (Medical): No     Lack of Transportation (Non-Medical): No   Housing Stability: Unknown (5/27/2025)    Housing Stability Vital Sign     Number of Times Moved in the Last Year: 1     Homeless in the Last Year: No   Utilities: Not At Risk (5/27/2025)    [facility] Utilities     Threatened with loss of utilities: No     No results found.    Objective   /58   Pulse 81   Temp (!) 97.3 °F (36.3 °C) (Tympanic)   Ht 5' 3\" (1.6 m)   Wt (!) 188 kg (414 lb 6.4 oz)   LMP  (LMP Unknown)   SpO2 95%   BMI 73.41 kg/m²     Physical Exam  Vitals and nursing note reviewed.   Constitutional:       General: She is not in acute distress.     Appearance: Normal appearance. She is well-developed. She is obese. She is not ill-appearing, toxic-appearing or diaphoretic.   HENT:      Head: Normocephalic and atraumatic.      Right Ear: Tympanic membrane normal.      Left Ear: Tympanic membrane normal.      Nose: Nose normal.      Mouth/Throat:      Mouth: Mucous membranes are moist.      Pharynx: " Oropharynx is clear.     Eyes:      Conjunctiva/sclera: Conjunctivae normal.      Pupils: Pupils are equal, round, and reactive to light.     Neck:      Vascular: No carotid bruit.     Cardiovascular:      Rate and Rhythm: Normal rate and regular rhythm.      Pulses: Normal pulses.      Heart sounds: Normal heart sounds. No murmur heard.  Pulmonary:      Effort: Pulmonary effort is normal. No respiratory distress.      Breath sounds: Normal breath sounds. No wheezing.   Chest:      Chest wall: No tenderness.   Abdominal:      General: Bowel sounds are normal.      Palpations: Abdomen is soft. There is no mass.      Tenderness: There is no abdominal tenderness.     Musculoskeletal:         General: No swelling or tenderness. Normal range of motion.      Cervical back: Normal range of motion and neck supple. No tenderness.      Right lower leg: No edema.      Left lower leg: No edema.   Lymphadenopathy:      Cervical: No cervical adenopathy.     Skin:     General: Skin is warm and dry.      Capillary Refill: Capillary refill takes less than 2 seconds.      Findings: No erythema, lesion or rash.     Neurological:      General: No focal deficit present.      Mental Status: She is alert and oriented to person, place, and time. Mental status is at baseline.      Motor: No weakness.      Coordination: Coordination normal.      Gait: Gait normal.     Psychiatric:         Mood and Affect: Mood normal.         Behavior: Behavior normal.         Thought Content: Thought content normal.         Judgment: Judgment normal.

## 2025-05-27 NOTE — ASSESSMENT & PLAN NOTE
Lab Results   Component Value Date    HGBA1C 5.6 05/27/2025     A1c at goal. Will continue metformin. Patient offered diabetes eye exam and foot exam today. Patient declines. Educated patient on importance of this. Patient declines. Contact office if would like to complete these. Does see eye doctor and podiatry. Will try to get reports from the specialists.   Orders:    POCT hemoglobin A1c    CBC and differential; Future    Lipid Panel with Direct LDL reflex; Future    Comprehensive metabolic panel; Future    Hemoglobin A1C; Future

## 2025-05-28 RX ORDER — FUROSEMIDE 20 MG/1
20 TABLET ORAL DAILY
Qty: 90 TABLET | Refills: 1 | Status: SHIPPED | OUTPATIENT
Start: 2025-05-28

## 2025-05-28 RX ORDER — PRAVASTATIN SODIUM 40 MG
40 TABLET ORAL DAILY
Qty: 90 TABLET | Refills: 1 | Status: SHIPPED | OUTPATIENT
Start: 2025-05-28

## 2025-05-28 RX ORDER — MONTELUKAST SODIUM 10 MG/1
10 TABLET ORAL
Qty: 90 TABLET | Refills: 1 | Status: SHIPPED | OUTPATIENT
Start: 2025-05-28

## 2025-05-28 RX ORDER — METFORMIN HYDROCHLORIDE 500 MG/1
2000 TABLET, EXTENDED RELEASE ORAL
Qty: 360 TABLET | Refills: 1 | Status: SHIPPED | OUTPATIENT
Start: 2025-05-28

## 2025-05-28 RX ORDER — WARFARIN SODIUM 5 MG/1
TABLET ORAL
Qty: 102 TABLET | Refills: 1 | Status: SHIPPED | OUTPATIENT
Start: 2025-05-28

## 2025-06-24 ENCOUNTER — TELEPHONE (OUTPATIENT)
Age: 70
End: 2025-06-24

## 2025-06-24 DIAGNOSIS — Z79.01 ANTICOAGULATED ON COUMADIN: Primary | ICD-10-CM

## 2025-06-24 DIAGNOSIS — D68.9 ABNORMALITY OF COAGULATION (HCC): ICD-10-CM

## 2025-06-24 DIAGNOSIS — Z86.718 HISTORY OF DVT (DEEP VEIN THROMBOSIS): ICD-10-CM

## 2025-06-24 DIAGNOSIS — D68.59 HYPERCOAGULABLE STATE (HCC): ICD-10-CM

## 2025-06-24 NOTE — TELEPHONE ENCOUNTER
Call out to patient and reviewed recommendation from Dr. Alicea to continue current dose and recheck INR next week.   Left vm with recommendations and call back number.

## 2025-06-24 NOTE — TELEPHONE ENCOUNTER
Received a phone call from patient.  Patient had labs drawn on 6/18 and INR=2.8.  Patient is currently taking Coumadin 5 mg Monday-Friday and 7.5 mg Saturday & Sunday.  Please call patient with any updates.

## 2025-07-02 ENCOUNTER — TELEPHONE (OUTPATIENT)
Age: 70
End: 2025-07-02

## 2025-07-02 NOTE — TELEPHONE ENCOUNTER
Pt calling in regarding her INR, she had her labs completed yesterday and calling to see what her dose instructions should be, thank you.

## 2025-07-02 NOTE — TELEPHONE ENCOUNTER
Returned call to the patient, I spoke with Nikki, I relayed message from Wanda Martin PA-C to continue same does and recheck in one month. She confirmed understanding and thanked me.

## 2025-07-07 DIAGNOSIS — Z86.711 HISTORY OF PULMONARY EMBOLISM: ICD-10-CM

## 2025-07-07 DIAGNOSIS — Z86.718 HISTORY OF DVT (DEEP VEIN THROMBOSIS): ICD-10-CM

## 2025-07-07 DIAGNOSIS — D68.9 ABNORMALITY OF COAGULATION (HCC): Primary | ICD-10-CM

## 2025-07-08 DIAGNOSIS — Z86.718 HISTORY OF DVT (DEEP VEIN THROMBOSIS): ICD-10-CM

## 2025-07-08 DIAGNOSIS — R79.83 HOMOCYSTEINEMIA: ICD-10-CM

## 2025-07-08 DIAGNOSIS — D68.59 HYPERCOAGULABLE STATE (HCC): ICD-10-CM

## 2025-07-08 NOTE — TELEPHONE ENCOUNTER
Reason for call:   [x] Refill   [] Prior Auth  [] Other:     Office:   [x] PCP/Provider - NEIDA WASHINGTON   [] Specialty/Provider -     Medication: folic acid-pyridoxine-cyanocobalamin (Folbic) 2.5-25-2 mg     Dose/Frequency: 1 tablet, Daily     Quantity: 90    Pharmacy: [id number]    Local Pharmacy   Does the patient have enough for 3 days?   [x] Yes   [] No - Send as HP to POD    Mail Away Pharmacy   Does the patient have enough for 10 days?   [] Yes   [] No - Send as HP to POD

## 2025-07-10 ENCOUNTER — TELEPHONE (OUTPATIENT)
Dept: HEMATOLOGY ONCOLOGY | Facility: CLINIC | Age: 70
End: 2025-07-10

## 2025-07-10 DIAGNOSIS — Z86.718 HISTORY OF DVT (DEEP VEIN THROMBOSIS): Primary | ICD-10-CM

## 2025-07-10 RX ORDER — FOLIC ACID-PYRIDOXINE-CYANOCOBALAMIN TAB 2.5-25-2 MG 2.5-25-2 MG
1 TAB ORAL DAILY
Qty: 90 TABLET | Refills: 0 | Status: SHIPPED | OUTPATIENT
Start: 2025-07-10

## 2025-07-10 NOTE — TELEPHONE ENCOUNTER
Left message for patient in regards to obtaining labwork prior to upcoming appointment with Dr. Alicea on 7/14.  Advised patient labs are non fasting and in system.  Advised patient to call office back if they are unable to obtain labwork prior to appointment.  [facility] number provided.

## 2025-07-11 LAB
ALBUMIN SERPL-MCNC: 3.6 G/DL (ref 3.5–5.7)
ALP SERPL-CCNC: 68 U/L (ref 35–120)
ALT SERPL-CCNC: 10 U/L
ANION GAP SERPL CALCULATED.3IONS-SCNC: 9 MMOL/L (ref 3–11)
AST SERPL-CCNC: 20 U/L
BASOPHILS # BLD AUTO: 0.1 THOU/CMM (ref 0–0.1)
BASOPHILS NFR BLD AUTO: 1 %
BILIRUB SERPL-MCNC: 0.5 MG/DL (ref 0.2–1)
BUN SERPL-MCNC: 28 MG/DL (ref 7–25)
CALCIUM SERPL-MCNC: 9.5 MG/DL (ref 8.5–10.5)
CHLORIDE SERPL-SCNC: 103 MMOL/L (ref 100–109)
CO2 SERPL-SCNC: 28 MMOL/L (ref 21–31)
CREAT SERPL-MCNC: 1.42 MG/DL (ref 0.4–1.1)
CYTOLOGY CMNT CVX/VAG CYTO-IMP: ABNORMAL
DIFFERENTIAL METHOD BLD: NORMAL
EOSINOPHIL # BLD AUTO: 0.3 THOU/CMM (ref 0–0.5)
EOSINOPHIL NFR BLD AUTO: 3 %
ERYTHROCYTE [DISTWIDTH] IN BLOOD BY AUTOMATED COUNT: 15 % (ref 12–16)
GFR/BSA.PRED SERPLBLD CYS-BASED-ARV: 40 ML/MIN/{1.73_M2}
GLUCOSE SERPL-MCNC: 123 MG/DL (ref 65–99)
HCT VFR BLD AUTO: 39.1 % (ref 35–43)
HGB BLD-MCNC: 13 G/DL (ref 11.5–14.5)
LYMPHOCYTES # BLD AUTO: 2.7 THOU/CMM (ref 1–3)
LYMPHOCYTES NFR BLD AUTO: 28 %
MCH RBC QN AUTO: 29 PG (ref 26–34)
MCHC RBC AUTO-ENTMCNC: 33.1 G/DL (ref 32–37)
MCV RBC AUTO: 88 FL (ref 80–100)
MONOCYTES # BLD AUTO: 0.7 THOU/CMM (ref 0.3–1)
MONOCYTES NFR BLD AUTO: 8 %
NEUTROPHILS # BLD AUTO: 6 THOU/CMM (ref 1.8–7.8)
NEUTROPHILS NFR BLD AUTO: 60 %
PLATELET # BLD AUTO: 348 THOU/CMM (ref 140–350)
PMV BLD REES-ECKER: 9.3 FL (ref 7.5–11.3)
POTASSIUM SERPL-SCNC: 4.1 MMOL/L (ref 3.5–5.2)
PROT SERPL-MCNC: 7.5 G/DL (ref 6.3–8.3)
RBC # BLD AUTO: 4.46 MILL/CMM (ref 3.7–4.7)
SODIUM SERPL-SCNC: 140 MMOL/L (ref 135–145)
WBC # BLD AUTO: 9.8 THOU/CMM (ref 4–10)

## 2025-07-14 ENCOUNTER — OFFICE VISIT (OUTPATIENT)
Dept: HEMATOLOGY ONCOLOGY | Facility: CLINIC | Age: 70
End: 2025-07-14
Payer: MEDICARE

## 2025-07-14 VITALS
SYSTOLIC BLOOD PRESSURE: 140 MMHG | HEART RATE: 91 BPM | TEMPERATURE: 98.1 F | OXYGEN SATURATION: 95 % | WEIGHT: 293 LBS | DIASTOLIC BLOOD PRESSURE: 78 MMHG | BODY MASS INDEX: 75.64 KG/M2

## 2025-07-14 DIAGNOSIS — D68.59 HYPERCOAGULABLE STATE (HCC): Primary | ICD-10-CM

## 2025-07-14 DIAGNOSIS — R79.89 ELEVATED SERUM CREATININE: ICD-10-CM

## 2025-07-14 DIAGNOSIS — Z79.01 ANTICOAGULATED ON COUMADIN: ICD-10-CM

## 2025-07-14 DIAGNOSIS — Z86.718 HISTORY OF DVT (DEEP VEIN THROMBOSIS): ICD-10-CM

## 2025-07-14 DIAGNOSIS — Z95.828 PRESENCE OF IVC FILTER: ICD-10-CM

## 2025-07-14 DIAGNOSIS — R79.83 HOMOCYSTEINEMIA: ICD-10-CM

## 2025-07-14 DIAGNOSIS — Z86.79 HISTORY OF HYPERTENSION: ICD-10-CM

## 2025-07-14 DIAGNOSIS — Z86.39 HISTORY OF DIABETES MELLITUS: ICD-10-CM

## 2025-07-14 DIAGNOSIS — Z86.39 HISTORY OF HYPERLIPIDEMIA: ICD-10-CM

## 2025-07-14 DIAGNOSIS — Z86.711 HISTORY OF PULMONARY EMBOLISM: ICD-10-CM

## 2025-07-14 PROCEDURE — G2211 COMPLEX E/M VISIT ADD ON: HCPCS | Performed by: INTERNAL MEDICINE

## 2025-07-14 PROCEDURE — 99214 OFFICE O/P EST MOD 30 MIN: CPT | Performed by: INTERNAL MEDICINE

## 2025-07-14 NOTE — ASSESSMENT & PLAN NOTE
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      Patient on lifelong anticoagulation with coumadin, INR remains within goal of 2-3 ; denies any abnormal bleeding and aware of food and drugs interactions with coumadin. (Currently taking 5 mg Monday - Friday, and 7.5 mg on Saturday and Sunday).     Patient takes daily folic acid-pyridoxine-cyanocobalamin (Folbic) 2.5-25-2 mg.     Patient has the marcus IVC filter in place. Today we discussed that it may cause complications e.g. filter migration, fracture, thrombosis and to consider its removal given she is on therapeutic anticoagulation now, and doing well with INR within goal with no evidence of recurrent thrombosis since the even in 2007. Patient agrees and referral to IR is requested.

## 2025-07-14 NOTE — ASSESSMENT & PLAN NOTE
Overall within goal and aware of food/drug interaction with Coumdin  Orders:    Protime-INR; Standing    CBC and differential; Standing    Comprehensive metabolic panel; Standing

## 2025-07-15 PROBLEM — R79.89 ELEVATED SERUM CREATININE: Status: ACTIVE | Noted: 2025-07-15

## 2025-07-15 PROBLEM — Z86.39 HISTORY OF DIABETES MELLITUS: Status: ACTIVE | Noted: 2025-07-15

## 2025-07-15 PROBLEM — Z86.79 HISTORY OF HYPERTENSION: Status: ACTIVE | Noted: 2025-07-15

## 2025-07-23 LAB
ALBUMIN SERPL-MCNC: 3.5 G/DL (ref 3.5–5.7)
ALP SERPL-CCNC: 62 U/L (ref 35–120)
ALT SERPL-CCNC: 10 U/L
ANION GAP SERPL CALCULATED.3IONS-SCNC: 11 MMOL/L (ref 3–11)
AST SERPL-CCNC: 22 U/L
BASOPHILS # BLD AUTO: 0.1 THOU/CMM (ref 0–0.1)
BASOPHILS NFR BLD AUTO: 1 %
BILIRUB SERPL-MCNC: 0.4 MG/DL (ref 0.2–1)
BUN SERPL-MCNC: 22 MG/DL (ref 7–25)
CALCIUM SERPL-MCNC: 8.9 MG/DL (ref 8.5–10.5)
CHLORIDE SERPL-SCNC: 103 MMOL/L (ref 100–109)
CO2 SERPL-SCNC: 28 MMOL/L (ref 21–31)
CREAT SERPL-MCNC: 1.22 MG/DL (ref 0.4–1.1)
CYTOLOGY CMNT CVX/VAG CYTO-IMP: ABNORMAL
DIFFERENTIAL METHOD BLD: ABNORMAL
EOSINOPHIL # BLD AUTO: 0.2 THOU/CMM (ref 0–0.5)
EOSINOPHIL NFR BLD AUTO: 3 %
ERYTHROCYTE [DISTWIDTH] IN BLOOD BY AUTOMATED COUNT: 15.4 % (ref 12–16)
GFR/BSA.PRED SERPLBLD CYS-BASED-ARV: 48 ML/MIN/{1.73_M2}
GLUCOSE SERPL-MCNC: 108 MG/DL (ref 65–99)
HCT VFR BLD AUTO: 37.7 % (ref 35–43)
HGB BLD-MCNC: 12.7 G/DL (ref 11.5–14.5)
LYMPHOCYTES # BLD AUTO: 2.1 THOU/CMM (ref 1–3)
LYMPHOCYTES NFR BLD AUTO: 22 %
MCH RBC QN AUTO: 28.9 PG (ref 26–34)
MCHC RBC AUTO-ENTMCNC: 33.7 G/DL (ref 32–37)
MCV RBC AUTO: 86 FL (ref 80–100)
MONOCYTES # BLD AUTO: 0.8 THOU/CMM (ref 0.3–1)
MONOCYTES NFR BLD AUTO: 9 %
NEUTROPHILS # BLD AUTO: 6.2 THOU/CMM (ref 1.8–7.8)
NEUTROPHILS NFR BLD AUTO: 65 %
PLATELET # BLD AUTO: 366 THOU/CMM (ref 140–350)
PMV BLD REES-ECKER: 8.7 FL (ref 7.5–11.3)
POTASSIUM SERPL-SCNC: 4.4 MMOL/L (ref 3.5–5.2)
PROT SERPL-MCNC: 7.5 G/DL (ref 6.3–8.3)
RBC # BLD AUTO: 4.4 MILL/CMM (ref 3.7–4.7)
SODIUM SERPL-SCNC: 142 MMOL/L (ref 135–145)
WBC # BLD AUTO: 9.5 THOU/CMM (ref 4–10)

## 2025-08-01 ENCOUNTER — TELEPHONE (OUTPATIENT)
Age: 70
End: 2025-08-01

## 2025-08-01 ENCOUNTER — TELEPHONE (OUTPATIENT)
Dept: HEMATOLOGY ONCOLOGY | Facility: CLINIC | Age: 70
End: 2025-08-01

## 2025-08-01 ENCOUNTER — TELEPHONE (OUTPATIENT)
Dept: INTERVENTIONAL RADIOLOGY/VASCULAR | Facility: CLINIC | Age: 70
End: 2025-08-01

## 2025-08-19 DIAGNOSIS — Z79.01 ANTICOAGULATED ON COUMADIN: ICD-10-CM

## 2025-08-19 DIAGNOSIS — E28.2 PCOS (POLYCYSTIC OVARIAN SYNDROME): ICD-10-CM

## 2025-08-19 DIAGNOSIS — I10 ESSENTIAL HYPERTENSION: ICD-10-CM

## 2025-08-19 DIAGNOSIS — Z86.718 HISTORY OF DVT (DEEP VEIN THROMBOSIS): ICD-10-CM

## 2025-08-19 DIAGNOSIS — Z86.711 HISTORY OF PULMONARY EMBOLISM: ICD-10-CM

## 2025-08-19 DIAGNOSIS — E78.5 HYPERLIPIDEMIA, UNSPECIFIED HYPERLIPIDEMIA TYPE: ICD-10-CM

## 2025-08-19 DIAGNOSIS — T78.40XA ALLERGY, INITIAL ENCOUNTER: ICD-10-CM

## 2025-08-21 RX ORDER — FUROSEMIDE 20 MG/1
20 TABLET ORAL DAILY
Qty: 90 TABLET | Refills: 1 | Status: SHIPPED | OUTPATIENT
Start: 2025-08-21

## 2025-08-21 RX ORDER — PRAVASTATIN SODIUM 40 MG
40 TABLET ORAL DAILY
Qty: 90 TABLET | Refills: 1 | Status: SHIPPED | OUTPATIENT
Start: 2025-08-21

## 2025-08-21 RX ORDER — AMLODIPINE AND BENAZEPRIL HYDROCHLORIDE 5; 20 MG/1; MG/1
1 CAPSULE ORAL DAILY
Qty: 90 CAPSULE | Refills: 1 | Status: SHIPPED | OUTPATIENT
Start: 2025-08-21

## 2025-08-21 RX ORDER — MONTELUKAST SODIUM 10 MG/1
10 TABLET ORAL
Qty: 90 TABLET | Refills: 1 | Status: SHIPPED | OUTPATIENT
Start: 2025-08-21

## 2025-08-21 RX ORDER — WARFARIN SODIUM 5 MG/1
TABLET ORAL
Qty: 102 TABLET | Refills: 1 | Status: SHIPPED | OUTPATIENT
Start: 2025-08-21

## 2025-08-21 RX ORDER — METFORMIN HYDROCHLORIDE 500 MG/1
2000 TABLET, EXTENDED RELEASE ORAL
Qty: 360 TABLET | Refills: 1 | Status: SHIPPED | OUTPATIENT
Start: 2025-08-21